# Patient Record
Sex: FEMALE | Race: BLACK OR AFRICAN AMERICAN | NOT HISPANIC OR LATINO | URBAN - METROPOLITAN AREA
[De-identification: names, ages, dates, MRNs, and addresses within clinical notes are randomized per-mention and may not be internally consistent; named-entity substitution may affect disease eponyms.]

---

## 2017-03-31 ENCOUNTER — HOSPITAL ENCOUNTER (OUTPATIENT)
Dept: GENERAL RADIOLOGY | Facility: HOSPITAL | Age: 72
Discharge: HOME | End: 2017-03-31
Attending: INTERNAL MEDICINE | Admitting: INTERNAL MEDICINE

## 2017-04-01 ENCOUNTER — CONVERSION ENCOUNTER (OUTPATIENT)
Dept: VASCULAR SURGERY | Facility: CLINIC | Age: 72
End: 2017-04-01

## 2017-04-01 LAB
ALBUMIN SERPL-MCNC: 3.7 G/DL (ref 3.6–5.1)
ALBUMIN/GLOB SERPL: ABNORMAL {RATIO} (ref 1–2.5)
ALP SERPL-CCNC: 88 UNITS/L (ref 33–130)
ALT SERPL-CCNC: 17 UNITS/L (ref 6–29)
ANA SER QL IA: NEGATIVE
AST SERPL-CCNC: 19 UNITS/L (ref 10–35)
BILIRUB SERPL-MCNC: 0.9 MG/DL (ref 0.2–1.2)
BUN SERPL-MCNC: 28 MG/DL (ref 7–25)
BUN/CREAT SERPL: ABNORMAL (ref 6–22)
CALCIUM SERPL-MCNC: 9.3 MG/DL (ref 8.6–10.4)
CHLORIDE SERPL-SCNC: 100 MMOL/L (ref 98–110)
CO2 CONTENT VENOUS: 33 MMOL/L (ref 20–31)
CONV TOTAL PROTEIN: 6.4 G/DL (ref 6.1–8.1)
CREAT UR-MCNC: 1.02 MG/DL (ref 0.6–0.93)
CRP SERPL-MCNC: 0.29 MG/DL
ERYTHROCYTE [DISTWIDTH] IN BLOOD BY AUTOMATED COUNT: 14.3 % (ref 11–15)
GLOBULIN UR ELPH-MCNC: ABNORMAL G/DL (ref 1.9–3.7)
GLUCOSE SERPL-MCNC: 158 MG/DL (ref 65–99)
HCT VFR BLD AUTO: 37.7 % (ref 35–45)
HGB BLD-MCNC: 12.3 G/DL (ref 11.7–15.5)
MCH RBC QN AUTO: 27.6 PG (ref 27–33)
MCHC RBC AUTO-ENTMCNC: ABNORMAL % (ref 32–36)
MCV RBC AUTO: 84.5 FL (ref 80–100)
PLATELET # BLD AUTO: ABNORMAL 10*3/MM3 (ref 140–400)
PMV BLD AUTO: 9.6 FL (ref 7.5–12.5)
POTASSIUM SERPL-SCNC: 4.8 MMOL/L (ref 3.5–5.3)
RBC # BLD AUTO: ABNORMAL 10*6/MM3 (ref 3.8–5.1)
SODIUM SERPL-SCNC: 139 MMOL/L (ref 135–146)
WBC # BLD AUTO: ABNORMAL K/UL (ref 3.8–10.8)

## 2017-04-05 ENCOUNTER — HOSPITAL ENCOUNTER (OUTPATIENT)
Dept: MAMMOGRAPHY | Facility: HOSPITAL | Age: 72
Discharge: HOME | End: 2017-04-05
Attending: INTERNAL MEDICINE | Admitting: INTERNAL MEDICINE

## 2018-02-05 ENCOUNTER — HOSPITAL ENCOUNTER (OUTPATIENT)
Dept: PREADMISSION TESTING | Facility: HOSPITAL | Age: 73
Discharge: HOME | End: 2018-02-05
Attending: SURGERY | Admitting: SURGERY

## 2018-02-05 LAB
ANION GAP SERPL CALC-SCNC: 14.8 MMOL/L (ref 10–20)
BASOPHILS # BLD AUTO: 0.1 10*3/UL (ref 0–0.2)
BASOPHILS NFR BLD AUTO: 1 % (ref 0–2)
BUN SERPL-MCNC: 29 MG/DL (ref 8–20)
BUN/CREAT SERPL: 24.2 (ref 5.4–26.2)
CALCIUM SERPL-MCNC: 9.4 MG/DL (ref 8.9–10.3)
CHLORIDE SERPL-SCNC: 95 MMOL/L (ref 101–111)
CONV CO2: 30 MMOL/L (ref 22–32)
CREAT UR-MCNC: 1.2 MG/DL (ref 0.4–1)
DIFFERENTIAL METHOD BLD: (no result)
EOSINOPHIL # BLD AUTO: 0.4 10*3/UL (ref 0–0.3)
EOSINOPHIL # BLD AUTO: 5 % (ref 0–3)
ERYTHROCYTE [DISTWIDTH] IN BLOOD BY AUTOMATED COUNT: 13.7 % (ref 11.5–14.5)
GLUCOSE SERPL-MCNC: 271 MG/DL (ref 65–99)
HCT VFR BLD AUTO: 39.5 % (ref 35–49)
HGB BLD-MCNC: 13.1 G/DL (ref 12–15)
LYMPHOCYTES # BLD AUTO: 2.5 10*3/UL (ref 0.8–4.8)
LYMPHOCYTES NFR BLD AUTO: 33 % (ref 18–42)
MCH RBC QN AUTO: 28.2 PG (ref 26–32)
MCHC RBC AUTO-ENTMCNC: 33.1 G/DL (ref 32–36)
MCV RBC AUTO: 85.2 FL (ref 80–94)
MONOCYTES # BLD AUTO: 0.4 10*3/UL (ref 0.1–1.3)
MONOCYTES NFR BLD AUTO: 6 % (ref 2–11)
NEUTROPHILS # BLD AUTO: 4.1 10*3/UL (ref 2.3–8.6)
NEUTROPHILS NFR BLD AUTO: 55 % (ref 50–75)
NRBC BLD AUTO-RTO: 0 /100{WBCS}
NRBC/RBC NFR BLD MANUAL: 0 10*3/UL
PLATELET # BLD AUTO: 249 10*3/UL (ref 150–450)
PMV BLD AUTO: 8.8 FL (ref 7.4–10.4)
POTASSIUM SERPL-SCNC: 3.8 MMOL/L (ref 3.6–5.1)
RBC # BLD AUTO: 4.63 10*6/UL (ref 4–5.4)
SODIUM SERPL-SCNC: 136 MMOL/L (ref 136–144)
WBC # BLD AUTO: 7.5 10*3/UL (ref 4.5–11.5)

## 2018-02-08 ENCOUNTER — HOSPITAL ENCOUNTER (OUTPATIENT)
Dept: PREOP | Facility: HOSPITAL | Age: 73
Setting detail: HOSPITAL OUTPATIENT SURGERY
Discharge: HOME | End: 2018-02-08
Attending: SURGERY | Admitting: SURGERY

## 2018-02-08 LAB
GLUCOSE BLD-MCNC: 106 MG/DL (ref 70–105)
GLUCOSE BLD-MCNC: 73 MG/DL (ref 70–105)

## 2023-10-18 ENCOUNTER — APPOINTMENT (OUTPATIENT)
Dept: CT IMAGING | Facility: HOSPITAL | Age: 78
End: 2023-10-18
Payer: MEDICARE

## 2023-10-18 ENCOUNTER — APPOINTMENT (OUTPATIENT)
Dept: CARDIOLOGY | Facility: HOSPITAL | Age: 78
End: 2023-10-18
Payer: MEDICARE

## 2023-10-18 ENCOUNTER — APPOINTMENT (OUTPATIENT)
Dept: GENERAL RADIOLOGY | Facility: HOSPITAL | Age: 78
End: 2023-10-18
Payer: MEDICARE

## 2023-10-18 ENCOUNTER — HOSPITAL ENCOUNTER (INPATIENT)
Facility: HOSPITAL | Age: 78
LOS: 4 days | Discharge: NURSING, REHAB OR LONG-TERM CARE | End: 2023-10-23
Attending: EMERGENCY MEDICINE | Admitting: HOSPITALIST
Payer: MEDICARE

## 2023-10-18 DIAGNOSIS — N17.9 AKI (ACUTE KIDNEY INJURY): ICD-10-CM

## 2023-10-18 DIAGNOSIS — M25.551 ACUTE RIGHT HIP PAIN: Primary | ICD-10-CM

## 2023-10-18 PROBLEM — I10 ESSENTIAL HYPERTENSION: Chronic | Status: ACTIVE | Noted: 2023-10-18

## 2023-10-18 PROBLEM — E11.9 TYPE 2 DIABETES MELLITUS: Chronic | Status: ACTIVE | Noted: 2023-10-18

## 2023-10-18 LAB
ANION GAP SERPL CALCULATED.3IONS-SCNC: 12 MMOL/L (ref 5–15)
BACTERIA UR QL AUTO: ABNORMAL /HPF
BASOPHILS # BLD AUTO: 0.1 10*3/MM3 (ref 0–0.2)
BASOPHILS NFR BLD AUTO: 0.7 % (ref 0–1.5)
BILIRUB UR QL STRIP: NEGATIVE
BUN SERPL-MCNC: 62 MG/DL (ref 8–23)
BUN/CREAT SERPL: 27.9 (ref 7–25)
CALCIUM SPEC-SCNC: 9.4 MG/DL (ref 8.6–10.5)
CHLORIDE SERPL-SCNC: 101 MMOL/L (ref 98–107)
CLARITY UR: CLEAR
CO2 SERPL-SCNC: 26 MMOL/L (ref 22–29)
COLOR UR: YELLOW
CREAT SERPL-MCNC: 2.22 MG/DL (ref 0.57–1)
CRP SERPL-MCNC: 0.59 MG/DL (ref 0–0.5)
DEPRECATED RDW RBC AUTO: 46.4 FL (ref 37–54)
EGFRCR SERPLBLD CKD-EPI 2021: 22.3 ML/MIN/1.73
EOSINOPHIL # BLD AUTO: 0.1 10*3/MM3 (ref 0–0.4)
EOSINOPHIL NFR BLD AUTO: 1.6 % (ref 0.3–6.2)
ERYTHROCYTE [DISTWIDTH] IN BLOOD BY AUTOMATED COUNT: 15 % (ref 12.3–15.4)
ERYTHROCYTE [SEDIMENTATION RATE] IN BLOOD: 30 MM/HR (ref 0–30)
FERRITIN SERPL-MCNC: 86.13 NG/ML (ref 13–150)
FOLATE SERPL-MCNC: 12.1 NG/ML (ref 4.78–24.2)
GLUCOSE BLDC GLUCOMTR-MCNC: 301 MG/DL (ref 70–105)
GLUCOSE BLDC GLUCOMTR-MCNC: 342 MG/DL (ref 70–105)
GLUCOSE BLDC GLUCOMTR-MCNC: 345 MG/DL (ref 70–105)
GLUCOSE SERPL-MCNC: 298 MG/DL (ref 65–99)
GLUCOSE UR STRIP-MCNC: ABNORMAL MG/DL
HBA1C MFR BLD: 9.3 % (ref 4.8–5.6)
HCT VFR BLD AUTO: 27.5 % (ref 34–46.6)
HGB BLD-MCNC: 9.3 G/DL (ref 12–15.9)
HGB UR QL STRIP.AUTO: NEGATIVE
HYALINE CASTS UR QL AUTO: ABNORMAL /LPF
IRON 24H UR-MRATE: 47 MCG/DL (ref 37–145)
KETONES UR QL STRIP: NEGATIVE
LEUKOCYTE ESTERASE UR QL STRIP.AUTO: NEGATIVE
LYMPHOCYTES # BLD AUTO: 1 10*3/MM3 (ref 0.7–3.1)
LYMPHOCYTES NFR BLD AUTO: 12.2 % (ref 19.6–45.3)
MCH RBC QN AUTO: 28 PG (ref 26.6–33)
MCHC RBC AUTO-ENTMCNC: 33.8 G/DL (ref 31.5–35.7)
MCV RBC AUTO: 82.8 FL (ref 79–97)
MONOCYTES # BLD AUTO: 0.7 10*3/MM3 (ref 0.1–0.9)
MONOCYTES NFR BLD AUTO: 8.5 % (ref 5–12)
NEUTROPHILS NFR BLD AUTO: 6 10*3/MM3 (ref 1.7–7)
NEUTROPHILS NFR BLD AUTO: 77 % (ref 42.7–76)
NITRITE UR QL STRIP: NEGATIVE
NRBC BLD AUTO-RTO: 0 /100 WBC (ref 0–0.2)
PH UR STRIP.AUTO: <=5 [PH] (ref 5–8)
PLATELET # BLD AUTO: 198 10*3/MM3 (ref 140–450)
PMV BLD AUTO: 9 FL (ref 6–12)
POTASSIUM SERPL-SCNC: 4.3 MMOL/L (ref 3.5–5.2)
PROT UR QL STRIP: ABNORMAL
RBC # BLD AUTO: 3.32 10*6/MM3 (ref 3.77–5.28)
RBC # UR STRIP: ABNORMAL /HPF
REF LAB TEST METHOD: ABNORMAL
SODIUM SERPL-SCNC: 139 MMOL/L (ref 136–145)
SP GR UR STRIP: 1.02 (ref 1–1.03)
SQUAMOUS #/AREA URNS HPF: ABNORMAL /HPF
URATE SERPL-MCNC: 9.7 MG/DL (ref 2.4–5.7)
UROBILINOGEN UR QL STRIP: ABNORMAL
VIT B12 BLD-MCNC: 694 PG/ML (ref 211–946)
WBC # UR STRIP: ABNORMAL /HPF
WBC NRBC COR # BLD: 7.8 10*3/MM3 (ref 3.4–10.8)
WHOLE BLOOD HOLD COAG: NORMAL

## 2023-10-18 PROCEDURE — G0378 HOSPITAL OBSERVATION PER HR: HCPCS

## 2023-10-18 PROCEDURE — 97162 PT EVAL MOD COMPLEX 30 MIN: CPT

## 2023-10-18 PROCEDURE — 86140 C-REACTIVE PROTEIN: CPT | Performed by: EMERGENCY MEDICINE

## 2023-10-18 PROCEDURE — 99285 EMERGENCY DEPT VISIT HI MDM: CPT

## 2023-10-18 PROCEDURE — 25010000002 MORPHINE PER 10 MG: Performed by: EMERGENCY MEDICINE

## 2023-10-18 PROCEDURE — 25010000002 HYDRALAZINE PER 20 MG: Performed by: EMERGENCY MEDICINE

## 2023-10-18 PROCEDURE — 83540 ASSAY OF IRON: CPT

## 2023-10-18 PROCEDURE — 82746 ASSAY OF FOLIC ACID SERUM: CPT

## 2023-10-18 PROCEDURE — 73502 X-RAY EXAM HIP UNI 2-3 VIEWS: CPT

## 2023-10-18 PROCEDURE — 25810000003 SODIUM CHLORIDE 0.9 % SOLUTION: Performed by: EMERGENCY MEDICINE

## 2023-10-18 PROCEDURE — 81001 URINALYSIS AUTO W/SCOPE: CPT | Performed by: EMERGENCY MEDICINE

## 2023-10-18 PROCEDURE — 36415 COLL VENOUS BLD VENIPUNCTURE: CPT

## 2023-10-18 PROCEDURE — P9612 CATHETERIZE FOR URINE SPEC: HCPCS

## 2023-10-18 PROCEDURE — 82607 VITAMIN B-12: CPT

## 2023-10-18 PROCEDURE — 83036 HEMOGLOBIN GLYCOSYLATED A1C: CPT

## 2023-10-18 PROCEDURE — 85652 RBC SED RATE AUTOMATED: CPT | Performed by: EMERGENCY MEDICINE

## 2023-10-18 PROCEDURE — 73700 CT LOWER EXTREMITY W/O DYE: CPT

## 2023-10-18 PROCEDURE — 84550 ASSAY OF BLOOD/URIC ACID: CPT | Performed by: EMERGENCY MEDICINE

## 2023-10-18 PROCEDURE — 85025 COMPLETE CBC W/AUTO DIFF WBC: CPT | Performed by: EMERGENCY MEDICINE

## 2023-10-18 PROCEDURE — 25010000002 HYDROMORPHONE 1 MG/ML SOLUTION: Performed by: EMERGENCY MEDICINE

## 2023-10-18 PROCEDURE — 25010000002 ONDANSETRON PER 1 MG: Performed by: EMERGENCY MEDICINE

## 2023-10-18 PROCEDURE — 25010000002 HYDRALAZINE PER 20 MG: Performed by: HOSPITALIST

## 2023-10-18 PROCEDURE — 82948 REAGENT STRIP/BLOOD GLUCOSE: CPT

## 2023-10-18 PROCEDURE — 63710000001 INSULIN LISPRO (HUMAN) PER 5 UNITS

## 2023-10-18 PROCEDURE — 80048 BASIC METABOLIC PNL TOTAL CA: CPT | Performed by: EMERGENCY MEDICINE

## 2023-10-18 PROCEDURE — 82728 ASSAY OF FERRITIN: CPT

## 2023-10-18 RX ORDER — SODIUM CHLORIDE 9 MG/ML
40 INJECTION, SOLUTION INTRAVENOUS AS NEEDED
Status: DISCONTINUED | OUTPATIENT
Start: 2023-10-18 | End: 2023-10-23 | Stop reason: HOSPADM

## 2023-10-18 RX ORDER — ALUMINA, MAGNESIA, AND SIMETHICONE 2400; 2400; 240 MG/30ML; MG/30ML; MG/30ML
15 SUSPENSION ORAL EVERY 6 HOURS PRN
Status: DISCONTINUED | OUTPATIENT
Start: 2023-10-18 | End: 2023-10-23 | Stop reason: HOSPADM

## 2023-10-18 RX ORDER — ACETAMINOPHEN 325 MG/1
650 TABLET ORAL EVERY 4 HOURS PRN
Status: DISCONTINUED | OUTPATIENT
Start: 2023-10-18 | End: 2023-10-23 | Stop reason: HOSPADM

## 2023-10-18 RX ORDER — ACETAMINOPHEN 650 MG/1
650 SUPPOSITORY RECTAL EVERY 4 HOURS PRN
Status: DISCONTINUED | OUTPATIENT
Start: 2023-10-18 | End: 2023-10-23 | Stop reason: HOSPADM

## 2023-10-18 RX ORDER — EZETIMIBE 10 MG/1
10 TABLET ORAL DAILY
COMMUNITY

## 2023-10-18 RX ORDER — ONDANSETRON 2 MG/ML
4 INJECTION INTRAMUSCULAR; INTRAVENOUS EVERY 6 HOURS PRN
Status: DISCONTINUED | OUTPATIENT
Start: 2023-10-18 | End: 2023-10-23 | Stop reason: HOSPADM

## 2023-10-18 RX ORDER — FLUTICASONE PROPIONATE 50 MCG
2 SPRAY, SUSPENSION (ML) NASAL DAILY
COMMUNITY

## 2023-10-18 RX ORDER — HYDRALAZINE HYDROCHLORIDE 100 MG/1
100 TABLET, FILM COATED ORAL DAILY
COMMUNITY

## 2023-10-18 RX ORDER — NICOTINE POLACRILEX 4 MG
15 LOZENGE BUCCAL
Status: DISCONTINUED | OUTPATIENT
Start: 2023-10-18 | End: 2023-10-23 | Stop reason: HOSPADM

## 2023-10-18 RX ORDER — DILTIAZEM HYDROCHLORIDE 240 MG/1
240 CAPSULE, COATED, EXTENDED RELEASE ORAL DAILY
Status: DISCONTINUED | OUTPATIENT
Start: 2023-10-18 | End: 2023-10-23 | Stop reason: HOSPADM

## 2023-10-18 RX ORDER — POLYETHYLENE GLYCOL 3350 17 G/17G
17 POWDER, FOR SOLUTION ORAL DAILY PRN
Status: DISCONTINUED | OUTPATIENT
Start: 2023-10-18 | End: 2023-10-23 | Stop reason: HOSPADM

## 2023-10-18 RX ORDER — SODIUM CHLORIDE 0.9 % (FLUSH) 0.9 %
10 SYRINGE (ML) INJECTION EVERY 12 HOURS SCHEDULED
Status: DISCONTINUED | OUTPATIENT
Start: 2023-10-18 | End: 2023-10-23 | Stop reason: HOSPADM

## 2023-10-18 RX ORDER — INSULIN LISPRO 100 [IU]/ML
2-7 INJECTION, SOLUTION INTRAVENOUS; SUBCUTANEOUS
Status: DISCONTINUED | OUTPATIENT
Start: 2023-10-18 | End: 2023-10-23 | Stop reason: HOSPADM

## 2023-10-18 RX ORDER — LEVOTHYROXINE SODIUM 0.05 MG/1
50 TABLET ORAL DAILY
COMMUNITY

## 2023-10-18 RX ORDER — ONDANSETRON 4 MG/1
4 TABLET, FILM COATED ORAL EVERY 6 HOURS PRN
Status: DISCONTINUED | OUTPATIENT
Start: 2023-10-18 | End: 2023-10-23 | Stop reason: HOSPADM

## 2023-10-18 RX ORDER — ACETAMINOPHEN 160 MG/5ML
650 SOLUTION ORAL EVERY 4 HOURS PRN
Status: DISCONTINUED | OUTPATIENT
Start: 2023-10-18 | End: 2023-10-23 | Stop reason: HOSPADM

## 2023-10-18 RX ORDER — DILTIAZEM HYDROCHLORIDE 240 MG/1
240 CAPSULE, COATED, EXTENDED RELEASE ORAL DAILY
COMMUNITY

## 2023-10-18 RX ORDER — LEVOTHYROXINE SODIUM 0.05 MG/1
50 TABLET ORAL
Status: DISCONTINUED | OUTPATIENT
Start: 2023-10-18 | End: 2023-10-23 | Stop reason: HOSPADM

## 2023-10-18 RX ORDER — DEXTROSE MONOHYDRATE 25 G/50ML
25 INJECTION, SOLUTION INTRAVENOUS
Status: DISCONTINUED | OUTPATIENT
Start: 2023-10-18 | End: 2023-10-23 | Stop reason: HOSPADM

## 2023-10-18 RX ORDER — IBUPROFEN 600 MG/1
1 TABLET ORAL
Status: DISCONTINUED | OUTPATIENT
Start: 2023-10-18 | End: 2023-10-23 | Stop reason: HOSPADM

## 2023-10-18 RX ORDER — HYDRALAZINE HYDROCHLORIDE 20 MG/ML
10 INJECTION INTRAMUSCULAR; INTRAVENOUS EVERY 6 HOURS PRN
Status: DISCONTINUED | OUTPATIENT
Start: 2023-10-18 | End: 2023-10-23 | Stop reason: HOSPADM

## 2023-10-18 RX ORDER — HYDRALAZINE HYDROCHLORIDE 25 MG/1
100 TABLET, FILM COATED ORAL DAILY
Status: DISCONTINUED | OUTPATIENT
Start: 2023-10-18 | End: 2023-10-23 | Stop reason: HOSPADM

## 2023-10-18 RX ORDER — FUROSEMIDE 40 MG/1
40 TABLET ORAL DAILY
COMMUNITY

## 2023-10-18 RX ORDER — MELOXICAM 15 MG/1
15 TABLET ORAL DAILY
COMMUNITY

## 2023-10-18 RX ORDER — ONDANSETRON 2 MG/ML
4 INJECTION INTRAMUSCULAR; INTRAVENOUS ONCE
Status: COMPLETED | OUTPATIENT
Start: 2023-10-18 | End: 2023-10-18

## 2023-10-18 RX ORDER — POTASSIUM CHLORIDE 750 MG/1
10 TABLET, FILM COATED, EXTENDED RELEASE ORAL DAILY
COMMUNITY

## 2023-10-18 RX ORDER — BISACODYL 10 MG
10 SUPPOSITORY, RECTAL RECTAL DAILY PRN
Status: DISCONTINUED | OUTPATIENT
Start: 2023-10-18 | End: 2023-10-23 | Stop reason: HOSPADM

## 2023-10-18 RX ORDER — LOSARTAN POTASSIUM 50 MG/1
100 TABLET ORAL DAILY
Status: DISCONTINUED | OUTPATIENT
Start: 2023-10-18 | End: 2023-10-18

## 2023-10-18 RX ORDER — AMOXICILLIN 250 MG
2 CAPSULE ORAL 2 TIMES DAILY
Status: DISCONTINUED | OUTPATIENT
Start: 2023-10-18 | End: 2023-10-23 | Stop reason: HOSPADM

## 2023-10-18 RX ORDER — SODIUM CHLORIDE 0.9 % (FLUSH) 0.9 %
10 SYRINGE (ML) INJECTION AS NEEDED
Status: DISCONTINUED | OUTPATIENT
Start: 2023-10-18 | End: 2023-10-23 | Stop reason: HOSPADM

## 2023-10-18 RX ORDER — HYDROCHLOROTHIAZIDE 25 MG/1
25 TABLET ORAL DAILY
COMMUNITY

## 2023-10-18 RX ORDER — SODIUM CHLORIDE 9 MG/ML
125 INJECTION, SOLUTION INTRAVENOUS CONTINUOUS
Status: DISCONTINUED | OUTPATIENT
Start: 2023-10-18 | End: 2023-10-19

## 2023-10-18 RX ORDER — CHOLECALCIFEROL (VITAMIN D3) 125 MCG
5 CAPSULE ORAL NIGHTLY PRN
Status: DISCONTINUED | OUTPATIENT
Start: 2023-10-18 | End: 2023-10-23 | Stop reason: HOSPADM

## 2023-10-18 RX ORDER — BISACODYL 5 MG/1
5 TABLET, DELAYED RELEASE ORAL DAILY PRN
Status: DISCONTINUED | OUTPATIENT
Start: 2023-10-18 | End: 2023-10-23 | Stop reason: HOSPADM

## 2023-10-18 RX ORDER — LOSARTAN POTASSIUM 100 MG/1
100 TABLET ORAL DAILY
COMMUNITY

## 2023-10-18 RX ORDER — MORPHINE SULFATE 2 MG/ML
2 INJECTION, SOLUTION INTRAMUSCULAR; INTRAVENOUS ONCE
Status: COMPLETED | OUTPATIENT
Start: 2023-10-18 | End: 2023-10-18

## 2023-10-18 RX ORDER — HYDRALAZINE HYDROCHLORIDE 20 MG/ML
10 INJECTION INTRAMUSCULAR; INTRAVENOUS ONCE
Status: COMPLETED | OUTPATIENT
Start: 2023-10-18 | End: 2023-10-18

## 2023-10-18 RX ADMIN — HYDROMORPHONE HYDROCHLORIDE 0.25 MG: 1 INJECTION, SOLUTION INTRAMUSCULAR; INTRAVENOUS; SUBCUTANEOUS at 08:53

## 2023-10-18 RX ADMIN — INSULIN LISPRO 5 UNITS: 100 INJECTION, SOLUTION INTRAVENOUS; SUBCUTANEOUS at 17:57

## 2023-10-18 RX ADMIN — SODIUM CHLORIDE 125 ML/HR: 9 INJECTION, SOLUTION INTRAVENOUS at 06:34

## 2023-10-18 RX ADMIN — Medication 5 MG: at 22:21

## 2023-10-18 RX ADMIN — HYDRALAZINE HYDROCHLORIDE 10 MG: 20 INJECTION INTRAMUSCULAR; INTRAVENOUS at 08:08

## 2023-10-18 RX ADMIN — Medication 10 ML: at 21:59

## 2023-10-18 RX ADMIN — HYDRALAZINE HYDROCHLORIDE 100 MG: 25 TABLET, FILM COATED ORAL at 13:24

## 2023-10-18 RX ADMIN — ONDANSETRON 4 MG: 2 INJECTION INTRAMUSCULAR; INTRAVENOUS at 04:38

## 2023-10-18 RX ADMIN — MORPHINE SULFATE 2 MG: 2 INJECTION, SOLUTION INTRAMUSCULAR; INTRAVENOUS at 04:38

## 2023-10-18 RX ADMIN — SENNOSIDES AND DOCUSATE SODIUM 2 TABLET: 50; 8.6 TABLET ORAL at 22:15

## 2023-10-18 RX ADMIN — DILTIAZEM HYDROCHLORIDE 240 MG: 240 CAPSULE, EXTENDED RELEASE ORAL at 13:24

## 2023-10-18 RX ADMIN — LEVOTHYROXINE SODIUM 50 MCG: 0.05 TABLET ORAL at 13:24

## 2023-10-18 RX ADMIN — HYDRALAZINE HYDROCHLORIDE 10 MG: 20 INJECTION INTRAMUSCULAR; INTRAVENOUS at 22:16

## 2023-10-18 RX ADMIN — HYDROMORPHONE HYDROCHLORIDE 0.25 MG: 1 INJECTION, SOLUTION INTRAMUSCULAR; INTRAVENOUS; SUBCUTANEOUS at 22:14

## 2023-10-18 RX ADMIN — HYDROMORPHONE HYDROCHLORIDE 0.25 MG: 1 INJECTION, SOLUTION INTRAMUSCULAR; INTRAVENOUS; SUBCUTANEOUS at 05:45

## 2023-10-18 RX ADMIN — INSULIN LISPRO 5 UNITS: 100 INJECTION, SOLUTION INTRAVENOUS; SUBCUTANEOUS at 21:59

## 2023-10-18 NOTE — THERAPY EVALUATION
Patient Name: Leona Garcia  : 1945    MRN: [id number]                              Today's Date: 10/18/2023       Admit Date: 10/18/2023    Visit Dx:     ICD-10-CM ICD-9-CM   1. Acute right hip pain  M25.551 719.45   2. JOSELITO (acute kidney injury)  N17.9 584.9     Patient Active Problem List   Diagnosis    Right hip pain    Essential hypertension    Type 2 diabetes mellitus     No past medical history on file.  No past surgical history on file.   General Information       Row Name 10/18/23 1640          Physical Therapy Time and Intention    Document Type evaluation  -MB     Mode of Treatment physical therapy  -MB       Row Name 10/18/23 1640          General Information    Patient Profile Reviewed yes  -MB     Prior Level of Function independent:;all household mobility;community mobility;gait;transfer;ADL's;dressing;bathing;home management  -MB     Existing Precautions/Restrictions no known precautions/restrictions  -MB     Barriers to Rehab none identified  -MB       Row Name 10/18/23 1640          Living Environment    People in Home sibling(s)  -MB       Row Name 10/18/23 1640          Home Main Entrance    Number of Stairs, Main Entrance none  -MB       Row Name 10/18/23 1640          Stairs Within Home, Primary    Number of Stairs, Within Home, Primary none  -MB       Row Name 10/18/23 1640          Cognition    Orientation Status (Cognition) oriented x 4  -MB       Row Name 10/18/23 1640          Safety Issues, Functional Mobility    Impairments Affecting Function (Mobility) balance;endurance/activity tolerance;pain;strength  -MB               User Key  (r) = Recorded By, (t) = Taken By, (c) = Cosigned By      Initials Name Provider Type    Anant Fowler, PT Physical Therapist                   Mobility       Row Name 10/18/23 1641          Bed Mobility    Bed Mobility bed mobility (all) activities  -MB     All Activities, [place] (Bed Mobility) maximum assist (25% patient effort);1 person  assist  -MB     Comment, (Bed Mobility) max A supine>sit EOB  -MB       Row Name 10/18/23 1641          Sit-Stand Transfer    Sit-Stand [place] (Transfers) maximum assist (25% patient effort)  -MB     Assistive Device (Sit-Stand Transfers) walker, 4-wheeled  -MB       Row Name 10/18/23 1641          Gait/Stairs (Locomotion)    [place] Level (Gait) not tested  -MB               User Key  (r) = Recorded By, (t) = Taken By, (c) = Cosigned By      Initials Name Provider Type    Anant Fowler, PT Physical Therapist                   Obj/Interventions       Row Name 10/18/23 1641          Range of Motion Comprehensive    General Range of Motion no range of motion deficits identified  -MB       Row Name 10/18/23 1641          Strength Comprehensive (MMT)    Comment, General Manual Muscle Testing (MMT) Assessment BLE Strength limited by body habitus, functionally 3/5  -MB       Row Name 10/18/23 1641          Balance    Balance Assessment sitting static balance;sitting dynamic balance;sit to stand dynamic balance;standing static balance  -MB     Static Sitting Balance standby assist  -MB     Dynamic Sitting Balance contact guard  -MB     Sit to Stand Dynamic Balance minimal assist  -MB     Static Standing Balance contact guard;minimal assist  -MB       Row Name 10/18/23 1641          Sensory Assessment (Somatosensory)    Sensory Assessment (Somatosensory) sensation intact  -MB               User Key  (r) = Recorded By, (t) = Taken By, (c) = Cosigned By      Initials Name Provider Type    Anant Fowler, PT Physical Therapist                   Goals/Plan       Row Name 10/18/23 1643          Bed Mobility Goal 1 (PT)    Activity/Assistive Device (Bed Mobility Goal 1, PT) bed mobility activities, all  -MB     [place] Level/Cues Needed (Bed Mobility Goal 1, PT) minimum assist (75% or more patient effort)  -MB     Time Frame (Bed Mobility Goal 1, PT) long term goal (LTG);2 weeks  -MB       Row Name 10/18/23  1643          Transfer Goal 1 (PT)    Activity/Assistive Device (Transfer Goal 1, PT) transfers, all;walker, rolling  -MB     [place] Level/Cues Needed (Transfer Goal 1, PT) minimum assist (75% or more patient effort)  -MB     Time Frame (Transfer Goal 1, PT) long term goal (LTG);2 weeks  -MB       Row Name 10/18/23 1643          Gait Training Goal 1 (PT)    Activity/Assistive Device (Gait Training Goal 1, PT) gait (walking locomotion);walker, rolling  -MB     [place] Level (Gait Training Goal 1, PT) contact guard required  -MB     Distance (Gait Training Goal 1, PT) 10  -MB     Time Frame (Gait Training Goal 1, PT) long term goal (LTG);2 weeks  -MB       Row Name 10/18/23 1643          Therapy Assessment/Plan (PT)    Planned Therapy Interventions (PT) balance training;bed mobility training;gait training;home exercise program;neuromuscular re-education;patient/family education;strengthening;transfer training  -MB               User Key  (r) = Recorded By, (t) = Taken By, (c) = Cosigned By      Initials Name Provider Type    Anant Fowler, PT Physical Therapist                   Clinical Impression       Row Name 10/18/23 1642          Pain    Pretreatment Pain Rating 3/10  -MB     Posttreatment Pain Rating 10/10  -MB     Pain Location - Side/Orientation Right  -MB     Pain Location - hip  -MB     Pain Intervention(s) Repositioned;Emotional support;Nursing Notified  -MB       Row Name 10/18/23 1648 10/18/23 1642       Plan of Care Review    Plan of Care Reviewed With -- patient  -MB    Progress -- no change  -MB    Outcome Evaluation Pt is a 77 y/o F admitted to [facility] on [facility]/18/23 with complaints of R hip pain beginning when she stood up at [facility] last evening. She reports that she heard a pop when standing up and pain is worse with movements, but she has been able to ambulate since the injury. XR Hip (-) for acute osseous abnormality and CT Lower Extremity (+) for potential cellulitis and mild hip OA. Pt is  currently with sister, half the week at her house and the other half the week at sister's house, no steps to complete at either place. At baseline, she reports being IND with household mobility, community ambulation, and ADLs with rollator. Pt is currently AAOx4, complaining of 3/10 pain in the R hip at rest. She completes bed mobility max A and STS transfer max A, mainly due to severe pain in the R hip. When sitting back onto the bed, pt screams in 10/10 pain. Ambulation was not able to be attempted this session as WB increases pain levels. PT is far below functional IND baseline and is no safe to d/c back home in this condition. She will req SNF at d/c until pain levels improve to where she can tolerate ambulation and standing. PT will follow during stay and will req additional assist to help with transfers and bed mobility.  -MB --      Row Name 10/18/23 1642          Therapy Assessment/Plan (PT)    Rehab Potential (PT) fair, will monitor progress closely  -MB     Criteria for Skilled Interventions Met (PT) yes;meets criteria  -MB     Therapy Frequency (PT) 3 times/wk  -MB     Predicted Duration of Therapy Intervention (PT) until d/c  -MB       Row Name 10/18/23 1642          Vital Signs    Pre Systolic BP Rehab 167  -MB     Pre Treatment Diastolic BP 66  -MB     Intra Systolic BP Rehab 176  -MB     Intra Treatment Diastolic BP 66  -MB     Pre Patient Position Supine  -MB     Intra Patient Position Standing  -MB     Post Patient Position Supine  -MB       Row Name 10/18/23 1647          Positioning and Restraints    Pre-Treatment Position in bed  -MB     Post Treatment Position bed  -MB     In Bed notified nsg;supine;call light within reach;encouraged to call for assist;exit alarm on  -MB               User Key  (r) = Recorded By, (t) = Taken By, (c) = Cosigned By      Initials Name Provider Type    Anant Fowler, PT Physical Therapist                   Outcome Measures       Row Name 10/18/23 7112 10/18/23  1100       How much help from another person do you currently need...    Turning from your back to your side while in flat bed without using bedrails? 2  -MB 2  -KB    Moving from lying on back to sitting on the side of a flat bed without bedrails? 2  -MB 2  -KB    Moving to and from a bed to a chair (including a wheelchair)? 2  -MB 2  -KB    Standing up from a chair using your arms (e.g., wheelchair, bedside chair)? 2  -MB 2  -KB    Climbing 3-5 steps with a railing? 1  -MB 2  -KB    To walk in hospital room? 1  -MB 2  -KB    AM-PAC 6 Clicks Score (PT) 10  -MB 12  -KB    Highest level of mobility 4 --> Transferred to chair/commode  -MB 4 --> Transferred to chair/commode  -KB              User Key  (r) = Recorded By, (t) = Taken By, (c) = Cosigned By      Initials Name Provider Type     Theodora Nolasco, RN Registered Nurse    Anant Fowler, PT Physical Therapist                                 Physical Therapy Education       Title: PT OT SLP Therapies (Done)       Topic: Physical Therapy (Done)       Point: Mobility training (Done)       Learning Progress Summary             Patient Acceptance, E,TB, VU by MB at 10/18/2023 1644                         Point: Body mechanics (Done)       Learning Progress Summary             Patient Acceptance, E,TB, VU by MB at 10/18/2023 1644                         Point: Precautions (Done)       Learning Progress Summary             Patient Acceptance, E,TB, VU by MB at 10/18/2023 1644                                         User Key       Initials Effective Dates Name Provider Type Discipline    MB 06/06/23 -  Anant Del Toro, ARTHUR Physical Therapist PT                  PT Recommendation and Plan  Planned Therapy Interventions (PT): balance training, bed mobility training, gait training, home exercise program, neuromuscular re-education, patient/family education, strengthening, transfer training  Plan of Care Reviewed With: patient  Progress: no change  Outcome Evaluation: Pt is a  77 y/o F admitted to [facility] on 10/18/23 with complaints of R hip pain beginning when she stood up at [facility] last evening. She reports that she heard a pop when standing up and pain is worse with movements, but she has been able to ambulate since the injury. XR Hip (-) for acute osseous abnormality and CT Lower Extremity (+) for potential cellulitis and mild hip OA. Pt is currently with sister, half the week at her house and the other half the week at sister's house, no steps to complete at either place. At baseline, she reports being IND with household mobility, community ambulation, and ADLs with rollator. Pt is currently AAOx4, complaining of 3/10 pain in the R hip at rest. She completes bed mobility max A and STS transfer max A, mainly due to severe pain in the R hip. When sitting back onto the bed, pt screams in 10/10 pain. Ambulation was not able to be attempted this session as WB increases pain levels. PT is far below functional IND baseline and is no safe to d/c back home in this condition. She will req SNF at d/c until pain levels improve to where she can tolerate ambulation and standing. PT will follow during stay and will req additional assist to help with transfers and bed mobility.     Time Calculation:   PT Evaluation Complexity  History, PT Evaluation Complexity: 1-2 personal factors and/or comorbidities  Examination of Body Systems (PT Eval Complexity): total of 3 or more elements  Clinical Presentation (PT Evaluation Complexity): evolving  Clinical Decision Making (PT Evaluation Complexity): moderate complexity  Overall Complexity (PT Evaluation Complexity): moderate complexity     PT Charges       Row Name 10/18/23 7484             Time Calculation    Start Time 1500  -MB      Stop Time 1533  -MB      Time Calculation (min) 33 min  -MB      PT Received On 10/18/23  -MB      PT - Next Appointment 10/20/23  -MB      PT Goal Re-Cert Due Date 11/01/23  -MB         Time Calculation- PT    Total Timed Code  Minutes- PT 0 minute(s)  -MB                User Key  (r) = Recorded By, (t) = Taken By, (c) = Cosigned By      Initials Name Provider Type    Anant Fowler, PT Physical Therapist                  Therapy Charges for Today       Code Description Service Date Service Provider Modifiers Qty    82100361144 HC PT EVAL MOD COMPLEXITY 4 10/18/2023 Anant Del Toro, PT GP 1            PT G-Codes  AM-PAC 6 Clicks Score (PT): 10  PT Discharge Summary  Anticipated Discharge Disposition (PT): skilled nursing facility    Anant Del Toro PT  10/18/2023

## 2023-10-18 NOTE — CASE MANAGEMENT/SOCIAL WORK
Continued Stay Note   [facility]     Patient Name: Leona Garcia  MRN: [id number]  Today's Date: 10/18/2023    Admit Date: 10/18/2023    Plan: [facility] Accepted, No precert or PASRR required   Discharge Plan       Row Name 10/18/23 1716       Plan    Plan [facility] Accepted, No precert or PASRR required    Plan Comments Per Liaison Michelle [facility] has accepted patient and Liaison will come see patient tomorrow in hospital d/c barriers: Slmi Nielsen RN

## 2023-10-18 NOTE — H&P
"    [facility] Medicine Services  History & Physical    Patient Name: Leona Garcia  : 1945  MRN: [id number]  Primary Care Physician:  Alfred Liriano MD  Date of admission: 10/18/2023  Date and Time of Service: 10/18/2023     Subjective      Chief Complaint: hip pain     History of Present Illness: Leona Garcia is a 78 y.o. female with past medical history of hypertension, type 2 diabetes mellitus, hypothyroidism who [facility] complaining of right hip pain. She reports pain started when she attempted to stand at [employer] last night. She states she heard a \"pop\" when the pain started. She denies any falls, recent known injury to hip or prior injury/surgeries. She states the pain is constant and worse with movement.      In the ED, hip/pelvis x-ray interpreted by radiologist showed no acute osseous abnormality.  Mild to moderate osteoarthritic changes are present.  Trochanteric enthesopathy is present consistent with gluteal tendinopathy.  All vitals stable on admission except /74. All labs unremarkable except BUN 62, creatinine 2.22, EGFR 22.3, glucose 298, uric acid 9.7, hemoglobin 9.3, hematocrit 27.5.  UA unremarkable.  Patient received 2 mg IV morphine, 4 mg IV Zofran in ED. Hospitalist was contacted to admit patient for further care and management.          12 point ROS reviewed and negative except as mentioned above.      Personal History     No past medical history on file.    No past surgical history on file.    Family History: family history is not on file. Otherwise pertinent FHx was reviewed and not pertinent to current issue.    Social History:      Home Medications:  Prior to Admission Medications       None              Allergies:  No Known Allergies    Objective      Vitals:   Temp:  [96.8 °F (36 °C)] 96.8 °F (36 °C)  Heart Rate:  [61-75] 74  Resp:  [20] 20  BP: (155-209)/() 181/69  Flow (L/min):  [2] 2    Physical " Exam  Vitals reviewed.   Constitutional:       General: She is awake.   HENT:      Head: Normocephalic and atraumatic.      Mouth/Throat:      Mouth: Mucous membranes are moist.      Pharynx: Oropharynx is clear.   Eyes:      Extraocular Movements: Extraocular movements intact.      Pupils: Pupils are equal, round, and reactive to light.   Cardiovascular:      Rate and Rhythm: Normal rate and regular rhythm.      Pulses: Normal pulses.      Heart sounds: Normal heart sounds.   Pulmonary:      Effort: Pulmonary effort is normal.      Breath sounds: Wheezing present.   Abdominal:      General: Bowel sounds are normal.      Palpations: Abdomen is soft.   Musculoskeletal:      Cervical back: Normal range of motion and neck supple.      Right lower leg: Edema present.      Left lower leg: Edema present.      Comments: Decreased ROM to right lower extremity due to pain   Skin:     Capillary Refill: Capillary refill takes 2 to 3 seconds.   Neurological:      General: No focal deficit present.      Mental Status: She is alert and oriented to person, place, and time.   Psychiatric:         Mood and Affect: Mood normal.         Behavior: Behavior normal. Behavior is cooperative.          Result Review    Result Review:  I have personally reviewed the results from the time of this admission to 10/18/2023 10:56 EDT and agree with these findings:  []  Laboratory  []  Microbiology  []  Radiology  []  EKG/Telemetry   []  Cardiology/Vascular   []  Pathology  []  Old records  []  Other:  Most notable findings include:     In the ED, hip/pelvis x-ray interpreted by radiologist showed no acute osseous abnormality.  Mild to moderate osteoarthritic changes are present.  Trochanteric enthesopathy is present consistent with gluteal tendinopathy.  All vitals stable on admission except /74. All labs unremarkable except BUN 62, creatinine 2.22, EGFR 22.3, glucose 298, uric acid 9.7, hemoglobin 9.3, hematocrit 27.5.  UA unremarkable.   Patient received 2 mg IV morphine, 4 mg IV Zofran in ED. Hospitalist was contacted to admit patient for further care and management.   Assessment & Plan        Active Hospital Problems:  Active Hospital Problems    Diagnosis     **Right hip pain     Essential hypertension     Type 2 diabetes mellitus      Plan:     Right hip pain   - XR hip/pelvis interpreted by radiologist showed no acute osseous abnormality.  Mild to moderate osteoarthritic changes present. Trochanteric enthesopathy is present consistent with gluteal tendinopathy  - Uric acid 9.7  - Fall precautions  - Pain and antiemetics as needed  - Ortho consulted     JOSELITO on CKD  - BUN 62, Creatinine 2.22 (per available records last creatinine 1.2 02/05/2018)  - eGFR 22.3  - UA reviewed  - Avoid nephrotoxic agents   - Monitor BMP and I's and O's   - hold lasix for now  - Nephrology consulted >>> Followed by Dr. Lee outpatient    HTN   - /74 on admission  - monitor BP while admitted  - resume cardizem, hydralazine  - losartan held due to JOSELITO, resume pending further recommendations by Nephrology    Type 2 diabetes mellitus  - Glucose on admission 298  - Check Hemoglobin A1c  - Start SSI  - resume home NPH  - Glucose checks ACHS    Anemia  - likely of chronic disease  - Hgb 9.3, Hct 27.5  - no overt or active signs of bleeding  - Check anemia profile     Hypothyroidism  - resume synthroid    DVT prophylaxis:  Mechanical DVT prophylaxis orders are present.    I have seen and examined the patient myself. I have provided a substantial portion of the care of this patient. I personally provided more than half of the total time dedicated to the treatment of this patient. I discussed the case with the patient and or family. Please note the following additions and my physical exam findings.   GENERAL: The patient is well developed and nontoxic.  HEENT: Nonicteric sclerae, PERRLA, EOMI. Oropharynx clear. Moist mucous membranes. Conjunctivae appear well  perfused.  CHEST: Chest wall is nontender.  HEART: Regular rate and rhythm without murmurs. No MRG  LUNGS: Clear to auscultation bilaterally. No WRR  ABDOMEN: Soft, positive bowel sounds, nontender, no organomegaly.  RECTAL: Deferred.  SKIN: No rash, no excessive bruising, petechiae, or purpura.  NEUROLOGIC: Cranial nerves II-XII intact without motor/sensory deficit     CODE STATUS:    Code Status (Patient has no pulse and is not breathing): CPR (Attempt to Resuscitate)  Medical Interventions (Patient has pulse or is breathing): Full Support    Admission Status:  I believe this patient meets observation status.    I discussed the patient's findings and my recommendations with patient and family.    Signature: Electronically signed by ORVILLE Chao, 10/18/23, 10:56 EDT.  [facility] Hospitalist Team

## 2023-10-18 NOTE — PLAN OF CARE
Problem: Pain Acute  Goal: Acceptable Pain Control and Functional Ability  Outcome: Ongoing, Progressing             Problem: Fall Injury Risk  Goal: Absence of Fall and Fall-Related Injury  Outcome: Ongoing, Progressing  Intervention: Promote Injury-Free Environment  Recent Flowsheet Documentation  Taken 10/18/2023 1100 by Theodora Nolasco, RN  Safety Promotion/Fall Prevention: safety round/check completed     Problem: Dysrhythmia  Goal: Normalized Cardiac Rhythm  Outcome: Ongoing, Progressing

## 2023-10-18 NOTE — CONSULTS
INITIAL CONSULT NOTE      Patient Name: Leona Garcia  : 1945  MRN: [id number]  Primary Care Physician: Alfred Liriano MD  Date of admission: 10/18/2023    Patient Care Team:  Alfred Liriano MD as PCP - General (Family Medicine)        Reason for Consult:       Acute renal failure  Subjective   History of Present Illness:   Chief Complaint:   Chief Complaint   Patient presents with    Hip Pain     HISTORY:  Leona Garcia is a 78 y.o. female with past medical history of hypertension, diabetes mellitus, hypothyroidism, with CKD stage III A2B with last creatinine 1.5 when she saw me in my office at the 2023 her next appointment was on  next week,. who presents with mainly admitted because of pain in the hip as she heard a pop while moving from her wheelchair to get into her Chair x-ray did not show any acute fracture and CAT scan is also unremarkable for any acute fracture but patient is being admitted because of acute increasing creatinine from baseline of 1.5 now 2.2.          Review of systems:  All ROS unremarable  Constitutional: No fever, no chills, no lethargy, no weakness.  HEENT:  No headache, otalgia, itchy eyes, nasal discharge or sore throat.  Cardiac:  No chest pain, dyspnea, orthopnea or PND.  Chest:              No cough, phlegm or wheezing.  Abdomen:  No abdominal pain, nausea or vomiting.  Neuro:  Weakness recently  :   No hematuria, no pyuria, no dysuria, no flank pain.  ROS was otherwise negative except as mentioned in the [place].       Personal History:     Past Medical History: No past medical history on file.    Surgical History:    No past surgical history on file.    Family History: family history is not on file. Otherwise pertinent FHx was reviewed and unremarkable.     Social History:      Medications:  Prior to Admission medications    Medication Sig Start Date End Date Taking? Authorizing Provider   dilTIAZem CD (CARDIZEM CD) 240 MG 24 hr capsule  Take 1 capsule by mouth Daily.   Yes Miriam Baldwin MD   ezetimibe (ZETIA) 10 MG tablet Take 1 tablet by mouth Daily.   Yes Miriam Baldwin MD   fluticasone (FLONASE) 50 MCG/ACT nasal spray 2 sprays into the nostril(s) as directed by provider Daily.   Yes Miriam Baldwin MD   furosemide (LASIX) 40 MG tablet Take 1 tablet by mouth Daily.   Yes Miriam Baldwin MD   hydrALAZINE (APRESOLINE) 100 MG tablet Take 1 tablet by mouth Daily.   Yes Miriam Baldwin MD   hydroCHLOROthiazide (HYDRODIURIL) 25 MG tablet Take 1 tablet by mouth Daily.   Yes Miriam Baldwin MD   insulin NPH-insulin regular (humuLIN 70/30,novoLIN 70/30) (70-30) 100 UNIT/ML injection Inject 20 Units under the skin into the appropriate area as directed Every Morning. And inject 10 units under the skin at bedtime   Yes Miriam Baldwin MD   levothyroxine (SYNTHROID, LEVOTHROID) 50 MCG tablet Take 1 tablet by mouth Daily.   Yes Miriam Baldwin MD   losartan (COZAAR) 100 MG tablet Take 1 tablet by mouth Daily.   Yes Miriam Baldwin MD   meloxicam (MOBIC) 15 MG tablet Take 1 tablet by mouth Daily.   Yes Miriam Baldwin MD   potassium chloride 10 MEQ CR tablet Take 1 tablet by mouth Daily.   Yes Miriam Baldwin MD     Scheduled Meds:dilTIAZem CD, 240 mg, Oral, Daily  hydrALAZINE, 100 mg, Oral, Daily  insulin lispro, 2-7 Units, Subcutaneous, 4x Daily AC & at Bedtime  insulin NPH-insulin regular, 20 Units, Subcutaneous, QAM  levothyroxine, 50 mcg, Oral, Q AM  senna-docusate sodium, 2 tablet, Oral, BID  sodium chloride, 10 mL, Intravenous, Q12H      Continuous Infusions:sodium chloride, 125 mL/hr, Last Rate: 125 mL/hr (10/18/23 0843)      PRN Meds:  acetaminophen **OR** acetaminophen **OR** acetaminophen    aluminum-magnesium hydroxide-simethicone    senna-docusate sodium **AND** polyethylene glycol **AND** bisacodyl **AND** bisacodyl    dextrose    dextrose    glucagon (human recombinant)     hydrALAZINE    HYDROmorphone    melatonin    ondansetron **OR** ondansetron    sodium chloride    sodium chloride  Allergies:  No Known Allergies    Objective   Exam:     Vital Signs  Temp:  [96.8 °F (36 °C)-97.4 °F (36.3 °C)] 97.4 °F (36.3 °C)  Heart Rate:  [61-75] 69  Resp:  [18-20] 18  BP: (120-209)/() 177/75  SpO2:  [90 %-100 %] 95 %  on  Flow (L/min):  [2] 2;   Device (Oxygen Therapy): nasal cannula  Body mass index is 43.42 kg/m².  EXAM  General:  elderly obese  female in no acute distress.    Head:      Normocephalic and atraumatic.    Eyes:      PERRL/EOM intact, conjunctivae and sclerae clear without nystagmus.    Neck:      No masses, thyromegaly,  trachea central   Lungs:    Clear bilaterally to auscultation.    Heart:      Regular rate and rhythm, no murmur no gallop  Abd:        Soft, nontender, not distended, bowel sounds positive, no shifting dullness.  Msk:        No deformity or scoliosis noted of thoracic or lumbar spine.    Pulses:   Pulses normal in all 4 extremities.    Extremities:        No cyanosis or clubbing--+ edema.    Neuro:    No focal deficits.   alert oriented x3  Skin:       Intact without lesions or rashes.    Psych:    Alert and cooperative; normal mood and affect; normal attention span       Results Review:  I have personally reviewed most recent Data :  BMP @LABRCNT(creatinine:10)  CBC    Results from last 7 days   Lab Units 10/18/23  0413   WBC 10*3/mm3 7.80   HEMOGLOBIN g/dL 9.3*   PLATELETS 10*3/mm3 198     CMP   Results from last 7 days   Lab Units 10/18/23  0413   SODIUM mmol/L 139   POTASSIUM mmol/L 4.3   CHLORIDE mmol/L 101   CO2 mmol/L 26.0   BUN mg/dL 62*   CREATININE mg/dL 2.22*   GLUCOSE mg/dL 298*     ABG      CT Lower Extremity Right Without Contrast    Result Date: 10/18/2023  Impression: 1.Subcutaneous edema along the lateral right pelvis and anterior-lateral proximal right thigh which could be related to contusion or cellulitis. 2.No definite acute osseous  abnormality is identified on this limited exam. If there is persistent clinical concern for an occult fracture or soft tissue injury, MRI could be performed for further evaluation. 3.Mild hip osteoarthritis. 4.Degenerative changes at the sacroiliac joints. Electronically Signed: Vlad Lopez  10/18/2023 10:52 AM EDT  Workstation ID: [id number]    XR Hip With or Without Pelvis 2 - 3 View Right    Result Date: 10/18/2023  Impression: No acute osseous abnormality. Mild to moderate osteoarthritic changes are present. Trochanteric enthesopathy is present consistent with gluteal tendinopathy. Electronically Signed: Karen Sweet MD  10/18/2023 4:52 AM EDT  Workstation ID: [id number]           Assessment & Plan   Assessment and Plan:         Right hip pain    Essential hypertension    Type 2 diabetes mellitus    ASSESSMENT:  Acute kidney injury in a patient with chronic kidney disease stage III  Hypertension  Diabetes millitus type 2  Hip pain         PLAN :     JOSELITO: etiology likley due to diuretics ,ARB , NSAIDS  Hold ARB  F/u with renal US if needed   Dc meloxicam at thsi time might be causing some increase in creatinine   Repeat labs AM      Chu Lee MD  [facility]  10/18/2023  14:07 EDT

## 2023-10-18 NOTE — ED PROVIDER NOTES
Subjective   History of Present Illness  78-year-old female complains of severe right lateral hip pain, worse with movement onset around 11:30 PM at [facility].  Patient states she went to stand up and felt a pop in her right lateral hip.  Patient denies any back pain, no abdominal pain, no weakness or numbness or fever.      Review of Systems   Constitutional: Negative.    Gastrointestinal: Negative.    Musculoskeletal:         As per HPI   Neurological: Negative.        No past medical history on file.    No Known Allergies    No past surgical history on file.    No family history on file.    Social History     Socioeconomic History    Marital status:            Objective   Physical Exam  Constitutional:       Comments: Morbidly obese 78-year-old female in moderate pain distress, otherwise alert and appropriate   HENT:      Head: Normocephalic and atraumatic.      Mouth/Throat:      Mouth: Mucous membranes are moist.      Pharynx: Oropharynx is clear.   Cardiovascular:      Rate and Rhythm: Normal rate and regular rhythm.      Pulses: Normal pulses.   Pulmonary:      Effort: Pulmonary effort is normal.      Breath sounds: Normal breath sounds.   Abdominal:      General: Bowel sounds are normal. There is no distension.      Palpations: Abdomen is soft.      Tenderness: There is no abdominal tenderness.   Musculoskeletal:      Comments: Bilateral lower extremity lymphedema with venous stasis, range of motion with hip without any reproduced pain in the groin but there is pain in the right lateral hip with movement, no overlying warmth or erythema appreciated, no tenderness over the lateral hip/greater trochanter   Skin:     General: Skin is warm and dry.      Capillary Refill: Capillary refill takes less than 2 seconds.   Neurological:      General: No focal deficit present.      Mental Status: She is oriented to person, place, and time.   Psychiatric:         Mood and Affect: Mood normal.         Behavior:  Behavior normal.         Procedures           ED Course                                           Medical Decision Making  Patient's pain has been difficult to control, patient had no pain relief with IV morphine and little relief with IV Dilaudid.  Unfortunately, patient has become hypoxic with this though is doing well with supplemental oxygen.  Records reviewed from [facility] where patient was admitted for pneumonia in May 2023.  5/5/2023, BUN 41, creatinine 1.9, GFR 33.  5/6/2023, hemoglobin 10.8.  Patient does appear to have a degree of acute kidney injury.  Given intractable pain, will observe in the hospital, gently hydrate, consult her nephrologist, consult orthopedist for further input.    Problems Addressed:  Acute right hip pain: complicated acute illness or injury  JOSELITO (acute kidney injury): complicated acute illness or injury    Amount and/or Complexity of Data Reviewed  Labs: ordered.  Radiology: ordered.    Risk  Prescription drug management.  Decision regarding hospitalization.        Final diagnoses:   Acute right hip pain   JOSELITO (acute kidney injury)       ED Disposition  ED Disposition       ED Disposition   Decision to Admit    Condition   --    Comment   Level of Care: Telemetry [5]   Admitting Physician: LUIS ANGEL ANGEL [[id number]]                 No follow-up provider specified.       Medication List      No changes were made to your prescriptions during this visit.            Jasiel Hoyos MD  10/19/23 0357

## 2023-10-18 NOTE — PLAN OF CARE
Goal Outcome Evaluation:  Plan of Care Reviewed With: patient        Progress: no change   Pt is a 79 y/o F admitted to [facility] on 10/18/23 with complaints of R hip pain beginning when she stood up at bingo last evening. She reports that she heard a pop when standing up and pain is worse with movements, but she has been able to ambulate since the injury. XR Hip (-) for acute osseous abnormality and CT Lower Extremity (+) for potential cellulitis and mild hip OA. Pt is currently with sister, half the week at her house and the other half the week at sister's house, no steps to complete at either place. At baseline, she reports being IND with household mobility, community ambulation, and ADLs with rollator. Pt is currently AAOx4, complaining of 3/10 pain in the R hip at rest. She completes bed mobility max A and STS transfer max A, mainly due to severe pain in the R hip. When sitting back onto the bed, pt screams in 10/10 pain. Ambulation was not able to be attempted this session as WB increases pain levels. PT is far below functional IND baseline and is no safe to d/c back home in this condition. She will req SNF at d/c until pain levels improve to where she can tolerate ambulation and standing. PT will follow during stay and will req additional assist to help with transfers and bed mobility.    Anticipated Discharge Disposition (PT): skilled nursing facility

## 2023-10-18 NOTE — DISCHARGE PLACEMENT REQUEST
"Chari Garcia (78 y.o. Female)       Date of Birth   1945    Social Security Number       Address   [address]  [place] [facility]    Home Phone   [phone]    MRN   [id number]       [facility]    Marital Status                               Admission Date   10/18/23    Admission Type   Emergency    Admitting Provider   Enrike Quijano MD    Attending Provider   Enrike Quijano MD    Department, Room/Bed   [facility] EMERGENCY DEPARTMENT, 23/23       Discharge Date       Discharge Disposition       Discharge Destination                                 Attending Provider: Enrike Quijano MD    Allergies: No Known Allergies    Isolation: None   Infection: None   Code Status: CPR    Ht: 167.6 cm (66\")   Wt: 122 kg (269 lb)    Admission Cmt: None   Principal Problem: Right hip pain [M25.551]                   Active Insurance as of 10/18/2023       Primary Coverage       Payor Plan Insurance Group Employer/Plan Group    MEDICARE MEDICARE A & B        Payor Plan Address Payor Plan Phone Number Payor Plan Fax Number Effective Dates    [address] [phone]  7/1/2011 - None Entered    [facility] [id number]         Subscriber Name Subscriber Birth Date Member ID       CHARI GARCIA 1945 [id number]               Secondary Coverage       Payor Plan Insurance Group Employer/Plan Group    AETNA COMMERCIAL AETNA [id number]       Payor Plan Address Payor Plan Phone Number Payor Plan Fax Number Effective Dates    [address] [phone]  12/1/2012 - None Entered    [facility] [address]         Subscriber Name Subscriber Birth Date Member ID       CHARI GARCIA 1945 [id number]                     Emergency Contacts        (Rel.) Home Phone Work Phone Mobile Phone    MOSES HUMPHREY (Sister) [phone] -- [phone]                "

## 2023-10-18 NOTE — CASE MANAGEMENT/SOCIAL WORK
Discharge Planning Assessment  [facility]     Patient Name: Leona Garcia  MRN: [id number]  Today's Date: 10/18/2023    Admit Date: 10/18/2023    Plan: Referral to [facility]hab pending acceptance, No precert or PASRR required   Discharge Needs Assessment       Row Name 10/18/23 1620       Living Environment    People in Home sibling(s)    Current Living Arrangements home    Potentially Unsafe Housing Conditions none    In the past 12 months has the electric, gas, oil, or water company threatened to shut off services in your home? No    Primary Care Provided by self    Provides Primary Care For no one    Able to Return to Prior Arrangements yes       Resource/Environmental Concerns    Resource/Environmental Concerns none    Transportation Concerns none       Food Insecurity    Within the past 12 months, you worried that your food would run out before you got the money to buy more. Never true    Within the past 12 months, the food you bought just didn't last and you didn't have money to get more. Never true       Transition Planning    Patient/Family Anticipates Transition to home with family    Patient/Family Anticipated Services at Transition none    Transportation Anticipated family or friend will provide       Discharge Needs Assessment    Equipment Currently Used at Home walker, rolling    Concerns to be Addressed denies needs/concerns at this time    Anticipated Changes Related to Illness none    Equipment Needed After Discharge none    Discharge Facility/Level of Care Needs nursing facility, skilled    Provided Post Acute Provider List? Yes    Post Acute Provider List Nursing Home    Provided Post Acute Provider Quality & Resource List? Yes    Post Acute Provider Quality and Resource List Nursing Home    Delivered To Patient    Method of Delivery In person                   Discharge Plan       Row Name 10/18/23 1622       Plan    Plan Referral to [facility] pending acceptance, No precert or PASRR required     Plan Comments Met with Patient at bedside Lives at home with sister. Patient does not drive but sister does. IADL's. PCP and Pharmacy verified, able to afford medications. Per Patient She was getting around just fine yesterday now she cannot walk today do to Pain. Patient [facility] referral sent to [facility] Pending review and aceptance. Also explained that patient may not have criteria for acute inpatient but she only want acute rehab referral at this time. d/c barriers: Ortho COnsult, Pain control                  Continued Care and Services - Admitted Since 10/18/2023       Destination       Service Provider Request Status Selected Services Address Phone Fax Patient Preferred    [facility] Pending - Request Sent N/A [id number] [facility] [place] [id number] [phone] --                  Expected Discharge Date and Time       Expected Discharge Date Expected Discharge Time    Oct 19, 2023            Demographic Summary       Row Name 10/18/23 1620       General Information    Admission Type observation    Arrived From emergency department    Required Notices Provided Observation Status Notice    Referral Source admission list    Reason for Consult discharge planning    Preferred Language English                   Functional Status       Row Name 10/18/23 1620       Functional Status    Usual Activity Tolerance good    Current Activity Tolerance good       Functional Status, IADL    Medications independent    Meal Preparation independent    Housekeeping independent    Laundry independent    Shopping assistive person       Mental Status    General Appearance WDL WDL       Mental Status Summary    Recent Changes in Mental Status/Cognitive Functioning no changes                            Patient Forms       Row Name 10/18/23 1633       Patient Forms    Important Message from Medicare (IMM) --  [place] 10/18 per michael Nielsen,  RN

## 2023-10-19 ENCOUNTER — APPOINTMENT (OUTPATIENT)
Dept: CARDIOLOGY | Facility: HOSPITAL | Age: 78
End: 2023-10-19
Payer: MEDICARE

## 2023-10-19 LAB
ALBUMIN SERPL-MCNC: 3.5 G/DL (ref 3.5–5.2)
ALBUMIN/GLOB SERPL: 1.3 G/DL
ALP SERPL-CCNC: 129 U/L (ref 39–117)
ALT SERPL W P-5'-P-CCNC: 57 U/L (ref 1–33)
ANION GAP SERPL CALCULATED.3IONS-SCNC: 10 MMOL/L (ref 5–15)
AST SERPL-CCNC: 34 U/L (ref 1–32)
BASOPHILS # BLD AUTO: 0 10*3/MM3 (ref 0–0.2)
BASOPHILS NFR BLD AUTO: 0.5 % (ref 0–1.5)
BH CV ECHO MEAS - ACS: 2.3 CM
BH CV ECHO MEAS - AI P1/2T: 1183 MSEC
BH CV ECHO MEAS - AO MAX PG: 15.4 MMHG
BH CV ECHO MEAS - AO MEAN PG: 7 MMHG
BH CV ECHO MEAS - AO ROOT DIAM: 3.2 CM
BH CV ECHO MEAS - AO V2 MAX: 196 CM/SEC
BH CV ECHO MEAS - AO V2 VTI: 46.8 CM
BH CV ECHO MEAS - AVA(I,D): 2.22 CM2
BH CV ECHO MEAS - EDV(CUBED): 103.8 ML
BH CV ECHO MEAS - EDV(MOD-SP4): 78.1 ML
BH CV ECHO MEAS - EF(MOD-BP): 55 %
BH CV ECHO MEAS - EF(MOD-SP4): 54.5 %
BH CV ECHO MEAS - ESV(CUBED): 24.4 ML
BH CV ECHO MEAS - ESV(MOD-SP4): 35.5 ML
BH CV ECHO MEAS - FS: 38.3 %
BH CV ECHO MEAS - IVS/LVPW: 1 CM
BH CV ECHO MEAS - IVSD: 1.1 CM
BH CV ECHO MEAS - LA DIMENSION: 4.3 CM
BH CV ECHO MEAS - LAT PEAK E' VEL: 7.8 CM/SEC
BH CV ECHO MEAS - LV DIASTOLIC VOL/BSA (35-75): 32.7 CM2
BH CV ECHO MEAS - LV MASS(C)D: 187.5 GRAMS
BH CV ECHO MEAS - LV MAX PG: 7 MMHG
BH CV ECHO MEAS - LV MEAN PG: 3 MMHG
BH CV ECHO MEAS - LV SYSTOLIC VOL/BSA (12-30): 14.9 CM2
BH CV ECHO MEAS - LV V1 MAX: 132 CM/SEC
BH CV ECHO MEAS - LV V1 VTI: 33 CM
BH CV ECHO MEAS - LVIDD: 4.7 CM
BH CV ECHO MEAS - LVIDS: 2.9 CM
BH CV ECHO MEAS - LVOT AREA: 3.1 CM2
BH CV ECHO MEAS - LVOT DIAM: 2 CM
BH CV ECHO MEAS - LVPWD: 1.1 CM
BH CV ECHO MEAS - MED PEAK E' VEL: 8.6 CM/SEC
BH CV ECHO MEAS - MR MAX PG: 81.7 MMHG
BH CV ECHO MEAS - MR MAX VEL: 452 CM/SEC
BH CV ECHO MEAS - MV A DUR: 0.11 SEC
BH CV ECHO MEAS - MV A MAX VEL: 94 CM/SEC
BH CV ECHO MEAS - MV DEC SLOPE: 984 CM/SEC2
BH CV ECHO MEAS - MV DEC TIME: 0.17 SEC
BH CV ECHO MEAS - MV E MAX VEL: 159 CM/SEC
BH CV ECHO MEAS - MV E/A: 1.69
BH CV ECHO MEAS - MV MAX PG: 13.7 MMHG
BH CV ECHO MEAS - MV MEAN PG: 5 MMHG
BH CV ECHO MEAS - MV P1/2T: 56.6 MSEC
BH CV ECHO MEAS - MV V2 VTI: 42.5 CM
BH CV ECHO MEAS - MVA(P1/2T): 3.9 CM2
BH CV ECHO MEAS - MVA(VTI): 2.44 CM2
BH CV ECHO MEAS - PA V2 MAX: 95.3 CM/SEC
BH CV ECHO MEAS - PULM A REVS DUR: 0.12 SEC
BH CV ECHO MEAS - PULM A REVS VEL: 30.5 CM/SEC
BH CV ECHO MEAS - PULM DIAS VEL: 47.2 CM/SEC
BH CV ECHO MEAS - PULM S/D: 0.83
BH CV ECHO MEAS - PULM SYS VEL: 39.2 CM/SEC
BH CV ECHO MEAS - RAP SYSTOLE: 8 MMHG
BH CV ECHO MEAS - RVOT DIAM: 2.2 CM
BH CV ECHO MEAS - RVSP: 56.4 MMHG
BH CV ECHO MEAS - SI(MOD-SP4): 17.8 ML/M2
BH CV ECHO MEAS - SV(LVOT): 103.7 ML
BH CV ECHO MEAS - SV(MOD-SP4): 42.6 ML
BH CV ECHO MEAS - TAPSE (>1.6): 2.7 CM
BH CV ECHO MEAS - TR MAX PG: 48.4 MMHG
BH CV ECHO MEAS - TR MAX VEL: 348 CM/SEC
BH CV ECHO MEASUREMENTS AVERAGE E/E' RATIO: 19.39
BH CV XLRA - RV BASE: 3.9 CM
BH CV XLRA - RV LENGTH: 7.7 CM
BH CV XLRA - RV MID: 3.5 CM
BH CV XLRA - TDI S': 9.9 CM/SEC
BILIRUB SERPL-MCNC: 0.3 MG/DL (ref 0–1.2)
BUN SERPL-MCNC: 62 MG/DL (ref 8–23)
BUN/CREAT SERPL: 29.8 (ref 7–25)
CALCIUM SPEC-SCNC: 9.1 MG/DL (ref 8.6–10.5)
CHLORIDE SERPL-SCNC: 102 MMOL/L (ref 98–107)
CO2 SERPL-SCNC: 27 MMOL/L (ref 22–29)
CREAT SERPL-MCNC: 2.08 MG/DL (ref 0.57–1)
DEPRECATED RDW RBC AUTO: 45.1 FL (ref 37–54)
EGFRCR SERPLBLD CKD-EPI 2021: 24.1 ML/MIN/1.73
EOSINOPHIL # BLD AUTO: 0.1 10*3/MM3 (ref 0–0.4)
EOSINOPHIL NFR BLD AUTO: 2.5 % (ref 0.3–6.2)
ERYTHROCYTE [DISTWIDTH] IN BLOOD BY AUTOMATED COUNT: 15.2 % (ref 12.3–15.4)
GLOBULIN UR ELPH-MCNC: 2.8 GM/DL
GLUCOSE BLDC GLUCOMTR-MCNC: 223 MG/DL (ref 70–105)
GLUCOSE BLDC GLUCOMTR-MCNC: 229 MG/DL (ref 70–105)
GLUCOSE BLDC GLUCOMTR-MCNC: 246 MG/DL (ref 70–105)
GLUCOSE BLDC GLUCOMTR-MCNC: 250 MG/DL (ref 70–105)
GLUCOSE SERPL-MCNC: 304 MG/DL (ref 65–99)
HCT VFR BLD AUTO: 29.2 % (ref 34–46.6)
HGB BLD-MCNC: 9.3 G/DL (ref 12–15.9)
LEFT ATRIUM VOLUME INDEX: 45.2 ML/M2
LYMPHOCYTES # BLD AUTO: 1 10*3/MM3 (ref 0.7–3.1)
LYMPHOCYTES NFR BLD AUTO: 17.8 % (ref 19.6–45.3)
MCH RBC QN AUTO: 27.1 PG (ref 26.6–33)
MCHC RBC AUTO-ENTMCNC: 31.8 G/DL (ref 31.5–35.7)
MCV RBC AUTO: 85.1 FL (ref 79–97)
MONOCYTES # BLD AUTO: 0.7 10*3/MM3 (ref 0.1–0.9)
MONOCYTES NFR BLD AUTO: 12.4 % (ref 5–12)
NEUTROPHILS NFR BLD AUTO: 3.8 10*3/MM3 (ref 1.7–7)
NEUTROPHILS NFR BLD AUTO: 66.8 % (ref 42.7–76)
NRBC BLD AUTO-RTO: 0 /100 WBC (ref 0–0.2)
PLATELET # BLD AUTO: 201 10*3/MM3 (ref 140–450)
PMV BLD AUTO: 9 FL (ref 6–12)
POTASSIUM SERPL-SCNC: 4.6 MMOL/L (ref 3.5–5.2)
PROT SERPL-MCNC: 6.3 G/DL (ref 6–8.5)
RBC # BLD AUTO: 3.43 10*6/MM3 (ref 3.77–5.28)
SINUS: 3.1 CM
SODIUM SERPL-SCNC: 139 MMOL/L (ref 136–145)
STJ: 2.1 CM
WBC NRBC COR # BLD: 5.8 10*3/MM3 (ref 3.4–10.8)

## 2023-10-19 PROCEDURE — 85025 COMPLETE CBC W/AUTO DIFF WBC: CPT

## 2023-10-19 PROCEDURE — 80053 COMPREHEN METABOLIC PANEL: CPT | Performed by: INTERNAL MEDICINE

## 2023-10-19 PROCEDURE — 63710000001 INSULIN ISOPHANE & REGULAR PER 5 UNITS

## 2023-10-19 PROCEDURE — 25010000002 ONDANSETRON PER 1 MG

## 2023-10-19 PROCEDURE — 25010000002 HYDRALAZINE PER 20 MG: Performed by: HOSPITALIST

## 2023-10-19 PROCEDURE — 93306 TTE W/DOPPLER COMPLETE: CPT

## 2023-10-19 PROCEDURE — 82948 REAGENT STRIP/BLOOD GLUCOSE: CPT

## 2023-10-19 PROCEDURE — 25010000002 DIPHENHYDRAMINE PER 50 MG: Performed by: HOSPITALIST

## 2023-10-19 PROCEDURE — 25010000002 HYDROMORPHONE 1 MG/ML SOLUTION: Performed by: EMERGENCY MEDICINE

## 2023-10-19 PROCEDURE — 25810000003 SODIUM CHLORIDE 0.9 % SOLUTION: Performed by: EMERGENCY MEDICINE

## 2023-10-19 PROCEDURE — 93306 TTE W/DOPPLER COMPLETE: CPT | Performed by: INTERNAL MEDICINE

## 2023-10-19 PROCEDURE — 63710000001 INSULIN LISPRO (HUMAN) PER 5 UNITS

## 2023-10-19 RX ORDER — DIPHENHYDRAMINE HYDROCHLORIDE 50 MG/ML
25 INJECTION INTRAMUSCULAR; INTRAVENOUS ONCE
Status: COMPLETED | OUTPATIENT
Start: 2023-10-19 | End: 2023-10-19

## 2023-10-19 RX ADMIN — HYDRALAZINE HYDROCHLORIDE 10 MG: 20 INJECTION INTRAMUSCULAR; INTRAVENOUS at 21:51

## 2023-10-19 RX ADMIN — SENNOSIDES AND DOCUSATE SODIUM 2 TABLET: 50; 8.6 TABLET ORAL at 08:13

## 2023-10-19 RX ADMIN — SENNOSIDES AND DOCUSATE SODIUM 2 TABLET: 50; 8.6 TABLET ORAL at 21:50

## 2023-10-19 RX ADMIN — INSULIN LISPRO 3 UNITS: 100 INJECTION, SOLUTION INTRAVENOUS; SUBCUTANEOUS at 21:50

## 2023-10-19 RX ADMIN — INSULIN LISPRO 4 UNITS: 100 INJECTION, SOLUTION INTRAVENOUS; SUBCUTANEOUS at 08:18

## 2023-10-19 RX ADMIN — INSULIN LISPRO 3 UNITS: 100 INJECTION, SOLUTION INTRAVENOUS; SUBCUTANEOUS at 12:30

## 2023-10-19 RX ADMIN — HYDROMORPHONE HYDROCHLORIDE 0.25 MG: 1 INJECTION, SOLUTION INTRAMUSCULAR; INTRAVENOUS; SUBCUTANEOUS at 16:51

## 2023-10-19 RX ADMIN — HYDROMORPHONE HYDROCHLORIDE 0.25 MG: 1 INJECTION, SOLUTION INTRAMUSCULAR; INTRAVENOUS; SUBCUTANEOUS at 23:22

## 2023-10-19 RX ADMIN — ONDANSETRON 4 MG: 2 INJECTION INTRAMUSCULAR; INTRAVENOUS at 09:54

## 2023-10-19 RX ADMIN — HYDRALAZINE HYDROCHLORIDE 100 MG: 25 TABLET, FILM COATED ORAL at 08:13

## 2023-10-19 RX ADMIN — LEVOTHYROXINE SODIUM 50 MCG: 0.05 TABLET ORAL at 05:10

## 2023-10-19 RX ADMIN — Medication 10 ML: at 08:14

## 2023-10-19 RX ADMIN — ACETAMINOPHEN 650 MG: 325 TABLET, FILM COATED ORAL at 18:16

## 2023-10-19 RX ADMIN — DILTIAZEM HYDROCHLORIDE 240 MG: 240 CAPSULE, EXTENDED RELEASE ORAL at 08:13

## 2023-10-19 RX ADMIN — INSULIN LISPRO 3 UNITS: 100 INJECTION, SOLUTION INTRAVENOUS; SUBCUTANEOUS at 18:11

## 2023-10-19 RX ADMIN — INSULIN HUMAN 20 UNITS: 100 INJECTION, SUSPENSION SUBCUTANEOUS at 08:18

## 2023-10-19 RX ADMIN — HYDROMORPHONE HYDROCHLORIDE 0.25 MG: 1 INJECTION, SOLUTION INTRAMUSCULAR; INTRAVENOUS; SUBCUTANEOUS at 05:10

## 2023-10-19 RX ADMIN — DIPHENHYDRAMINE HYDROCHLORIDE 25 MG: 50 INJECTION, SOLUTION INTRAMUSCULAR; INTRAVENOUS at 09:54

## 2023-10-19 RX ADMIN — SODIUM CHLORIDE 125 ML/HR: 9 INJECTION, SOLUTION INTRAVENOUS at 11:14

## 2023-10-19 RX ADMIN — Medication 10 ML: at 21:58

## 2023-10-19 NOTE — CONSULTS
Pt sad/ frustrated about hospitalization and injury. Anxious and hopeful for transition to rehab and then home. Not as comfortable due to having a room mate, but coping. Chp offered active listening and prayer as requested. Chp will continue to provide routine visits at least once weekly through admission. Additional support available as needed/ requested.

## 2023-10-19 NOTE — PROGRESS NOTES
PROGRESS NOTE      Patient Name: Leona Garcia  : 1945  MRN: [id number]  Primary Care Physician: Alfred Liriano MD  Date of admission: 10/18/2023    Patient Care Team:  Alfred Liriano MD as PCP - General (Family Medicine)        Subjective   Subjective:     Patient is feeling better no new complaints still complaining of arthritis hip pain  Review of systems:  All other review of system unremarkable      Allergies:  No Known Allergies    Objective   Exam:     Vital Signs  Temp:  [97.4 °F (36.3 °C)-97.9 °F (36.6 °C)] 97.8 °F (36.6 °C)  Heart Rate:  [67-69] 67  Resp:  [18-20] 20  BP: (120-196)/(49-75) 158/70  SpO2:  [95 %-96 %] 96 %  on  Flow (L/min):  [1.5-2] 1.5;   Device (Oxygen Therapy): nasal cannula  Body mass index is 49.07 kg/m².    General: Elderly Afro-American female in no acute distress.    Head:      Normocephalic and atraumatic.    Eyes:      PERRL/EOM intact, conjunctiva and sclera clear with out nystagmus.    Neck:      No masses, thyromegaly,  trachea central with normal respiratory effort   Lungs:    Clear bilaterally to auscultation.    Heart:      Regular rate and rhythm, no murmur no gallop  Abd:        Soft, nontender, not distended, bowel sounds positive, no shifting dullness   Pulses:   Pulses palpable  Extr:        No cyanosis or clubbing--+1 edema.    Neuro:    No focal deficits.   alert oriented x3  Skin:       Intact without lesions or rashes.    Psych:    Alert and cooperative; normal mood and affect; .      Results Review:  I have personally reviewed most recent Data :  CBC    Results from last 7 days   Lab Units 10/19/23  0155 10/18/23  0413   WBC 10*3/mm3 5.80 7.80   HEMOGLOBIN g/dL 9.3* 9.3*   PLATELETS 10*3/mm3 201 198     CMP   Results from last 7 days   Lab Units 10/19/23  0155 10/18/23  0413   SODIUM mmol/L 139 139   POTASSIUM mmol/L 4.6 4.3   CHLORIDE mmol/L 102 101   CO2 mmol/L 27.0 26.0   BUN mg/dL 62* 62*   CREATININE mg/dL 2.08* 2.22*   GLUCOSE  mg/dL 304* 298*   ALBUMIN g/dL 3.5  --    BILIRUBIN mg/dL 0.3  --    ALK PHOS U/L 129*  --    AST (SGOT) U/L 34*  --    ALT (SGPT) U/L 57*  --      ABG      CT Lower Extremity Right Without Contrast    Result Date: 10/18/2023  Impression: 1.Subcutaneous edema along the lateral right pelvis and anterior-lateral proximal right thigh which could be related to contusion or cellulitis. 2.No definite acute osseous abnormality is identified on this limited exam. If there is persistent clinical concern for an occult fracture or soft tissue injury, MRI could be performed for further evaluation. 3.Mild hip osteoarthritis. 4.Degenerative changes at the sacroiliac joints. Electronically Signed: Vlad Lopez  10/18/2023 10:52 AM EDT  Workstation ID: [id number]    XR Hip With or Without Pelvis 2 - 3 View Right    Result Date: 10/18/2023  Impression: No acute osseous abnormality. Mild to moderate osteoarthritic changes are present. Trochanteric enthesopathy is present consistent with gluteal tendinopathy. Electronically Signed: Karen Sweet MD  10/18/2023 4:52 AM EDT  Workstation ID: [id number]     Results for orders placed during the hospital encounter of 10/18/23    Adult Transthoracic Echo Complete w/ Color, Spectral and Contrast if Necessary Per Protocol    Interpretation Summary    Left ventricular systolic function is normal. Left ventricular ejection fraction appears to be 56 - 60%.    Left ventricular wall thickness is consistent with borderline concentric hypertrophy.    Estimated right ventricular systolic pressure from tricuspid regurgitation is markedly elevated (>55 mmHg).    Scheduled Meds:dilTIAZem CD, 240 mg, Oral, Daily  hydrALAZINE, 100 mg, Oral, Daily  insulin lispro, 2-7 Units, Subcutaneous, 4x Daily AC & at Bedtime  insulin NPH-insulin regular, 20 Units, Subcutaneous, QAM  levothyroxine, 50 mcg, Oral, Q AM  senna-docusate sodium, 2 tablet, Oral, BID  sodium chloride, 10 mL, Intravenous, Q12H      Continuous  Infusions:sodium chloride, 125 mL/hr, Last Rate: 125 mL/hr (10/19/23 1114)      PRN Meds:  acetaminophen **OR** acetaminophen **OR** acetaminophen    aluminum-magnesium hydroxide-simethicone    senna-docusate sodium **AND** polyethylene glycol **AND** bisacodyl **AND** bisacodyl    dextrose    dextrose    glucagon (human recombinant)    hydrALAZINE    HYDROmorphone    melatonin    ondansetron **OR** ondansetron    sodium chloride    sodium chloride    Assessment & Plan   Assessment and Plan:         Right hip pain    Essential hypertension    Type 2 diabetes mellitus    ASSESSMENT:  Acute kidney injury in a patient with chronic kidney disease stage III  Hypertension  Diabetes millitus type 2  Hip pain            PLAN :      JOSELITO: etiology likley due to diuretics ,ARB , NSAIDS  Will restart angiotensin receptor blocker in 1 to 2 days  As hemodynamics are better discontinue IV fluid  Hold ARB  May need low-dose diuretics by tomorrow morning  F/u with renal US if needed   Dc meloxicam at thsi time might be causing some increase in creatinine   Repeat labs AM             Electronically signed by Chu Lee MD,   [facility]  [phone]  10/19/2023  12:26 EDT

## 2023-10-19 NOTE — PLAN OF CARE
Problem: Pain Acute  Goal: Acceptable Pain Control and Functional Ability  Outcome: Ongoing, Progressing     Problem: Fall Injury Risk  Goal: Absence of Fall and Fall-Related Injury  Outcome: Ongoing, Progressing  Intervention: Promote Injury-Free Environment  Recent Flowsheet Documentation  Taken 10/19/2023 0200 by Aden Johnson LPN  Safety Promotion/Fall Prevention:   assistive device/personal items within reach   clutter free environment maintained   fall prevention program maintained   room organization consistent   safety round/check completed     Problem: Dysrhythmia  Goal: Normalized Cardiac Rhythm  Outcome: Ongoing, Progressing     Problem: Skin Injury Risk Increased  Goal: Skin Health and Integrity  Outcome: Ongoing, Progressing     Problem: Malnutrition  Goal: Improved Nutritional Intake  Outcome: Ongoing, Progressing     Problem: Diabetes Comorbidity  Goal: Blood Glucose Level Within Targeted Range  Outcome: Ongoing, Progressing   Goal Outcome Evaluation:            Pt admitted to unit and admission completed.  Vitals taken, No issues.  Will continue to monitor.

## 2023-10-19 NOTE — NURSING NOTE
Pt bp 198/71, patient administered scheduled 100mg of hydralazine. After administration , patient c/o dizziness and nausea and hard time swallowing. /70. MD at bedside. Benadryl 25mg IV once ordered and administered with zofran. Will continue to monitor.

## 2023-10-19 NOTE — SIGNIFICANT NOTE
10/19/23 1421   OTHER   Discipline occupational therapist   Rehab Time/Intention   Session Not Performed other (see comments)  (awaiting ortho consult)   Recommendation   OT - Next Appointment 10/20/23

## 2023-10-19 NOTE — CONSULTS
Diabetes Education    Patient Name:  Leona Garcia  YOB: 1945  MRN: [id number]  Admit Date:  10/18/2023    Consult received to teach bs monitoring. Pt off floor for testing. Will attempt follow up at later time.       Electronically signed by:  Cori May RN  10/19/23 13:56 EDT

## 2023-10-19 NOTE — CASE MANAGEMENT/SOCIAL WORK
Continued Stay Note  DENISE Gallagher     Patient Name: Leona Garcia  MRN: [id number]  Today's Date: 10/19/2023    Admit Date: 10/18/2023    Plan: [facility] accepted. No precert or PASRR required.   Discharge Plan       Row Name 10/19/23 1622       Plan    Plan [facility] accepted. No precert or PASRR required.    Patient/Family in Agreement with Plan yes    Plan Comments DC barriers: Renal following for acute renal failure. Ortho consulted but no notes in. Echo completed.             Megan Naegele, RN     Office Phone: [phone]  Office Cell: [phone]

## 2023-10-19 NOTE — PLAN OF CARE
Goal Outcome Evaluation:               Patient resting comfortably in bed. BP high this am, 100mg of hydralazine administered and patient then c/o of dizziness. MD aware. BP stable. No nursing concerns at this time

## 2023-10-19 NOTE — PROGRESS NOTES
[facility] Medicine Services   Daily Progress Note    Patient Name: Leona Garcia  : 1945  MRN: [id number]  Primary Care Physician:  Alfred Liriano MD  Date of admission: 10/18/2023  Date and Time of Service: 10/19/23 at 1:11pm      Subjective    Pt seen and examined  Today feeling extremely dizzy, her BP was  elevated and she was given hydralazin 100mg this morning;    Vitals:   Temp:  [97.1 °F (36.2 °C)-97.9 °F (36.6 °C)] 97.1 °F (36.2 °C)  Heart Rate:  [67-69] 67  Resp:  [18-20] 18  BP: (157-196)/(66-75) 157/66  Flow (L/min):  [1.5-2] 1.5    Physical Exam   GENERAL: The patient is well developed and nontoxic.  HEENT: Nonicteric sclerae, PERRLA, EOMI. Oropharynx clear. Moist mucous membranes. Conjunctivae appear well perfused.  CHEST: Chest wall is nontender.  HEART: Regular rate and rhythm without murmurs. No MRG  LUNGS: Clear to auscultation bilaterally. No WRR  ABDOMEN: Soft, positive bowel sounds, nontender, no organomegaly.  RECTAL: Deferred.  SKIN: No rash, no excessive bruising, petechiae, or purpura.  NEUROLOGIC: Cranial nerves II-XII intact without motor/sensory deficit     Result Review    Result Review:  I have personally reviewed the results from the time of this admission to 10/19/2023 13:11 EDT and agree with these findings:  [x]  Laboratory  []  Microbiology  [x]  Radiology  [x]  EKG/Telemetry   []  Cardiology/Vascular   []  Pathology  []  Old records  []  Other:  Most notable findings include          Assessment & Plan      Brief Patient Summary:  Leona Garcia is a 78 y.o. female who       dilTIAZem CD, 240 mg, Oral, Daily  hydrALAZINE, 100 mg, Oral, Daily  insulin lispro, 2-7 Units, Subcutaneous, 4x Daily AC & at Bedtime  insulin NPH-insulin regular, 20 Units, Subcutaneous, QAM  levothyroxine, 50 mcg, Oral, Q AM  senna-docusate sodium, 2 tablet, Oral, BID  sodium chloride, 10 mL, Intravenous, Q12H             Active Hospital Problems:  Active Hospital Problems     Diagnosis     **Right hip pain     Essential hypertension     Type 2 diabetes mellitus      Plan:   Right hip pain   - XR hip/pelvis interpreted by radiologist showed no acute osseous abnormality.  Mild to moderate osteoarthritic changes present. Trochanteric enthesopathy is present consistent with gluteal tendinopathy  - Uric acid 9.7  - Fall precautions  - Pain and antiemetics as needed  - Ortho consulted      JOSELITO on CKD  - BUN 62, Creatinine 2.22 (per available records last creatinine 1.2 02/05/2018)  - eGFR 22.3  - UA reviewed  - Avoid nephrotoxic agents   - Monitor BMP and I's and O's   - hold lasix for now  - Nephrology consulted >>> Followed by Dr. Lee outpatient     HTN   - /74 on admission  - monitor BP while admitted  -Will restart angiotensin receptor blocker in 1 to 2 days   -Hold ARB       Type 2 diabetes mellitus  - Glucose on admission 298  - Check Hemoglobin A1c  - Start SSI  - resume home NPH  - Glucose checks ACHS     Anemia  - likely of chronic disease  - Hgb 9.3, Hct 27.5  - no overt or active signs of bleeding  - Check anemia profile      Hypothyroidism  - resume synthroid    DVT prophylaxis:  Mechanical DVT prophylaxis orders are present.    CODE STATUS:    Code Status (Patient has no pulse and is not breathing): CPR (Attempt to Resuscitate)  Medical Interventions (Patient has pulse or is breathing): Full Support      Disposition:  I expect patient to be discharged home        Electronically signed by Enrike Quijano MD, 10/19/23, 13:11 EDT.  [facility] Hospitalist Team

## 2023-10-20 LAB
ANION GAP SERPL CALCULATED.3IONS-SCNC: 8 MMOL/L (ref 5–15)
BASOPHILS # BLD AUTO: 0 10*3/MM3 (ref 0–0.2)
BASOPHILS NFR BLD AUTO: 0.4 % (ref 0–1.5)
BUN SERPL-MCNC: 56 MG/DL (ref 8–23)
BUN/CREAT SERPL: 29 (ref 7–25)
CALCIUM SPEC-SCNC: 9.4 MG/DL (ref 8.6–10.5)
CHLORIDE SERPL-SCNC: 104 MMOL/L (ref 98–107)
CO2 SERPL-SCNC: 26 MMOL/L (ref 22–29)
CREAT SERPL-MCNC: 1.93 MG/DL (ref 0.57–1)
DEPRECATED RDW RBC AUTO: 48.6 FL (ref 37–54)
EGFRCR SERPLBLD CKD-EPI 2021: 26.4 ML/MIN/1.73
EOSINOPHIL # BLD AUTO: 0.1 10*3/MM3 (ref 0–0.4)
EOSINOPHIL NFR BLD AUTO: 1.2 % (ref 0.3–6.2)
ERYTHROCYTE [DISTWIDTH] IN BLOOD BY AUTOMATED COUNT: 16 % (ref 12.3–15.4)
GLUCOSE BLDC GLUCOMTR-MCNC: 214 MG/DL (ref 70–105)
GLUCOSE BLDC GLUCOMTR-MCNC: 245 MG/DL (ref 70–105)
GLUCOSE BLDC GLUCOMTR-MCNC: 249 MG/DL (ref 70–105)
GLUCOSE BLDC GLUCOMTR-MCNC: 257 MG/DL (ref 70–105)
GLUCOSE SERPL-MCNC: 229 MG/DL (ref 65–99)
HCT VFR BLD AUTO: 26.8 % (ref 34–46.6)
HGB BLD-MCNC: 8.8 G/DL (ref 12–15.9)
LYMPHOCYTES # BLD AUTO: 1.1 10*3/MM3 (ref 0.7–3.1)
LYMPHOCYTES NFR BLD AUTO: 21.2 % (ref 19.6–45.3)
MCH RBC QN AUTO: 28.6 PG (ref 26.6–33)
MCHC RBC AUTO-ENTMCNC: 32.9 G/DL (ref 31.5–35.7)
MCV RBC AUTO: 87.1 FL (ref 79–97)
MONOCYTES # BLD AUTO: 0.5 10*3/MM3 (ref 0.1–0.9)
MONOCYTES NFR BLD AUTO: 10.1 % (ref 5–12)
NEUTROPHILS NFR BLD AUTO: 3.3 10*3/MM3 (ref 1.7–7)
NEUTROPHILS NFR BLD AUTO: 67.1 % (ref 42.7–76)
NRBC BLD AUTO-RTO: 0 /100 WBC (ref 0–0.2)
PLATELET # BLD AUTO: 190 10*3/MM3 (ref 140–450)
PMV BLD AUTO: 8.8 FL (ref 6–12)
POTASSIUM SERPL-SCNC: 4.5 MMOL/L (ref 3.5–5.2)
RBC # BLD AUTO: 3.08 10*6/MM3 (ref 3.77–5.28)
SODIUM SERPL-SCNC: 138 MMOL/L (ref 136–145)
WBC NRBC COR # BLD: 5 10*3/MM3 (ref 3.4–10.8)

## 2023-10-20 PROCEDURE — 25010000002 HYDRALAZINE PER 20 MG: Performed by: HOSPITALIST

## 2023-10-20 PROCEDURE — 97530 THERAPEUTIC ACTIVITIES: CPT

## 2023-10-20 PROCEDURE — 97116 GAIT TRAINING THERAPY: CPT

## 2023-10-20 PROCEDURE — 25010000002 LABETALOL 5 MG/ML SOLUTION: Performed by: STUDENT IN AN ORGANIZED HEALTH CARE EDUCATION/TRAINING PROGRAM

## 2023-10-20 PROCEDURE — 99221 1ST HOSP IP/OBS SF/LOW 40: CPT | Performed by: ORTHOPAEDIC SURGERY

## 2023-10-20 PROCEDURE — 25010000002 HYDROMORPHONE 1 MG/ML SOLUTION: Performed by: EMERGENCY MEDICINE

## 2023-10-20 PROCEDURE — 85025 COMPLETE CBC W/AUTO DIFF WBC: CPT | Performed by: HOSPITALIST

## 2023-10-20 PROCEDURE — 63710000001 INSULIN LISPRO (HUMAN) PER 5 UNITS

## 2023-10-20 PROCEDURE — 63710000001 INSULIN ISOPHANE & REGULAR PER 5 UNITS

## 2023-10-20 PROCEDURE — 80048 BASIC METABOLIC PNL TOTAL CA: CPT | Performed by: HOSPITALIST

## 2023-10-20 PROCEDURE — 82948 REAGENT STRIP/BLOOD GLUCOSE: CPT

## 2023-10-20 PROCEDURE — 97166 OT EVAL MOD COMPLEX 45 MIN: CPT

## 2023-10-20 RX ORDER — LABETALOL HYDROCHLORIDE 5 MG/ML
10 INJECTION, SOLUTION INTRAVENOUS
Status: DISCONTINUED | OUTPATIENT
Start: 2023-10-20 | End: 2023-10-23 | Stop reason: HOSPADM

## 2023-10-20 RX ADMIN — DILTIAZEM HYDROCHLORIDE 240 MG: 240 CAPSULE, EXTENDED RELEASE ORAL at 08:48

## 2023-10-20 RX ADMIN — ACETAMINOPHEN 650 MG: 325 TABLET, FILM COATED ORAL at 17:57

## 2023-10-20 RX ADMIN — Medication 10 ML: at 08:51

## 2023-10-20 RX ADMIN — HYDRALAZINE HYDROCHLORIDE 10 MG: 20 INJECTION INTRAMUSCULAR; INTRAVENOUS at 21:02

## 2023-10-20 RX ADMIN — INSULIN LISPRO 3 UNITS: 100 INJECTION, SOLUTION INTRAVENOUS; SUBCUTANEOUS at 13:14

## 2023-10-20 RX ADMIN — INSULIN LISPRO 3 UNITS: 100 INJECTION, SOLUTION INTRAVENOUS; SUBCUTANEOUS at 17:58

## 2023-10-20 RX ADMIN — SENNOSIDES AND DOCUSATE SODIUM 2 TABLET: 50; 8.6 TABLET ORAL at 21:00

## 2023-10-20 RX ADMIN — HYDROMORPHONE HYDROCHLORIDE 0.25 MG: 1 INJECTION, SOLUTION INTRAMUSCULAR; INTRAVENOUS; SUBCUTANEOUS at 14:53

## 2023-10-20 RX ADMIN — SENNOSIDES AND DOCUSATE SODIUM 2 TABLET: 50; 8.6 TABLET ORAL at 08:48

## 2023-10-20 RX ADMIN — HYDROMORPHONE HYDROCHLORIDE 0.25 MG: 1 INJECTION, SOLUTION INTRAMUSCULAR; INTRAVENOUS; SUBCUTANEOUS at 21:02

## 2023-10-20 RX ADMIN — INSULIN HUMAN 20 UNITS: 100 INJECTION, SUSPENSION SUBCUTANEOUS at 06:19

## 2023-10-20 RX ADMIN — HYDRALAZINE HYDROCHLORIDE 25 MG: 25 TABLET, FILM COATED ORAL at 08:49

## 2023-10-20 RX ADMIN — INSULIN LISPRO 4 UNITS: 100 INJECTION, SOLUTION INTRAVENOUS; SUBCUTANEOUS at 21:01

## 2023-10-20 RX ADMIN — Medication 10 ML: at 21:03

## 2023-10-20 RX ADMIN — LABETALOL HYDROCHLORIDE 10 MG: 5 INJECTION, SOLUTION INTRAVENOUS at 03:11

## 2023-10-20 RX ADMIN — LEVOTHYROXINE SODIUM 50 MCG: 0.05 TABLET ORAL at 06:19

## 2023-10-20 RX ADMIN — INSULIN LISPRO 3 UNITS: 100 INJECTION, SOLUTION INTRAVENOUS; SUBCUTANEOUS at 08:49

## 2023-10-20 RX ADMIN — LABETALOL HYDROCHLORIDE 10 MG: 5 INJECTION, SOLUTION INTRAVENOUS at 22:38

## 2023-10-20 NOTE — CONSULTS
"     Patient ID: Leona Garcia is a 78 y.o. female.    Chief Complaint:    Chief Complaint   Patient presents with    Hip Pain       HPI:  This is a 78-year-old female who developed right hip pain after feeling a pop while walking in the last couple days.  She denies a fall.  She has diffuse pain over the anterior aspect of the hip with difficulty bearing weight  History reviewed. No pertinent past medical history.    History reviewed. No pertinent surgical history.    History reviewed. No pertinent family history.       Social History     Occupational History    Not on file   Tobacco Use    Smoking status: Never    Smokeless tobacco: Never   Vaping Use    Vaping Use: Never used   Substance and Sexual Activity    Alcohol use: Never    Drug use: Never    Sexual activity: Not Currently      Review of Systems   Cardiovascular:  Negative for chest pain.   Musculoskeletal:  Positive for arthralgias.       Objective:    /65 (BP Location: Left arm, Patient Position: Lying)   Pulse 82   Temp 98 °F (36.7 °C) (Oral)   Resp 16   Ht 167.6 cm (66\")   Wt (!) 145 kg (318 lb 9 oz)   SpO2 98%   BMI 51.42 kg/m²     Physical Examination:  Right hip demonstrates intact skin no redness or warmth mild tenderness over the anterior aspect of the hip flexor hip flexion 70 external rotation is about 50 degrees with mild pain in the groin  Sensory and motor exam are intact in all distributions. Dorsalis pedis and posterior tibialis pulses are palpable and capillary refill is less than two seconds to all digits.    Imaging:  CT and x-ray of the right hip demonstrate no fracture    Assessment:  Right hip strain    Plan:  Recommend continued supportive modalities physical therapy weightbearing as tolerated pain control per the primary service he has as needed    Disclaimer: Part of this note may be an electronic transcription/translation of spoken language to printed text using the Dragon Dictation System   "

## 2023-10-20 NOTE — PLAN OF CARE
Goal Outcome Evaluation:      Patient resting this shift. Patients BP elevated this shift, see MAR. Patient has no new complaints at this time. Patient is stable at this time.

## 2023-10-20 NOTE — THERAPY EVALUATION
"Patient Name: Leona Garcia  : 1945    MRN: [id number]                              Today's Date: 10/20/2023       Admit Date: 10/18/2023    Visit Dx:     ICD-10-CM ICD-9-CM   1. Acute right hip pain  M25.551 719.45   2. JOSELITO (acute kidney injury)  N17.9 584.9     Patient Active Problem List   Diagnosis    Right hip pain    Essential hypertension    Type 2 diabetes mellitus     History reviewed. No pertinent past medical history.  History reviewed. No pertinent surgical history.   General Information       Row Name 10/20/23 1513          OT Time and Intention    Document Type evaluation  -MS     Mode of Treatment occupational therapy  -MS       Row Name 10/20/23 1513          General Information    Patient Profile Reviewed yes  -MS     Prior Level of Function independent:;ADL's  sister provides transportation  -MS     Existing Precautions/Restrictions fall  -MS     Barriers to Rehab previous functional deficit  -MS       Row Name 10/20/23 1513          Occupational Profile    Reason for Services/Referral (Occupational Profile) Pt is a 77 y/o F admitted to [facility] 10/18/23 with c/o R hip pain, after standing \"heard a pop.\" XR R hip (-) acute abnormality, (+) mild to moderate arthritic changes, (+) trochanteric enthesopathy consistent with gluteal tendinopathy. CT LE (+) cellulitis related to lateral R pelvis and anterior-lateral proximal R thigh, (+) mild hip OA. Orthopedic recommends supportive modalities and WBAT. PMHx significant for HTN and DMII. At baseline pt resides with sister, 1/2 week at pt house, 1/2 week at sister's home. Pt does not drive but sister provides transportation. Pt typically independent with ADLs, uses rollator for mobility, and reports 0 falls. Pt reports her and pt assist each other as needed.  -MS     Environmental Supports and Barriers (Occupational Profile) supportive family  -MS       Row Name 10/20/23 1513          Living Environment    People in Home sibling(s)  -MS       Row " Name 10/20/23 1513          Home Main Entrance    Number of Stairs, Main Entrance other (see comments)  ramp entry for pt home, 0 OTONIEL for sister home  -MS       Row Name 10/20/23 1513          Stairs Within Home, Primary    Number of Stairs, Within Home, Primary none  -MS       Row Name 10/20/23 1513          Cognition    Orientation Status (Cognition) oriented x 4  -MS       Row Name 10/20/23 1513          Safety Issues, Functional Mobility    Impairments Affecting Function (Mobility) balance;endurance/activity tolerance;pain  -MS               User Key  (r) = Recorded By, (t) = Taken By, (c) = Cosigned By      Initials Name Provider Type    Maral Escobar, MARY Occupational Therapist                     Mobility/ADL's       Row Name 10/20/23 1518          Bed Mobility    Bed Mobility bed mobility (all) activities  -MS     All Activities, [place] (Bed Mobility) unable to assess  -MS     Comment, (Bed Mobility) pt up in chair upon arrival  -MS       Row Name 10/20/23 1518          Transfers    Transfers sit-stand transfer  -MS       Row Name 10/20/23 1518          Sit-Stand Transfer    Sit-Stand [place] (Transfers) minimum assist (75% patient effort)  -MS     Comment, (Sit-Stand Transfer) personal rollator  -MS       Row Name 10/20/23 1518          Activities of Daily Living    BADL Assessment/Intervention feeding  -MS       Row Name 10/20/23 1518          Mobility    Extremity Weight-bearing Status right lower extremity  -MS     Right Lower Extremity (Weight-bearing Status) weight-bearing as tolerated (WBAT)  -MS       Row Name 10/20/23 1518          Self-Feeding Assessment/Training    [place] Level (Feeding) feeding skills;independent  -MS     Position (Self-Feeding) supported sitting  -MS               User Key  (r) = Recorded By, (t) = Taken By, (c) = Cosigned By      Initials Name Provider Type    Maral Escobar, MARY Occupational Therapist                   Obj/Interventions       Row Name  10/20/23 1520          Sensory Assessment (Somatosensory)    Sensory Assessment (Somatosensory) UE sensation intact  -MS       Row Name 10/20/23 1520          Vision Assessment/Intervention    Visual Impairment/Limitations WFL  -MS       Row Name 10/20/23 1520          Range of Motion Comprehensive    General Range of Motion no range of motion deficits identified  -MS       Row Name 10/20/23 1520          Strength Comprehensive (MMT)    Comment, General Manual Muscle Testing (MMT) Assessment BUE grossly 4-/5  -MS       Row Name 10/20/23 1520          Balance    Balance Assessment sitting static balance;sitting dynamic balance;standing static balance;standing dynamic balance  -MS     Static Sitting Balance modified independence  -MS     Dynamic Sitting Balance modified independence  -MS     Position, Sitting Balance sitting in chair;supported  -MS     Static Standing Balance contact guard  -MS     Dynamic Standing Balance contact guard;minimal assist  -MS     Position/Device Used, Standing Balance supported  rollator  -MS               User Key  (r) = Recorded By, (t) = Taken By, (c) = Cosigned By      Initials Name Provider Type    MS Maral Narayan, OT Occupational Therapist                   Goals/Plan       Row Name 10/20/23 1524          Bed Mobility Goal 1 (OT)    Activity/Assistive Device (Bed Mobility Goal 1, OT) bed mobility activities, all  -MS     [place] Level/Cues Needed (Bed Mobility Goal 1, OT) modified independence  -MS     Time Frame (Bed Mobility Goal 1, OT) long term goal (LTG);2 weeks  -MS     Progress/Outcomes (Bed Mobility Goal 1, OT) new goal  -MS       Row Name 10/20/23 1524          Transfer Goal 1 (OT)    Activity/Assistive Device (Transfer Goal 1, OT) transfers, all  -MS     [place] Level/Cues Needed (Transfer Goal 1, OT) modified independence  -MS     Time Frame (Transfer Goal 1, OT) long term goal (LTG);2 weeks  -MS     Progress/Outcome (Transfer Goal 1, OT) new goal  -MS        "Row Name 10/20/23 1524          Dressing Goal 1 (OT)    Activity/Device (Dressing Goal 1, OT) dressing skills, all  -MS     [place]/Cues Needed (Dressing Goal 1, OT) minimum assist (75% or more patient effort)  -MS     Time Frame (Dressing Goal 1, OT) long term goal (LTG);2 weeks  -MS     Progress/Outcome (Dressing Goal 1, OT) new goal  -MS       Row Name 10/20/23 1524          Toileting Goal 1 (OT)    Activity/Device (Toileting Goal 1, OT) toileting skills, all  -MS     [place] Level/Cues Needed (Toileting Goal 1, OT) modified independence  -MS     Time Frame (Toileting Goal 1, OT) long term goal (LTG);2 weeks  -MS     Progress/Outcome (Toileting Goal 1, OT) new goal  -MS       Row Name 10/20/23 1524          Therapy Assessment/Plan (OT)    Planned Therapy Interventions (OT) activity tolerance training;adaptive equipment training;BADL retraining;functional balance retraining;IADL retraining;occupation/activity based interventions;passive ROM/stretching;patient/caregiver education/training;transfer/mobility retraining;strengthening exercise;ROM/therapeutic exercise  -MS               User Key  (r) = Recorded By, (t) = Taken By, (c) = Cosigned By      Initials Name Provider Type    Maral Escobar, MARY Occupational Therapist                   Clinical Impression       Row Name 10/20/23 1521          Pain Assessment    Additional Documentation Pain Scale: FACES Pre/Post-Treatment (Group)  -MS       Row Name 10/20/23 1521          Pain Scale: FACES Pre/Post-Treatment    Pain: FACES Scale, Pretreatment 2-->hurts little bit  -MS     Posttreatment Pain Rating 6-->hurts even more  -MS     Pain Location - Side/Orientation Right  -MS     Pain Location - hip  -MS       Row Name 10/20/23 1521          Plan of Care Review    Plan of Care Reviewed With patient  -MS     Progress no change  -MS     Outcome Evaluation Pt is a 79 y/o F admitted to [facility] 10/18/23 with c/o R hip pain, after standing \"heard a pop.\" XR R hip " (-) acute abnormality, (+) mild to moderate arthritic changes, (+) trochanteric enthesopathy consistent with gluteal tendinopathy. CT LE (+) cellulitis related to lateral R pelvis and anterior-lateral proximal R thigh, (+) mild hip OA. Orthopedic recommends supportive modalities and WBAT. PMHx significant for HTN and DMII. At baseline pt resides with sister, 1/2 week at pt house, 1/2 week at sister's home. Pt does not drive but sister provides transportation. Pt typically independent with ADLs, uses rollator for mobility, and reports 0 falls. Pt reports her and pt assist each other as needed. This date, pt A&Ox4, sititng up in chair and eating lunch upon arrival. Pt requires min A to come to standing from chair with rollator. Pt demos difficulty weight shifting and difficulty taking steps,d/t R hip pain, therefore further activity was deferred. Pt is far below baseline and is at high risk for falls. OT recommend SNF when medically approrpiate for d/c. OT will follow.  -MS       Row Name 10/20/23 1521          Therapy Assessment/Plan (OT)    Rehab Potential (OT) good, to achieve stated therapy goals  -MS     Criteria for Skilled Therapeutic Interventions Met (OT) yes;meets criteria;skilled treatment is necessary  -MS     Therapy Frequency (OT) 3 times/wk  -MS     Predicted Duration of Therapy Intervention (OT) until d/c  -MS       Row Name 10/20/23 1521          Therapy Plan Review/Discharge Plan (OT)    Anticipated Discharge Disposition (OT) skilled nursing facility  -MS       Row Name 10/20/23 1521          Vital Signs    O2 Delivery Pre Treatment supplemental O2  -MS     O2 Delivery Intra Treatment supplemental O2  -MS     O2 Delivery Post Treatment supplemental O2  -MS     Pre Patient Position Sitting  -MS     Intra Patient Position Standing  -MS     Post Patient Position Sitting  -MS     Recovery Time VSS  -MS       Row Name 10/20/23 1521          Positioning and Restraints    Pre-Treatment Position sitting in  chair/recliner  -MS     Post Treatment Position chair  -MS     In Chair notified nsg;sitting;call light within reach;encouraged to call for assist  -MS               User Key  (r) = Recorded By, (t) = Taken By, (c) = Cosigned By      Initials Name Provider Type    Maral Escobar OT Occupational Therapist                   Outcome Measures       Row Name 10/20/23 1526          How much help from another is currently needed...    Putting on and taking off regular lower body clothing? 2  -MS     Bathing (including washing, rinsing, and drying) 2  -MS     Toileting (which includes using toilet bed pan or urinal) 2  -MS     Putting on and taking off regular upper body clothing 4  -MS     Taking care of personal grooming (such as brushing teeth) 4  -MS     Eating meals 4  -MS     AM-PAC 6 Clicks Score (OT) 18  -MS       Row Name 10/20/23 0839          How much help from another person do you currently need...    Turning from your back to your side while in flat bed without using bedrails? 2  -ET     Moving from lying on back to sitting on the side of a flat bed without bedrails? 2  -ET     Moving to and from a bed to a chair (including a wheelchair)? 2  -ET     Standing up from a chair using your arms (e.g., wheelchair, bedside chair)? 2  -ET     Climbing 3-5 steps with a railing? 1  -ET     To walk in hospital room? 1  -ET     AM-PAC 6 Clicks Score (PT) 10  -ET     Highest level of mobility 4 --> Transferred to chair/commode  -ET       Row Name 10/20/23 1526          Functional Assessment    Outcome Measure Options AM-PAC 6 Clicks Daily Activity (OT)  -MS               User Key  (r) = Recorded By, (t) = Taken By, (c) = Cosigned By      Initials Name Provider Type    Maral Escobar OT Occupational Therapist    ET Meera Sahu RN Registered Nurse                    Occupational Therapy Education       Title: PT OT SLP Therapies (Done)       Topic: Occupational Therapy (Done)       Point: ADL training (Done)   "     Description:   Instruct learner(s) on proper safety adaptation and remediation techniques during self care or transfers.   Instruct in proper use of assistive devices.                  Learning Progress Summary             Patient Acceptance, E,TB, VU by MS at 10/20/2023 1527                         Point: Precautions (Done)       Description:   Instruct learner(s) on prescribed precautions during self-care and functional transfers.                  Learning Progress Summary             Patient Acceptance, E,TB, VU by MS at 10/20/2023 1527                         Point: Body mechanics (Done)       Description:   Instruct learner(s) on proper positioning and spine alignment during self-care, functional mobility activities and/or exercises.                  Learning Progress Summary             Patient Acceptance, E,TB, VU by MS at 10/20/2023 1527                                         User Key       Initials Effective Dates Name Provider Type Discipline    MS 07/13/22 -  Maral Narayan, MARY Occupational Therapist OT                  OT Recommendation and Plan  Planned Therapy Interventions (OT): activity tolerance training, adaptive equipment training, BADL retraining, functional balance retraining, IADL retraining, occupation/activity based interventions, passive ROM/stretching, patient/caregiver education/training, transfer/mobility retraining, strengthening exercise, ROM/therapeutic exercise  Therapy Frequency (OT): 3 times/wk  Plan of Care Review  Plan of Care Reviewed With: patient  Progress: no change  Outcome Evaluation: Pt is a 79 y/o F admitted [facility]/18/23 with c/o R hip pain, after standing \"heard a pop.\" XR R hip (-) acute abnormality, (+) mild to moderate arthritic changes, (+) trochanteric enthesopathy consistent with gluteal tendinopathy. CT LE (+) cellulitis related to lateral R pelvis and anterior-lateral proximal R thigh, (+) mild hip OA. Orthopedic recommends supportive modalities and WBAT. " PMHx significant for HTN and DMII. At baseline pt resides with sister, 1/2 week at pt house, 1/2 week at sister's home. Pt does not drive but sister provides transportation. Pt typically independent with ADLs, uses rollator for mobility, and reports 0 falls. Pt reports her and pt assist each other as needed. This date, pt A&Ox4, sititng up in chair and eating lunch upon arrival. Pt requires min A to come to standing from chair with rollator. Pt demos difficulty weight shifting and difficulty taking steps,d/t R hip pain, therefore further activity was deferred. Pt is far below baseline and is at high risk for falls. OT recommend SNF when medically approrpiate for d/c. OT will follow.     Time Calculation:                   Maral Narayan OT  10/20/2023

## 2023-10-20 NOTE — PLAN OF CARE
"Goal Outcome Evaluation:      Bed mobility - Mod-A supine to sit  Transfers - Mod-A and with rolling walker sit to stand to bedside chair.  Pt then transferred to bedside commode.   Ambulation - 12 feet Min-A and with rolling walker  wide lashell, pt slightly flexed forward at the hips, slow pace, lateral sway    Moderate Intensity Therapy recommended post-acute care. This is recommended as therapy feels the patient would require 3-4 days per week and wouldn't tolerate \"3 hour daily\" rehab intensity. SNF would be the preferred choice.  Pt requires no DME at discharge.     Pt desires Skilled Rehab placement at discharge. Pt cooperative; agreeable to therapeutic recommendations and plan of care.                   "

## 2023-10-20 NOTE — PLAN OF CARE
"Goal Outcome Evaluation:  Plan of Care Reviewed With: patient        Progress: no change  Outcome Evaluation: Pt is a 77 y/o F admitted to [facility] 10/18/23 with c/o R hip pain, after standing \"heard a pop.\" XR R hip (-) acute abnormality, (+) mild to moderate arthritic changes, (+) trochanteric enthesopathy consistent with gluteal tendinopathy. CT LE (+) cellulitis related to lateral R pelvis and anterior-lateral proximal R thigh, (+) mild hip OA. Orthopedic recommends supportive modalities and WBAT. PMHx significant for HTN and DMII. At baseline pt resides with sister, 1/2 week at pt house, 1/2 week at sister's home. Pt does not drive but sister provides transportation. Pt typically independent with ADLs, uses rollator for mobility, and reports 0 falls. Pt reports her and pt assist each other as needed. This date, pt A&Ox4, sititng up in chair and eating lunch upon arrival. Pt requires min A to come to standing from chair with rollator. Pt demos difficulty weight shifting and difficulty taking steps,d/t R hip pain, therefore further activity was deferred. Pt is far below baseline and is at high risk for falls. OT recommend SNF when medically approrpiate for d/c. OT will follow.      Anticipated Discharge Disposition (OT): skilled nursing facility  "

## 2023-10-20 NOTE — PROGRESS NOTES
PROGRESS NOTE      Patient Name: Leona Garcia  : 1945  MRN: [id number]  Primary Care Physician: Alfred Liriano MD  Date of admission: 10/18/2023    Patient Care Team:  Alfred Liriano MD as PCP - General (Family Medicine)        Subjective   Subjective:     Patient is feeling better no new complaints still complaining of arthritis hip pain  Review of systems:  All other review of system unremarkable      Allergies:  No Known Allergies    Objective   Exam:     Vital Signs  Temp:  [97.4 °F (36.3 °C)-98 °F (36.7 °C)] 97.4 °F (36.3 °C)  Heart Rate:  [66-86] 70  Resp:  [12-18] 16  BP: (131-205)/(54-80) 131/54  SpO2:  [92 %-100 %] 92 %  on  Flow (L/min):  [1.5] 1.5;   Device (Oxygen Therapy): nasal cannula  Body mass index is 51.42 kg/m².    General: Elderly Afro-American female in no acute distress.    Head:      Normocephalic and atraumatic.    Eyes:      PERRL/EOM intact, conjunctiva and sclera clear with out nystagmus.    Neck:      No masses, thyromegaly,  trachea central with normal respiratory effort   Lungs:    Clear bilaterally to auscultation.    Heart:      Regular rate and rhythm, no murmur no gallop  Abd:        Soft, nontender, not distended, bowel sounds positive, no shifting dullness   Pulses:   Pulses palpable  Extr:        No cyanosis or clubbing--+1 edema.    Neuro:    No focal deficits.   alert oriented x3  Skin:       Intact without lesions or rashes.    Psych:    Alert and cooperative; normal mood and affect; .      Results Review:  I have personally reviewed most recent Data :  CBC    Results from last 7 days   Lab Units 10/20/23  1148 10/19/23  0155 10/18/23  0413   WBC 10*3/mm3 5.00 5.80 7.80   HEMOGLOBIN g/dL 8.8* 9.3* 9.3*   PLATELETS 10*3/mm3 190 201 198     CMP   Results from last 7 days   Lab Units 10/20/23  1148 10/19/23  0155 10/18/23  0413   SODIUM mmol/L 138 139 139   POTASSIUM mmol/L 4.5 4.6 4.3   CHLORIDE mmol/L 104 102 101   CO2 mmol/L 26.0 27.0 26.0   BUN  mg/dL 56* 62* 62*   CREATININE mg/dL 1.93* 2.08* 2.22*   GLUCOSE mg/dL 229* 304* 298*   ALBUMIN g/dL  --  3.5  --    BILIRUBIN mg/dL  --  0.3  --    ALK PHOS U/L  --  129*  --    AST (SGOT) U/L  --  34*  --    ALT (SGPT) U/L  --  57*  --      ABG      No radiology results for the last day    Results for orders placed during the hospital encounter of 10/18/23    Adult Transthoracic Echo Complete w/ Color, Spectral and Contrast if Necessary Per Protocol    Interpretation Summary    Left ventricular systolic function is normal. Left ventricular ejection fraction appears to be 56 - 60%.    Left ventricular wall thickness is consistent with borderline concentric hypertrophy.    Estimated right ventricular systolic pressure from tricuspid regurgitation is markedly elevated (>55 mmHg).    Scheduled Meds:dilTIAZem CD, 240 mg, Oral, Daily  hydrALAZINE, 100 mg, Oral, Daily  insulin lispro, 2-7 Units, Subcutaneous, 4x Daily AC & at Bedtime  insulin NPH-insulin regular, 20 Units, Subcutaneous, QAM  levothyroxine, 50 mcg, Oral, Q AM  senna-docusate sodium, 2 tablet, Oral, BID  sodium chloride, 10 mL, Intravenous, Q12H      Continuous Infusions:     PRN Meds:  acetaminophen **OR** acetaminophen **OR** acetaminophen    aluminum-magnesium hydroxide-simethicone    senna-docusate sodium **AND** polyethylene glycol **AND** bisacodyl **AND** bisacodyl    dextrose    dextrose    glucagon (human recombinant)    hydrALAZINE    HYDROmorphone    labetalol    melatonin    ondansetron **OR** ondansetron    sodium chloride    sodium chloride    Assessment & Plan   Assessment and Plan:         Right hip pain    Essential hypertension    Type 2 diabetes mellitus    ASSESSMENT:  Acute kidney injury in a patient with chronic kidney disease stage III  Hypertension  Diabetes millitus type 2  Hip pain            PLAN :      JOSELITO: etiology likley due to diuretics ,ARB , NSAIDS  Creatinine slightly better than yesterday  Will restart angiotensin  receptor blocker in 1 to 2 days  As hemodynamics are better discontinue IV fluid  Hold ARB  Blood pressure is improving at this time  May need low-dose diuretics by tomorrow morning  F/u with renal US if needed   Dc meloxicam at thsi time might be causing some increase in creatinine   Repeat labs AM             Electronically signed by Chu Lee MD,   [facility] kidney consultant  [phone]  10/20/2023  14:00 EDT  ,

## 2023-10-20 NOTE — CONSULTS
"Diabetes Education  Assessment/Teaching    Patient Name:  Leona Garcia  YOB: 1945  MRN: [id number]  Admit Date:  10/18/2023      Assessment Date:  10/20/2023  Flowsheet Row Most Recent Value   General Information     Referral From: MD order  [Consult received to teach bs monitoring. A1c this adm 9.3%. Adm bs 298.]   Height 167.6 cm (66\")   Height Method Stated   Weight 145 kg (318 lb 9 oz)   Weight Method Bed scale   Pregnancy Assessment    Diabetes History    What type of diabetes do you have? Type 2   Do you test your blood sugar at home? yes   Frequency of checks 2-3 times/day   Meter type ReliOn meter (talking meter), a couple months old   Who performs the test? self   Have you had low blood sugar? (<70mg/dl) yes   How often do you have low blood sugar? occasionally   Education Preferences    Nutrition Information    Assessment Topics    DM Goals             Flowsheet Row Most Recent Value   DM Education Needs    Meter Has own   Frequency of Testing 3 times a day  [Discussed importance of checking bs 3 times/day. Pt states she does record bs. Discussed importance of taking readings to PCP visits.]   Blood Glucose Target Range Reviewed A1c result of 9.3%. Reviewed healthy bs range and healthy A1c target. Discussed importance of bs control.   Medication Insulin, Pen  [Pt taking Humulin 70/30 20 units am and 10 units pm. Pt giving own injections.]   Problem Solving Hypoglycemia, Hyperglycemia, Signs, Symptoms, Treatment  [Discussed importance of keeping low bs tx with her at all times.]   Reducing Risks A1C testing  [Gave A1c info sheet.]   Healthy Eating --  [Pt usually eating 2-3 meals/day.]   Motivation Engaged   Teaching Method Explanation, Discussion, Handouts   Patient Response Verbalized understanding              Other Comments:  Met with pt at bedside. Pt taking insulin injections prebreakfast and presupper. She states has problems with low bs when she doesn't eat enough. Discussed " importance of eating 3 meals/day when taking this type of insulin. Pt has PCP and sees routinely. Pt states she has insulin, insulin supplies, bs meter and monitoring supplies. Pt states not needing additional info at this time.       Electronically signed by:  Cori May RN  10/20/23 11:04 EDT

## 2023-10-20 NOTE — PROGRESS NOTES
[facility]   Daily Progress Note    Patient Name: Loena Garcia  : 1945  MRN: [id number]  Primary Care Physician:  Alfred Liriano MD  Date of admission: 10/18/2023  Date and Time of Service: 10/19/23 at 1:11pm      Subjective    Pt seen and examined  She feels better today no dizziness, her bp is better controlled  Hoever she still c.o hip pain, renal fxn unchanged since yesterday.    Vitals:   Temp:  [97.4 °F (36.3 °C)-98 °F (36.7 °C)] 97.4 °F (36.3 °C)  Heart Rate:  [66-86] 70  Resp:  [12-18] 16  BP: (131-205)/(54-80) 131/54  Flow (L/min):  [1.5] 1.5    Physical Exam   GENERAL: The patient is well developed and nontoxic.  HEENT: Nonicteric sclerae, PERRLA, EOMI. Oropharynx clear. Moist mucous membranes. Conjunctivae appear well perfused.  CHEST: Chest wall is nontender.  HEART: Regular rate and rhythm without murmurs. No MRG  LUNGS: Clear to auscultation bilaterally. No WRR  ABDOMEN: Soft, positive bowel sounds, nontender, no organomegaly.  RECTAL: Deferred.  SKIN: No rash, no excessive bruising, petechiae, or purpura.  NEUROLOGIC: Cranial nerves II-XII intact without motor/sensory deficit     Result Review    Result Review:  I have personally reviewed the results from the time of this admission to 10/20/2023 12:35 EDT and agree with these findings:  [x]  Laboratory  []  Microbiology  [x]  Radiology  [x]  EKG/Telemetry   []  Cardiology/Vascular   []  Pathology  []  Old records  []  Other:  Most notable findings include          Assessment & Plan      Brief Patient Summary:  Leona Garcia is a 78 y.o. female who       dilTIAZem CD, 240 mg, Oral, Daily  hydrALAZINE, 100 mg, Oral, Daily  insulin lispro, 2-7 Units, Subcutaneous, 4x Daily AC & at Bedtime  insulin NPH-insulin regular, 20 Units, Subcutaneous, QAM  levothyroxine, 50 mcg, Oral, Q AM  senna-docusate sodium, 2 tablet, Oral, BID  sodium chloride, 10 mL, Intravenous, Q12H             Active Hospital  Problems:  Active Hospital Problems    Diagnosis     **Right hip pain     Essential hypertension     Type 2 diabetes mellitus      Plan:   Right hip pain   - XR hip/pelvis interpreted by radiologist showed no acute osseous abnormality.  Mild to moderate osteoarthritic changes present. Trochanteric enthesopathy is present consistent with gluteal tendinopathy  - Uric acid 9.7  - Fall precautions  - Pain and antiemetics as needed  - Ortho consulted ; conservative mgt     JOSELITO on CKD  - BUN 62, Creatinine 2.22 (per available records last creatinine 1.2 02/05/2018)  - eGFR 22.3  - UA reviewed  - Avoid nephrotoxic agents   - Monitor BMP and I's and O's   - hold lasix for now  - Nephrology consulted >>> Followed by Dr. Lee outpatient     HTN   - /74 on admission  - monitor BP while admitted  -Will restart angiotensin receptor blocker in 1 to 2 days   -Hold ARB       Type 2 diabetes mellitus  - Glucose on admission 298  - Check Hemoglobin A1c  - Start SSI  - resume home NPH  - Glucose checks ACHS     Anemia  - likely of chronic disease  - Hgb 9.3, Hct 27.5  - no overt or active signs of bleeding  - Check anemia profile      Hypothyroidism  - resume synthroid    DVT prophylaxis:  Mechanical DVT prophylaxis orders are present.    CODE STATUS:    Code Status (Patient has no pulse and is not breathing): CPR (Attempt to Resuscitate)  Medical Interventions (Patient has pulse or is breathing): Full Support      Disposition:  I expect patient to be discharged home        Electronically signed by Enrike Quijano MD, 10/20/23, 12:35 EDT.  [facility] Hospitalist Team

## 2023-10-20 NOTE — PLAN OF CARE
Goal Outcome Evaluation:               Patient sitting up in chair. VS stable. Patient has no c/o pain or s/s of distress. No nursing concerns

## 2023-10-20 NOTE — THERAPY TREATMENT NOTE
"Subjective: Pt agreeable to therapeutic plan of care.  Dr. Ponce consulted today.   Pt with right hip strain:   WBAT.      Objective:     Bed mobility - Mod-A supine to sit  Transfers - Mod-A and with rolling walker sit to stand to bedside chair.  Pt then transferred to bedside commode.   Ambulation - 12 feet Min-A and with rolling walker  wide lashell, pt slightly flexed forward at the hips, slow pace, lateral sway    Vitals: WNL    Pain: 6 VAS   Location: right hip  Intervention for pain: Repositioned, Increased Activity, and Therapeutic Presence    Education: Provided education on the importance of mobility in the acute care setting, Verbal/Tactile Cues, Transfer Training, and Gait Training    Assessment: Leona Garcia presents with functional mobility impairments which indicate the need for skilled intervention. Tolerating session today without incident. Pt with less right hip pain today.  Pt was able to transfers out of the bed and progress to gait training. Pt making very good effort to improve her mobility.  Pt will need rehab at d/c to recover her mobility.  Will continue to follow and progress as tolerated.     Plan/Recommendations:   Moderate Intensity Therapy recommended post-acute care. This is recommended as therapy feels the patient would require 3-4 days per week and wouldn't tolerate \"3 hour daily\" rehab intensity. SNF would be the preferred choice.  Pt requires no DME at discharge.     Pt desires Skilled Rehab placement at discharge. Pt cooperative; agreeable to therapeutic recommendations and plan of care.     Basic Mobility 6-click:  Rollin = Total, A lot = 2, A little = 3; 4 = None  Supine>Sit:   1 = Total, A lot = 2, A little = 3; 4 = None   Sit>Stand with arms:  1 = Total, A lot = 2, A little = 3; 4 = None  Bed>Chair:   1 = Total, A lot = 2, A little = 3; 4 = None  Ambulate in room:  1 = Total, A lot = 2, A little = 3; 4 = None  3-5 Steps with railin = Total, A lot = 2, A " little = 3; 4 = None  Score: 13    Modified [place]: 4 = Moderately severe disability (Unable to attend to own bodily needs without assistance, and unable to walk unassisted)     Post-Tx Position: Up in Chair and Call light and personal items within reach  PPE: gloves

## 2023-10-20 NOTE — CASE MANAGEMENT/SOCIAL WORK
Continued Stay Note   [facility]     Patient Name: Leona Garcia  MRN: [id number]  Today's Date: 10/20/2023    Admit Date: 10/18/2023    Plan: [facility] accepted. No precert or PASRR required.   Discharge Plan       Row Name 10/20/23 161       Plan    Plan Comments DC barriers: Ortho consult completed. Renal following, improvement in Cr.             Megan Naegele, RN     Office Phone: [phone]  Office Cell: [phone]

## 2023-10-21 LAB
ANION GAP SERPL CALCULATED.3IONS-SCNC: 8 MMOL/L (ref 5–15)
BUN SERPL-MCNC: 54 MG/DL (ref 8–23)
BUN/CREAT SERPL: 30.3 (ref 7–25)
CALCIUM SPEC-SCNC: 9 MG/DL (ref 8.6–10.5)
CHLORIDE SERPL-SCNC: 102 MMOL/L (ref 98–107)
CO2 SERPL-SCNC: 27 MMOL/L (ref 22–29)
CREAT SERPL-MCNC: 1.78 MG/DL (ref 0.57–1)
EGFRCR SERPLBLD CKD-EPI 2021: 29.1 ML/MIN/1.73
GLUCOSE BLDC GLUCOMTR-MCNC: 222 MG/DL (ref 70–105)
GLUCOSE BLDC GLUCOMTR-MCNC: 252 MG/DL (ref 70–105)
GLUCOSE BLDC GLUCOMTR-MCNC: 256 MG/DL (ref 70–105)
GLUCOSE BLDC GLUCOMTR-MCNC: 270 MG/DL (ref 70–105)
GLUCOSE SERPL-MCNC: 235 MG/DL (ref 65–99)
POTASSIUM SERPL-SCNC: 5.2 MMOL/L (ref 3.5–5.2)
SODIUM SERPL-SCNC: 137 MMOL/L (ref 136–145)

## 2023-10-21 PROCEDURE — 25010000002 ONDANSETRON PER 1 MG

## 2023-10-21 PROCEDURE — 25010000002 HYDRALAZINE PER 20 MG: Performed by: HOSPITALIST

## 2023-10-21 PROCEDURE — 80048 BASIC METABOLIC PNL TOTAL CA: CPT | Performed by: NURSE PRACTITIONER

## 2023-10-21 PROCEDURE — 25010000002 HYDROMORPHONE 1 MG/ML SOLUTION: Performed by: EMERGENCY MEDICINE

## 2023-10-21 PROCEDURE — 63710000001 INSULIN ISOPHANE & REGULAR PER 5 UNITS

## 2023-10-21 PROCEDURE — 63710000001 INSULIN LISPRO (HUMAN) PER 5 UNITS

## 2023-10-21 PROCEDURE — 25010000002 LABETALOL 5 MG/ML SOLUTION: Performed by: STUDENT IN AN ORGANIZED HEALTH CARE EDUCATION/TRAINING PROGRAM

## 2023-10-21 PROCEDURE — 82948 REAGENT STRIP/BLOOD GLUCOSE: CPT

## 2023-10-21 RX ORDER — LOSARTAN POTASSIUM 50 MG/1
50 TABLET ORAL
Status: DISCONTINUED | OUTPATIENT
Start: 2023-10-21 | End: 2023-10-21

## 2023-10-21 RX ORDER — BUMETANIDE 1 MG/1
1 TABLET ORAL DAILY
Status: DISCONTINUED | OUTPATIENT
Start: 2023-10-21 | End: 2023-10-22

## 2023-10-21 RX ADMIN — HYDRALAZINE HYDROCHLORIDE 10 MG: 20 INJECTION INTRAMUSCULAR; INTRAVENOUS at 17:40

## 2023-10-21 RX ADMIN — DILTIAZEM HYDROCHLORIDE 240 MG: 240 CAPSULE, EXTENDED RELEASE ORAL at 08:16

## 2023-10-21 RX ADMIN — Medication 10 ML: at 08:17

## 2023-10-21 RX ADMIN — LABETALOL HYDROCHLORIDE 10 MG: 5 INJECTION, SOLUTION INTRAVENOUS at 00:56

## 2023-10-21 RX ADMIN — HYDRALAZINE HYDROCHLORIDE 100 MG: 25 TABLET, FILM COATED ORAL at 08:17

## 2023-10-21 RX ADMIN — SENNOSIDES AND DOCUSATE SODIUM 2 TABLET: 50; 8.6 TABLET ORAL at 20:33

## 2023-10-21 RX ADMIN — INSULIN LISPRO 4 UNITS: 100 INJECTION, SOLUTION INTRAVENOUS; SUBCUTANEOUS at 13:21

## 2023-10-21 RX ADMIN — SENNOSIDES AND DOCUSATE SODIUM 2 TABLET: 50; 8.6 TABLET ORAL at 08:16

## 2023-10-21 RX ADMIN — Medication 5 MG: at 20:34

## 2023-10-21 RX ADMIN — INSULIN LISPRO 4 UNITS: 100 INJECTION, SOLUTION INTRAVENOUS; SUBCUTANEOUS at 17:04

## 2023-10-21 RX ADMIN — HYDRALAZINE HYDROCHLORIDE 10 MG: 20 INJECTION INTRAMUSCULAR; INTRAVENOUS at 03:42

## 2023-10-21 RX ADMIN — Medication 10 ML: at 20:44

## 2023-10-21 RX ADMIN — BUMETANIDE 1 MG: 1 TABLET ORAL at 16:15

## 2023-10-21 RX ADMIN — HYDROMORPHONE HYDROCHLORIDE 0.25 MG: 1 INJECTION, SOLUTION INTRAMUSCULAR; INTRAVENOUS; SUBCUTANEOUS at 03:42

## 2023-10-21 RX ADMIN — INSULIN HUMAN 20 UNITS: 100 INJECTION, SUSPENSION SUBCUTANEOUS at 08:17

## 2023-10-21 RX ADMIN — LABETALOL HYDROCHLORIDE 10 MG: 5 INJECTION, SOLUTION INTRAVENOUS at 20:42

## 2023-10-21 RX ADMIN — INSULIN LISPRO 3 UNITS: 100 INJECTION, SOLUTION INTRAVENOUS; SUBCUTANEOUS at 08:17

## 2023-10-21 RX ADMIN — ONDANSETRON 4 MG: 2 INJECTION INTRAMUSCULAR; INTRAVENOUS at 11:30

## 2023-10-21 RX ADMIN — INSULIN LISPRO 4 UNITS: 100 INJECTION, SOLUTION INTRAVENOUS; SUBCUTANEOUS at 20:46

## 2023-10-21 RX ADMIN — LEVOTHYROXINE SODIUM 50 MCG: 0.05 TABLET ORAL at 05:04

## 2023-10-21 NOTE — PLAN OF CARE
Goal Outcome Evaluation:  Plan of Care Reviewed With: patient        Progress: no change  Outcome Evaluation: Patient denies pain so far this shift. PO bumex started by nephrology. Pt remains on 1.5L oxygen. Has sat up in chair several hours this shift. Ax1 to chair/BSC. Will go to [facility] on discharge. Will monitor.

## 2023-10-21 NOTE — PLAN OF CARE
Problem: Pain Acute  Goal: Acceptable Pain Control and Functional Ability  Outcome: Ongoing, Not Progressing  Intervention: Prevent or Manage Pain  Recent Flowsheet Documentation  Taken 10/21/2023 0005 by Amalia Rojo RN  Medication Review/Management:   medications reviewed   high-risk medications identified  Taken 10/20/2023 2005 by Amalia Rojo RN  Sensory Stimulation Regulation: television on  Bowel Elimination Promotion:   adequate fluid intake promoted   ambulation promoted  Sleep/Rest Enhancement: awakenings minimized  Medication Review/Management:   medications reviewed   high-risk medications identified  Intervention: Develop Pain Management Plan  Recent Flowsheet Documentation  Taken 10/21/2023 0005 by Amalia Rojo RN  Pain Management Interventions: see MAR  Taken 10/20/2023 2005 by Amalia Rojo RN  Pain Management Interventions: see MAR  Intervention: Optimize Psychosocial Wellbeing  Recent Flowsheet Documentation  Taken 10/20/2023 2005 by Amalia Rojo RN  Supportive Measures: active listening utilized  Diversional Activities:   television   smartphone     Problem: Fall Injury Risk  Goal: Absence of Fall and Fall-Related Injury  Outcome: Ongoing, Not Progressing  Intervention: Identify and Manage Contributors  Recent Flowsheet Documentation  Taken 10/21/2023 0005 by Amalia Rojo RN  Medication Review/Management:   medications reviewed   high-risk medications identified  Taken 10/20/2023 2005 by Amalia Rojo RN  Medication Review/Management:   medications reviewed   high-risk medications identified  Self-Care Promotion: independence encouraged  Intervention: Promote Injury-Free Environment  Recent Flowsheet Documentation  Taken 10/21/2023 0203 by Amalia Rojo RN  Safety Promotion/Fall Prevention:   assistive device/personal items within reach   clutter free environment maintained   nonskid shoes/slippers when out of bed   room organization consistent   safety round/check  completed  Taken 10/21/2023 0005 by Amalia Rojo RN  Safety Promotion/Fall Prevention:   assistive device/personal items within reach   clutter free environment maintained   nonskid shoes/slippers when out of bed   room organization consistent   safety round/check completed  Taken 10/20/2023 2214 by Amalia Rojo RN  Safety Promotion/Fall Prevention:   assistive device/personal items within reach   clutter free environment maintained   nonskid shoes/slippers when out of bed   room organization consistent   safety round/check completed  Taken 10/20/2023 2005 by Amalia Rojo RN  Safety Promotion/Fall Prevention:   assistive device/personal items within reach   clutter free environment maintained   fall prevention program maintained   nonskid shoes/slippers when out of bed   room organization consistent   safety round/check completed     Problem: Dysrhythmia  Goal: Normalized Cardiac Rhythm  Outcome: Ongoing, Not Progressing  Intervention: Monitor and Manage Cardiac Rhythm Effect  Recent Flowsheet Documentation  Taken 10/20/2023 2005 by Amalia Rojo RN  VTE Prevention/Management: sequential compression devices off     Problem: Skin Injury Risk Increased  Goal: Skin Health and Integrity  Outcome: Ongoing, Not Progressing  Intervention: Optimize Skin Protection  Recent Flowsheet Documentation  Taken 10/20/2023 2005 by Amalia Rojo RN  Pressure Reduction Techniques: frequent weight shift encouraged  Head of Bed (HOB) Positioning: HOB at 60-90 degrees     Problem: Malnutrition  Goal: Improved Nutritional Intake  Outcome: Ongoing, Not Progressing     Problem: Diabetes Comorbidity  Goal: Blood Glucose Level Within Targeted Range  Outcome: Ongoing, Not Progressing  Intervention: Monitor and Manage Glycemia  Recent Flowsheet Documentation  Taken 10/20/2023 2005 by Amalia Rojo RN  Glycemic Management:   blood glucose monitored   supplemental insulin given   Goal Outcome Evaluation: no change    Alert and  oriented x 4. Able to verbalize needs and wants. Takes medication whole and tolerates well. Continent of bowel and bladder. C/O pain to R hip, PRN dilaudid administered with minimal effect stated. Continues to require O2 therapy at 1.5 LPM via NC and tolerating well. Continues to be followed by nephrology and orthopedics. WBAT. Elevated BP noted, PRN hydralazine and labetalol administered, positive effect noted. Currently in bed, awake. Call bell in reach.

## 2023-10-21 NOTE — PROGRESS NOTES
PROGRESS NOTE      Patient Name: Leona Garcia  : 1945  MRN: [id number]  Primary Care Physician: Alfred Liriano MD  Date of admission: 10/18/2023    Patient Care Team:  Alferd Liriano MD as PCP - General (Family Medicine)        Subjective   Subjective:     Patient is feeling better no new complaints   Increase volume.     Review of systems:  All other review of system unremarkable      Allergies:  No Known Allergies    Objective   Exam:     Vital Signs  Temp:  [97.2 °F (36.2 °C)-97.8 °F (36.6 °C)] 97.8 °F (36.6 °C)  Heart Rate:  [70-82] 82  Resp:  [14-19] 16  BP: (131-206)/(54-85) 149/69  SpO2:  [92 %-99 %] 97 %  on  Flow (L/min):  [1.5] 1.5;   Device (Oxygen Therapy): nasal cannula  Body mass index is 51.6 kg/m².    General: Elderly Afro-American female in no acute distress.    Head:      Normocephalic and atraumatic.    Eyes:      PERRL/EOM intact, conjunctiva and sclera clear with out nystagmus.    Neck:      No masses, thyromegaly,  trachea central with normal respiratory effort   Lungs:    Clear bilaterally to auscultation.    Heart:      Regular rate and rhythm, no murmur no gallop  Abd:        Soft, nontender, not distended, bowel sounds positive, no shifting dullness   Pulses:   Pulses palpable  Extr:        No cyanosis or clubbing--+1 edema.    Neuro:    No focal deficits.   alert oriented x3  Skin:       Intact without lesions or rashes.    Psych:    Alert and cooperative; normal mood and affect; .      Results Review:  I have personally reviewed most recent Data :  CBC    Results from last 7 days   Lab Units 10/20/23  1148 10/19/23  0155 10/18/23  0413   WBC 10*3/mm3 5.00 5.80 7.80   HEMOGLOBIN g/dL 8.8* 9.3* 9.3*   PLATELETS 10*3/mm3 190 201 198     CMP   Results from last 7 days   Lab Units 10/20/23  1148 10/19/23  0155 10/18/23  0413   SODIUM mmol/L 138 139 139   POTASSIUM mmol/L 4.5 4.6 4.3   CHLORIDE mmol/L 104 102 101   CO2 mmol/L 26.0 27.0 26.0   BUN mg/dL 56* 62* 62*    CREATININE mg/dL 1.93* 2.08* 2.22*   GLUCOSE mg/dL 229* 304* 298*   ALBUMIN g/dL  --  3.5  --    BILIRUBIN mg/dL  --  0.3  --    ALK PHOS U/L  --  129*  --    AST (SGOT) U/L  --  34*  --    ALT (SGPT) U/L  --  57*  --      ABG      No radiology results for the last day    Results for orders placed during the hospital encounter of 10/18/23    Adult Transthoracic Echo Complete w/ Color, Spectral and Contrast if Necessary Per Protocol    Interpretation Summary    Left ventricular systolic function is normal. Left ventricular ejection fraction appears to be 56 - 60%.    Left ventricular wall thickness is consistent with borderline concentric hypertrophy.    Estimated right ventricular systolic pressure from tricuspid regurgitation is markedly elevated (>55 mmHg).    Scheduled Meds:dilTIAZem CD, 240 mg, Oral, Daily  hydrALAZINE, 100 mg, Oral, Daily  insulin lispro, 2-7 Units, Subcutaneous, 4x Daily AC & at Bedtime  insulin NPH-insulin regular, 20 Units, Subcutaneous, QAM  levothyroxine, 50 mcg, Oral, Q AM  senna-docusate sodium, 2 tablet, Oral, BID  sodium chloride, 10 mL, Intravenous, Q12H      Continuous Infusions:     PRN Meds:  acetaminophen **OR** acetaminophen **OR** acetaminophen    aluminum-magnesium hydroxide-simethicone    senna-docusate sodium **AND** polyethylene glycol **AND** bisacodyl **AND** bisacodyl    dextrose    dextrose    glucagon (human recombinant)    hydrALAZINE    HYDROmorphone    labetalol    melatonin    ondansetron **OR** ondansetron    sodium chloride    sodium chloride    Assessment & Plan   Assessment and Plan:         Right hip pain    Essential hypertension    Type 2 diabetes mellitus    ASSESSMENT:  Acute kidney injury in a patient with chronic kidney disease stage III  Hypertension  Diabetes millitus type 2  Hip pain   ECHO EF normal.      PLAN :     JOSELITO: etiology likley due to diuretics ,ARB , NSAIDS, acute kidney injury with a creatinine as high as 2.2,    Kidney function continues  to be stable with a creatinine 1.9, down from 2, other electrolytes are stable, acid-base stable, hemoglobin low at 8.8 but stable,  NO ARB FOR NOW. DC  Needs vol control, clinically increase vol.   Plan for bumex daily.   Strict I/o monitoring.     Blood pressure very labile, noted as high as 200 yesterday, currently around 140s, monitor,  Creatinine slightly better than yesterda  F/u with renal US if needed   Avoid NSAID.   Repeat labs AM      Electronically signed by ORVILLE Diop,      Attestation Statement:   I personally have examined the patient and interviewed the patient. I have reviewed the history, data, problems, assessment and plan with the nurse practitioner during rounds. and I concur with the history. exam, asssesment and plan as described in the Progress note, with comments additions, revisions as noted.      Tab England MD     [facility] kidney consultant  [phone]  10/21/2023  10:13 EDT  ,

## 2023-10-21 NOTE — PROGRESS NOTES
[facility] Medicine Services   Daily Progress Note    Patient Name: Leona Garcia  : 1945  MRN: [id number]  Primary Care Physician:  Alfred Liriano MD  Date of admission: 10/18/2023  Date and Time of Service: 10/19/23 at 1:11pm      Subjective    Pt seen and examined  She feels better today no dizziness, her bp is better controlled      Vitals:   Temp:  [97.2 °F (36.2 °C)-97.8 °F (36.6 °C)] 97.5 °F (36.4 °C)  Heart Rate:  [73-82] 76  Resp:  [14-19] 16  BP: (136-206)/(51-85) 156/56  Flow (L/min):  [1.5] 1.5    Physical Exam   GENERAL: The patient is well developed and nontoxic.  HEENT: Nonicteric sclerae, PERRLA, EOMI. Oropharynx clear. Moist mucous membranes. Conjunctivae appear well perfused.  CHEST: Chest wall is nontender.  HEART: Regular rate and rhythm without murmurs. No MRG  LUNGS: Clear to auscultation bilaterally. No WRR  ABDOMEN: Soft, positive bowel sounds, nontender, no organomegaly.  RECTAL: Deferred.  SKIN: No rash, no excessive bruising, petechiae, or purpura.  NEUROLOGIC: Cranial nerves II-XII intact without motor/sensory deficit     Result Review    Result Review:  I have personally reviewed the results from the time of this admission to 10/21/2023 14:07 EDT and agree with these findings:  [x]  Laboratory  []  Microbiology  [x]  Radiology  [x]  EKG/Telemetry   []  Cardiology/Vascular   []  Pathology  []  Old records  []  Other:  Most notable findings include          Assessment & Plan      Brief Patient Summary:  Leona Garcia is a 78 y.o. female who       dilTIAZem CD, 240 mg, Oral, Daily  hydrALAZINE, 100 mg, Oral, Daily  insulin lispro, 2-7 Units, Subcutaneous, 4x Daily AC & at Bedtime  insulin NPH-insulin regular, 20 Units, Subcutaneous, QAM  levothyroxine, 50 mcg, Oral, Q AM  losartan, 50 mg, Oral, Q24H  senna-docusate sodium, 2 tablet, Oral, BID  sodium chloride, 10 mL, Intravenous, Q12H             Active Hospital Problems:  Active Hospital Problems     Diagnosis     **Right hip pain     Essential hypertension     Type 2 diabetes mellitus      Plan:   Right hip pain   - XR hip/pelvis interpreted by radiologist showed no acute osseous abnormality.  Mild to moderate osteoarthritic changes present. Trochanteric enthesopathy is present consistent with gluteal tendinopathy  - Uric acid 9.7  - Fall precautions  - Pain and antiemetics as needed  - Ortho consulted ; conservative mgt     JOSELITO on CKD  - BUN 62, Creatinine 2.22 (per available records last creatinine 1.2 02/05/2018)  - eGFR 22.3  - UA reviewed  - Avoid nephrotoxic agents   - Monitor BMP and I's and O's   - hold lasix for now  - Nephrology onboard     HTN   - /74 on admission  - monitor BP while admitted  -Will restart angiotensin receptor blocker in 1 to 2 days   -Hold ARB       Type 2 diabetes mellitus  - Glucose on admission 298  - Check Hemoglobin A1c  - Start SSI  - resume home NPH  - Glucose checks ACHS     Anemia  - likely of chronic disease  - Hgb 9.3, Hct 27.5  - no overt or active signs of bleeding  - Check anemia profile      Hypothyroidism  - resume synthroid    DVT prophylaxis:  Mechanical DVT prophylaxis orders are present.    CODE STATUS:    Code Status (Patient has no pulse and is not breathing): CPR (Attempt to Resuscitate)  Medical Interventions (Patient has pulse or is breathing): Full Support      Disposition:  I expect patient to be discharged home        Electronically signed by Enrike Quijano MD, 10/21/23, 14:07 EDT.  [facility] Hospitalist Team

## 2023-10-22 LAB
ANION GAP SERPL CALCULATED.3IONS-SCNC: 8 MMOL/L (ref 5–15)
BASOPHILS # BLD AUTO: 0 10*3/MM3 (ref 0–0.2)
BASOPHILS NFR BLD AUTO: 0.4 % (ref 0–1.5)
BUN SERPL-MCNC: 53 MG/DL (ref 8–23)
BUN/CREAT SERPL: 30.1 (ref 7–25)
CALCIUM SPEC-SCNC: 9.4 MG/DL (ref 8.6–10.5)
CHLORIDE SERPL-SCNC: 103 MMOL/L (ref 98–107)
CO2 SERPL-SCNC: 28 MMOL/L (ref 22–29)
CREAT SERPL-MCNC: 1.76 MG/DL (ref 0.57–1)
DEPRECATED RDW RBC AUTO: 47.3 FL (ref 37–54)
EGFRCR SERPLBLD CKD-EPI 2021: 29.5 ML/MIN/1.73
EOSINOPHIL # BLD AUTO: 0.1 10*3/MM3 (ref 0–0.4)
EOSINOPHIL NFR BLD AUTO: 1.4 % (ref 0.3–6.2)
ERYTHROCYTE [DISTWIDTH] IN BLOOD BY AUTOMATED COUNT: 15.4 % (ref 12.3–15.4)
GLUCOSE BLDC GLUCOMTR-MCNC: 190 MG/DL (ref 70–105)
GLUCOSE BLDC GLUCOMTR-MCNC: 203 MG/DL (ref 70–105)
GLUCOSE BLDC GLUCOMTR-MCNC: 231 MG/DL (ref 70–105)
GLUCOSE BLDC GLUCOMTR-MCNC: 277 MG/DL (ref 70–105)
GLUCOSE BLDC GLUCOMTR-MCNC: 286 MG/DL (ref 70–105)
GLUCOSE SERPL-MCNC: 183 MG/DL (ref 65–99)
HCT VFR BLD AUTO: 26.1 % (ref 34–46.6)
HGB BLD-MCNC: 8.7 G/DL (ref 12–15.9)
LYMPHOCYTES # BLD AUTO: 1.1 10*3/MM3 (ref 0.7–3.1)
LYMPHOCYTES NFR BLD AUTO: 16.9 % (ref 19.6–45.3)
MCH RBC QN AUTO: 27.4 PG (ref 26.6–33)
MCHC RBC AUTO-ENTMCNC: 33.2 G/DL (ref 31.5–35.7)
MCV RBC AUTO: 82.6 FL (ref 79–97)
MONOCYTES # BLD AUTO: 0.8 10*3/MM3 (ref 0.1–0.9)
MONOCYTES NFR BLD AUTO: 11.9 % (ref 5–12)
NEUTROPHILS NFR BLD AUTO: 4.7 10*3/MM3 (ref 1.7–7)
NEUTROPHILS NFR BLD AUTO: 69.4 % (ref 42.7–76)
NRBC BLD AUTO-RTO: 0.1 /100 WBC (ref 0–0.2)
PLATELET # BLD AUTO: 190 10*3/MM3 (ref 140–450)
PMV BLD AUTO: 9.3 FL (ref 6–12)
POTASSIUM SERPL-SCNC: 4.6 MMOL/L (ref 3.5–5.2)
RBC # BLD AUTO: 3.16 10*6/MM3 (ref 3.77–5.28)
SODIUM SERPL-SCNC: 139 MMOL/L (ref 136–145)
WBC NRBC COR # BLD: 6.7 10*3/MM3 (ref 3.4–10.8)

## 2023-10-22 PROCEDURE — 63710000001 INSULIN LISPRO (HUMAN) PER 5 UNITS

## 2023-10-22 PROCEDURE — 25010000002 LABETALOL 5 MG/ML SOLUTION: Performed by: STUDENT IN AN ORGANIZED HEALTH CARE EDUCATION/TRAINING PROGRAM

## 2023-10-22 PROCEDURE — 82948 REAGENT STRIP/BLOOD GLUCOSE: CPT

## 2023-10-22 PROCEDURE — 25010000002 FUROSEMIDE PER 20 MG: Performed by: INTERNAL MEDICINE

## 2023-10-22 PROCEDURE — 85025 COMPLETE CBC W/AUTO DIFF WBC: CPT | Performed by: HOSPITALIST

## 2023-10-22 PROCEDURE — 80048 BASIC METABOLIC PNL TOTAL CA: CPT | Performed by: NURSE PRACTITIONER

## 2023-10-22 PROCEDURE — 63710000001 INSULIN ISOPHANE & REGULAR PER 5 UNITS

## 2023-10-22 PROCEDURE — 92610 EVALUATE SWALLOWING FUNCTION: CPT

## 2023-10-22 PROCEDURE — 25010000002 HYDRALAZINE PER 20 MG: Performed by: HOSPITALIST

## 2023-10-22 RX ORDER — HYDROCHLOROTHIAZIDE 25 MG/1
25 TABLET ORAL ONCE
Status: COMPLETED | OUTPATIENT
Start: 2023-10-22 | End: 2023-10-22

## 2023-10-22 RX ORDER — HYDROXYZINE HYDROCHLORIDE 50 MG/ML
25 INJECTION, SOLUTION INTRAMUSCULAR ONCE
Status: DISCONTINUED | OUTPATIENT
Start: 2023-10-22 | End: 2023-10-23 | Stop reason: HOSPADM

## 2023-10-22 RX ORDER — FUROSEMIDE 10 MG/ML
80 INJECTION INTRAMUSCULAR; INTRAVENOUS EVERY 8 HOURS
Status: DISCONTINUED | OUTPATIENT
Start: 2023-10-22 | End: 2023-10-23 | Stop reason: HOSPADM

## 2023-10-22 RX ORDER — TERAZOSIN 2 MG/1
2 CAPSULE ORAL NIGHTLY
Status: DISCONTINUED | OUTPATIENT
Start: 2023-10-22 | End: 2023-10-23 | Stop reason: HOSPADM

## 2023-10-22 RX ORDER — CHLORTHALIDONE 25 MG/1
25 TABLET ORAL DAILY
Status: DISCONTINUED | OUTPATIENT
Start: 2023-10-22 | End: 2023-10-23 | Stop reason: HOSPADM

## 2023-10-22 RX ADMIN — INSULIN LISPRO 4 UNITS: 100 INJECTION, SOLUTION INTRAVENOUS; SUBCUTANEOUS at 21:00

## 2023-10-22 RX ADMIN — FUROSEMIDE 80 MG: 10 INJECTION, SOLUTION INTRAMUSCULAR; INTRAVENOUS at 20:05

## 2023-10-22 RX ADMIN — HYDROCHLOROTHIAZIDE 25 MG: 25 TABLET ORAL at 05:10

## 2023-10-22 RX ADMIN — HYDRALAZINE HYDROCHLORIDE 10 MG: 20 INJECTION INTRAMUSCULAR; INTRAVENOUS at 00:41

## 2023-10-22 RX ADMIN — FUROSEMIDE 80 MG: 10 INJECTION, SOLUTION INTRAMUSCULAR; INTRAVENOUS at 11:12

## 2023-10-22 RX ADMIN — CHLORTHALIDONE 25 MG: 25 TABLET ORAL at 11:12

## 2023-10-22 RX ADMIN — Medication 10 ML: at 08:16

## 2023-10-22 RX ADMIN — LABETALOL HYDROCHLORIDE 10 MG: 5 INJECTION, SOLUTION INTRAVENOUS at 02:58

## 2023-10-22 RX ADMIN — INSULIN LISPRO 3 UNITS: 100 INJECTION, SOLUTION INTRAVENOUS; SUBCUTANEOUS at 08:16

## 2023-10-22 RX ADMIN — TERAZOSIN HYDROCHLORIDE 2 MG: 2 CAPSULE ORAL at 11:12

## 2023-10-22 RX ADMIN — DILTIAZEM HYDROCHLORIDE 240 MG: 240 CAPSULE, EXTENDED RELEASE ORAL at 08:15

## 2023-10-22 RX ADMIN — LEVOTHYROXINE SODIUM 50 MCG: 0.05 TABLET ORAL at 05:10

## 2023-10-22 RX ADMIN — INSULIN LISPRO 4 UNITS: 100 INJECTION, SOLUTION INTRAVENOUS; SUBCUTANEOUS at 17:02

## 2023-10-22 RX ADMIN — INSULIN HUMAN 20 UNITS: 100 INJECTION, SUSPENSION SUBCUTANEOUS at 08:15

## 2023-10-22 RX ADMIN — HYDRALAZINE HYDROCHLORIDE 100 MG: 25 TABLET, FILM COATED ORAL at 08:15

## 2023-10-22 RX ADMIN — TERAZOSIN HYDROCHLORIDE 2 MG: 2 CAPSULE ORAL at 21:01

## 2023-10-22 RX ADMIN — BUMETANIDE 1 MG: 1 TABLET ORAL at 08:15

## 2023-10-22 RX ADMIN — INSULIN LISPRO 3 UNITS: 100 INJECTION, SOLUTION INTRAVENOUS; SUBCUTANEOUS at 12:13

## 2023-10-22 RX ADMIN — Medication 10 ML: at 21:01

## 2023-10-22 NOTE — PLAN OF CARE
Goal Outcome Evaluation:      Patient was seen for dysphagia evaluation as nursing reported patient was choking on eggs this morning. Patient reports that she has had GERD in the past but does not take routine medication for it. No history of esophageal stricture. Patient has upper and lower dentures. Patient reports that lower dentures are loosely fitted d/t gum shrinkage. Oral mechanism examination revealed patient demonstrates full ROM and mobility of lingual and labial structures. Lingual strength was adequate. Minimal palatal movement was observed. Diminished gag reflex noted. Patient was sitting upright in chair at 90 degree hip flexion. Patient was in bed during breakfast when the choking episode ocurred. Patient is currently receiving a regular diet. Patient demonstrated adequate rotary chewing but was prolong  chewing. for regular textured food. Noted trace to mild oral residue between bites.  Patient has tendency to chew on her right side. Patient had tendency to take multiple bites at a time without completely clearing oral cavity. Noted patient spitting out some food at times as well. No clearing of throat, coughing or vocal changes were detected.  Provided trials of thins via straw x 5. No overt s/s of aspiration were evident. Vocal quality remained clear. Patient demonstrates moderate oral dysphagia characterized by increase chewing, oral residue between bites. Furthermore, patient does not exhibit safe swallow strategies during meal. It is recommended that patient be downgraded to mechanical ground diet at this time. ST fabián rogers to assure safety and tolerance with recommended diet as well as to assure patient utilizes safe swallow strategies to minimize risk of aspiration and/or choking.

## 2023-10-22 NOTE — NURSING NOTE
Patient called staff into room, complains of choking. Patient pulled up and sat up in bed. RN was told patient was on oxygen during report, patient on RA upon assessment, patient put back on 2L with humidification. After RN reassessed patient, patient complaining of congestion, says she gets congested at night, does not feel like any pills are stuck. RN educated patient that the oxygen could also dry patient out as well, humidification added.

## 2023-10-22 NOTE — NURSING NOTE
Nephrologist at bedside, explained new medications to patient and why he added them. Wants to get extra fluid off and try to better control BP. Patient wants to call sister prior to taking medication

## 2023-10-22 NOTE — PROGRESS NOTES
PROGRESS NOTE      Patient Name: Leona Garcia  : 1945  MRN: [id number]  Primary Care Physician: Alfred Liriano MD  Date of admission: 10/18/2023    Patient Care Team:  Alfred Liriano MD as PCP - General (Family Medicine)        Subjective   Subjective:     Patient is feeling better no new complaints   Continues to have increased volume  Blood pressure is very high, needing as needed antihypertensive,    Review of systems:  All other review of system unremarkable      Allergies:  No Known Allergies    Objective   Exam:     Vital Signs  Temp:  [97.3 °F (36.3 °C)-98 °F (36.7 °C)] 98 °F (36.7 °C)  Heart Rate:  [75-85] 81  Resp:  [13-19] 18  BP: (136-223)/(51-81) 213/80  SpO2:  [90 %-97 %] 91 %  on  Flow (L/min):  [1-2] 2;   Device (Oxygen Therapy): humidified;nasal cannula  Body mass index is 50.03 kg/m².    General: Elderly Afro-American female in no acute distress.    Head:      Normocephalic and atraumatic.    Eyes:      PERRL/EOM intact, conjunctiva and sclera clear with out nystagmus.    Neck:      No masses, thyromegaly,  trachea central with normal respiratory effort   Lungs:    Clear bilaterally to auscultation.    Heart:      Regular rate and rhythm, no murmur no gallop  Abd:        Soft, nontender, not distended, bowel sounds positive, no shifting dullness   Pulses:   Pulses palpable  Extr:        No cyanosis or clubbing--+1 edema.    Neuro:    No focal deficits.   alert oriented x3  Skin:       Intact without lesions or rashes.    Psych:    Alert and cooperative; normal mood and affect; .      Results Review:  I have personally reviewed most recent Data :  CBC    Results from last 7 days   Lab Units 10/22/23  0133 10/20/23  1148 10/19/23  0155 10/18/23  0413   WBC 10*3/mm3 6.70 5.00 5.80 7.80   HEMOGLOBIN g/dL 8.7* 8.8* 9.3* 9.3*   PLATELETS 10*3/mm3 190 190 201 198     CMP   Results from last 7 days   Lab Units 10/22/23  0133 10/21/23  1230 10/20/23  1148 10/19/23  0155  10/18/23  0413   SODIUM mmol/L 139 137 138 139 139   POTASSIUM mmol/L 4.6 5.2 4.5 4.6 4.3   CHLORIDE mmol/L 103 102 104 102 101   CO2 mmol/L 28.0 27.0 26.0 27.0 26.0   BUN mg/dL 53* 54* 56* 62* 62*   CREATININE mg/dL 1.76* 1.78* 1.93* 2.08* 2.22*   GLUCOSE mg/dL 183* 235* 229* 304* 298*   ALBUMIN g/dL  --   --   --  3.5  --    BILIRUBIN mg/dL  --   --   --  0.3  --    ALK PHOS U/L  --   --   --  129*  --    AST (SGOT) U/L  --   --   --  34*  --    ALT (SGPT) U/L  --   --   --  57*  --      ABG      No radiology results for the last day    Results for orders placed during the hospital encounter of 10/18/23    Adult Transthoracic Echo Complete w/ Color, Spectral and Contrast if Necessary Per Protocol    Interpretation Summary    Left ventricular systolic function is normal. Left ventricular ejection fraction appears to be 56 - 60%.    Left ventricular wall thickness is consistent with borderline concentric hypertrophy.    Estimated right ventricular systolic pressure from tricuspid regurgitation is markedly elevated (>55 mmHg).    Scheduled Meds:chlorthalidone, 25 mg, Oral, Daily  dilTIAZem CD, 240 mg, Oral, Daily  furosemide, 80 mg, Intravenous, Q8H  hydrALAZINE, 100 mg, Oral, Daily  insulin lispro, 2-7 Units, Subcutaneous, 4x Daily AC & at Bedtime  insulin NPH-insulin regular, 20 Units, Subcutaneous, QAM  levothyroxine, 50 mcg, Oral, Q AM  senna-docusate sodium, 2 tablet, Oral, BID  sodium chloride, 10 mL, Intravenous, Q12H  terazosin, 2 mg, Oral, Nightly      Continuous Infusions:     PRN Meds:  acetaminophen **OR** acetaminophen **OR** acetaminophen    aluminum-magnesium hydroxide-simethicone    senna-docusate sodium **AND** polyethylene glycol **AND** bisacodyl **AND** bisacodyl    dextrose    dextrose    glucagon (human recombinant)    hydrALAZINE    HYDROmorphone    labetalol    melatonin    ondansetron **OR** ondansetron    sodium chloride    sodium chloride    Assessment & Plan   Assessment and Plan:          Right hip pain    Essential hypertension    Type 2 diabetes mellitus    ASSESSMENT:  Acute kidney injury in a patient with chronic kidney disease stage III  Hypertension  Diabetes millitus type 2  Hip pain   ECHO EF normal.      PLAN :     JOSELITO: etiology likley due to diuretics ,ARB , NSAIDS, acute kidney injury with a creatinine as high as 2.2,    Kidney function continues to be stable with a creatinine 1.76, down from 2, other electrolytes are stable, acid-base stable, hemoglobin low at 8.7 but stable,  Currently holding ARB, till resolution of the kidney injury, but also has a significant increase in volume and hypertension, with a systolic more than 200, needing as needed antihypertensive, very frequently,   Had started Bumex, will need more aggressive volume control,  Start Lasix 80 IV q8h. Added chlorthalidone 25mg PO qd, as well as doxazosin,  Patient is very hesitant about taking these medications, patient nurse in the room, discussed need of volume control, and my Rationale.  Patient blood pressure is as high as 220, patient is seems to be slightly anxious too.  But highlighted, risk of such a high blood pressure including stroke MI.   I discussed as volume improves, I expect cutting down on the blood pressure medications again,  Patient was on maximal dose of hydralazine, already on diltiazem,  Eventually once volume is better, kidney function is better, needs to be back on the ARB,    Strict I/o monitoring.   Strict input output monitoring,    Creatinine slightly better than yesterday    Avoid NSAID.   Repeat labs AM      Electronically signed by ORVILLE Diop,      Attestation Statement:   I personally have examined the patient and interviewed the patient. I have reviewed the history, data, problems, assessment and plan with the nurse practitioner during rounds. and I concur with the history. exam, asssesment and plan as described in the Progress note, with comments additions, revisions as noted.       Tab England MD     [facility] kidney consultant  [phone]  10/22/2023  10:45 EDT  ,   98.1

## 2023-10-22 NOTE — PROGRESS NOTES
[facility] Medicine Services   Daily Progress Note    Patient Name: Leona Garcia  : 1945  MRN: [id number]  Primary Care Physician:  Alfred Liriano MD  Date of admission: 10/18/2023  Date and Time of Service: 10/19/23 at 1:11pm      Subjective    Pt seen and examined  She feels anxious; she states she choked on eggs this monring and has been having trouble ever since      Vitals:   Temp:  [97.3 °F (36.3 °C)-98.3 °F (36.8 °C)] 98.3 °F (36.8 °C)  Heart Rate:  [75-85] 77  Resp:  [13-19] 18  BP: (169-223)/(63-81) 169/63  Flow (L/min):  [1-2] 2    Physical Exam   GENERAL: The patient is well developed and nontoxic.  HEENT: Nonicteric sclerae, PERRLA, EOMI. Oropharynx clear. Moist mucous membranes. Conjunctivae appear well perfused.  CHEST: Chest wall is nontender.  HEART: Regular rate and rhythm without murmurs. No MRG  LUNGS: Clear to auscultation bilaterally. No WRR  ABDOMEN: Soft, positive bowel sounds, nontender, no organomegaly.  RECTAL: Deferred.  SKIN: No rash, no excessive bruising, petechiae, or purpura.  NEUROLOGIC: Cranial nerves II-XII intact without motor/sensory deficit     Result Review    Result Review:  I have personally reviewed the results from the time of this admission to 10/22/2023 12:56 EDT and agree with these findings:  [x]  Laboratory  []  Microbiology  [x]  Radiology  [x]  EKG/Telemetry   []  Cardiology/Vascular   []  Pathology  []  Old records  []  Other:  Most notable findings include          Assessment & Plan      Brief Patient Summary:  Leona Garcia is a 78 y.o. female who       chlorthalidone, 25 mg, Oral, Daily  dilTIAZem CD, 240 mg, Oral, Daily  furosemide, 80 mg, Intravenous, Q8H  hydrALAZINE, 100 mg, Oral, Daily  hydrOXYzine, 25 mg, Intramuscular, Once  insulin lispro, 2-7 Units, Subcutaneous, 4x Daily AC & at Bedtime  insulin NPH-insulin regular, 20 Units, Subcutaneous, QAM  levothyroxine, 50 mcg, Oral, Q AM  senna-docusate sodium, 2 tablet, Oral,  BID  sodium chloride, 10 mL, Intravenous, Q12H  terazosin, 2 mg, Oral, Nightly             Active Hospital Problems:  Active Hospital Problems    Diagnosis     **Right hip pain     Essential hypertension     Type 2 diabetes mellitus      Plan:       Difficulty swallowing  -Speech swallow eval consulted    Right hip pain   - XR hip/pelvis interpreted by radiologist showed no acute osseous abnormality.  Mild to moderate osteoarthritic changes present. Trochanteric enthesopathy is present consistent with gluteal tendinopathy  - Uric acid 9.7  - Fall precautions  - Pain and antiemetics as needed  - Ortho consulted ; conservative mgt     JOSELITO on CKD  - BUN 62, Creatinine 2.22 (per available records last creatinine 1.2 02/05/2018)  - eGFR 22.3  - UA reviewed  - Avoid nephrotoxic agents   - Monitor BMP and I's and O's   - hold lasix for now  - Nephrology onboard     HTN   - /74 on admission  - monitor BP while admitted  -Will restart angiotensin receptor blocker in 1 to 2 days   -Hold ARB       Type 2 diabetes mellitus  - Glucose on admission 298  - Check Hemoglobin A1c  - Start SSI  - resume home NPH  - Glucose checks ACHS     Anemia  - likely of chronic disease  - Hgb 9.3, Hct 27.5  - no overt or active signs of bleeding  - Check anemia profile      Hypothyroidism  - resume synthroid    DVT prophylaxis:  Mechanical DVT prophylaxis orders are present.    CODE STATUS:    Code Status (Patient has no pulse and is not breathing): CPR (Attempt to Resuscitate)  Medical Interventions (Patient has pulse or is breathing): Full Support      Disposition:  I expect patient to be discharged home        Electronically signed by Enrike Quijano MD, 10/22/23, 12:56 EDT.  [facility] Hospitalist Team

## 2023-10-22 NOTE — PLAN OF CARE
Problem: Pain Acute  Goal: Acceptable Pain Control and Functional Ability  Outcome: Ongoing, Progressing  Intervention: Prevent or Manage Pain  Recent Flowsheet Documentation  Taken 10/22/2023 0002 by Amalia Rojo RN  Medication Review/Management:   medications reviewed   high-risk medications identified  Taken 10/21/2023 2222 by Amalia Rojo RN  Medication Review/Management:   medications reviewed   high-risk medications identified  Taken 10/21/2023 2002 by Amalia Rojo RN  Sensory Stimulation Regulation: television on  Bowel Elimination Promotion:   adequate fluid intake promoted   ambulation promoted  Sleep/Rest Enhancement:   awakenings minimized   relaxation techniques promoted  Medication Review/Management:   medications reviewed   high-risk medications identified  Intervention: Optimize Psychosocial Wellbeing  Recent Flowsheet Documentation  Taken 10/21/2023 2002 by Amalia Rojo RN  Supportive Measures:   active listening utilized   self-care encouraged  Diversional Activities:   television   smartphone     Problem: Fall Injury Risk  Goal: Absence of Fall and Fall-Related Injury  Outcome: Ongoing, Progressing  Intervention: Identify and Manage Contributors  Recent Flowsheet Documentation  Taken 10/22/2023 0002 by Amalia Rojo RN  Medication Review/Management:   medications reviewed   high-risk medications identified  Taken 10/21/2023 2222 by Amalia Rojo RN  Medication Review/Management:   medications reviewed   high-risk medications identified  Taken 10/21/2023 2002 by Amalia Rojo RN  Medication Review/Management:   medications reviewed   high-risk medications identified  Self-Care Promotion:   independence encouraged   BADL personal objects within reach  Intervention: Promote Injury-Free Environment  Recent Flowsheet Documentation  Taken 10/22/2023 0210 by Amalia Rojo RN  Safety Promotion/Fall Prevention:   assistive device/personal items within reach   clutter free environment  maintained   nonskid shoes/slippers when out of bed   room organization consistent   safety round/check completed  Taken 10/22/2023 0002 by Amalia Rojo RN  Safety Promotion/Fall Prevention:   assistive device/personal items within reach   clutter free environment maintained   nonskid shoes/slippers when out of bed   room organization consistent   safety round/check completed  Taken 10/21/2023 2222 by Amalia Rojo RN  Safety Promotion/Fall Prevention:   assistive device/personal items within reach   clutter free environment maintained   nonskid shoes/slippers when out of bed   room organization consistent   safety round/check completed  Taken 10/21/2023 2002 by Amalia Rojo RN  Safety Promotion/Fall Prevention:   assistive device/personal items within reach   clutter free environment maintained   nonskid shoes/slippers when out of bed   room organization consistent   safety round/check completed     Problem: Dysrhythmia  Goal: Normalized Cardiac Rhythm  Outcome: Ongoing, Progressing  Intervention: Monitor and Manage Cardiac Rhythm Effect  Recent Flowsheet Documentation  Taken 10/21/2023 2002 by Amalia Rojo RN  VTE Prevention/Management: sequential compression devices off     Problem: Skin Injury Risk Increased  Goal: Skin Health and Integrity  Outcome: Ongoing, Progressing  Intervention: Optimize Skin Protection  Recent Flowsheet Documentation  Taken 10/21/2023 2002 by Amalia Rojo RN  Pressure Reduction Techniques: frequent weight shift encouraged  Head of Bed (HOB) Positioning: HOB at 30-45 degrees  Pressure Reduction Devices: specialty bed utilized     Problem: Malnutrition  Goal: Improved Nutritional Intake  Outcome: Ongoing, Progressing     Problem: Diabetes Comorbidity  Goal: Blood Glucose Level Within Targeted Range  Outcome: Ongoing, Progressing  Intervention: Monitor and Manage Glycemia  Recent Flowsheet Documentation  Taken 10/21/2023 2002 by Amalia Rojo RN  Glycemic Management:   blood  glucose monitored   supplemental insulin given     Problem: Adult Inpatient Plan of Care  Goal: Plan of Care Review  Outcome: Ongoing, Progressing  Flowsheets (Taken 10/22/2023 0212)  Progress: improving  Plan of Care Reviewed With: patient  Goal: Patient-Specific Goal (Individualized)  Outcome: Ongoing, Progressing  Goal: Absence of Hospital-Acquired Illness or Injury  Outcome: Ongoing, Progressing  Intervention: Identify and Manage Fall Risk  Recent Flowsheet Documentation  Taken 10/22/2023 0210 by Amalia Rojo RN  Safety Promotion/Fall Prevention:   assistive device/personal items within reach   clutter free environment maintained   nonskid shoes/slippers when out of bed   room organization consistent   safety round/check completed  Taken 10/22/2023 0002 by Amalia Rojo RN  Safety Promotion/Fall Prevention:   assistive device/personal items within reach   clutter free environment maintained   nonskid shoes/slippers when out of bed   room organization consistent   safety round/check completed  Taken 10/21/2023 2222 by Amalia Rojo RN  Safety Promotion/Fall Prevention:   assistive device/personal items within reach   clutter free environment maintained   nonskid shoes/slippers when out of bed   room organization consistent   safety round/check completed  Taken 10/21/2023 2002 by Amalia Rojo RN  Safety Promotion/Fall Prevention:   assistive device/personal items within reach   clutter free environment maintained   nonskid shoes/slippers when out of bed   room organization consistent   safety round/check completed  Intervention: Prevent Skin Injury  Recent Flowsheet Documentation  Taken 10/21/2023 2002 by Amalia Rojo, RN  Body Position:   supine   position changed independently  Intervention: Prevent and Manage VTE (Venous Thromboembolism) Risk  Recent Flowsheet Documentation  Taken 10/21/2023 2002 by Amalia Rojo, RN  Activity Management:   ambulated in room   up to bedside commode   activity  encouraged  VTE Prevention/Management: sequential compression devices off  Range of Motion: active ROM (range of motion) encouraged  Intervention: Prevent Infection  Recent Flowsheet Documentation  Taken 10/22/2023 0210 by Amalia Rojo RN  Infection Prevention:   environmental surveillance performed   hand hygiene promoted   rest/sleep promoted   single patient room provided  Taken 10/22/2023 0002 by Amalia Rojo RN  Infection Prevention:   environmental surveillance performed   hand hygiene promoted   single patient room provided  Taken 10/21/2023 2222 by Amalia Rojo RN  Infection Prevention:   environmental surveillance performed   hand hygiene promoted   single patient room provided  Taken 10/21/2023 2002 by Amalia Rojo RN  Infection Prevention:   environmental surveillance performed   hand hygiene promoted   single patient room provided  Goal: Optimal Comfort and Wellbeing  Outcome: Ongoing, Progressing  Intervention: Provide Person-Centered Care  Recent Flowsheet Documentation  Taken 10/21/2023 2002 by Amalia Rojo RN  Trust Relationship/Rapport:   care explained   choices provided  Goal: Readiness for Transition of Care  Outcome: Ongoing, Progressing   Goal Outcome Evaluation:  Plan of Care Reviewed With: patient        Progress: improving     Alert and oriented x 4. Able to verbalize needs and wants. Takes medication whole and tolerates well. Continent of bowel, external catheter in place for bladder elimination. Decrease pain noted this shift. Assist x 1 for transfers, uses Rolator for ambulation. Continues to require O2 therapy at 1.5 LPM via NC and tolerating well. No s/s of hyper/hypoglycemia noted. Continues to have nonsymptomatic HTN that requires PRN medication. Currently in bed, eyes closed. Rise and fall of chest observed. Call bell in reach. Discharge plan: [facility]

## 2023-10-22 NOTE — THERAPY EVALUATION
Acute Care - Speech Language Pathology   Swallow Initial Evaluation  [facility]     Patient Name: Leona Garcia  : 1945  MRN: [id number]  Today's Date: 10/22/2023               Admit Date: 10/18/2023    Visit Dx:     ICD-10-CM ICD-9-CM   1. Acute right hip pain  M25.551 719.45   2. JOSELITO (acute kidney injury)  N17.9 584.9     Patient Active Problem List   Diagnosis    Right hip pain    Essential hypertension    Type 2 diabetes mellitus     History reviewed. No pertinent past medical history.  History reviewed. No pertinent surgical history.    SLP Recommendation and Plan  SLP Swallowing Diagnosis: moderate, oral dysphagia (10/22/23 1500)  SLP Diet Recommendation: mechanical ground textures, thin liquids (10/22/23 1500)  Recommended Precautions and Strategies: upright posture during/after eating, small bites of food and sips of liquid, multiple swallows per bite of food, alternate between small bites of food and sips of liquid, check mouth frequently for oral residue/pocketing, general aspiration precautions (10/22/23 1500)  SLP Rec. for Method of Medication Administration: meds whole, meds crushed, as tolerated (10/22/23 1500)     Monitor for Signs of Aspiration: yes, notify SLP if any concerns (10/22/23 1500)     Swallow Criteria for Skilled Therapeutic Interventions Met: demonstrates skilled criteria (10/22/23 1500)     Rehab Potential/Prognosis, Swallowing: good, to achieve stated therapy goals (10/22/23 1500)  Therapy Frequency (Swallow): PRN (10/22/23 1500)  Predicted Duration Therapy Intervention (Days): until discharge (10/22/23 1500)  Oral Care Recommendations: Oral Care BID/PRN, Swab (10/22/23 1500)      Oral Care Recommendations: Oral Care BID/PRN, Swab (10/22/23 1500)           SWALLOW EVALUATION (last 72 hours)       SLP Adult Swallow Evaluation       Row Name 10/22/23 1500       Rehab Evaluation    Document Type evaluation  -CB    Subjective Information complains of  choking on eggs this morning.  " -CB    Patient Observations alert;cooperative  -CB    Patient/Family/Caregiver Comments/Observations Patient's sister was present during evaluation. Patient able to follow simple directives for oral mechanism examination.  -CB    Patient Effort good  -CB       General Information    Patient Profile Reviewed yes  -CB    Pertinent History Of Current Problem History of Present Illness: Leona Garica is a 78 y.o. female with past medical history of hypertension, type 2 diabetes mellitus, hypothyroidism who presented to [facility] on 10/18/2023 complaining of right hip pain. She reports pain started when she attempted to stand at bingo last night. She states she heard a \"pop\" when the pain started. She denies any falls, recent known injury to hip or prior injury/surgeries. She states the pain is constant and worse with movement.       In the ED, hip/pelvis x-ray interpreted by radiologist showed no acute osseous abnormality.  Mild to moderate osteoarthritic changes are present.  Trochanteric enthesopathy is present consistent with gluteal tendinopathy.  All vitals stable on admission except /74. All labs unremarkable except BUN 62, creatinine 2.22, EGFR 22.3, glucose 298, uric acid 9.7, hemoglobin 9.3, hematocrit 27.5.  UA unremarkable.  Patient received 2 mg IV morphine, 4 mg IV Zofran in ED. Hospitalist was contacted to admit patient for further care and management.  -CB    Current Method of Nutrition regular textures;thin liquids  -CB       Pain    Additional Documentation Pain Scale: FACES Pre/Post-Treatment (Group)  -CB       Pain Scale: FACES Pre/Post-Treatment    Pain: FACES Scale, Pretreatment 0-->no hurt  -CB    Posttreatment Pain Rating 0-->no hurt  -CB       Oral Motor Structure and Function    Dentition Assessment upper dentures/partial in place;lower dentures/partial in place  loose fitted lower dentures d/t gum shrinkage.  -CB    Secretion Management WNL/WFL  -CB    Mucosal Quality " moist, healthy  -CB    Gag Response absent or diminished  -CB       Oral Musculature and Cranial Nerve Assessment    Oral Motor General Assessment WFL  -CB    Oral Motor, Comment Oral mechanism examination revealed full ROM and mobilty of lingual and labial structures. Minimal palatal movement was observed. LIngual strength was adequate. DIminished gag reflex.  -CB       General Eating/Swallowing Observations    Respiratory Support Currently in Use nasal cannula  -CB    O2 Liters 2L  -CB    Eating/Swallowing Skills self-fed  -CB    Positioning During Eating upright in chair  -CB    Utensils Used straw  -CB    Consistencies Trialed regular textures;thin liquids  -CB       Clinical Swallow Eval    Clinical Swallow Evaluation Summary Patient was seen for dysphagia evaluation as nursing reported patient was choking on eggs this morning. Patient reports that she has had GERD in the past but does not take routine medication for it. No history of esophageal stricture. Patient has upper and lower dentures. Patient reports that lower dentures are loosely fitted d/t gum shrinkage. Oral mechanism examination revealed patient demonstrates full ROM and mobility of lingual and labial structures. Lingual strength was adequate. Minimal palatal movement was observed. Diminished gag reflex noted. Patient was sitting upright in chair at 90 degree hip flexion. Patient was in bed during breakfast when the choking episode ocurred. Patient is currently receiving a regular diet. Patient demonstrated adequate rotary chewing but was prolong  chewing. for regular textured food. Noted trace to mild oral residue between bites.  Patient has tendency to chew on her right side. Patient had tendency to take multiple bites at a time without completely clearing oral cavity. Noted patient spitting out some food at times as well. No clearing of throat, coughing or vocal changes were detected.  Provided trials of thins via straw x 5. No overt s/s of  aspiration were evident. Vocal quality remained clear. Patient demonstrates moderate oral dysphagia characterized by increase chewing, oral residue between bites. Furthermore, patient does not exhibit safe swallow strategies during meal. It is recommended that patient be downgraded to mechanical ground diet at this time. ST will folllow to assure safety and tolerance with recommended diet as well as to assure patient utilizes safe swallow strategies to minimize risk of aspiration and/or choking.  -CB       SLP Evaluation Clinical Impression    SLP Swallowing Diagnosis moderate;oral dysphagia  -CB    Functional Impact risk of aspiration/pneumonia  -CB    Rehab Potential/Prognosis, Swallowing good, to achieve stated therapy goals  -CB    Swallow Criteria for Skilled Therapeutic Interventions Met demonstrates skilled criteria  -CB       Recommendations    Therapy Frequency (Swallow) PRN  -CB    Predicted Duration Therapy Intervention (Days) until discharge  -CB    SLP Diet Recommendation mechanical ground textures;thin liquids  -CB    Recommended Precautions and Strategies upright posture during/after eating;small bites of food and sips of liquid;multiple swallows per bite of food;alternate between small bites of food and sips of liquid;check mouth frequently for oral residue/pocketing;general aspiration precautions  -CB    Oral Care Recommendations Oral Care BID/PRN;Swab  -CB    SLP Rec. for Method of Medication Administration meds whole;meds crushed;as tolerated  -CB    Monitor for Signs of Aspiration yes;notify SLP if any concerns  -CB       Swallow Goals (SLP)    Swallow LTGs Swallow Long Term Goal (free text)  -CB    Swallow STGs diet tolerance goal selection (SLP)  -CB    Diet Tolerance Goal Selection (SLP) Swallow Short Term Goal 1  -CB       (LTG) Swallow    (LTG) Swallow Patient will tolerate safest and least restrictive diet without complications from aspiration.  -CB    Time Frame (Swallow Long Term Goal) by  discharge  -CB    Progress/Outcomes (Swallow Long Term Goal) new goal  -CB       (STG) Swallow 1    (STG) Swallow 1 Patient will participate in a full meal assessment to determine safety and adequacy of recommended diet, independent use of safe swallow compensations to minimize risk of aspiration and/or choking.  -CB    Time Frame (Swallow Short Term Goal 1) 1 week  -CB    Progress/Outcomes (Swallow Short Term Goal 1) new goal  -CB              User Key  (r) = Recorded By, (t) = Taken By, (c) = Cosigned By      Initials Name Effective Dates    Bonnie Abad, SLP 09/21/21 -                     EDUCATION  The patient has been educated in the following areas:   Dysphagia (Swallowing Impairment) Oral Care/Hydration.        SLP GOALS       Row Name 10/22/23 1500       (LTG) Swallow    (LTG) Swallow Patient will tolerate safest and least restrictive diet without complications from aspiration.  -CB    Time Frame (Swallow Long Term Goal) by discharge  -CB    Progress/Outcomes (Swallow Long Term Goal) new goal  -CB       (STG) Swallow 1    (STG) Swallow 1 Patient will participate in a full meal assessment to determine safety and adequacy of recommended diet, independent use of safe swallow compensations to minimize risk of aspiration and/or choking.  -CB    Time Frame (Swallow Short Term Goal 1) 1 week  -CB    Progress/Outcomes (Swallow Short Term Goal 1) new goal  -CB              User Key  (r) = Recorded By, (t) = Taken By, (c) = Cosigned By      Initials Name Provider Type    Bonnie Abad, SLP Speech and Language Pathologist                       Time Calculation:                CECILE Montague  10/22/2023

## 2023-10-22 NOTE — PLAN OF CARE
Goal Outcome Evaluation:                          Patient very tearful this shift. Patient BP running 213 systolic this AM. Nephrology added 3 new medications, IV lasix added today. Patient hesitant to take new medications, nephrologist explained medications to patient. Patient had an episode of choking this AM, SLP consulted. Patient also expressed that she was congested, oxygen replaced with humidification and MD notified, will check chart, no new orders. Patient will go to [facility] rehab on discharge. Patient is A&O X4, takes medications whole, incontinent at times, up with 2 to chair and BSC with rollator.

## 2023-10-23 VITALS
HEIGHT: 66 IN | TEMPERATURE: 98.1 F | HEART RATE: 88 BPM | OXYGEN SATURATION: 93 % | BODY MASS INDEX: 47.09 KG/M2 | RESPIRATION RATE: 14 BRPM | DIASTOLIC BLOOD PRESSURE: 67 MMHG | WEIGHT: 293 LBS | SYSTOLIC BLOOD PRESSURE: 152 MMHG

## 2023-10-23 LAB
ANION GAP SERPL CALCULATED.3IONS-SCNC: 9 MMOL/L (ref 5–15)
BASOPHILS # BLD AUTO: 0 10*3/MM3 (ref 0–0.2)
BASOPHILS NFR BLD AUTO: 0.4 % (ref 0–1.5)
BUN SERPL-MCNC: 47 MG/DL (ref 8–23)
BUN/CREAT SERPL: 27.5 (ref 7–25)
CALCIUM SPEC-SCNC: 9.4 MG/DL (ref 8.6–10.5)
CHLORIDE SERPL-SCNC: 99 MMOL/L (ref 98–107)
CO2 SERPL-SCNC: 30 MMOL/L (ref 22–29)
CREAT SERPL-MCNC: 1.71 MG/DL (ref 0.57–1)
DEPRECATED RDW RBC AUTO: 45.9 FL (ref 37–54)
EGFRCR SERPLBLD CKD-EPI 2021: 30.5 ML/MIN/1.73
EOSINOPHIL # BLD AUTO: 0.1 10*3/MM3 (ref 0–0.4)
EOSINOPHIL NFR BLD AUTO: 1.8 % (ref 0.3–6.2)
ERYTHROCYTE [DISTWIDTH] IN BLOOD BY AUTOMATED COUNT: 15.4 % (ref 12.3–15.4)
GLUCOSE BLDC GLUCOMTR-MCNC: 173 MG/DL (ref 70–105)
GLUCOSE BLDC GLUCOMTR-MCNC: 238 MG/DL (ref 70–105)
GLUCOSE BLDC GLUCOMTR-MCNC: 79 MG/DL (ref 70–105)
GLUCOSE SERPL-MCNC: 212 MG/DL (ref 65–99)
HCT VFR BLD AUTO: 26.2 % (ref 34–46.6)
HGB BLD-MCNC: 8.7 G/DL (ref 12–15.9)
LYMPHOCYTES # BLD AUTO: 1.2 10*3/MM3 (ref 0.7–3.1)
LYMPHOCYTES NFR BLD AUTO: 19.9 % (ref 19.6–45.3)
MCH RBC QN AUTO: 28.1 PG (ref 26.6–33)
MCHC RBC AUTO-ENTMCNC: 33.1 G/DL (ref 31.5–35.7)
MCV RBC AUTO: 84.8 FL (ref 79–97)
MONOCYTES # BLD AUTO: 0.8 10*3/MM3 (ref 0.1–0.9)
MONOCYTES NFR BLD AUTO: 14.2 % (ref 5–12)
NEUTROPHILS NFR BLD AUTO: 3.8 10*3/MM3 (ref 1.7–7)
NEUTROPHILS NFR BLD AUTO: 63.7 % (ref 42.7–76)
NRBC BLD AUTO-RTO: 0.1 /100 WBC (ref 0–0.2)
PLATELET # BLD AUTO: 187 10*3/MM3 (ref 140–450)
PMV BLD AUTO: 9.2 FL (ref 6–12)
POTASSIUM SERPL-SCNC: 4.1 MMOL/L (ref 3.5–5.2)
RBC # BLD AUTO: 3.09 10*6/MM3 (ref 3.77–5.28)
SODIUM SERPL-SCNC: 138 MMOL/L (ref 136–145)
WBC NRBC COR # BLD: 5.9 10*3/MM3 (ref 3.4–10.8)

## 2023-10-23 PROCEDURE — 25010000002 FUROSEMIDE PER 20 MG: Performed by: INTERNAL MEDICINE

## 2023-10-23 PROCEDURE — 80048 BASIC METABOLIC PNL TOTAL CA: CPT | Performed by: HOSPITALIST

## 2023-10-23 PROCEDURE — 63710000001 INSULIN ISOPHANE & REGULAR PER 5 UNITS

## 2023-10-23 PROCEDURE — 25010000002 HYDRALAZINE PER 20 MG: Performed by: HOSPITALIST

## 2023-10-23 PROCEDURE — 85025 COMPLETE CBC W/AUTO DIFF WBC: CPT | Performed by: HOSPITALIST

## 2023-10-23 PROCEDURE — 82948 REAGENT STRIP/BLOOD GLUCOSE: CPT

## 2023-10-23 PROCEDURE — 63710000001 INSULIN LISPRO (HUMAN) PER 5 UNITS

## 2023-10-23 RX ADMIN — Medication 10 ML: at 09:32

## 2023-10-23 RX ADMIN — INSULIN HUMAN 20 UNITS: 100 INJECTION, SUSPENSION SUBCUTANEOUS at 06:00

## 2023-10-23 RX ADMIN — FUROSEMIDE 80 MG: 10 INJECTION, SOLUTION INTRAMUSCULAR; INTRAVENOUS at 01:29

## 2023-10-23 RX ADMIN — DILTIAZEM HYDROCHLORIDE 240 MG: 240 CAPSULE, EXTENDED RELEASE ORAL at 09:32

## 2023-10-23 RX ADMIN — CHLORTHALIDONE 25 MG: 25 TABLET ORAL at 09:31

## 2023-10-23 RX ADMIN — LEVOTHYROXINE SODIUM 50 MCG: 0.05 TABLET ORAL at 06:00

## 2023-10-23 RX ADMIN — INSULIN LISPRO 3 UNITS: 100 INJECTION, SOLUTION INTRAVENOUS; SUBCUTANEOUS at 17:23

## 2023-10-23 RX ADMIN — INSULIN LISPRO 2 UNITS: 100 INJECTION, SOLUTION INTRAVENOUS; SUBCUTANEOUS at 09:31

## 2023-10-23 RX ADMIN — HYDRALAZINE HYDROCHLORIDE 10 MG: 20 INJECTION INTRAMUSCULAR; INTRAVENOUS at 01:29

## 2023-10-23 RX ADMIN — HYDRALAZINE HYDROCHLORIDE 100 MG: 25 TABLET, FILM COATED ORAL at 09:32

## 2023-10-23 RX ADMIN — FUROSEMIDE 80 MG: 10 INJECTION, SOLUTION INTRAMUSCULAR; INTRAVENOUS at 09:32

## 2023-10-23 NOTE — PLAN OF CARE
Goal Outcome Evaluation:              Outcome Evaluation: patient discharging to [facility], waiting for transport. Patient on room air with no new complaints

## 2023-10-23 NOTE — PLAN OF CARE
Goal Outcome Evaluation:      Patient resting this shift. Patient has had no complaints of pain. Patient is stable at this time.

## 2023-10-23 NOTE — CASE MANAGEMENT/SOCIAL WORK
Social Work Assessment  [facility]     Patient Name: Leona Garcia  MRN: [id number]  Today's Date: 10/23/2023    Admit Date: 10/18/2023     Discharge Plan       Row Name 10/23/23 1434       Plan    Plan Comments PIERCE consulted to meet with Pt to discuss ACP/ healthcare surrogate. SW met with Pt at bedside. Pt reports that she is not , does not have children, and her parents are no longer alive. Pt reports she does have a sister that is her NOK. Pt agreeable to PIERCE leaving the paperwork to look over but declined wanting to comlete at this time. No further needs identified at this visit.            MAHNAZ Horne, MSW    Phone: [phone]  Fax: [phone]  Email: [email or web address]

## 2023-10-23 NOTE — DISCHARGE SUMMARY
[facility] Medicine Services  Discharge Summary    Date of Service: 10/23/23  Patient Name: Leona Garcia  : 1945  MRN: [id number]    Date of Admission: 10/18/2023  Discharge Diagnosis:    Diagnosis Plan   1. Acute right hip pain        2. JOSELITO (acute kidney injury)            Date of Discharge:  10/23/23  Primary Care Physician: Alfred Liriano MD      Presenting Problem:   Right hip pain [M25.551]  JOSELITO (acute kidney injury) [N17.9]  Acute right hip pain [M25.551]    Active and Resolved Hospital Problems:  Active Hospital Problems    Diagnosis POA    **Right hip pain [M25.551] Yes    Essential hypertension [I10] Yes    Type 2 diabetes mellitus [E11.9] Yes      Resolved Hospital Problems   No resolved problems to display.         Hospital Course     Hospital Course:  Leona Garcia is a 78 y.o. female past medical history of hypertension, diabetes mellitus, hypothyroidism, with CKD stage III A2B with last creatinine 1.5 when she saw me in my office at the 2023 her next appointment was on  next week,. who presents with mainly admitted because of pain in the hip as she heard a pop while moving from her wheelchair to get into her Chair x-ray did not show any acute fracture and CAT scan is also unremarkable for any acute fracture but patient is being admitted because of acute increasing creatinine from baseline of 1.5 now 2.2. he has diffuse pain over the anterior aspect of the hip with difficulty bearing weight, ortho consultd; Recommend continued supportive modalities physical therapy weightbearing as tolerated pain control per the primary service he has as needed     Difficulty swallowing  -Speech swallow eval consulted     Right hip pain   - XR hip/pelvis interpreted by radiologist showed no acute osseous abnormality.  Mild to moderate osteoarthritic changes present. Trochanteric enthesopathy is present consistent with gluteal tendinopathy  - Uric acid 9.7  - Fall  precautions  - Pain and antiemetics as needed  - Ortho consulted ; conservative mgt     JOSELITO on CKD  - BUN 62, Creatinine 2.22 (per available records last creatinine 1.2 02/05/2018)  - eGFR 22.3  - UA reviewed  - Avoid nephrotoxic agents   - Monitor BMP and I's and O's   - hold lasix for now  - Nephrology onboard     HTN   - /74 on admission  - monitor BP while admitted  -Will restart angiotensin receptor blocker in 1 to 2 days   -Hold ARB         Type 2 diabetes mellitus  - Glucose on admission 298  - Check Hemoglobin A1c  - Start SSI  - resume home NPH  - Glucose checks ACHS     Anemia  - likely of chronic disease  - Hgb 9.3, Hct 27.5  - no overt or active signs of bleeding  - Check anemia profile      Hypothyroidism      DISCHARGE Follow Up Recommendations for labs and diagnostics:       Reasons For Change In Medications and Indications for New Medications:      Day of Discharge     Vital Signs:  Temp:  [97.6 °F (36.4 °C)-98 °F (36.7 °C)] 97.7 °F (36.5 °C)  Heart Rate:  [80-91] 88  Resp:  [13-20] 13  BP: (136-193)/(55-86) 193/70  Flow (L/min):  [2] 2    Physical Exam:  Physical Exam   GENERAL: The patient is well developed and nontoxic.  HEENT: Nonicteric sclerae, PERRLA, EOMI. Oropharynx clear. Moist mucous membranes. Conjunctivae appear well perfused.  CHEST: Chest wall is nontender.  HEART: Regular rate and rhythm without murmurs. No MRG  LUNGS: Clear to auscultation bilaterally. No WRR  ABDOMEN: Soft, positive bowel sounds, nontender, no organomegaly.  RECTAL: Deferred.  SKIN: No rash, no excessive bruising, petechiae, or purpura.  NEUROLOGIC: Cranial nerves II-XII intact without motor/sensory deficit     Pertinent  and/or Most Recent Results     LAB RESULTS:      Lab 10/23/23  0313 10/22/23  0133 10/20/23  1148 10/19/23  0155 10/18/23  0413   WBC 5.90 6.70 5.00 5.80 7.80   HEMOGLOBIN 8.7* 8.7* 8.8* 9.3* 9.3*   HEMATOCRIT 26.2* 26.1* 26.8* 29.2* 27.5*   PLATELETS 187 190 190 201 198   NEUTROS ABS 3.80  4.70 3.30 3.80 6.00   LYMPHS ABS 1.20 1.10 1.10 1.00 1.00   MONOS ABS 0.80 0.80 0.50 0.70 0.70   EOS ABS 0.10 0.10 0.10 0.10 0.10   MCV 84.8 82.6 87.1 85.1 82.8   SED RATE  --   --   --   --  30   CRP  --   --   --   --  0.59*         Lab 10/23/23  0313 10/22/23  0133 10/21/23  1230 10/20/23  1148 10/19/23  0155 10/18/23  0413   SODIUM 138 139 137 138 139 139   POTASSIUM 4.1 4.6 5.2 4.5 4.6 4.3   CHLORIDE 99 103 102 104 102 101   CO2 30.0* 28.0 27.0 26.0 27.0 26.0   ANION GAP 9.0 8.0 8.0 8.0 10.0 12.0   BUN 47* 53* 54* 56* 62* 62*   CREATININE 1.71* 1.76* 1.78* 1.93* 2.08* 2.22*   EGFR 30.5* 29.5* 29.1* 26.4* 24.1* 22.3*   GLUCOSE 212* 183* 235* 229* 304* 298*   CALCIUM 9.4 9.4 9.0 9.4 9.1 9.4   HEMOGLOBIN A1C  --   --   --   --   --  9.30*         Lab 10/19/23  0155   TOTAL PROTEIN 6.3   ALBUMIN 3.5   GLOBULIN 2.8   ALT (SGPT) 57*   AST (SGOT) 34*   BILIRUBIN 0.3   ALK PHOS 129*                 Lab 10/18/23  1501   IRON 47   FERRITIN 86.13   FOLATE 12.10   VITAMIN B 12 694         Brief Urine Lab Results  (Last result in the past 365 days)        Color   Clarity   Blood   Leuk Est   Nitrite   Protein   CREAT   Urine HCG        10/18/23 0635 Yellow   Clear   Negative   Negative   Negative   100 mg/dL (2+)                 Microbiology Results (last 10 days)       ** No results found for the last 240 hours. **            CT Lower Extremity Right Without Contrast    Result Date: 10/18/2023  Impression: Impression: 1.Subcutaneous edema along the lateral right pelvis and anterior-lateral proximal right thigh which could be related to contusion or cellulitis. 2.No definite acute osseous abnormality is identified on this limited exam. If there is persistent clinical concern for an occult fracture or soft tissue injury, MRI could be performed for further evaluation. 3.Mild hip osteoarthritis. 4.Degenerative changes at the sacroiliac joints. Electronically Signed: Vlad Lopez  10/18/2023 10:52 AM EDT  Workstation ID:  [id number]    XR Hip With or Without Pelvis 2 - 3 View Right    Result Date: 10/18/2023  Impression: Impression: No acute osseous abnormality. Mild to moderate osteoarthritic changes are present. Trochanteric enthesopathy is present consistent with gluteal tendinopathy. Electronically Signed: Karen Sweet MD  10/18/2023 4:52 AM EDT  Workstation ID: [id number]             Results for orders placed during the hospital encounter of 10/18/23    Adult Transthoracic Echo Complete w/ Color, Spectral and Contrast if Necessary Per Protocol    Interpretation Summary    Left ventricular systolic function is normal. Left ventricular ejection fraction appears to be 56 - 60%.    Left ventricular wall thickness is consistent with borderline concentric hypertrophy.    Estimated right ventricular systolic pressure from tricuspid regurgitation is markedly elevated (>55 mmHg).      Labs Pending at Discharge:      Procedures Performed           Consults:   Consults       Date and Time Order Name Status Description    10/18/2023  6:47 AM IP Consult to Nephrology      10/18/2023  6:35 AM Inpatient Hospitalist Consult      10/18/2023  6:35 AM IP Consult to Orthopedic Surgery Completed               Discharge Details        Discharge Medications        Continue These Medications        Instructions Start Date   dilTIAZem  MG 24 hr capsule  Commonly known as: CARDIZEM CD   240 mg, Oral, Daily      ezetimibe 10 MG tablet  Commonly known as: ZETIA   10 mg, Oral, Daily      fluticasone 50 MCG/ACT nasal spray  Commonly known as: FLONASE   2 sprays, Nasal, Daily      furosemide 40 MG tablet  Commonly known as: LASIX   40 mg, Oral, Daily      hydrALAZINE 100 MG tablet  Commonly known as: APRESOLINE   100 mg, Oral, Daily      hydroCHLOROthiazide 25 MG tablet  Commonly known as: HYDRODIURIL   25 mg, Oral, Daily      insulin NPH-insulin regular (70-30) 100 UNIT/ML injection  Commonly known as: humuLIN 70/30,novoLIN 70/30   20 Units, Subcutaneous,  Every Morning, And inject 10 units under the skin at bedtime      levothyroxine 50 MCG tablet  Commonly known as: SYNTHROID, LEVOTHROID   50 mcg, Oral, Daily      losartan 100 MG tablet  Commonly known as: COZAAR   100 mg, Oral, Daily      meloxicam 15 MG tablet  Commonly known as: MOBIC   15 mg, Oral, Daily      potassium chloride 10 MEQ CR tablet   10 mEq, Oral, Daily               No Known Allergies      Discharge Disposition:   Skilled Nursing Facility (DC - External)    Diet:  Hospital:  Diet Order   Procedures    Diet: Diabetic Diets, Renal Diets; Consistent Carbohydrate; Low Sodium (2-3g); Texture: Mechanical Ground (NDD 2); Fluid Consistency: Thin (IDDSI 0)         Discharge Activity:         CODE STATUS:  Code Status and Medical Interventions:   Ordered at: 10/18/23 0932     Code Status (Patient has no pulse and is not breathing):    CPR (Attempt to Resuscitate)     Medical Interventions (Patient has pulse or is breathing):    Full Support         No future appointments.        Time spent on Discharge including face to face service:  35 minutes        Signature: Electronically signed by Enrike Quijano MD, 10/23/23, 12:45 EDT.  [facility] Hospitalist Team

## 2023-10-23 NOTE — PROGRESS NOTES
PROGRESS NOTE      Patient Name: Leona Garcia  : 1945  MRN: [id number]  Primary Care Physician: Alfred Liriano MD  Date of admission: 10/18/2023    Patient Care Team:  Alfred Liriano MD as PCP - General (Family Medicine)        Subjective   Subjective:     Patient is feeling better no new complaints still complaining of arthritis hip pain  Review of systems:  All other review of system unremarkable      Allergies:  No Known Allergies    Objective   Exam:     Vital Signs  Temp:  [97.6 °F (36.4 °C)-98 °F (36.7 °C)] 97.7 °F (36.5 °C)  Heart Rate:  [80-91] 88  Resp:  [13-20] 16  BP: (136-193)/(55-86) 152/67  SpO2:  [92 %-100 %] 93 %  on  Flow (L/min):  [2] 2;   Device (Oxygen Therapy): room air  Body mass index is 48.75 kg/m².    General: Elderly Afro-American female in no acute distress.    Head:      Normocephalic and atraumatic.    Eyes:      PERRL/EOM intact, conjunctiva and sclera clear with out nystagmus.    Neck:      No masses, thyromegaly,  trachea central with normal respiratory effort   Lungs:    Clear bilaterally to auscultation.    Heart:      Regular rate and rhythm, no murmur no gallop  Abd:        Soft, nontender, not distended, bowel sounds positive, no shifting dullness   Pulses:   Pulses palpable  Extr:        No cyanosis or clubbing--+1 edema.    Neuro:    No focal deficits.   alert oriented x3  Skin:       Intact without lesions or rashes.    Psych:    Alert and cooperative; normal mood and affect; .      Results Review:  I have personally reviewed most recent Data :  CBC    Results from last 7 days   Lab Units 10/23/23  0313 10/22/23  0133 10/20/23  1148 10/19/23  0155 10/18/23  0413   WBC 10*3/mm3 5.90 6.70 5.00 5.80 7.80   HEMOGLOBIN g/dL 8.7* 8.7* 8.8* 9.3* 9.3*   PLATELETS 10*3/mm3 187 190 190 201 198     CMP   Results from last 7 days   Lab Units 10/23/23  0313 10/22/23  0133 10/21/23  1230 10/20/23  1148 10/19/23  0155 10/18/23  0413   SODIUM mmol/L 138 139 137  138 139 139   POTASSIUM mmol/L 4.1 4.6 5.2 4.5 4.6 4.3   CHLORIDE mmol/L 99 103 102 104 102 101   CO2 mmol/L 30.0* 28.0 27.0 26.0 27.0 26.0   BUN mg/dL 47* 53* 54* 56* 62* 62*   CREATININE mg/dL 1.71* 1.76* 1.78* 1.93* 2.08* 2.22*   GLUCOSE mg/dL 212* 183* 235* 229* 304* 298*   ALBUMIN g/dL  --   --   --   --  3.5  --    BILIRUBIN mg/dL  --   --   --   --  0.3  --    ALK PHOS U/L  --   --   --   --  129*  --    AST (SGOT) U/L  --   --   --   --  34*  --    ALT (SGPT) U/L  --   --   --   --  57*  --      ABG      No radiology results for the last day    Results for orders placed during the hospital encounter of 10/18/23    Adult Transthoracic Echo Complete w/ Color, Spectral and Contrast if Necessary Per Protocol    Interpretation Summary    Left ventricular systolic function is normal. Left ventricular ejection fraction appears to be 56 - 60%.    Left ventricular wall thickness is consistent with borderline concentric hypertrophy.    Estimated right ventricular systolic pressure from tricuspid regurgitation is markedly elevated (>55 mmHg).    Scheduled Meds:chlorthalidone, 25 mg, Oral, Daily  dilTIAZem CD, 240 mg, Oral, Daily  furosemide, 80 mg, Intravenous, Q8H  hydrALAZINE, 100 mg, Oral, Daily  hydrOXYzine, 25 mg, Intramuscular, Once  insulin lispro, 2-7 Units, Subcutaneous, 4x Daily AC & at Bedtime  insulin NPH-insulin regular, 20 Units, Subcutaneous, QAM  levothyroxine, 50 mcg, Oral, Q AM  senna-docusate sodium, 2 tablet, Oral, BID  sodium chloride, 10 mL, Intravenous, Q12H  terazosin, 2 mg, Oral, Nightly      Continuous Infusions:     PRN Meds:  acetaminophen **OR** acetaminophen **OR** acetaminophen    aluminum-magnesium hydroxide-simethicone    senna-docusate sodium **AND** polyethylene glycol **AND** bisacodyl **AND** bisacodyl    dextrose    dextrose    glucagon (human recombinant)    hydrALAZINE    HYDROmorphone    labetalol    melatonin    ondansetron **OR** ondansetron    sodium chloride    sodium  chloride    Assessment & Plan   Assessment and Plan:         Right hip pain    Essential hypertension    Type 2 diabetes mellitus    ASSESSMENT:  Acute kidney injury in a patient with chronic kidney disease stage III  Hypertension  Diabetes millitus type 2  Hip pain            PLAN :      JOSELITO: etiology likley due to diuretics ,ARB , NSAIDS  Creatinine is stable from yesterday  Okay to restart angiotensin receptor blocker blood pressure is on the high side  I will follow patient if patient go to [facility] rehab we will follow-up and start diuretics and angiotensin receptor blockers  Blood pressure is improving at this time  Will restart low-dose diuretics  Dc meloxicam at thsi time might be causing some increase in creatinine   Repeat labs AM             Electronically signed by Chu Lee MD,   [facility] kidney consultant  [phone]  10/23/2023  15:09 EDT  ,

## 2023-10-23 NOTE — CASE MANAGEMENT/SOCIAL WORK
Continued Stay Note   [facility]     Patient Name: Leona Garcia  MRN: [id number]  Today's Date: 10/23/2023    Admit Date: 10/18/2023    Plan: [facility] accepted. Bed avail. 10/23. No precert or PASRR required.   Discharge Plan       Row Name 10/23/23 1435       Plan    Plan [facility] accepted. Bed avail. 10/23. No precert or PASRR required.    Patient/Family in Agreement with Plan yes    Plan Comments Discussed plans to discharge today per Dr Quijano. CM reached out to [facility] elsi Martinez and was initially told bed would not be available until tomorrow 10/24 but informed a discharge is happening this evening and pt will be able to come around 18:30. Dr Quijano and nursing updated by secure chat. El[facility] Martinez confirms transportation with their w/c van this evening at 18:30. CM informed pt at bedside and provided IMM copy.        Megan Naegele, RN     Office Phone: [phone]  Office Cell: [phone]

## 2023-10-24 NOTE — CASE MANAGEMENT/SOCIAL WORK
Case Management Discharge Note      Final Note: [facility].    Selected Continued Care - Discharged on 10/23/2023 Admission date: 10/18/2023 - Discharge disposition: Skilled Nursing Facility (DC - External)      Destination Coordination complete.      Service Provider Selected Services Address Phone Fax Patient Preferred    [facility] [address] [place] [id number] [phone] --             Transportation Services  W/C Van: Skilled Nursing Facility Van ([facility] van)    Final Discharge Disposition Code: 62 - inpatient rehab facility

## 2023-12-27 ENCOUNTER — HOSPITAL ENCOUNTER (INPATIENT)
Facility: HOSPITAL | Age: 78
LOS: 7 days | Discharge: HOME HEALTH | End: 2024-01-04
Attending: EMERGENCY MEDICINE | Admitting: INTERNAL MEDICINE
Payer: MEDICARE

## 2023-12-27 ENCOUNTER — APPOINTMENT (OUTPATIENT)
Dept: GENERAL RADIOLOGY | Facility: HOSPITAL | Age: 78
End: 2023-12-27
Payer: MEDICARE

## 2023-12-27 DIAGNOSIS — J10.1 INFLUENZA A: ICD-10-CM

## 2023-12-27 DIAGNOSIS — R79.89 ELEVATED TROPONIN: ICD-10-CM

## 2023-12-27 DIAGNOSIS — I50.9 CONGESTIVE HEART FAILURE, UNSPECIFIED HF CHRONICITY, UNSPECIFIED HEART FAILURE TYPE: ICD-10-CM

## 2023-12-27 DIAGNOSIS — N18.9 CHRONIC KIDNEY DISEASE, UNSPECIFIED CKD STAGE: ICD-10-CM

## 2023-12-27 DIAGNOSIS — J96.01 ACUTE RESPIRATORY FAILURE WITH HYPOXIA: Primary | ICD-10-CM

## 2023-12-27 PROBLEM — J96.91 RESPIRATORY FAILURE WITH HYPOXIA: Status: ACTIVE | Noted: 2023-12-27

## 2023-12-27 PROBLEM — E78.5 HYPERLIPIDEMIA: Status: ACTIVE | Noted: 2023-12-27

## 2023-12-27 PROBLEM — N18.30 CHRONIC RENAL INSUFFICIENCY, STAGE III (MODERATE): Status: ACTIVE | Noted: 2017-03-17

## 2023-12-27 PROBLEM — E03.9 HYPOTHYROIDISM: Status: ACTIVE | Noted: 2017-03-17

## 2023-12-27 LAB
ALBUMIN SERPL-MCNC: 3.7 G/DL (ref 3.5–5.2)
ALBUMIN/GLOB SERPL: 1.4 G/DL
ALP SERPL-CCNC: 167 U/L (ref 39–117)
ALT SERPL W P-5'-P-CCNC: 34 U/L (ref 1–33)
ANION GAP SERPL CALCULATED.3IONS-SCNC: 7.6 MMOL/L (ref 5–15)
ANION GAP SERPL CALCULATED.3IONS-SCNC: 9 MMOL/L (ref 5–15)
AST SERPL-CCNC: 30 U/L (ref 1–32)
BASOPHILS # BLD AUTO: 0 10*3/MM3 (ref 0–0.2)
BASOPHILS # BLD AUTO: 0.02 10*3/MM3 (ref 0–0.2)
BASOPHILS NFR BLD AUTO: 0.3 % (ref 0–1.5)
BASOPHILS NFR BLD AUTO: 0.5 % (ref 0–1.5)
BILIRUB SERPL-MCNC: 0.5 MG/DL (ref 0–1.2)
BUN SERPL-MCNC: 43 MG/DL (ref 8–23)
BUN SERPL-MCNC: 47 MG/DL (ref 8–23)
BUN/CREAT SERPL: 20.8 (ref 7–25)
BUN/CREAT SERPL: 21.4 (ref 7–25)
CALCIUM SPEC-SCNC: 8.7 MG/DL (ref 8.6–10.5)
CALCIUM SPEC-SCNC: 9.2 MG/DL (ref 8.6–10.5)
CHLORIDE SERPL-SCNC: 103 MMOL/L (ref 98–107)
CHLORIDE SERPL-SCNC: 106 MMOL/L (ref 98–107)
CO2 SERPL-SCNC: 28 MMOL/L (ref 22–29)
CO2 SERPL-SCNC: 28.4 MMOL/L (ref 22–29)
CREAT SERPL-MCNC: 2.07 MG/DL (ref 0.57–1)
CREAT SERPL-MCNC: 2.2 MG/DL (ref 0.57–1)
DEPRECATED RDW RBC AUTO: 54.3 FL (ref 37–54)
DEPRECATED RDW RBC AUTO: 55.7 FL (ref 37–54)
EGFRCR SERPLBLD CKD-EPI 2021: 22.6 ML/MIN/1.73
EGFRCR SERPLBLD CKD-EPI 2021: 24.3 ML/MIN/1.73
EOSINOPHIL # BLD AUTO: 0.1 10*3/MM3 (ref 0–0.4)
EOSINOPHIL # BLD AUTO: 0.13 10*3/MM3 (ref 0–0.4)
EOSINOPHIL NFR BLD AUTO: 1.9 % (ref 0.3–6.2)
EOSINOPHIL NFR BLD AUTO: 3 % (ref 0.3–6.2)
ERYTHROCYTE [DISTWIDTH] IN BLOOD BY AUTOMATED COUNT: 16.7 % (ref 12.3–15.4)
ERYTHROCYTE [DISTWIDTH] IN BLOOD BY AUTOMATED COUNT: 17.3 % (ref 12.3–15.4)
FLUAV SUBTYP SPEC NAA+PROBE: DETECTED
FLUBV RNA ISLT QL NAA+PROBE: NOT DETECTED
GEN 5 2HR TROPONIN T REFLEX: 49 NG/L
GLOBULIN UR ELPH-MCNC: 2.7 GM/DL
GLUCOSE BLDC GLUCOMTR-MCNC: 241 MG/DL (ref 70–105)
GLUCOSE SERPL-MCNC: 218 MG/DL (ref 65–99)
GLUCOSE SERPL-MCNC: 271 MG/DL (ref 65–99)
HCT VFR BLD AUTO: 25.9 % (ref 34–46.6)
HCT VFR BLD AUTO: 31.3 % (ref 34–46.6)
HGB BLD-MCNC: 8.2 G/DL (ref 12–15.9)
HGB BLD-MCNC: 9.3 G/DL (ref 12–15.9)
IMM GRANULOCYTES # BLD AUTO: 0.02 10*3/MM3 (ref 0–0.05)
IMM GRANULOCYTES NFR BLD AUTO: 0.3 % (ref 0–0.5)
LYMPHOCYTES # BLD AUTO: 1.6 10*3/MM3 (ref 0.7–3.1)
LYMPHOCYTES # BLD AUTO: 2.06 10*3/MM3 (ref 0.7–3.1)
LYMPHOCYTES NFR BLD AUTO: 30.4 % (ref 19.6–45.3)
LYMPHOCYTES NFR BLD AUTO: 31.8 % (ref 19.6–45.3)
MCH RBC QN AUTO: 26.3 PG (ref 26.6–33)
MCH RBC QN AUTO: 27 PG (ref 26.6–33)
MCHC RBC AUTO-ENTMCNC: 29.7 G/DL (ref 31.5–35.7)
MCHC RBC AUTO-ENTMCNC: 31.5 G/DL (ref 31.5–35.7)
MCV RBC AUTO: 83.4 FL (ref 79–97)
MCV RBC AUTO: 90.7 FL (ref 79–97)
MONOCYTES # BLD AUTO: 0.6 10*3/MM3 (ref 0.1–0.9)
MONOCYTES # BLD AUTO: 0.6 10*3/MM3 (ref 0.1–0.9)
MONOCYTES NFR BLD AUTO: 12.5 % (ref 5–12)
MONOCYTES NFR BLD AUTO: 8.8 % (ref 5–12)
NEUTROPHILS NFR BLD AUTO: 2.6 10*3/MM3 (ref 1.7–7)
NEUTROPHILS NFR BLD AUTO: 3.95 10*3/MM3 (ref 1.7–7)
NEUTROPHILS NFR BLD AUTO: 52.2 % (ref 42.7–76)
NEUTROPHILS NFR BLD AUTO: 58.3 % (ref 42.7–76)
NRBC BLD AUTO-RTO: 0.2 /100 WBC (ref 0–0.2)
NT-PROBNP SERPL-MCNC: 1936 PG/ML (ref 0–1800)
PLATELET # BLD AUTO: 174 10*3/MM3 (ref 140–450)
PLATELET # BLD AUTO: 209 10*3/MM3 (ref 140–450)
PMV BLD AUTO: 11 FL (ref 6–12)
PMV BLD AUTO: 9.1 FL (ref 6–12)
POTASSIUM SERPL-SCNC: 4.5 MMOL/L (ref 3.5–5.2)
POTASSIUM SERPL-SCNC: 4.7 MMOL/L (ref 3.5–5.2)
PROT SERPL-MCNC: 6.4 G/DL (ref 6–8.5)
RBC # BLD AUTO: 3.11 10*6/MM3 (ref 3.77–5.28)
RBC # BLD AUTO: 3.45 10*6/MM3 (ref 3.77–5.28)
SARS-COV-2 RNA RESP QL NAA+PROBE: NOT DETECTED
SODIUM SERPL-SCNC: 139 MMOL/L (ref 136–145)
SODIUM SERPL-SCNC: 143 MMOL/L (ref 136–145)
TROPONIN T DELTA: -5 NG/L
TROPONIN T SERPL HS-MCNC: 54 NG/L
WBC NRBC COR # BLD AUTO: 5 10*3/MM3 (ref 3.4–10.8)
WBC NRBC COR # BLD AUTO: 6.78 10*3/MM3 (ref 3.4–10.8)

## 2023-12-27 PROCEDURE — 93010 ELECTROCARDIOGRAM REPORT: CPT | Performed by: EMERGENCY MEDICINE

## 2023-12-27 PROCEDURE — 85025 COMPLETE CBC W/AUTO DIFF WBC: CPT

## 2023-12-27 PROCEDURE — G0378 HOSPITAL OBSERVATION PER HR: HCPCS

## 2023-12-27 PROCEDURE — 84484 ASSAY OF TROPONIN QUANT: CPT

## 2023-12-27 PROCEDURE — 25010000002 FUROSEMIDE PER 20 MG

## 2023-12-27 PROCEDURE — 25010000002 HYDRALAZINE PER 20 MG: Performed by: EMERGENCY MEDICINE

## 2023-12-27 PROCEDURE — 99285 EMERGENCY DEPT VISIT HI MDM: CPT | Performed by: EMERGENCY MEDICINE

## 2023-12-27 PROCEDURE — 87636 SARSCOV2 & INF A&B AMP PRB: CPT | Performed by: EMERGENCY MEDICINE

## 2023-12-27 PROCEDURE — 99285 EMERGENCY DEPT VISIT HI MDM: CPT

## 2023-12-27 PROCEDURE — 71045 X-RAY EXAM CHEST 1 VIEW: CPT

## 2023-12-27 PROCEDURE — 83880 ASSAY OF NATRIURETIC PEPTIDE: CPT

## 2023-12-27 PROCEDURE — 36415 COLL VENOUS BLD VENIPUNCTURE: CPT

## 2023-12-27 PROCEDURE — 82948 REAGENT STRIP/BLOOD GLUCOSE: CPT

## 2023-12-27 PROCEDURE — 93005 ELECTROCARDIOGRAM TRACING: CPT

## 2023-12-27 PROCEDURE — 25010000002 METHYLPREDNISOLONE PER 125 MG

## 2023-12-27 PROCEDURE — 80053 COMPREHEN METABOLIC PANEL: CPT

## 2023-12-27 RX ORDER — HYDRALAZINE HYDROCHLORIDE 20 MG/ML
20 INJECTION INTRAMUSCULAR; INTRAVENOUS ONCE
Status: COMPLETED | OUTPATIENT
Start: 2023-12-27 | End: 2023-12-27

## 2023-12-27 RX ORDER — LOSARTAN POTASSIUM 50 MG/1
100 TABLET ORAL DAILY
Status: DISCONTINUED | OUTPATIENT
Start: 2023-12-27 | End: 2023-12-29

## 2023-12-27 RX ORDER — ACETAMINOPHEN 650 MG/1
650 SUPPOSITORY RECTAL EVERY 4 HOURS PRN
Status: DISCONTINUED | OUTPATIENT
Start: 2023-12-27 | End: 2024-01-04 | Stop reason: HOSPADM

## 2023-12-27 RX ORDER — IBUPROFEN 600 MG/1
1 TABLET ORAL
Status: DISCONTINUED | OUTPATIENT
Start: 2023-12-27 | End: 2024-01-04 | Stop reason: HOSPADM

## 2023-12-27 RX ORDER — INSULIN LISPRO 100 [IU]/ML
4-24 INJECTION, SOLUTION INTRAVENOUS; SUBCUTANEOUS
Status: DISCONTINUED | OUTPATIENT
Start: 2023-12-28 | End: 2023-12-28

## 2023-12-27 RX ORDER — NICOTINE POLACRILEX 4 MG
15 LOZENGE BUCCAL
Status: DISCONTINUED | OUTPATIENT
Start: 2023-12-27 | End: 2024-01-04 | Stop reason: HOSPADM

## 2023-12-27 RX ORDER — SODIUM CHLORIDE 0.9 % (FLUSH) 0.9 %
10 SYRINGE (ML) INJECTION AS NEEDED
Status: DISCONTINUED | OUTPATIENT
Start: 2023-12-27 | End: 2024-01-04 | Stop reason: HOSPADM

## 2023-12-27 RX ORDER — ONDANSETRON 2 MG/ML
4 INJECTION INTRAMUSCULAR; INTRAVENOUS EVERY 6 HOURS PRN
Status: DISCONTINUED | OUTPATIENT
Start: 2023-12-27 | End: 2024-01-04 | Stop reason: HOSPADM

## 2023-12-27 RX ORDER — SODIUM CHLORIDE 9 MG/ML
40 INJECTION, SOLUTION INTRAVENOUS AS NEEDED
Status: DISCONTINUED | OUTPATIENT
Start: 2023-12-27 | End: 2024-01-04 | Stop reason: HOSPADM

## 2023-12-27 RX ORDER — CHOLECALCIFEROL (VITAMIN D3) 125 MCG
5 CAPSULE ORAL NIGHTLY PRN
Status: DISCONTINUED | OUTPATIENT
Start: 2023-12-27 | End: 2024-01-04 | Stop reason: HOSPADM

## 2023-12-27 RX ORDER — METHYLPREDNISOLONE SODIUM SUCCINATE 125 MG/2ML
125 INJECTION, POWDER, LYOPHILIZED, FOR SOLUTION INTRAMUSCULAR; INTRAVENOUS ONCE
Status: COMPLETED | OUTPATIENT
Start: 2023-12-27 | End: 2023-12-27

## 2023-12-27 RX ORDER — IPRATROPIUM BROMIDE AND ALBUTEROL SULFATE 2.5; .5 MG/3ML; MG/3ML
3 SOLUTION RESPIRATORY (INHALATION) EVERY 4 HOURS PRN
Status: DISCONTINUED | OUTPATIENT
Start: 2023-12-27 | End: 2024-01-04 | Stop reason: HOSPADM

## 2023-12-27 RX ORDER — ACETAMINOPHEN 325 MG/1
650 TABLET ORAL EVERY 4 HOURS PRN
Status: DISCONTINUED | OUTPATIENT
Start: 2023-12-27 | End: 2024-01-04 | Stop reason: HOSPADM

## 2023-12-27 RX ORDER — AMOXICILLIN 250 MG
2 CAPSULE ORAL 2 TIMES DAILY PRN
Status: DISCONTINUED | OUTPATIENT
Start: 2023-12-27 | End: 2024-01-04 | Stop reason: HOSPADM

## 2023-12-27 RX ORDER — SODIUM CHLORIDE 0.9 % (FLUSH) 0.9 %
10 SYRINGE (ML) INJECTION EVERY 12 HOURS SCHEDULED
Status: DISCONTINUED | OUTPATIENT
Start: 2023-12-27 | End: 2024-01-04 | Stop reason: HOSPADM

## 2023-12-27 RX ORDER — BISACODYL 5 MG/1
5 TABLET, DELAYED RELEASE ORAL DAILY PRN
Status: DISCONTINUED | OUTPATIENT
Start: 2023-12-27 | End: 2024-01-04 | Stop reason: HOSPADM

## 2023-12-27 RX ORDER — LEVOTHYROXINE SODIUM 0.05 MG/1
50 TABLET ORAL DAILY
Status: DISCONTINUED | OUTPATIENT
Start: 2023-12-28 | End: 2024-01-04 | Stop reason: HOSPADM

## 2023-12-27 RX ORDER — POLYETHYLENE GLYCOL 3350 17 G/17G
17 POWDER, FOR SOLUTION ORAL DAILY PRN
Status: DISCONTINUED | OUTPATIENT
Start: 2023-12-27 | End: 2024-01-04 | Stop reason: HOSPADM

## 2023-12-27 RX ORDER — FUROSEMIDE 10 MG/ML
20 INJECTION INTRAMUSCULAR; INTRAVENOUS EVERY 12 HOURS
Status: DISCONTINUED | OUTPATIENT
Start: 2023-12-27 | End: 2023-12-29

## 2023-12-27 RX ORDER — NITROGLYCERIN 0.4 MG/1
0.4 TABLET SUBLINGUAL
Status: DISCONTINUED | OUTPATIENT
Start: 2023-12-27 | End: 2024-01-04 | Stop reason: HOSPADM

## 2023-12-27 RX ORDER — HYDROCHLOROTHIAZIDE 25 MG/1
25 TABLET ORAL DAILY
Status: DISCONTINUED | OUTPATIENT
Start: 2023-12-27 | End: 2023-12-29

## 2023-12-27 RX ORDER — LABETALOL HYDROCHLORIDE 5 MG/ML
10 INJECTION, SOLUTION INTRAVENOUS EVERY 6 HOURS PRN
Status: DISCONTINUED | OUTPATIENT
Start: 2023-12-27 | End: 2024-01-04 | Stop reason: HOSPADM

## 2023-12-27 RX ORDER — DILTIAZEM HYDROCHLORIDE 240 MG/1
240 CAPSULE, COATED, EXTENDED RELEASE ORAL DAILY
Status: DISCONTINUED | OUTPATIENT
Start: 2023-12-27 | End: 2023-12-31

## 2023-12-27 RX ORDER — HYDRALAZINE HYDROCHLORIDE 25 MG/1
100 TABLET, FILM COATED ORAL DAILY
Status: DISCONTINUED | OUTPATIENT
Start: 2023-12-27 | End: 2023-12-29

## 2023-12-27 RX ORDER — ACETAMINOPHEN 160 MG/5ML
650 SOLUTION ORAL EVERY 4 HOURS PRN
Status: DISCONTINUED | OUTPATIENT
Start: 2023-12-27 | End: 2024-01-04 | Stop reason: HOSPADM

## 2023-12-27 RX ORDER — DEXTROSE MONOHYDRATE 25 G/50ML
25 INJECTION, SOLUTION INTRAVENOUS
Status: DISCONTINUED | OUTPATIENT
Start: 2023-12-27 | End: 2024-01-04 | Stop reason: HOSPADM

## 2023-12-27 RX ORDER — BISACODYL 10 MG
10 SUPPOSITORY, RECTAL RECTAL DAILY PRN
Status: DISCONTINUED | OUTPATIENT
Start: 2023-12-27 | End: 2024-01-04 | Stop reason: HOSPADM

## 2023-12-27 RX ADMIN — HYDROCHLOROTHIAZIDE 25 MG: 25 TABLET ORAL at 19:52

## 2023-12-27 RX ADMIN — NITROGLYCERIN 1 INCH: 20 OINTMENT TOPICAL at 16:26

## 2023-12-27 RX ADMIN — LOSARTAN POTASSIUM 100 MG: 50 TABLET, FILM COATED ORAL at 19:52

## 2023-12-27 RX ADMIN — HYDRALAZINE HYDROCHLORIDE 20 MG: 20 INJECTION INTRAMUSCULAR; INTRAVENOUS at 18:00

## 2023-12-27 RX ADMIN — FUROSEMIDE 20 MG: 10 INJECTION, SOLUTION INTRAMUSCULAR; INTRAVENOUS at 19:52

## 2023-12-27 RX ADMIN — METHYLPREDNISOLONE SODIUM SUCCINATE 125 MG: 125 INJECTION, POWDER, FOR SOLUTION INTRAMUSCULAR; INTRAVENOUS at 21:41

## 2023-12-27 RX ADMIN — DILTIAZEM HYDROCHLORIDE 240 MG: 240 CAPSULE, EXTENDED RELEASE ORAL at 19:52

## 2023-12-27 RX ADMIN — Medication 10 ML: at 21:12

## 2023-12-27 RX ADMIN — HYDRALAZINE HYDROCHLORIDE 20 MG: 20 INJECTION, SOLUTION INTRAMUSCULAR; INTRAVENOUS at 16:52

## 2023-12-27 RX ADMIN — HYDRALAZINE HYDROCHLORIDE 100 MG: 25 TABLET, FILM COATED ORAL at 19:52

## 2023-12-27 NOTE — FSED PROVIDER NOTE
Subjective   History of Present Illness  The patient is a 78-year-old -American female with past medical history significant for chronic kidney disease, who presents emergency room with complaints of shortness of breath.  Patient states that she was admitted to the hospital back in October.  She states that she went to rehab for a while and has now been home for several months.  Patient states that she has a visiting home nurse.  She reports that over the past week she has become increasingly more short of breath.  She states that the nurse that comes visits her checked her oxygen and it has been fine.  Patient states that today, she was trying to transfer from the bed to the chair and got extremely short of breath.  She reports that this is unusual for her.  She denies any fever.  No headache.  She is here for evaluation.        Review of Systems   Constitutional:  Positive for fatigue. Negative for chills and fever.   Eyes: Negative.    Respiratory:  Positive for cough and shortness of breath. Negative for chest tightness.    Cardiovascular:  Negative for chest pain and palpitations.   Gastrointestinal:  Negative for abdominal pain, diarrhea, nausea and vomiting.   Genitourinary: Negative.    Musculoskeletal: Negative.    Skin: Negative.  Negative for rash.   Neurological: Negative.  Negative for syncope, weakness, numbness and headaches.   Psychiatric/Behavioral: Negative.     All other systems reviewed and are negative.      History reviewed. No pertinent past medical history.    No Known Allergies    History reviewed. No pertinent surgical history.    History reviewed. No pertinent family history.    Social History     Socioeconomic History    Marital status:    Tobacco Use    Smoking status: Never    Smokeless tobacco: Never   Vaping Use    Vaping Use: Never used   Substance and Sexual Activity    Alcohol use: Never    Drug use: Never    Sexual activity: Not Currently           Objective    Physical Exam  Vitals and nursing note reviewed.   Constitutional:       General: She is in acute distress.      Appearance: Normal appearance. She is normal weight. She is ill-appearing.   HENT:      Right Ear: External ear normal.      Left Ear: External ear normal.      Nose: Nose normal.   Cardiovascular:      Rate and Rhythm: Normal rate.   Pulmonary:      Effort: Pulmonary effort is normal.      Breath sounds: Examination of the right-lower field reveals decreased breath sounds. Examination of the left-lower field reveals decreased breath sounds. Rhonchi (Scattered, bilateral) present.   Musculoskeletal:         General: Normal range of motion.      Cervical back: Normal range of motion.   Skin:     Capillary Refill: Capillary refill takes less than 2 seconds.   Neurological:      General: No focal deficit present.      Mental Status: She is alert and oriented to person, place, and time.   Psychiatric:         Mood and Affect: Mood normal.         Procedures           ED Course  ED Course as of 12/27/23 1647   Wed Dec 27, 2023   1604 Patient presents with shortness of breath and hypoxia.  She is also hypertensive. [KZ]   1604 Patient chronically anemic. [KZ]   1619 Chest x-ray is read as nothing acute. [KZ]   1626 Troponin is critically elevated.  Patient's renal function is worse than her baseline.  BNP elevated. [KZ]   1635 Positive for influenza A. [KZ]      ED Course User Index  [KZ] Álvaro Ba MD                                           Medical Decision Making  The patient presents to the emergency room with an acute emergent concern-shortness of breath.  Patient has NO history of pulmonary disease.  They are not oxygen dependent at home.  Differential diagnosis includes:acute cardiac etiologies (ACS/CHF/Pericardial effusion/tamponade), acute respiratory etiologies (PE, pneumothorax , asthma, COPD exacerbation, allergic etiologies), and/or infectious etiologies such as PNA/Covid-19/Bronchitis.  Presentation is not consistent with non-cardiopulmonary causes to include toxidromes, metabolic etiologies such as acidemia or electrolyte derangements, sepsis, neurologic causes (i.e. demyelinating diseases).  A work-up has been requested.    Patient found to be in new onset CHF.  Also positive for influenza A.  Patient will be admitted to [facility].      Problems Addressed:  Acute respiratory failure with hypoxia: complicated acute illness or injury  Chronic kidney disease, unspecified CKD stage: complicated acute illness or injury  Congestive heart failure, unspecified HF chronicity, unspecified heart failure type: complicated acute illness or injury  Elevated troponin: complicated acute illness or injury  Influenza A: complicated acute illness or injury    Amount and/or Complexity of Data Reviewed  External Data Reviewed: radiology.     Details: Echocardiogram-October 23:    Left ventricular systolic function is normal. Left ventricular ejection fraction appears to be 56 - 60%.  ·  Left ventricular wall thickness is consistent with borderline concentric hypertrophy.  ·  Estimated right ventricular systolic pressure from tricuspid regurgitation is markedly elevated (>55 mmHg).    Labs: ordered. Decision-making details documented in ED Course.  Radiology: ordered. Decision-making details documented in ED Course.  ECG/medicine tests: ordered and independent interpretation performed. Decision-making details documented in ED Course.     Details: EKG shows normal sinus rhythm.  The rate is 72 bpm.  There is no ectopy.  Intervals are normal.  No acute ST segment or T wave changes.  Discussion of management or test interpretation with external provider(s): Case discussed with Dr. Meneses.  He has accepted the patient for admission.    Risk  Prescription drug management.  Decision regarding hospitalization.        Final diagnoses:   Acute respiratory failure with hypoxia   Congestive heart failure, unspecified  HF chronicity, unspecified heart failure type   Elevated troponin   Chronic kidney disease, unspecified CKD stage   Influenza A       ED Disposition  ED Disposition       ED Disposition   Decision to Admit    Condition   --    Comment   Level of Care: Progressive Care [20]   Diagnosis: Respiratory failure with hypoxia [301650]   Admitting Physician: DONIS PALMER [[id number]]   Attending Physician: DONIS PALMER [[id number]]                 No follow-up provider specified.       Medication List      No changes were made to your prescriptions during this visit.

## 2023-12-27 NOTE — ED NOTES
[facility] on site to transfer patient to [facility] PCU room 2114; status board showing room clean and ready.

## 2023-12-28 ENCOUNTER — APPOINTMENT (OUTPATIENT)
Dept: ULTRASOUND IMAGING | Facility: HOSPITAL | Age: 78
End: 2023-12-28
Payer: MEDICARE

## 2023-12-28 ENCOUNTER — APPOINTMENT (OUTPATIENT)
Dept: CARDIOLOGY | Facility: HOSPITAL | Age: 78
End: 2023-12-28
Payer: MEDICARE

## 2023-12-28 PROBLEM — J10.1 INFLUENZA A: Status: ACTIVE | Noted: 2023-12-28

## 2023-12-28 LAB
AMORPH URATE CRY URNS QL MICRO: ABNORMAL /HPF
BACTERIA UR QL AUTO: ABNORMAL /HPF
BILIRUB UR QL STRIP: NEGATIVE
CLARITY UR: ABNORMAL
COLOR UR: YELLOW
GLUCOSE BLDC GLUCOMTR-MCNC: 341 MG/DL (ref 70–105)
GLUCOSE BLDC GLUCOMTR-MCNC: 351 MG/DL (ref 70–105)
GLUCOSE BLDC GLUCOMTR-MCNC: 362 MG/DL (ref 70–105)
GLUCOSE BLDC GLUCOMTR-MCNC: 405 MG/DL (ref 70–105)
GLUCOSE UR STRIP-MCNC: ABNORMAL MG/DL
HGB UR QL STRIP.AUTO: NEGATIVE
HYALINE CASTS UR QL AUTO: ABNORMAL /LPF
KETONES UR QL STRIP: NEGATIVE
LEUKOCYTE ESTERASE UR QL STRIP.AUTO: NEGATIVE
NITRITE UR QL STRIP: NEGATIVE
PH UR STRIP.AUTO: <=5 [PH] (ref 5–8)
PROT UR QL STRIP: ABNORMAL
RBC # UR STRIP: ABNORMAL /HPF
REF LAB TEST METHOD: ABNORMAL
SP GR UR STRIP: 1.01 (ref 1–1.03)
SQUAMOUS #/AREA URNS HPF: ABNORMAL /HPF
UROBILINOGEN UR QL STRIP: ABNORMAL
WBC # UR STRIP: ABNORMAL /HPF

## 2023-12-28 PROCEDURE — 93306 TTE W/DOPPLER COMPLETE: CPT | Performed by: INTERNAL MEDICINE

## 2023-12-28 PROCEDURE — 76775 US EXAM ABDO BACK WALL LIM: CPT

## 2023-12-28 PROCEDURE — 82948 REAGENT STRIP/BLOOD GLUCOSE: CPT

## 2023-12-28 PROCEDURE — 63710000001 INSULIN LISPRO (HUMAN) PER 5 UNITS

## 2023-12-28 PROCEDURE — 99223 1ST HOSP IP/OBS HIGH 75: CPT | Performed by: INTERNAL MEDICINE

## 2023-12-28 PROCEDURE — 93306 TTE W/DOPPLER COMPLETE: CPT

## 2023-12-28 PROCEDURE — 25010000002 HYDRALAZINE PER 20 MG

## 2023-12-28 PROCEDURE — 63710000001 INSULIN LISPRO (HUMAN) PER 5 UNITS: Performed by: INTERNAL MEDICINE

## 2023-12-28 PROCEDURE — 81001 URINALYSIS AUTO W/SCOPE: CPT | Performed by: INTERNAL MEDICINE

## 2023-12-28 PROCEDURE — 63710000001 INSULIN GLARGINE PER 5 UNITS: Performed by: INTERNAL MEDICINE

## 2023-12-28 PROCEDURE — 63710000001 INSULIN ISOPHANE & REGULAR PER 5 UNITS

## 2023-12-28 PROCEDURE — 99222 1ST HOSP IP/OBS MODERATE 55: CPT | Performed by: INTERNAL MEDICINE

## 2023-12-28 PROCEDURE — 25010000002 FUROSEMIDE PER 20 MG

## 2023-12-28 RX ORDER — DEXTROSE MONOHYDRATE 25 G/50ML
25 INJECTION, SOLUTION INTRAVENOUS
Status: DISCONTINUED | OUTPATIENT
Start: 2023-12-28 | End: 2023-12-28 | Stop reason: SDUPTHER

## 2023-12-28 RX ORDER — INSULIN LISPRO 100 [IU]/ML
2-9 INJECTION, SOLUTION INTRAVENOUS; SUBCUTANEOUS
Status: DISCONTINUED | OUTPATIENT
Start: 2023-12-28 | End: 2024-01-04 | Stop reason: HOSPADM

## 2023-12-28 RX ORDER — ECHINACEA PURPUREA EXTRACT 125 MG
2 TABLET ORAL AS NEEDED
Status: DISCONTINUED | OUTPATIENT
Start: 2023-12-28 | End: 2024-01-04 | Stop reason: HOSPADM

## 2023-12-28 RX ORDER — HYDRALAZINE HYDROCHLORIDE 20 MG/ML
10 INJECTION INTRAMUSCULAR; INTRAVENOUS EVERY 4 HOURS PRN
Status: DISCONTINUED | OUTPATIENT
Start: 2023-12-28 | End: 2024-01-04 | Stop reason: HOSPADM

## 2023-12-28 RX ORDER — INSULIN LISPRO 100 [IU]/ML
7 INJECTION, SOLUTION INTRAVENOUS; SUBCUTANEOUS
Status: DISCONTINUED | OUTPATIENT
Start: 2023-12-28 | End: 2023-12-29

## 2023-12-28 RX ORDER — NICOTINE POLACRILEX 4 MG
15 LOZENGE BUCCAL
Status: DISCONTINUED | OUTPATIENT
Start: 2023-12-28 | End: 2023-12-28 | Stop reason: SDUPTHER

## 2023-12-28 RX ORDER — IBUPROFEN 600 MG/1
1 TABLET ORAL
Status: DISCONTINUED | OUTPATIENT
Start: 2023-12-28 | End: 2023-12-28 | Stop reason: SDUPTHER

## 2023-12-28 RX ADMIN — INSULIN LISPRO 16 UNITS: 100 INJECTION, SOLUTION INTRAVENOUS; SUBCUTANEOUS at 16:45

## 2023-12-28 RX ADMIN — INSULIN GLARGINE 20 UNITS: 100 INJECTION, SOLUTION SUBCUTANEOUS at 20:53

## 2023-12-28 RX ADMIN — HYDROCHLOROTHIAZIDE 25 MG: 25 TABLET ORAL at 09:08

## 2023-12-28 RX ADMIN — INSULIN LISPRO 9 UNITS: 100 INJECTION, SOLUTION INTRAVENOUS; SUBCUTANEOUS at 20:53

## 2023-12-28 RX ADMIN — Medication 10 ML: at 09:11

## 2023-12-28 RX ADMIN — INSULIN HUMAN 20 UNITS: 100 INJECTION, SUSPENSION SUBCUTANEOUS at 07:40

## 2023-12-28 RX ADMIN — HYDRALAZINE HYDROCHLORIDE 10 MG: 20 INJECTION INTRAMUSCULAR; INTRAVENOUS at 06:26

## 2023-12-28 RX ADMIN — HYDRALAZINE HYDROCHLORIDE 10 MG: 20 INJECTION INTRAMUSCULAR; INTRAVENOUS at 20:53

## 2023-12-28 RX ADMIN — HYDRALAZINE HYDROCHLORIDE 100 MG: 25 TABLET, FILM COATED ORAL at 09:10

## 2023-12-28 RX ADMIN — FUROSEMIDE 20 MG: 10 INJECTION, SOLUTION INTRAMUSCULAR; INTRAVENOUS at 07:41

## 2023-12-28 RX ADMIN — FUROSEMIDE 20 MG: 10 INJECTION, SOLUTION INTRAMUSCULAR; INTRAVENOUS at 20:53

## 2023-12-28 RX ADMIN — Medication 10 MG: at 04:23

## 2023-12-28 RX ADMIN — DILTIAZEM HYDROCHLORIDE 240 MG: 240 CAPSULE, EXTENDED RELEASE ORAL at 09:09

## 2023-12-28 RX ADMIN — INSULIN LISPRO 20 UNITS: 100 INJECTION, SOLUTION INTRAVENOUS; SUBCUTANEOUS at 07:40

## 2023-12-28 RX ADMIN — LEVOTHYROXINE SODIUM 50 MCG: 0.05 TABLET ORAL at 20:53

## 2023-12-28 RX ADMIN — Medication 10 ML: at 20:54

## 2023-12-28 RX ADMIN — INSULIN LISPRO 20 UNITS: 100 INJECTION, SOLUTION INTRAVENOUS; SUBCUTANEOUS at 11:38

## 2023-12-28 RX ADMIN — LOSARTAN POTASSIUM 100 MG: 50 TABLET, FILM COATED ORAL at 09:09

## 2023-12-28 RX ADMIN — Medication 10 MG: at 11:39

## 2023-12-28 NOTE — CONSULTS
Pt resting in bed, no visitors present. Pt lives w 69 yo sister. Pt has been feeling ill since [place]. Requested prayer for enough strength to be able to return home with support of home health. Chp provided emotional support and prayer. Chp will continue to provide routine support to offer consistent, relational supports and prayer consistent with pt giovanny culture. Additional support available as needed/ requested.

## 2023-12-28 NOTE — SIGNIFICANT NOTE
12/28/23 1427   OTHER   Discipline physical therapist   Rehab Time/Intention   Session Not Performed patient unavailable for evaluation  (Pt leaving floor for renal ultrasound)   Recommendation   PT - Next Appointment 12/29/23

## 2023-12-28 NOTE — CONSULTS
Consults  [facility] Consult Note      Patient Name: Leona Garcia  : 1945  MRN: [id number]  Primary Care Physician:  Alfred Liriano MD  Referring Physician: Alfred Liriano MD  Date of admission: 2023    Subjective     Reason for Consult:  amaris    HPI:   Leona Garcia is a 78 y.o. female admitted with soa.  She is homebound and has home health.  She developed worsening cough and soa and came to the er in [facility] and then was transferred here. She denies taking nsaids. She was found to have a creatinine of 2 whereas her baseline creatinine apparently is in the 1.7 range prompting renal consultation. She is not short of breath now. She has no orthopnea or pnd. She is very weak with any exertion at baseline and basically spends all of her time seated on her couch and walking to and from the bathroom and bedroom. She has had no chest pain. She has felt cold and wonders if she has had fevers. She has had no n/v or diarrhea.    Review of Systems:   14 point review of systems is otherwise negative except for mentioned above on HPI    Personal History     Past Medical History:   Diagnosis Date    Anemia     Chronic kidney disease (CKD)     Diabetes mellitus     Disease of thyroid gland     Hypertension        History reviewed. No pertinent surgical history.    Family History: family history is not on file.    Social History:  reports that she has never smoked. She has never used smokeless tobacco. She reports that she does not drink alcohol and does not use drugs.    Home Medications:  Prior to Admission medications    Medication Sig Start Date End Date Taking? Authorizing Provider   dilTIAZem CD (CARDIZEM CD) 240 MG 24 hr capsule Take 1 capsule by mouth Daily.   Yes Provider, MD Miriam   ezetimibe (ZETIA) 10 MG tablet Take 1 tablet by mouth Daily.   Yes Provider, MD Miriam   fluticasone (FLONASE) 50 MCG/ACT nasal spray 2 sprays into the nostril(s)  as directed by provider Daily.   Yes Miriam Baldwin MD   furosemide (LASIX) 40 MG tablet Take 1 tablet by mouth Daily.   Yes Miriam Baldwin MD   hydrALAZINE (APRESOLINE) 100 MG tablet Take 1 tablet by mouth Daily.   Yes Miriam Baldwin MD   hydroCHLOROthiazide (HYDRODIURIL) 25 MG tablet Take 1 tablet by mouth Daily.   Yes Miriam Baldwin MD   insulin NPH-insulin regular (humuLIN 70/30,novoLIN 70/30) (70-30) 100 UNIT/ML injection Inject 20 Units under the skin into the appropriate area as directed Every Morning. And inject 10 units under the skin at bedtime   Yes Miriam Baldwin MD   levothyroxine (SYNTHROID, LEVOTHROID) 50 MCG tablet Take 1 tablet by mouth Daily.   Yes Miriam Baldwin MD   losartan (COZAAR) 100 MG tablet Take 1 tablet by mouth Daily.   Yes Miriam Baldwin MD   meloxicam (MOBIC) 15 MG tablet Take 1 tablet by mouth Daily.   Yes Miriam Baldwin MD   potassium chloride 10 MEQ CR tablet Take 1 tablet by mouth Daily.   Yes Miriam Baldwin MD       Allergies:  No Known Allergies    Objective     Vitals:   Temp:  [94.7 °F (34.8 °C)-98.7 °F (37.1 °C)] 97.9 °F (36.6 °C)  Heart Rate:  [46-81] 74  Resp:  [14-26] 21  BP: (123-214)/(47-94) 167/63  Flow (L/min):  [2-4] 4    Intake/Output Summary (Last 24 hours) at 12/28/2023 1216  Last data filed at 12/28/2023 0908  Gross per 24 hour   Intake 1920 ml   Output 500 ml   Net 1420 ml       Physical Exam:    General Appearance: alert, oriented x 3, no acute distress   Skin: warm and dry  HEENT: oral mucosa normal, nonicteric sclera  Neck: supple, no JVD  Lungs: CTA  Heart: RRR, normal S1 and S2  Abdomen: soft, nontender, nondistended  : no palpable bladder  Extremities: 3+ brawny edema with severe venous stasis change, no cyanosis or clubbing  Neuro: normal speech and mental status     Scheduled Meds:     dilTIAZem CD, 240 mg, Oral, Daily  furosemide, 20 mg, Intravenous, Q12H  hydrALAZINE, 100 mg, Oral,  Daily  hydroCHLOROthiazide, 25 mg, Oral, Daily  insulin lispro, 4-24 Units, Subcutaneous, 4x Daily AC & at Bedtime  insulin NPH-insulin regular, 20 Units, Subcutaneous, QAM  levothyroxine, 50 mcg, Oral, Daily  losartan, 100 mg, Oral, Daily  sodium chloride, 10 mL, Intravenous, Q12H      IV Meds:        Results Reviewed:   I have personally reviewed the results from the time of this admission to 12/28/2023 12:16 EST     Lab Results   Component Value Date    GLUCOSE 218 (H) 12/27/2023    CALCIUM 8.7 12/27/2023     12/27/2023    K 4.5 12/27/2023    CO2 28.0 12/27/2023     12/27/2023    BUN 43 (H) 12/27/2023    CREATININE 2.07 (H) 12/27/2023    EGFRIFAFRI 56 (L) 04/01/2017    EGFRIFNONA 65 04/01/2017    BCR 20.8 12/27/2023    ANIONGAP 9.0 12/27/2023      Lab Results   Component Value Date    ALBUMIN 3.7 12/27/2023           Assessment / Plan     ASSESSMENT:  Toñito-prerenal, better with supportive care, superimposed on ckd3a from diabetes and hypertension  Benign htn with ckd3 controlled  Edema-venous stasis from staying seated with legs dependent  Influenza a infection    PLAN:  Avoid nephrotoxins  Renally dose medicines  Check lab in am    Thank you for involving us in the care of Leona HILDA Jose.  Please feel free to call with any questions.    Gray Norwood MD  12/28/23  12:16 EST    [facility]  [phone]

## 2023-12-28 NOTE — PLAN OF CARE
Goal Outcome Evaluation:  Plan of Care Reviewed With: patient           Outcome Evaluation: Patient remains on 4 liters of oxygen with saturation in the mid 90s. Patient NSR and at times bradycardia with rate in the upper 50s. Patient had a renal ultrasound today that was negative and a echo that is still waiting to be read. BP improved this afternoon with prn med given once. Blood sugars in the mid 300s, endocrine consulted and changes made to insulin regimen. Patient up out of bed once today to sit on bsc with max assist of 2. No new complaints

## 2023-12-28 NOTE — DISCHARGE PLACEMENT REQUEST
"Chari Garcia (78 y.o. Female)       Date of Birth   1945    Social Security Number       Address   [address] [place] [facility]    Home Phone   [phone]    MRN   [id number]       [facility]    Marital Status                               Admission Date   12/27/23    Admission Type   Urgent    Admitting Provider   Valery Mensees MD    Attending Provider   Valery Meneses MD    Department, Room/Bed   [facility] CARE, 2114/1       Discharge Date       Discharge Disposition       Discharge Destination                                 Attending Provider: Valery Meneses MD    Allergies: No Known Allergies    Isolation: Droplet   Infection: Influenza (12/27/23)   Code Status: CPR    Ht: 167.6 cm (66\")   Wt: 134 kg (295 lb)    Admission Cmt: None   Principal Problem: Respiratory failure with hypoxia [J96.91]                   Active Insurance as of 12/27/2023       Primary Coverage       Payor Plan Insurance Group Employer/Plan Group    MEDICARE MEDICARE A & B        Payor Plan Address Payor Plan Phone Number Payor Plan Fax Number Effective Dates    [address] [phone]  7/1/2011 - None Entered    [facility] [id number]         Subscriber Name Subscriber Birth Date Member ID       CHARI GARCIA 1945 [id number]               Secondary Coverage       Payor Plan Insurance Group Employer/Plan Group    AETNA COMMERCIAL AETNA [id number]       Payor Plan Address Payor Plan Phone Number Payor Plan Fax Number Effective Dates    [address] [phone]  12/1/2012 - None Entered    [facility] [id number]         Subscriber Name Subscriber Birth Date Member ID       CHARI GARCIA 1945 [id number]                     Emergency Contacts        (Rel.) Home Phone Work Phone Mobile Phone    MOSES HUMPHREY (Sister) [phone] -- [phone]              "

## 2023-12-28 NOTE — PROGRESS NOTES
[facility] Medicine Services  History & Physical     Patient Name: Leona Garcia  : 1945  MRN: [id number]  Primary Care Physician:  Alfred Liriano MD  Date of admission: 2023  Date and Time of Service: 2023 at 1935     Subjective       Chief Complaint: shortness of breath     History of Present Illness: Leona Garcia is a 78 y.o. female with a previous medical history of HTN, CKD III, DM on insulin, Hyperlipidemia, and Dysphagia who presented to [facility] ED on 2023 with shortness of breath which has been worsening over the past week.  She states that her home health nurse has been checking oxygen and it has been fine.  She states that this morning, she was unable to transfer from her bed to the chair due to the shortness of breath.       At [facility], initial troponin is 54, repeat 49. BNP 1936, Creatinine 2.2 (baseline 1.71). Respiratory panel is positive for Influenza A. Otherwise, labs are unremarkable. Chest xray shows stable cardiomegaly without acute process. EKG shows SR, HR 72.  She is afebrile, hypertensive at 170/62.  SPO2 94% on 2L per NC.       On chart review, patient had an ECHO on 10/19/23 which showed normal LV systolic function with an EF of 56-60%. Right ventricular systolic pressure is >55 mmHg due to tricuspid regurgitation.  LV wall thickness is consistent with borderline concentric hypertrophy.     12 point ROS reviewed and negative except as mentioned above        Personal History      Medical History        Past Medical History:   Diagnosis Date    Anemia      Chronic kidney disease (CKD)      Diabetes mellitus      Disease of thyroid gland      Hypertension              Surgical History   History reviewed. No pertinent surgical history.        Family History: family history is not on file. Otherwise pertinent FHx was reviewed and not pertinent to current issue.     Social History:  reports that she has never smoked. She has  never used smokeless tobacco. She reports that she does not drink alcohol and does not use drugs.     Home Medications:  Prior to Admission Medications         Prescriptions Last Dose Informant Patient Reported? Taking?     dilTIAZem CD (CARDIZEM CD) 240 MG 24 hr capsule 12/27/2023   Yes Yes     Take 1 capsule by mouth Daily.     ezetimibe (ZETIA) 10 MG tablet 12/27/2023   Yes Yes     Take 1 tablet by mouth Daily.     fluticasone (FLONASE) 50 MCG/ACT nasal spray 12/27/2023   Yes Yes     2 sprays into the nostril(s) as directed by provider Daily.     furosemide (LASIX) 40 MG tablet 12/27/2023   Yes Yes     Take 1 tablet by mouth Daily.     hydrALAZINE (APRESOLINE) 100 MG tablet 12/27/2023   Yes Yes     Take 1 tablet by mouth Daily.     hydroCHLOROthiazide (HYDRODIURIL) 25 MG tablet 12/26/2023   Yes Yes     Take 1 tablet by mouth Daily.     insulin NPH-insulin regular (humuLIN 70/30,novoLIN 70/30) (70-30) 100 UNIT/ML injection 12/27/2023   Yes Yes     Inject 20 Units under the skin into the appropriate area as directed Every Morning. And inject 10 units under the skin at bedtime     levothyroxine (SYNTHROID, LEVOTHROID) 50 MCG tablet 12/27/2023   Yes Yes     Take 1 tablet by mouth Daily.     losartan (COZAAR) 100 MG tablet 12/27/2023   Yes Yes     Take 1 tablet by mouth Daily.     meloxicam (MOBIC) 15 MG tablet Past Week   Yes Yes     Take 1 tablet by mouth Daily.     potassium chloride 10 MEQ CR tablet 12/27/2023   Yes Yes     Take 1 tablet by mouth Daily.                   Allergies:  No Known Allergies     Objective       Vitals:   Temp:  [94.7 °F (34.8 °C)-97.8 °F (36.6 °C)] 94.7 °F (34.8 °C)  Heart Rate:  [64-81] 64  Resp:  [14-26] 14  BP: (192-214)/(68-86) 195/68  Body mass index is 47.68 kg/m².  Physical Exam  Vitals and nursing note reviewed.   Constitutional:       General: She is sleeping.      Appearance: Normal appearance.      Interventions: Nasal cannula in place.   HENT:      Head: Normocephalic and  atraumatic.      Nose: Nose normal.      Mouth/Throat:      Mouth: Mucous membranes are moist.   Eyes:      Extraocular Movements: Extraocular movements intact.      Pupils: Pupils are equal, round, and reactive to light.   Cardiovascular:      Rate and Rhythm: Regular rhythm. Bradycardia present.      Pulses: Normal pulses.      Heart sounds: Normal heart sounds.   Pulmonary:      Effort: Pulmonary effort is normal.      Breath sounds: Normal breath sounds. Examination of the right-upper field reveals wheezing. Examination of the left-upper field reveals wheezing. Examination of the right-lower field reveals wheezing. Examination of the left-lower field reveals wheezing.   Abdominal:      General: Bowel sounds are normal.      Palpations: Abdomen is soft.   Musculoskeletal:         General: Normal range of motion.      Cervical back: Normal range of motion.  Significant bilateral lower extremity edema/lymphedema with  Skin:     General: Skin is warm and dry.   Neurological:      General: No focal deficit present.      Mental Status: She is oriented to person, place, and time and easily aroused. Mental status is at baseline. She is lethargic.   Psychiatric:         Mood and Affect: Mood normal.         Behavior: Behavior normal.               Assessment & Plan          This is a 78 y.o. female with:     Active and Resolved Problems        Active Hospital Problems     Diagnosis   POA    **Respiratory failure with hypoxia [J96.91]   Yes    Hyperlipidemia [E78.5]   Yes    Type 2 diabetes mellitus [E11.9]   Yes    Essential hypertension [I10]   Yes    Chronic renal insufficiency, stage III (moderate) [N18.30]   Yes    Hypothyroidism [E03.9]   Yes       Resolved Hospital Problems   No resolved problems to display.         Plan:     Respiratory Failure with Hypoxia  Likely due to a combination of Influenza A and possible new onset CHF  -Patient denies previous respiratory problems  -Chest xray reviewed, cardiomegaly  -BNP  1936, 2D echocardiogram results pending.  -Positive for Influenza A  -EF 56-60% 10/2023  -No Tamiflu, Symptomatic for greater than 48 hours  -Solumedrol ordered, patient with wheezes throughout all lung fields  -Duonebs prn  -Supplemental O2 to keep SPO2 greater than 90%  -Cardiology consulted  -IV Lasix ordered  Bilateral lower extremity edema/lymphedema; patient is basically bedridden just gets up and go to the bathroom and comes back at home     Essential Hypertension  -Uncontrolled  -Monitor BP  -Continue home Cardizem, Hydralazine, Hydrochlorothiazide, Losartan     Bradycardia  -likely due to the combination of multiple BP meds  -HR running in the 40's on exam  -She is lethargic but wakes easily and is A&O x4  -continuous cardiac monitoring  -She received her home BP meds, a total of 40mg of IV Hydralazine, and 1 inch of Nitrostat at [facility]  -Nitrostat was removed  JOSELITO/CKD stage III; likely prerenal continue to monitor improved with supportive care.  Nephrology consult appreciated.  Dysphagia  -chronic, stable  -Evaluated by SLP on previous admission 10/2023  -Mechanical ground textures with thin liquids and either whole or crushed medications recommended  -Diet ordered, no need for additional SLP evaluation     Diabetes Mellitus II  -Monitor glucose  -SSI ordered  -Continue home NPH     Hypothyroidism  -Continue Levothyroxine                 DVT prophylaxis:  Mechanical DVT prophylaxis orders are present.     CODE STATUS:    Code Status (Patient has no pulse and is not breathing): CPR (Attempt to Resuscitate)  Medical Interventions (Patient has pulse or is breathing): Full Support           Admission Status:  I believe this patient meets inpatient status.     I discussed the patient's findings and my recommendations with patient and nursing staff.     Signature:  Electronically signed by Valery Meneses MD, 12/28/23, 3:05 PM EST.    [facility] Hospitalist Team

## 2023-12-28 NOTE — PLAN OF CARE
Goal Outcome Evaluation:  Plan of Care Reviewed With: patient        Progress: no change       Pt admitted for increased SOA and hypertension. Pt positive for Flu A. Pt arrived to PCU from the [place] ER yesterday evening on 2L NC, in SR, with elevated BP, and a low temp. Admission complete and pt was seen by the on call NP. Bear hugger placed on pt and temp and BP closely monitored. Home medications reordered and administered. Medications effective in lowering BP but later on began creeping back up. PRN labetalol administered. Audible wheezing noted upon assessment of breath sounds. Albuterol and steroids ordered. HR running kristin while pt resting. Pt being turned Q2hrs to prevent skin breakdown. No family has been at bedside up to this point but the pt frequently speaks with them on the phone to give updates. Pt currently resting with call light within reach.

## 2023-12-28 NOTE — CONSULTS
ENDOCRINE INITIAL CONSULT NOTE  DATE OF SERVICE: 23  Patient Care Team:  Alfred Liriano MD as PCP - General (Family Medicine)        PATIENT NAME: Leona Garcia  PATIENT : 1945 AGE: 78 y.o.  MRN NUMBER: [id number]  PRIMARY CARE: Alfred Liriano MD    ==========================================================================    REASON FOR CONSULT: Type 2 diabetes management    HPI / SUBJECTIVE  78 y.o. female with multiple medical problems admitted to the hospital with shortness of breath, currently being managed for acute hypoxic respiratory failure likely secondary to combination of influenza A and possibly new onset congestive heart failure.  Endocrine team is consulted for type 2 diabetes management.  Patient longstanding history of type 2 diabetes and currently following PCP for management.  Maintained on insulin 70/30 20 units in the morning and 10 in the evening.  Reports to have a history of hypoglycemia.  Previously tried medication but was not able to tolerate it.  Patient says the admission is having significant hyperglycemia started on NPH insulin and given bolus dosing of lispro but still have persistent hypoglycemia.  Was also given 1 dose of Solu-Medrol yesterday on 2023.    ==========================================================================                                                PAST MEDICAL HISTORY    Past Medical History:   Diagnosis Date    Anemia     Chronic kidney disease (CKD)     Diabetes mellitus     Disease of thyroid gland     Hypertension        ==========================================================================    PAST SURGICAL HISTORY    History reviewed. No pertinent surgical history.    ==========================================================================    FAMILY HISTORY    History reviewed. No pertinent family history.    ==========================================================================    SOCIAL  HISTORY    Social History     Socioeconomic History    Marital status:    Tobacco Use    Smoking status: Never    Smokeless tobacco: Never   Vaping Use    Vaping Use: Never used   Substance and Sexual Activity    Alcohol use: Never    Drug use: Never    Sexual activity: Not Currently       ==========================================================================    HOME MEDICATIONS REPORTED ON ADMISSION      Current Facility-Administered Medications:     acetaminophen (TYLENOL) tablet 650 mg, 650 mg, Oral, Q4H PRN **OR** acetaminophen (TYLENOL) 160 MG/5ML oral solution 650 mg, 650 mg, Oral, Q4H PRN **OR** acetaminophen (TYLENOL) suppository 650 mg, 650 mg, Rectal, Q4H PRN, Blaire Hancock, [place]    sennosides-docusate (PERICOLACE) 8.6-50 MG per tablet 2 tablet, 2 tablet, Oral, BID PRN **AND** polyethylene glycol (MIRALAX) packet 17 g, 17 g, Oral, Daily PRN **AND** bisacodyl (DULCOLAX) EC tablet 5 mg, 5 mg, Oral, Daily PRN **AND** bisacodyl (DULCOLAX) suppository 10 mg, 10 mg, Rectal, Daily PRN, Blaire Hancock, APRN    dextrose (D50W) (25 g/50 mL) IV injection 25 g, 25 g, Intravenous, Q15 Min PRN, Blaire Hancock APRAGNES    dextrose (GLUTOSE) oral gel 15 g, 15 g, Oral, Q15 Min PRN, Blaire Hancock, APRN    dilTIAZem CD (CARDIZEM CD) 24 hr capsule 240 mg, 240 mg, Oral, Daily, Blaire Hancock, APRN, 240 mg at 12/28/23 0909    furosemide (LASIX) injection 20 mg, 20 mg, Intravenous, Q12H, Blaire Hancock, APRN, 20 mg at 12/28/23 0741    glucagon (GLUCAGEN) injection 1 mg, 1 mg, Intramuscular, Q15 Min PRN, Blaire Hancock, APRN    hydrALAZINE (APRESOLINE) injection 10 mg, 10 mg, Intravenous, Q4H PRN, Blaire Hancock APRN, 10 mg at 12/28/23 0626    hydrALAZINE (APRESOLINE) tablet 100 mg, 100 mg, Oral, Daily, Blaire Hancock APRN, 100 mg at 12/28/23 0910    hydroCHLOROthiazide (HYDRODIURIL) tablet 25 mg, 25 mg, Oral, Daily, Blaire Hancock APRN, 25 mg at 12/28/23 0908    Influenza Vac High-Dose Quad  (FLUZONE HIGH DOSE) injection 0.7 mL, 0.7 mL, Intramuscular, During Hospitalization, Valery Meneses MD    insulin lispro (HUMALOG/ADMELOG) injection 4-24 Units, 4-24 Units, Subcutaneous, 4x Daily AC & at Bedtime, Blaire Hancock APRN, 16 Units at 12/28/23 1645    insulin NPH-insulin regular (humuLIN 70/30,novoLIN 70/30) injection 20 Units, 20 Units, Subcutaneous, QAM, Blaire Hancock APRN, 20 Units at 12/28/23 0740    ipratropium-albuterol (DUO-NEB) nebulizer solution 3 mL, 3 mL, Nebulization, Q4H PRN, Blaire Hancock APRN    labetalol (NORMODYNE,TRANDATE) injection 10 mg, 10 mg, Intravenous, Q6H PRN, Blaire Hancock APRN, 10 mg at 12/28/23 1139    levothyroxine (SYNTHROID, LEVOTHROID) tablet 50 mcg, 50 mcg, Oral, Daily, Blaire Hancock APRN    losartan (COZAAR) tablet 100 mg, 100 mg, Oral, Daily, Blaire Hancock APRN, 100 mg at 12/28/23 0909    melatonin tablet 5 mg, 5 mg, Oral, Nightly PRN, Blaire Hancock APRN    nitroglycerin (NITROSTAT) SL tablet 0.4 mg, 0.4 mg, Sublingual, Q5 Min PRN, Blaire Hancock APRN    ondansetron (ZOFRAN) injection 4 mg, 4 mg, Intravenous, Q6H PRN, Blaire Hancock APRN    sodium chloride 0.9 % flush 10 mL, 10 mL, Intravenous, PRN, Emergency, Triage Protocol, MD    sodium chloride 0.9 % flush 10 mL, 10 mL, Intravenous, Q12H, Blaire Hancock APRN, 10 mL at 12/28/23 0911    sodium chloride 0.9 % flush 10 mL, 10 mL, Intravenous, PRN, Blaire Hancock APRN    sodium chloride 0.9 % infusion 40 mL, 40 mL, Intravenous, PRN, Karissa, Blaire L, APRN    ==========================================================================    ALLERGIES    No Known Allergies    ==========================================================================    ACTIVE HOSPITAL MEDICATIONS    Scheduled Medications:  dilTIAZem CD, 240 mg, Oral, Daily  furosemide, 20 mg, Intravenous, Q12H  hydrALAZINE, 100 mg, Oral, Daily  hydroCHLOROthiazide, 25 mg, Oral, Daily  insulin lispro, 4-24 Units,  Subcutaneous, 4x Daily AC & at Bedtime  insulin NPH-insulin regular, 20 Units, Subcutaneous, QAM  levothyroxine, 50 mcg, Oral, Daily  losartan, 100 mg, Oral, Daily  sodium chloride, 10 mL, Intravenous, Q12H         PRN Medications:    acetaminophen **OR** acetaminophen **OR** acetaminophen    senna-docusate sodium **AND** polyethylene glycol **AND** bisacodyl **AND** bisacodyl    dextrose    dextrose    glucagon (human recombinant)    hydrALAZINE    influenza vaccine    ipratropium-albuterol    labetalol    melatonin    nitroglycerin    ondansetron    sodium chloride    sodium chloride    sodium chloride     ==========================================================================    OBJECTIVE    Vitals:    12/28/23 1617   BP: 153/54   Pulse: 59   Resp: 16   Temp: 97.3 °F (36.3 °C)   SpO2: 93%      Body mass index is 47.61 kg/m².     General: Alert, cooperative, on O2 therapy with nasal cannula, obese  Head: Normocephalic, without obvious abnormality, atraumatic  Lungs: Harsh vesicular breathing, on O2 therapy with nasal cannula  Heart: Regular rate and rhythm, S1 and S2  Abdomen: Bowel sounds Positive    ==========================================================================    LABORATORY DATA    Lab Results   Component Value Date    GLUCOSE 218 (H) 12/27/2023    BUN 43 (H) 12/27/2023    CREATININE 2.07 (H) 12/27/2023    EGFRIFNONA 65 04/01/2017    EGFRIFAFRI 56 (L) 04/01/2017    BCR 20.8 12/27/2023    K 4.5 12/27/2023    CO2 28.0 12/27/2023    CALCIUM 8.7 12/27/2023    ALBUMIN 3.7 12/27/2023    LABIL2 1.4 (calc) 04/01/2017    AST 30 12/27/2023    ALT 34 (H) 12/27/2023       Lab Results   Component Value Date    HGBA1C 9.30 (H) 10/18/2023     Lab Results   Component Value Date    CREATININE 2.07 (H) 12/27/2023     Results from last 7 days   Lab Units 12/28/23  1618 12/28/23  1111 12/28/23  0709 12/27/23  1919   GLUCOSE mg/dL 341* 351* 362* 241*  "    ==========================================================================    ASSESSMENT AND PLAN    # Type 2 diabetes with hyperglycemia  - Blood sugar significantly referred for last 24 hours  - Patient have A1c of 9.3% on 10/13/2023  - Patient reports to have a history of hypoglycemia at home  - So far patient have received 60 units of insulin and there is no evidence of hypoglycemia  - Will start patient on weight-based basal bolus therapy rather than using premixed insulin  - Given previous history of hypoglycemia and patient recent exposure to significant high amount of insulin will start conservatively as follows:  Insulin Lantus 20 units nightly  Insulin lispro 7 units with each meal  Moderate dose correction scale  - Monitor blood sugar for next 24 hours and adjust therapy accordingly    # Acute hypoxic respiratory failure, care as per primary team    Thank you for courtesy of consultation.  Will follow with you.  Rest as per primary care.    This note was created using voice recognition software and is inherently subject to errors including those of syntax and \"sound-alike\" substitutions which may escape proofreading.  In such instances, original meaning may be extrapolated by contextual derivation.    =======================================  Ron Arreguin MD  Department of Endocrine, Diabetes and Metabolism  [facility], [place]  =======================================    "

## 2023-12-28 NOTE — H&P
[facility] Medicine Services  History & Physical    Patient Name: Leona Garcia  : 1945  MRN: [id number]  Primary Care Physician:  Alfred Liriano MD  Date of admission: 2023  Date and Time of Service: 2023 at 1935    Subjective      Chief Complaint: shortness of breath    History of Present Illness: Leona Garcia is a 78 y.o. female with a previous medical history of HTN, CKD III, DM on insulin, Hyperlipidemia, and Dysphagia who presented to [facility] ED on 2023 with shortness of breath which has been worsening over the past week.  She states that her home health nurse has been checking oxygen and it has been fine.  She states that this morning, she was unable to transfer from her bed to the chair due to the shortness of breath.      At [facility], initial troponin is 54, repeat 49. BNP 1936, Creatinine 2.2 (baseline 1.71). Respiratory panel is positive for Influenza A. Otherwise, labs are unremarkable. Chest xray shows stable cardiomegaly without acute process. EKG shows SR, HR 72.  She is afebrile, hypertensive at 170/62.  SPO2 94% on 2L per NC.      On chart review, patient had an ECHO on 10/19/23 which showed normal LV systolic function with an EF of 56-60%. Right ventricular systolic pressure is >55 mmHg due to tricuspid regurgitation.  LV wall thickness is consistent with borderline concentric hypertrophy.    12 point ROS reviewed and negative except as mentioned above      Personal History     Past Medical History:   Diagnosis Date    Anemia     Chronic kidney disease (CKD)     Diabetes mellitus     Disease of thyroid gland     Hypertension        History reviewed. No pertinent surgical history.    Family History: family history is not on file. Otherwise pertinent FHx was reviewed and not pertinent to current issue.    Social History:  reports that she has never smoked. She has never used smokeless tobacco. She reports that she does not drink  alcohol and does not use drugs.    Home Medications:  Prior to Admission Medications       Prescriptions Last Dose Informant Patient Reported? Taking?    dilTIAZem CD (CARDIZEM CD) 240 MG 24 hr capsule 12/27/2023  Yes Yes    Take 1 capsule by mouth Daily.    ezetimibe (ZETIA) 10 MG tablet 12/27/2023  Yes Yes    Take 1 tablet by mouth Daily.    fluticasone (FLONASE) 50 MCG/ACT nasal spray 12/27/2023  Yes Yes    2 sprays into the nostril(s) as directed by provider Daily.    furosemide (LASIX) 40 MG tablet 12/27/2023  Yes Yes    Take 1 tablet by mouth Daily.    hydrALAZINE (APRESOLINE) 100 MG tablet 12/27/2023  Yes Yes    Take 1 tablet by mouth Daily.    hydroCHLOROthiazide (HYDRODIURIL) 25 MG tablet 12/26/2023  Yes Yes    Take 1 tablet by mouth Daily.    insulin NPH-insulin regular (humuLIN 70/30,novoLIN 70/30) (70-30) 100 UNIT/ML injection 12/27/2023  Yes Yes    Inject 20 Units under the skin into the appropriate area as directed Every Morning. And inject 10 units under the skin at bedtime    levothyroxine (SYNTHROID, LEVOTHROID) 50 MCG tablet 12/27/2023  Yes Yes    Take 1 tablet by mouth Daily.    losartan (COZAAR) 100 MG tablet 12/27/2023  Yes Yes    Take 1 tablet by mouth Daily.    meloxicam (MOBIC) 15 MG tablet Past Week  Yes Yes    Take 1 tablet by mouth Daily.    potassium chloride 10 MEQ CR tablet 12/27/2023  Yes Yes    Take 1 tablet by mouth Daily.              Allergies:  No Known Allergies    Objective      Vitals:   Temp:  [94.7 °F (34.8 °C)-97.8 °F (36.6 °C)] 94.7 °F (34.8 °C)  Heart Rate:  [64-81] 64  Resp:  [14-26] 14  BP: (192-214)/(68-86) 195/68  Body mass index is 47.68 kg/m².  Physical Exam  Vitals and nursing note reviewed.   Constitutional:       General: She is sleeping.      Appearance: Normal appearance.      Interventions: Nasal cannula in place.   HENT:      Head: Normocephalic and atraumatic.      Nose: Nose normal.      Mouth/Throat:      Mouth: Mucous membranes are moist.   Eyes:       Extraocular Movements: Extraocular movements intact.      Pupils: Pupils are equal, round, and reactive to light.   Cardiovascular:      Rate and Rhythm: Regular rhythm. Bradycardia present.      Pulses: Normal pulses.      Heart sounds: Normal heart sounds.   Pulmonary:      Effort: Pulmonary effort is normal.      Breath sounds: Normal breath sounds. Examination of the right-upper field reveals wheezing. Examination of the left-upper field reveals wheezing. Examination of the right-lower field reveals wheezing. Examination of the left-lower field reveals wheezing.   Abdominal:      General: Bowel sounds are normal.      Palpations: Abdomen is soft.   Musculoskeletal:         General: Normal range of motion.      Cervical back: Normal range of motion.   Skin:     General: Skin is warm and dry.   Neurological:      General: No focal deficit present.      Mental Status: She is oriented to person, place, and time and easily aroused. Mental status is at baseline. She is lethargic.   Psychiatric:         Mood and Affect: Mood normal.         Behavior: Behavior normal.           Assessment & Plan        This is a 78 y.o. female with:    Active and Resolved Problems  Active Hospital Problems    Diagnosis  POA    **Respiratory failure with hypoxia [J96.91]  Yes    Hyperlipidemia [E78.5]  Yes    Type 2 diabetes mellitus [E11.9]  Yes    Essential hypertension [I10]  Yes    Chronic renal insufficiency, stage III (moderate) [N18.30]  Yes    Hypothyroidism [E03.9]  Yes      Resolved Hospital Problems   No resolved problems to display.       Plan:    Respiratory Failure with Hypoxia  Likely due to a combination of Influenza A and new onset CHF  -Patient denies previous respiratory problems  -Chest xray reviewed, cardiomegaly  -BNP 1936  -Positive for Influenza A  -EF 56-60% 10/2023  -No Tamiflu, Symptomatic for greater than 48 hours  -ECHO ordered  -Solumedrol ordered, patient with wheezes throughout all lung fields  -David  prn  -Supplemental O2 to keep SPO2 greater than 90%  -Cardiology consulted  -IV Lasix ordered  -Consider Pulmonology consult if unable to wean off Oxygen    Essential Hypertension  -Uncontrolled  -Monitor BP  -Continue home Cardizem, Hydralazine, Hydrochlorothiazide, Losartan    Bradycardia  -likely due to the combination of multiple BP meds  -HR running in the 40's on exam  -She is lethargic but wakes easily and is A&O x4  -continuous cardiac monitoring  -She received her home BP meds, a total of 40mg of IV Hydralazine, and 1 inch of Nitrostat at [facility]  -Nitrostat was removed  -Atropine if HR does not improve    Dysphagia  -chronic, stable  -Evaluated by SLP on previous admission 10/2023  -Mechanical ground textures with thin liquids and either whole or crushed medications recommended  -Diet ordered, no need for additional SLP evaluation    Diabetes Mellitus II  -Monitor glucose  -SSI ordered  -Continue home NPH    Hypothyroidism  -Continue Levothyroxine            DVT prophylaxis:  Mechanical DVT prophylaxis orders are present.    CODE STATUS:    Code Status (Patient has no pulse and is not breathing): CPR (Attempt to Resuscitate)  Medical Interventions (Patient has pulse or is breathing): Full Support        Admission Status:  I believe this patient meets inpatient status.    I discussed the patient's findings and my recommendations with patient and nursing staff.    Signature:     This document has been electronically signed by Blaire Hancock DNP, APRN on December 27, 2023 19:42 [facility] Hospitalist Team

## 2023-12-28 NOTE — CONSULTS
"Diabetes Education  Assessment/Teaching    Patient Name:  Leona Garcia  YOB: 1945  MRN: [id number]  Admit Date:  12/27/2023      Assessment Date:  12/28/2023  Flowsheet Row Most Recent Value   General Information     Referral From: MD order   Height 167.6 cm (66\")   Height Method Stated   Weight 134 kg (295 lb)   Weight Method Bed scale   Pregnancy Assessment    Diabetes History    What type of diabetes do you have? Type 2   Education Preferences    Nutrition Information    Assessment Topics    DM Goals             Flowsheet Row Most Recent Value   DM Education Needs    Meter Has own   Frequency of Testing 4 times a day  [SHechecks 3-4 times a day.]   Blood Glucose Target Range   [Patient states they have been starting to come down lately.]   Medication Insulin   Problem Solving Hyperglycemia, Symptoms, Treatment, Hypoglycemia   Reducing Risks A1C testing  [Discussed A1C of 9.3 from Oct 2023]   Physical Activity --  [Encouraged pt to increase physical activity.]   Teaching Method Handouts, Discussion   Patient Response Verbalized understanding              Other Comments:  Patient seen per MD order. Patient states she takes 70/30 20 units before breakfast and 10 units before supper. Patient states she had some issues getting her glasses last time for [employer].  Patient states she dials up her insulin by listening to the clicks and then her sister always checks the dose before injecting.  Discussed A1c of 9.3% from Oct 2023. Patient states she has a meter and checks her blood sugars 3-4 times day. She states they have been coming down lately.  Discussed hyperglycemia.  Encouraged pt to increase physical activity once she is feeling well. She states she has been getting PT at home and a nurse visits her at home as well. Discussed the one plate method with patient. Patient states no further questions at this time.          Electronically signed by:  Mitzi Burgess RN  12/28/23 12:04 " EST

## 2023-12-28 NOTE — PLAN OF CARE
Goal Outcome Evaluation:  ST orders received for swallowing. Pt currently receiving renal ultrasound. Will f/u w/ pt tomorrow and attempt evaluation if deemed appropriate.

## 2023-12-28 NOTE — CASE MANAGEMENT/SOCIAL WORK
Discharge Planning Assessment  [facility]     Patient Name: Leona Garcia  MRN: [id number]  Today's Date: 12/28/2023    Admit Date: 12/27/2023    Plan: Anticipate routine home with [facility] and [facility] (current). Watch for O2 needs.   Discharge Needs Assessment       Row Name 12/28/23 1311       Living Environment    People in Home sibling(s)    Current Living Arrangements home    Potentially Unsafe Housing Conditions none    Primary Care Provided by self    Provides Primary Care For no one    Family Caregiver if Needed sibling(s)    Quality of Family Relationships helpful    Able to Return to Prior Arrangements yes       Resource/Environmental Concerns    Resource/Environmental Concerns none    Transportation Concerns none       Transition Planning    Patient/Family Anticipates Transition to home with family;home with help/services    Patient/Family Anticipated Services at Transition none    Transportation Anticipated family or friend will provide       Discharge Needs Assessment    Readmission Within the Last 30 Days no previous admission in last 30 days    Equipment Currently Used at Home rollator    Concerns to be Addressed denies needs/concerns at this time    Anticipated Changes Related to Illness none    Equipment Needed After Discharge none                   Discharge Plan       Row Name 12/28/23 3660       Plan    Plan Comments CM met with patient at bedside. IMM letter reviewed with patient, consent obtained and copy left at bedside.      Row Name 12/28/23 0642       Plan    Plan Anticipate routine home with [facility] and [facility] (current). Watch for O2 needs.    Patient/Family in Agreement with Plan yes    Plan Comments CM met with patient at bedside. Patient lives with sister who assists with ADLs and transportation. PCP and pharmacy verified-denies any difficulty affording meds. Patient denies any d/c needs at this time and sister will transport at d/c. Patient current  with [facility], liaison notfiied. Barrier to D/C: 4L O2, IV lasix, PT pending.                      Expected Discharge Date and Time       Expected Discharge Date Expected Discharge Time    Dec 30, 2023            Demographic Summary       Row Name 12/28/23 1310       General Information    Admission Type observation    Arrived From emergency department    Referral Source admission list    Reason for Consult discharge planning    Preferred Language English       Contact Information    Permission Granted to Share Info With                    Functional Status       Row Name 12/28/23 1311       Functional Status    Usual Activity Tolerance moderate    Current Activity Tolerance moderate       Functional Status, IADL    Medications assistive person    Meal Preparation assistive person    Housekeeping assistive person    Laundry assistive person    Shopping assistive person       Mental Status    General Appearance WDL WDL       Mental Status Summary    Recent Changes in Mental Status/Cognitive Functioning no changes                       Patient Forms       Row Name 12/28/23 1440       Patient Forms    Important Message from Medicare (IMM) Delivered  12/28/23    Delivered to Patient    Method of delivery In person      Row Name 12/28/23 1317       Patient Forms    Important Message from Medicare (IMM) --  LAND 12/27/23 by registration                             Met with patient in room.  Maintained distance greater than six feet and spent less than 15 minutes in the room.     OPAL ArguetaN, RN    [address] [address]    Office: [phone]  Fax: [phone]

## 2023-12-29 LAB
ANION GAP SERPL CALCULATED.3IONS-SCNC: 16 MMOL/L (ref 5–15)
BASOPHILS # BLD AUTO: 0.1 10*3/MM3 (ref 0–0.2)
BASOPHILS NFR BLD AUTO: 1 % (ref 0–1.5)
BH CV ECHO MEAS - ACS: 1.9 CM
BH CV ECHO MEAS - AO MAX PG: 17.5 MMHG
BH CV ECHO MEAS - AO MEAN PG: 9 MMHG
BH CV ECHO MEAS - AO V2 MAX: 209 CM/SEC
BH CV ECHO MEAS - AO V2 VTI: 41.2 CM
BH CV ECHO MEAS - AVA(I,D): 2.5 CM2
BH CV ECHO MEAS - EDV(CUBED): 117.6 ML
BH CV ECHO MEAS - EDV(MOD-SP4): 66.6 ML
BH CV ECHO MEAS - EF(MOD-BP): 63 %
BH CV ECHO MEAS - EF(MOD-SP4): 63.4 %
BH CV ECHO MEAS - ESV(CUBED): 22 ML
BH CV ECHO MEAS - ESV(MOD-SP4): 24.4 ML
BH CV ECHO MEAS - FS: 42.9 %
BH CV ECHO MEAS - IVS/LVPW: 1.08 CM
BH CV ECHO MEAS - IVSD: 1.4 CM
BH CV ECHO MEAS - LA DIMENSION: 4.1 CM
BH CV ECHO MEAS - LAT PEAK E' VEL: 7.1 CM/SEC
BH CV ECHO MEAS - LV DIASTOLIC VOL/BSA (35-75): 28.2 CM2
BH CV ECHO MEAS - LV MASS(C)D: 267.9 GRAMS
BH CV ECHO MEAS - LV MAX PG: 8.9 MMHG
BH CV ECHO MEAS - LV MEAN PG: 5 MMHG
BH CV ECHO MEAS - LV SYSTOLIC VOL/BSA (12-30): 10.3 CM2
BH CV ECHO MEAS - LV V1 MAX: 149 CM/SEC
BH CV ECHO MEAS - LV V1 VTI: 33.3 CM
BH CV ECHO MEAS - LVIDD: 4.9 CM
BH CV ECHO MEAS - LVIDS: 2.8 CM
BH CV ECHO MEAS - LVOT AREA: 3.1 CM2
BH CV ECHO MEAS - LVOT DIAM: 2 CM
BH CV ECHO MEAS - LVPWD: 1.3 CM
BH CV ECHO MEAS - MED PEAK E' VEL: 6.7 CM/SEC
BH CV ECHO MEAS - MR MAX PG: 55.4 MMHG
BH CV ECHO MEAS - MR MAX VEL: 372 CM/SEC
BH CV ECHO MEAS - MV A MAX VEL: 133 CM/SEC
BH CV ECHO MEAS - MV DEC SLOPE: 552 CM/SEC2
BH CV ECHO MEAS - MV DEC TIME: 0.26 SEC
BH CV ECHO MEAS - MV E MAX VEL: 121 CM/SEC
BH CV ECHO MEAS - MV E/A: 0.91
BH CV ECHO MEAS - MV MAX PG: 8.8 MMHG
BH CV ECHO MEAS - MV MEAN PG: 4 MMHG
BH CV ECHO MEAS - MV P1/2T: 81.2 MSEC
BH CV ECHO MEAS - MV V2 VTI: 41.4 CM
BH CV ECHO MEAS - MVA(P1/2T): 2.7 CM2
BH CV ECHO MEAS - MVA(VTI): 2.5 CM2
BH CV ECHO MEAS - PA ACC TIME: 0.1 SEC
BH CV ECHO MEAS - PA V2 MAX: 140 CM/SEC
BH CV ECHO MEAS - PI END-D VEL: 136 CM/SEC
BH CV ECHO MEAS - PULM A REVS DUR: 0.1 SEC
BH CV ECHO MEAS - PULM A REVS VEL: 30 CM/SEC
BH CV ECHO MEAS - PULM DIAS VEL: 51.9 CM/SEC
BH CV ECHO MEAS - PULM S/D: 1.55
BH CV ECHO MEAS - PULM SYS VEL: 80.6 CM/SEC
BH CV ECHO MEAS - RAP SYSTOLE: 15 MMHG
BH CV ECHO MEAS - RV MAX PG: 5.2 MMHG
BH CV ECHO MEAS - RV V1 MAX: 114 CM/SEC
BH CV ECHO MEAS - RV V1 VTI: 25.1 CM
BH CV ECHO MEAS - RVDD: 3.1 CM
BH CV ECHO MEAS - RVSP: 57.5 MMHG
BH CV ECHO MEAS - SI(MOD-SP4): 17.9 ML/M2
BH CV ECHO MEAS - SV(LVOT): 104.6 ML
BH CV ECHO MEAS - SV(MOD-SP4): 42.2 ML
BH CV ECHO MEAS - TAPSE (>1.6): 2.01 CM
BH CV ECHO MEAS - TR MAX PG: 42.5 MMHG
BH CV ECHO MEAS - TR MAX VEL: 326 CM/SEC
BH CV ECHO MEASUREMENTS AVERAGE E/E' RATIO: 17.54
BH CV XLRA - TDI S': 14.5 CM/SEC
BUN SERPL-MCNC: 60 MG/DL (ref 8–23)
BUN/CREAT SERPL: 22.5 (ref 7–25)
CALCIUM SPEC-SCNC: 9 MG/DL (ref 8.6–10.5)
CHLORIDE SERPL-SCNC: 97 MMOL/L (ref 98–107)
CO2 SERPL-SCNC: 24 MMOL/L (ref 22–29)
CREAT SERPL-MCNC: 2.67 MG/DL (ref 0.57–1)
DEPRECATED RDW RBC AUTO: 53.4 FL (ref 37–54)
EGFRCR SERPLBLD CKD-EPI 2021: 17.9 ML/MIN/1.73
EOSINOPHIL # BLD AUTO: 0 10*3/MM3 (ref 0–0.4)
EOSINOPHIL NFR BLD AUTO: 0 % (ref 0.3–6.2)
ERYTHROCYTE [DISTWIDTH] IN BLOOD BY AUTOMATED COUNT: 17.5 % (ref 12.3–15.4)
GLUCOSE BLDC GLUCOMTR-MCNC: 205 MG/DL (ref 70–105)
GLUCOSE BLDC GLUCOMTR-MCNC: 240 MG/DL (ref 70–105)
GLUCOSE BLDC GLUCOMTR-MCNC: 256 MG/DL (ref 70–105)
GLUCOSE BLDC GLUCOMTR-MCNC: 347 MG/DL (ref 70–105)
GLUCOSE BLDC GLUCOMTR-MCNC: 392 MG/DL (ref 70–105)
GLUCOSE BLDC GLUCOMTR-MCNC: 414 MG/DL (ref 70–105)
GLUCOSE SERPL-MCNC: 407 MG/DL (ref 65–99)
HCT VFR BLD AUTO: 28.4 % (ref 34–46.6)
HGB BLD-MCNC: 8.9 G/DL (ref 12–15.9)
LEFT ATRIUM VOLUME INDEX: 35.8 ML/M2
LYMPHOCYTES # BLD AUTO: 1.2 10*3/MM3 (ref 0.7–3.1)
LYMPHOCYTES NFR BLD AUTO: 13.8 % (ref 19.6–45.3)
MCH RBC QN AUTO: 26.9 PG (ref 26.6–33)
MCHC RBC AUTO-ENTMCNC: 31.4 G/DL (ref 31.5–35.7)
MCV RBC AUTO: 85.7 FL (ref 79–97)
MONOCYTES # BLD AUTO: 0.5 10*3/MM3 (ref 0.1–0.9)
MONOCYTES NFR BLD AUTO: 5.4 % (ref 5–12)
NEUTROPHILS NFR BLD AUTO: 7 10*3/MM3 (ref 1.7–7)
NEUTROPHILS NFR BLD AUTO: 79.8 % (ref 42.7–76)
NRBC BLD AUTO-RTO: 0.2 /100 WBC (ref 0–0.2)
PLATELET # BLD AUTO: 202 10*3/MM3 (ref 140–450)
PMV BLD AUTO: 9.2 FL (ref 6–12)
POTASSIUM SERPL-SCNC: 5 MMOL/L (ref 3.5–5.2)
QT INTERVAL: 406 MS
QTC INTERVAL: 445 MS
RBC # BLD AUTO: 3.32 10*6/MM3 (ref 3.77–5.28)
SINUS: 2.8 CM
SODIUM SERPL-SCNC: 137 MMOL/L (ref 136–145)
WBC NRBC COR # BLD AUTO: 8.8 10*3/MM3 (ref 3.4–10.8)

## 2023-12-29 PROCEDURE — 25010000002 HYDRALAZINE PER 20 MG

## 2023-12-29 PROCEDURE — 80048 BASIC METABOLIC PNL TOTAL CA: CPT

## 2023-12-29 PROCEDURE — 92610 EVALUATE SWALLOWING FUNCTION: CPT

## 2023-12-29 PROCEDURE — 97161 PT EVAL LOW COMPLEX 20 MIN: CPT

## 2023-12-29 PROCEDURE — 85025 COMPLETE CBC W/AUTO DIFF WBC: CPT

## 2023-12-29 PROCEDURE — 99232 SBSQ HOSP IP/OBS MODERATE 35: CPT | Performed by: INTERNAL MEDICINE

## 2023-12-29 PROCEDURE — 63710000001 INSULIN LISPRO (HUMAN) PER 5 UNITS: Performed by: INTERNAL MEDICINE

## 2023-12-29 PROCEDURE — 63710000001 INSULIN GLARGINE PER 5 UNITS: Performed by: INTERNAL MEDICINE

## 2023-12-29 PROCEDURE — 82948 REAGENT STRIP/BLOOD GLUCOSE: CPT

## 2023-12-29 RX ORDER — INSULIN LISPRO 100 [IU]/ML
10 INJECTION, SOLUTION INTRAVENOUS; SUBCUTANEOUS
Status: DISCONTINUED | OUTPATIENT
Start: 2023-12-29 | End: 2023-12-30

## 2023-12-29 RX ORDER — HYDRALAZINE HYDROCHLORIDE 25 MG/1
100 TABLET, FILM COATED ORAL EVERY 8 HOURS SCHEDULED
Status: DISCONTINUED | OUTPATIENT
Start: 2023-12-29 | End: 2024-01-04 | Stop reason: HOSPADM

## 2023-12-29 RX ADMIN — INSULIN LISPRO 7 UNITS: 100 INJECTION, SOLUTION INTRAVENOUS; SUBCUTANEOUS at 11:30

## 2023-12-29 RX ADMIN — SALINE NASAL SPRAY 2 SPRAY: 1.5 SOLUTION NASAL at 00:50

## 2023-12-29 RX ADMIN — INSULIN LISPRO 4 UNITS: 100 INJECTION, SOLUTION INTRAVENOUS; SUBCUTANEOUS at 17:22

## 2023-12-29 RX ADMIN — Medication 10 ML: at 20:11

## 2023-12-29 RX ADMIN — INSULIN LISPRO 10 UNITS: 100 INJECTION, SOLUTION INTRAVENOUS; SUBCUTANEOUS at 11:31

## 2023-12-29 RX ADMIN — INSULIN LISPRO 6 UNITS: 100 INJECTION, SOLUTION INTRAVENOUS; SUBCUTANEOUS at 20:10

## 2023-12-29 RX ADMIN — HYDRALAZINE HYDROCHLORIDE 100 MG: 25 TABLET ORAL at 14:05

## 2023-12-29 RX ADMIN — INSULIN LISPRO 10 UNITS: 100 INJECTION, SOLUTION INTRAVENOUS; SUBCUTANEOUS at 17:21

## 2023-12-29 RX ADMIN — INSULIN GLARGINE 30 UNITS: 100 INJECTION, SOLUTION SUBCUTANEOUS at 20:10

## 2023-12-29 RX ADMIN — LOSARTAN POTASSIUM 100 MG: 50 TABLET, FILM COATED ORAL at 05:00

## 2023-12-29 RX ADMIN — Medication 10 MG: at 01:19

## 2023-12-29 RX ADMIN — HYDRALAZINE HYDROCHLORIDE 100 MG: 25 TABLET, FILM COATED ORAL at 05:01

## 2023-12-29 RX ADMIN — HYDRALAZINE HYDROCHLORIDE 100 MG: 25 TABLET ORAL at 21:12

## 2023-12-29 RX ADMIN — Medication 10 ML: at 08:23

## 2023-12-29 RX ADMIN — DILTIAZEM HYDROCHLORIDE 240 MG: 240 CAPSULE, EXTENDED RELEASE ORAL at 08:18

## 2023-12-29 RX ADMIN — INSULIN LISPRO 9 UNITS: 100 INJECTION, SOLUTION INTRAVENOUS; SUBCUTANEOUS at 08:18

## 2023-12-29 RX ADMIN — INSULIN LISPRO 7 UNITS: 100 INJECTION, SOLUTION INTRAVENOUS; SUBCUTANEOUS at 08:18

## 2023-12-29 RX ADMIN — HYDRALAZINE HYDROCHLORIDE 10 MG: 20 INJECTION INTRAMUSCULAR; INTRAVENOUS at 08:22

## 2023-12-29 RX ADMIN — HYDRALAZINE HYDROCHLORIDE 10 MG: 20 INJECTION INTRAMUSCULAR; INTRAVENOUS at 02:18

## 2023-12-29 NOTE — PROGRESS NOTES
[facility] Progress Note      Patient Name: Leona Garcia  : 1945  MRN: [id number]  Primary Care Physician:  Alfred Liriano MD  Date of admission: 2023    Subjective     Interval History:   No soa at rest, has dry cough, no chest pain    Review of Systems:   14 point review of systems is otherwise negative except for mentioned above on HPI    Objective     Vitals:   Temp:  [97.2 °F (36.2 °C)-97.8 °F (36.6 °C)] 97.3 °F (36.3 °C)  Heart Rate:  [58-77] 58  Resp:  [13-21] 17  BP: (147-194)/(51-99) 150/58  Flow (L/min):  [2-4] 2    Intake/Output Summary (Last 24 hours) at 2023 1327  Last data filed at 2023 1000  Gross per 24 hour   Intake 960 ml   Output 1000 ml   Net -40 ml       Physical Exam:    General Appearance: alert, oriented x 3, no acute distress   Skin: warm and dry  HEENT: oral mucosa normal, nonicteric sclera  Neck: supple, no JVD  Lungs: CTA  Heart: RRR, normal S1 and S2  Abdomen: soft, nontender, nondistended  : no palpable bladder  Extremities: 2+ edema, no cyanosis or clubbing  Neuro: normal speech and mental status     Scheduled Meds:     dilTIAZem CD, 240 mg, Oral, Daily  hydrALAZINE, 100 mg, Oral, Q8H  insulin glargine, 30 Units, Subcutaneous, Nightly  insulin lispro, 10 Units, Subcutaneous, TID With Meals  insulin lispro, 2-9 Units, Subcutaneous, 4x Daily AC & at Bedtime  levothyroxine, 50 mcg, Oral, Daily  sodium chloride, 10 mL, Intravenous, Q12H      IV Meds:        Results Reviewed:   I have personally reviewed the results from the time of this admission to 2023 13:27 EST     Results from last 7 days   Lab Units 23  0041 23  2251 23  1546   SODIUM mmol/L 137 143 139   POTASSIUM mmol/L 5.0 4.5 4.7   CHLORIDE mmol/L 97* 106 103   CO2 mmol/L 24.0 28.0 28.4   BUN mg/dL 60* 43* 47*   CREATININE mg/dL 2.67* 2.07* 2.20*   CALCIUM mg/dL 9.0 8.7 9.2   BILIRUBIN mg/dL  --   --  0.5   ALK PHOS U/L  --   --  167*    ALT (SGPT) U/L  --   --  34*   AST (SGOT) U/L  --   --  30   GLUCOSE mg/dL 407* 218* 271*     Estimated Creatinine Clearance: 25.2 mL/min (A) (by C-G formula based on SCr of 2.67 mg/dL (H)).          Results from last 7 days   Lab Units 12/29/23  0041 12/27/23  2251 12/27/23  1546   WBC 10*3/mm3 8.80 5.00 6.78   HEMOGLOBIN g/dL 8.9* 8.2* 9.3*   PLATELETS 10*3/mm3 202 174 209             ASSESSMENT:    Toñito-prerenal, initially better with supportive care, superimposed on ckd3a from diabetes and hypertension, a little worse overnight with resumption of diuretics  Benign htn with ckd3 controlled  Edema-venous stasis from staying seated with legs dependent  Influenza a infection     PLAN:  Avoid nephrotoxins  Hold diuretics today  Renally dose medicines  Check lab in am   Assessment / Plan       Thank you for involving us in the care of Leona Garcia.  Please feel free to call with any questions.    Gray Norwood MD  12/29/23  13:27 [facility]    [facility]  [phone]

## 2023-12-29 NOTE — PLAN OF CARE
Goal Outcome Evaluation:           Progress: no change  Outcome Evaluation: Pt remains in droplet precautions r/t being + for Flu A. Pt currently on 4L per NC. HR NSR, BP elevated. PRN BP medications administered with little to no + effect noted. Blood glucose remains elevated. Dr. Arreguin notified of pt's blood gluose of 405 with N.N.O. provided. ECHO result still pending at this time. Pt C/O nasal congestion with N.O. obtained for PRN saline spray. External cath in place for accurate I&O's. Clear yellow urine noted. No C/O voiced per pt at this time. Pt resting abed with call light within reach. Pt turned & repositioned throughout the noc. Plan of care ongoing.         Problem: Adult Inpatient Plan of Care  Goal: Plan of Care Review  Outcome: Ongoing, Progressing  Flowsheets (Taken 12/29/2023 0358)  Progress: no change  Outcome Evaluation: Pt remains in droplet precautions r/t being + for Flu A. Pt currently on 4L per NC. HR NSR, BP elevated. PRN BP medications administered with little to no + effect noted. Blood glucose remains elevated. Dr. Arreguin notified of pt's blood gluose of 405 with N.N.O. provided. ECHO result still pending at this time. Pt C/O nasal congestion with N.O. obtained for PRN saline spray. External cath in place for accurate I&O's. Clear yellow urine noted. No C/O voiced per pt at this time. Pt resting abed with call light within reach. Pt turned & repositioned throughout the noc. Plan of care ongoing.  Goal: Patient-Specific Goal (Individualized)  Outcome: Ongoing, Progressing  Goal: Absence of Hospital-Acquired Illness or Injury  Outcome: Ongoing, Progressing  Intervention: Identify and Manage Fall Risk  Recent Flowsheet Documentation  Taken 12/29/2023 0200 by Gulshan Wyman, RN  Safety Promotion/Fall Prevention:   safety round/check completed   fall prevention program maintained  Taken 12/29/2023 0000 by Gulshan Wyman, RN  Safety Promotion/Fall Prevention:   safety round/check completed    fall prevention program maintained  Taken 12/28/2023 2200 by Gulshan Wyman RN  Safety Promotion/Fall Prevention:   safety round/check completed   fall prevention program maintained  Taken 12/28/2023 2000 by Gulshan Wyman RN  Safety Promotion/Fall Prevention:   safety round/check completed   nonskid shoes/slippers when out of bed   fall prevention program maintained   clutter free environment maintained   assistive device/personal items within reach   activity supervised  Intervention: Prevent Skin Injury  Recent Flowsheet Documentation  Taken 12/29/2023 0000 by Gulshan Wyman RN  Skin Protection:   adhesive use limited   incontinence pads utilized   tubing/devices free from skin contact  Taken 12/28/2023 2000 by Gulshan Wyman RN  Body Position:   side-lying   right  Skin Protection:   adhesive use limited   incontinence pads utilized   tubing/devices free from skin contact  Intervention: Prevent and Manage VTE (Venous Thromboembolism) Risk  Recent Flowsheet Documentation  Taken 12/28/2023 2000 by Gulshan Wyman RN  Activity Management: activity encouraged  Intervention: Prevent Infection  Recent Flowsheet Documentation  Taken 12/29/2023 0200 by Gulshan Wyman RN  Infection Prevention:   hand hygiene promoted   personal protective equipment utilized   rest/sleep promoted   single patient room provided  Taken 12/29/2023 0000 by Gulshan Wyman RN  Infection Prevention:   hand hygiene promoted   personal protective equipment utilized   rest/sleep promoted   single patient room provided  Taken 12/28/2023 2200 by Gulshan Wyman RN  Infection Prevention:   hand hygiene promoted   personal protective equipment utilized   rest/sleep promoted   single patient room provided  Taken 12/28/2023 2000 by Gulshan Wyman RN  Infection Prevention:   hand hygiene promoted   personal protective equipment utilized   rest/sleep promoted   single patient room provided  Goal: Optimal Comfort and Wellbeing  Outcome:  Ongoing, Progressing  Intervention: Monitor Pain and Promote Comfort  Recent Flowsheet Documentation  Taken 12/28/2023 2000 by Gulshan Wyman RN  Pain Management Interventions:   care clustered   diversional activity provided   pillow support provided   relaxation techniques promoted   unnecessary movement minimized  Intervention: Provide Person-Centered Care  Recent Flowsheet Documentation  Taken 12/28/2023 2000 by Gulshan Wyman RN  Trust Relationship/Rapport:   care explained   choices provided   questions answered   questions encouraged   reassurance provided   thoughts/feelings acknowledged  Goal: Readiness for Transition of Care  Outcome: Ongoing, Progressing     Problem: Diabetes Comorbidity  Goal: Blood Glucose Level Within Targeted Range  Outcome: Ongoing, Progressing  Intervention: Monitor and Manage Glycemia  Recent Flowsheet Documentation  Taken 12/29/2023 0000 by Gulshan Wyman RN  Glycemic Management:   blood glucose monitored   oral hydration promoted  Taken 12/28/2023 2000 by Gulshan Wyman RN  Glycemic Management:   blood glucose monitored   oral hydration promoted     Problem: Hypertension Comorbidity  Goal: Blood Pressure in Desired Range  Outcome: Ongoing, Progressing  Intervention: Maintain Blood Pressure Management  Recent Flowsheet Documentation  Taken 12/29/2023 0200 by Gulshan Wyman RN  Medication Review/Management: medications reviewed  Taken 12/29/2023 0000 by Gulshan Wyman RN  Medication Review/Management: medications reviewed  Taken 12/28/2023 2200 by Gulshan Wyman RN  Medication Review/Management: medications reviewed  Taken 12/28/2023 2000 by Gulshan Wyman RN  Medication Review/Management:   medications reviewed   high-risk medications identified     Problem: Osteoarthritis Comorbidity  Goal: Maintenance of Osteoarthritis Symptom Control  Outcome: Ongoing, Progressing  Intervention: Maintain Osteoarthritis Symptom Control  Recent Flowsheet Documentation  Taken  12/29/2023 0200 by Gulshan Wyman RN  Medication Review/Management: medications reviewed  Taken 12/29/2023 0000 by Gulshan Wyman RN  Medication Review/Management: medications reviewed  Taken 12/28/2023 2200 by Gulshan Wyman RN  Medication Review/Management: medications reviewed  Taken 12/28/2023 2000 by Gulshan Wyman RN  Activity Management: activity encouraged  Medication Review/Management:   medications reviewed   high-risk medications identified     Problem: Pain Chronic (Persistent) (Comorbidity Management)  Goal: Acceptable Pain Control and Functional Ability  Outcome: Ongoing, Progressing  Intervention: Manage Persistent Pain  Recent Flowsheet Documentation  Taken 12/29/2023 0200 by Gulshan Wyman RN  Medication Review/Management: medications reviewed  Taken 12/29/2023 0000 by Gulshan Wyman RN  Medication Review/Management: medications reviewed  Taken 12/28/2023 2200 by Gulshan Wymna RN  Medication Review/Management: medications reviewed  Taken 12/28/2023 2000 by Gulshan Wyman RN  Sleep/Rest Enhancement:   awakenings minimized   consistent schedule promoted   regular sleep/rest pattern promoted   relaxation techniques promoted  Medication Review/Management:   medications reviewed   high-risk medications identified  Intervention: Develop Pain Management Plan  Recent Flowsheet Documentation  Taken 12/28/2023 2000 by Gulshan Wyman RN  Pain Management Interventions:   care clustered   diversional activity provided   pillow support provided   relaxation techniques promoted   unnecessary movement minimized  Intervention: Optimize Psychosocial Wellbeing  Recent Flowsheet Documentation  Taken 12/29/2023 0000 by Gulshan Wyman RN  Diversional Activities: television  Taken 12/28/2023 2000 by Gulshan Wyman RN  Supportive Measures:   active listening utilized   verbalization of feelings encouraged  Diversional Activities:   television   smartphone  Family/Support System Care: support  provided     Problem: Skin Injury Risk Increased  Goal: Skin Health and Integrity  Outcome: Ongoing, Progressing  Intervention: Optimize Skin Protection  Recent Flowsheet Documentation  Taken 12/29/2023 0000 by Gulshan Wyman RN  Pressure Reduction Techniques:   frequent weight shift encouraged   weight shift assistance provided   heels elevated off bed  Pressure Reduction Devices:   pressure-redistributing mattress utilized   positioning supports utilized  Skin Protection:   adhesive use limited   incontinence pads utilized   tubing/devices free from skin contact  Taken 12/28/2023 2000 by Gulshan Wyman RN  Pressure Reduction Techniques: frequent weight shift encouraged  Head of Bed (HOB) Positioning: HOB elevated  Pressure Reduction Devices:   pressure-redistributing mattress utilized   positioning supports utilized  Skin Protection:   adhesive use limited   incontinence pads utilized   tubing/devices free from skin contact     Problem: Fall Injury Risk  Goal: Absence of Fall and Fall-Related Injury  Outcome: Ongoing, Progressing  Intervention: Identify and Manage Contributors  Recent Flowsheet Documentation  Taken 12/29/2023 0200 by Gulshan Wyman RN  Medication Review/Management: medications reviewed  Taken 12/29/2023 0000 by Gulshan Wyman RN  Medication Review/Management: medications reviewed  Taken 12/28/2023 2200 by Gulshan Wyman RN  Medication Review/Management: medications reviewed  Taken 12/28/2023 2000 by Gulshan Wyman RN  Medication Review/Management:   medications reviewed   high-risk medications identified  Self-Care Promotion: BADL personal objects within reach  Intervention: Promote Injury-Free Environment  Recent Flowsheet Documentation  Taken 12/29/2023 0200 by Gulshan Wyman RN  Safety Promotion/Fall Prevention:   safety round/check completed   fall prevention program maintained  Taken 12/29/2023 0000 by Gulshan Wyman RN  Safety Promotion/Fall Prevention:   safety round/check  completed   fall prevention program maintained  Taken 12/28/2023 2200 by Gulshan Wyman, RN  Safety Promotion/Fall Prevention:   safety round/check completed   fall prevention program maintained  Taken 12/28/2023 2000 by Gulshan Wyman, RN  Safety Promotion/Fall Prevention:   safety round/check completed   nonskid shoes/slippers when out of bed   fall prevention program maintained   clutter free environment maintained   assistive device/personal items within reach   activity supervised

## 2023-12-29 NOTE — PLAN OF CARE
Goal Outcome Evaluation:  Plan of Care Reviewed With: patient           Outcome Evaluation: Leona Garcia is a 78 y.o. female who presents positive for Influenza A and has new onset CHF as well as Bradycardia.   PMH: HTN, CKDIII, DM, HLD, Dysphagia.  Pt lives with her sister and reports that they switch back and forth between their homes. She states that there are no steps in either home. She is typically independent with rollator and has hx of falls in the last 6 months.  She is independent with ADLs; she and her sister both prepare meals and they obtain their own groceries with the sister driving due to pt's R knee pain. She does not use home O2 and is currently on 2L.   She is current with HH Nursing and PT. At time of PT evaluation, she is A&O x 4 with occasional prompts for month and situation.  She c/o marked fatigue after being OOB earlier today.  She sits up with supervision to min and requires mod A to return to supine. Pt declines OOB at this time due to fatigue.  Nursing reports that she transferred to chair with rollator and SBA.  She appears close to baseline and would benefit from continued HH PT.      Anticipated Discharge Disposition (PT): home with home health

## 2023-12-29 NOTE — PROGRESS NOTES
ENDOCRINE CONSULT PROGRESS NOTE  DATE OF SERVICE: 23        PATIENT NAME: Leona Garcia  PATIENT : 1945 AGE: 78 y.o.  MRN NUMBER: [id number]    ==========================================================================    CHIEF COMPLAINT: Type 2 Diabetes Mellitus    CARE TEAM:   Patient Care Team:  Alfred Liriano MD as PCP - General (Family Medicine)    SUBJECTIVE  Pt seen and examined.  Patient reports to be tired.  Blood sugar still on the higher side.  Blood sugar also reviewed while I was in the room.    ==========================================================================    CURRENT ACTIVE HOSPITAL MEDICATIONS    Scheduled Medications:  dilTIAZem CD, 240 mg, Oral, Daily  hydrALAZINE, 100 mg, Oral, Q8H  insulin glargine, 30 Units, Subcutaneous, Nightly  insulin lispro, 10 Units, Subcutaneous, TID With Meals  insulin lispro, 2-9 Units, Subcutaneous, 4x Daily AC & at Bedtime  levothyroxine, 50 mcg, Oral, Daily  sodium chloride, 10 mL, Intravenous, Q12H         PRN Medications:    acetaminophen **OR** acetaminophen **OR** acetaminophen    senna-docusate sodium **AND** polyethylene glycol **AND** bisacodyl **AND** bisacodyl    dextrose    dextrose    glucagon (human recombinant)    hydrALAZINE    influenza vaccine    ipratropium-albuterol    labetalol    melatonin    nitroglycerin    ondansetron    sodium chloride    sodium chloride    sodium chloride    sodium chloride     ==========================================================================    OBJECTIVE    Vitals:    23 1735   BP: 152/63   Pulse: 60   Resp: 13   Temp: 97.6 °F (36.4 °C)   SpO2: 98%      Body mass index is 48.57 kg/m².     General - Awake and alert    ==========================================================================    LAB EVALUATION    Lab Results   Component Value Date    GLUCOSE 407 (C) 2023    BUN 60 (H) 2023    CREATININE 2.67 (H) 2023    EGFRIFNONA 65 2017     "EGFRIFAFRI 56 (L) 04/01/2017    BCR 22.5 12/29/2023    K 5.0 12/29/2023    CO2 24.0 12/29/2023    CALCIUM 9.0 12/29/2023    ALBUMIN 3.7 12/27/2023    LABIL2 1.4 (calc) 04/01/2017    AST 30 12/27/2023    ALT 34 (H) 12/27/2023       Lab Results   Component Value Date    HGBA1C 9.30 (H) 10/18/2023     Lab Results   Component Value Date    CREATININE 2.67 (H) 12/29/2023     Results from last 7 days   Lab Units 12/29/23  1614 12/29/23  1110 12/29/23  0717 12/29/23  0132 12/28/23 2029 12/28/23  1618   GLUCOSE mg/dL 240* 347* 414* 392* 405* 341*     ==========================================================================    ASSESSMENT AND PLAN    # Type 2 diabetes with hyperglycemia  - Blood sugar reviewed for last 24 hours did have significant hyperglycemia  - Adjusted insulin therapy as:  Insulin Lantus 30 units nightly  Insulin lispro 10 units with each meal  Moderate dose correction scale  - Will review blood sugar for next 24 hours and further therapy adjustment accordingly  - Slowly uptitrating insulin as patient have a history of hypoglycemia    # Hypothyroidism, levothyroxine replacement therapy      Will follow with you.  Rest as per primary team.    This note was created using voice recognition software and is inherently subject to errors including those of syntax and \"sound-alike\" substitutions which may escape proofreading.  In such instances, original meaning may be extrapolated by contextual derivation.    Note: Portions of this note may have been copied from previous notes but documentation have been reviewed and edited as necessary to support clinical decision making for today's visit.    ==========================================================================  Ron Arreguin MD  Department of Endocrine, Diabetes and Metabolism  [facility] [place]  ==========================================================================    "

## 2023-12-29 NOTE — PLAN OF CARE
Goal Outcome Evaluation:   Pt seen for clinical swallow eval. Pt was awake but sleepy. She was able to follow all commands. Pt is well known to this dept and had VFSS in October 2023. A [facility] soft diet with thin liquids was rec. Pt had extended but functional mastication of soft solids during that assessment. Pt was seen at lunch meal consuming meat loaf, green beans and drinking water by straw. Pt had good labial closure over the fork and straw with no labial spillage.  Pt has upper and lower dentures and had extended mastication of the solids. Pt reports that chewing fatigues her but that she is not interested in puree food. She had no pocketing or oral residual on each bite of solid. Visualization of swallow suggests timely initiation after liquids. After the swallow, pt had clear vocal quality and no overt s/s of aspiration.     Pt appears to tolerate soft solids and thin liquids with no overt s/s of aspiration, however puree diet would be more appropriate for pt. Education provided to pt on the rationale for puree diet. pt verbalized understanding but rejected that diet rec. It is rec that pt remain on current [facility] soft diet with thin liquids. Pt should be up at 90 degrees for all PO and take small bites and sips.  ST will follow to assure tolerance of diet and make further recs as indicated.

## 2023-12-29 NOTE — THERAPY EVALUATION
Patient Name: Leona Garcia  : 1945    MRN: [id number]                              Today's Date: 2023       Admit Date: 2023    Visit Dx:     ICD-10-CM ICD-9-CM   1. Acute respiratory failure with hypoxia  J96.01 518.81   2. Congestive heart failure, unspecified HF chronicity, unspecified heart failure type  I50.9 428.0   3. Elevated troponin  R79.89 790.6   4. Chronic kidney disease, unspecified CKD stage  N18.9 585.9   5. Influenza A  J10.1 487.1     Patient Active Problem List   Diagnosis    Right hip pain    Essential hypertension    Type 2 diabetes mellitus    Respiratory failure with hypoxia    Chronic renal insufficiency, stage III (moderate)    Hyperlipidemia    Hypothyroidism    Influenza A     Past Medical History:   Diagnosis Date    Anemia     Chronic kidney disease (CKD)     Diabetes mellitus     Disease of thyroid gland     Hypertension      History reviewed. No pertinent surgical history.   General Information       Row Name 23 1115          Physical Therapy Time and Intention    Document Type evaluation  -CL     Mode of Treatment individual therapy;physical therapy  -CL       Row Name 23 111          General Information    Patient Profile Reviewed yes  -CL     Prior Level of Function independent:;all household mobility;ADL's;shopping  -CL     Existing Precautions/Restrictions fall;oxygen therapy device and L/min  -CL       Row Name 23 111          Living Environment    People in Home sibling(s)  -CL       Row Name 23 1115          Home Main Entrance    Number of Stairs, Main Entrance none  -CL       Row Name 23 1115          Stairs Within Home, Primary    Number of Stairs, Within Home, Primary none  -CL       Row Name 23 111          Cognition    Orientation Status (Cognition) oriented to;person;place;verbal cues/prompts needed for orientation;time;situation  -CL       Row Name 23 111          Safety Issues, Functional Mobility     Impairments Affecting Function (Mobility) other (see comments)  -CL     Comment, Safety Issues/Impairments (Mobility) Pt c/o fatigue; sat up and refused to get OOB due to fatigue  -CL               User Key  (r) = Recorded By, (t) = Taken By, (c) = Cosigned By      Initials Name Provider Type    Cierra Salazar, PT Physical Therapist                   Mobility       Row Name 12/29/23 1117          Bed Mobility    Bed Mobility supine-sit;sit-supine  -CL     Supine-Sit [place] (Bed Mobility) minimum assist (75% patient effort)  -CL     Sit-Supine [place] (Bed Mobility) moderate assist (50% patient effort)  -CL       Row Name 12/29/23 1117          Transfers    Comment, (Transfers) refused; Per nursing she was able to transfer bed to chair with rollator and SBA from nurse and aid.  They did elevate bed ht but she was also able to get out of chair with SBA and rocking  -CL               User Key  (r) = Recorded By, (t) = Taken By, (c) = Cosigned By      Initials Name Provider Type    Cierra Salazar PT Physical Therapist                   Obj/Interventions       Row Name 12/29/23 1118          Range of Motion Comprehensive    Comment, General Range of Motion Decreased active knee flexion bilaterally; appears to have at least 80-90 degrees available.  Wears tendon strap below R knee  -CL       Row Name 12/29/23 1118          Strength Comprehensive (MMT)    Comment, General Manual Muscle Testing (MMT) Assessment Unable to assess  -CL       Row Name 12/29/23 1118          Balance    Balance Assessment sitting static balance  -CL     Static Sitting Balance independent  -CL     Position, Sitting Balance sitting edge of bed  -CL               User Key  (r) = Recorded By, (t) = Taken By, (c) = Cosigned By      Initials Name Provider Type    Cierra Salazar PT Physical Therapist                   Goals/Plan       Row Name 12/29/23 1129          Bed Mobility Goal 1 (PT)    Activity/Assistive Device  (Bed Mobility Goal 1, PT) bed mobility activities, all  -CL     [place] Level/Cues Needed (Bed Mobility Goal 1, PT) independent  -CL     Time Frame (Bed Mobility Goal 1, PT) long term goal (LTG);2 weeks  -CL       Row Name 12/29/23 1129          Transfer Goal 1 (PT)    Activity/Assistive Device (Transfer Goal 1, PT) sit-to-stand/stand-to-sit;bed-to-chair/chair-to-bed  -CL     [place] Level/Cues Needed (Transfer Goal 1, PT) modified independence  -CL     Time Frame (Transfer Goal 1, PT) long term goal (LTG);2 weeks  -CL       Row Name 12/29/23 1129          Gait Training Goal 1 (PT)    Activity/Assistive Device (Gait Training Goal 1, PT) gait (walking locomotion);assistive device use  -CL     [place] Level (Gait Training Goal 1, PT) modified independence  -CL     Distance (Gait Training Goal 1, PT) 50'  -CL     Time Frame (Gait Training Goal 1, PT) long term goal (LTG);2 weeks  -CL       Row Name 12/29/23 1129          Therapy Assessment/Plan (PT)    Planned Therapy Interventions (PT) balance training;bed mobility training;gait training;patient/family education;transfer training;strengthening;ROM (range of motion)  -CL               User Key  (r) = Recorded By, (t) = Taken By, (c) = Cosigned By      Initials Name Provider Type    Cierra Salazar, PT Physical Therapist                   Clinical Impression       Row Name 12/29/23 1119          Pain    Pretreatment Pain Rating 0/10 - no pain  -CL     Posttreatment Pain Rating 0/10 - no pain  -CL       Row Name 12/29/23 1119          Plan of Care Review    Plan of Care Reviewed With patient  -CL     Outcome Evaluation Leona Garcia is a 78 y.o. female who presents positive for Influenza A and has new onset CHF as well as Bradycardia.   PMH: HTN, CKDIII, DM, HLD, Dysphagia.  Pt lives with her sister and reports that they switch back and forth between their homes. She states that there are no steps in either home. She is typically independent with  rollator and has hx of falls in the last 6 months.  She is independent with ADLs; she and her sister both prepare meals and they obtain their own groceries with the sister driving due to pt's R knee pain. She does not use home O2 and is currently on 2L.   She is current with HH Nursing and PT. At time of PT evaluation, she is A&O x 4 with occasional prompts for month and situation.  She c/o marked fatigue after being OOB earlier today.  She sits up with supervision to min and requires mod A to return to supine. Pt declines OOB at this time due to fatigue.  Nursing reports that she transferred to chair with rollator and SBA.  She appears close to baseline and would benefit from continued HH PT.  -CL       Row Name 12/29/23 1119          Therapy Assessment/Plan (PT)    Rehab Potential (PT) good, to achieve stated therapy goals  -CL     Criteria for Skilled Interventions Met (PT) yes;skilled treatment is necessary  -CL     Therapy Frequency (PT) 3 times/wk  -CL     Predicted Duration of Therapy Intervention (PT) Until discharge  -CL       Row Name 12/29/23 1119          Vital Signs    Pre Systolic BP Rehab 161  -CL     Pre Treatment Diastolic BP 80  -CL     Pre SpO2 (%) 93  -CL     O2 Delivery Pre Treatment supplemental O2  -CL     Post SpO2 (%) 95  -CL     O2 Delivery Post Treatment supplemental O2  -CL     Pre Patient Position Supine  -CL     Intra Patient Position Sitting  -CL     Post Patient Position Supine  -CL       Row Name 12/29/23 1119          Positioning and Restraints    Pre-Treatment Position in bed  -CL     Post Treatment Position bed  -CL     In Bed notified nsg;supine;call light within reach;exit alarm on  -CL               User Key  (r) = Recorded By, (t) = Taken By, (c) = Cosigned By      Initials Name Provider Type    CL Gaetano, Cierra STEVENS, PT Physical Therapist                   Outcome Measures       Row Name 12/29/23 1130 12/29/23 0800       How much help from another person do you currently  need...    Turning from your back to your side while in flat bed without using bedrails? 4  -CL 3  -GENOVEVA    Moving from lying on back to sitting on the side of a flat bed without bedrails? 3  -CL 3  -GENOVEVA    Moving to and from a bed to a chair (including a wheelchair)? 3  -CL 2  -GENOVEVA    Standing up from a chair using your arms (e.g., wheelchair, bedside chair)? 3  -CL 3  -GENOVEVA    Climbing 3-5 steps with a railing? 1  -CL 1  -GENOVEVA    To walk in hospital room? 3  -CL 3  -GENOVEVA    AM-PAC 6 Clicks Score (PT) 17  -CL 15  -GENOVEVA    Highest Level of Mobility Goal 5 --> Static standing  -CL 4 --> Transfer to chair/commode  -GENOVEVA              User Key  (r) = Recorded By, (t) = Taken By, (c) = Cosigned By      Initials Name Provider Type    GENOVEVA Claire Servin, RN Registered Nurse    CL Cierra Salter, PT Physical Therapist                                 Physical Therapy Education       Title: PT OT SLP Therapies (Done)       Topic: Physical Therapy (Done)       Point: Mobility training (Done)       Learning Progress Summary             Patient Acceptance, E,TB, VU by  at 12/29/2023 1131                                         User Key       Initials Effective Dates Name Provider Type Discipline     03/02/22 -  Cierra Salter, PT Physical Therapist PT                  PT Recommendation and Plan  Planned Therapy Interventions (PT): balance training, bed mobility training, gait training, patient/family education, transfer training, strengthening, ROM (range of motion)  Plan of Care Reviewed With: patient  Outcome Evaluation: Leona Garcia is a 78 y.o. female who presents positive for Influenza A and has new onset CHF as well as Bradycardia.   PMH: HTN, CKDIII, DM, HLD, Dysphagia.  Pt lives with her sister and reports that they switch back and forth between their homes. She states that there are no steps in either home. She is typically independent with rollator and has hx of falls in the last 6 months.  She is independent  with ADLs; she and her sister both prepare meals and they obtain their own groceries with the sister driving due to pt's R knee pain. She does not use home O2 and is currently on 2L.   She is current with HH Nursing and PT. At time of PT evaluation, she is A&O x 4 with occasional prompts for month and situation.  She c/o marked fatigue after being OOB earlier today.  She sits up with supervision to min and requires mod A to return to supine. Pt declines OOB at this time due to fatigue.  Nursing reports that she transferred to chair with rollator and SBA.  She appears close to baseline and would benefit from continued HH PT.     Time Calculation:   PT Evaluation Complexity  History, PT Evaluation Complexity: 3 or more personal factors and/or comorbidities  Examination of Body Systems (PT Eval Complexity): total of 3 or more elements  Clinical Presentation (PT Evaluation Complexity): evolving  Clinical Decision Making (PT Evaluation Complexity): low complexity  Overall Complexity (PT Evaluation Complexity): low complexity     PT Charges       Row Name 12/29/23 1131             Time Calculation    Start Time 1041  -CL      Stop Time 1054  -CL      Time Calculation (min) 13 min  -CL      PT Received On 12/29/23  -CL      PT - Next Appointment 12/31/23  -CL      PT Goal Re-Cert Due Date 01/12/24  -CL         Time Calculation- PT    Total Timed Code Minutes- PT 0 minute(s)  -CL                User Key  (r) = Recorded By, (t) = Taken By, (c) = Cosigned By      Initials Name Provider Type    CL Cierra Salter, PT Physical Therapist                  Therapy Charges for Today       Code Description Service Date Service Provider Modifiers Qty    [id number]  PT EVAL LOW COMPLEXITY 3 12/29/2023 Cierra Salter, PT GP 1            PT G-Codes  AM-PAC 6 Clicks Score (PT): 17  PT Discharge Summary  Anticipated Discharge Disposition (PT): home with home health    Cierra Salter PT  12/29/2023

## 2023-12-29 NOTE — PLAN OF CARE
Goal Outcome Evaluation:         Patient's bun/cr increased today. Nephrology following and stopped lasix. Nephrology also adjusted blood pressure medications. Patient's BP has improved today with SBP in the 150s.  Cardiology following. Echo completed.     Endocrine following and adjusting insulins daily. Pt states she has a history of dropping low so endocrine making adjustments slowly.     Patient assisted to the chair this am. Pt used her home walker and was a stand by assist to the chair. Pt stated she didn't sleep well last night and has slept most of the day after breakfast.     Problem: Adult Inpatient Plan of Care  Goal: Plan of Care Review  Outcome: Ongoing, Progressing  Goal: Patient-Specific Goal (Individualized)  Outcome: Ongoing, Progressing  Goal: Absence of Hospital-Acquired Illness or Injury  Outcome: Ongoing, Progressing  Intervention: Identify and Manage Fall Risk  Recent Flowsheet Documentation  Taken 12/29/2023 1200 by Claire Servin RN  Safety Promotion/Fall Prevention:   activity supervised   clutter free environment maintained   nonskid shoes/slippers when out of bed   assistive device/personal items within reach   room organization consistent   safety round/check completed  Taken 12/29/2023 1000 by Claire Servin RN  Safety Promotion/Fall Prevention:   activity supervised   clutter free environment maintained   nonskid shoes/slippers when out of bed   safety round/check completed  Taken 12/29/2023 0800 by Claire Servin RN  Safety Promotion/Fall Prevention:   activity supervised   clutter free environment maintained   nonskid shoes/slippers when out of bed   safety round/check completed  Intervention: Prevent Skin Injury  Recent Flowsheet Documentation  Taken 12/29/2023 1200 by Claire Servin RN  Skin Protection: incontinence pads utilized  Taken 12/29/2023 0800 by Claire Servin RN  Skin Protection: incontinence pads utilized  Intervention: Prevent and Manage VTE  (Venous Thromboembolism) Risk  Recent Flowsheet Documentation  Taken 12/29/2023 0800 by Claire Servin RN  Activity Management: up in chair  Intervention: Prevent Infection  Recent Flowsheet Documentation  Taken 12/29/2023 1200 by Claire Servin RN  Infection Prevention:   hand hygiene promoted   personal protective equipment utilized  Taken 12/29/2023 1000 by Claire Servin RN  Infection Prevention:   hand hygiene promoted   personal protective equipment utilized  Taken 12/29/2023 0800 by Claire Servin RN  Infection Prevention:   hand hygiene promoted   personal protective equipment utilized  Goal: Optimal Comfort and Wellbeing  Outcome: Ongoing, Progressing  Goal: Readiness for Transition of Care  Outcome: Ongoing, Progressing

## 2023-12-29 NOTE — CASE MANAGEMENT/SOCIAL WORK
Continued Stay Note  [facility]     Patient Name: Leona Garcia  MRN: [id number]  Today's Date: 12/29/2023    Admit Date: 12/27/2023    Plan: Anticipate routine home with sister and [facility] (current). Watch O2 needs.   Discharge Plan       Row Name 12/29/23 1222       Plan    Plan Anticipate routine home with sister and [facility] (current). Watch O2 needs.    Plan Comments Barrier to D/C: 2L O2, IV BP meds, monitoring renal function.                      Expected Discharge Date and Time       Expected Discharge Date Expected Discharge Time    Dec 31, 2023           Phone communication or documentation only - no physical contact with patient or family.     OP[place] [address]    Office: [phone]  Fax: [phone]

## 2023-12-29 NOTE — CONSULTS
CARDIOLOGY CONSULT      Requesting Provider    Valery Meneses MD      PATIENT IDENTIFICATION    Name: Leona Garcia  Age: 78 y.o. Sex: female : 1945  MRN: [id number]    REASON FOR CONSULTATION:  Acute respiratory failure  Elevated biomarkers    CHIEF COMPLAINT:  Shortness of breath    HISTORY OF PRESENT ILLNESS:   This is a 78-year-old female with no established history of ischemic heart disease who presents to [facility] emergency department with a complaint of shortness of breath.  She reports that her shortness of breath been worsening over the past week.  Her home health nurse had been checking her oxygen and had been doing okay however on the morning of the admission she was having difficulty transferring from her bed to the chair due to significant shortness of breath.    Her emergency department workup demonstrated a BNP of 1936 and significant lower extremity edema.  Troponin was 49 with no significant trend.  In reviewing her records it appears this edema may be more chronic.  She had an echocardiogram performed in 2023 with normal LV systolic function.  Preliminary review of her echocardiogram performed today demonstrates a similar left ventricular systolic function.  It is estimated at about 60%.  The patient does have a dilated IVC.  No pericardial effusion is noted.  Also of note she was diagnosed with influenza a.    IMPRESSIONS  Acute respiratory failure-multifactorial with contributing factors being volume overload, obesity hypoventilation syndrome, and influenza.  Abnormal BNP in the setting of acute on chronic kidney injury  Acute on chronic kidney disease  Volume overload state possibly related to diastolic heart failure  Chronic bilateral lower extremity edema with venous stasis changes noted to bilateral lower extremities    RECOMMENDATIONS:  Agree with gentle diuresis as renal function allows  Monitor renal function and electrolytes for toxicities with IV  "diuresis  Consider wound care evaluation to wrap lower extremities to help with venous return  Monitor rate and rhythm closely  Further recommendations as patient's course progresses.    Medical History    Past Medical History:   Diagnosis Date    Anemia     Chronic kidney disease (CKD)     Diabetes mellitus     Disease of thyroid gland     Hypertension         Surgical History    History reviewed. No pertinent surgical history.     Family History    History reviewed. No pertinent family history.    Social History    Social History     Tobacco Use    Smoking status: Never    Smokeless tobacco: Never   Substance Use Topics    Alcohol use: Never        Allergies    No Known Allergies    REVIEW OF SYSTEMS:  Pertinent items are noted in HPI, all other systems reviewed and negative    OBJECTIVE     VITAL SIGNS:  Visit Vitals  /54 (BP Location: Left arm, Patient Position: Lying)   Pulse 59   Temp 97.3 °F (36.3 °C) (Oral)   Resp 16   Ht 167.6 cm (66\")   Wt 134 kg (295 lb)   SpO2 93%   BMI 47.61 kg/m²     Oxygen Therapy  SpO2: 93 %  Pulse Oximetry Type: Continuous  Device (Oxygen Therapy): nasal cannula  Flow (L/min): 4  Flowsheet Rows      Flowsheet Row First Filed Value   Admission Height 167.6 cm (66\") Documented at 12/27/2023 1547   Admission Weight 138 kg (304 lb) Documented at 12/27/2023 1547          Intake & Output (last 3 days)         12/26 0701  12/27 0700 12/27 0701  12/28 0700 12/28 0701  12/29 0700    P.O.  1680 720    Total Intake(mL/kg)  1680 (12.5) 720 (5.4)    Urine (mL/kg/hr)  300 550 (0.3)    Stool  0     Total Output  300 550    Net  +1380 +170           Urine Unmeasured Occurrence  1 x 1 x          Lines, Drains & Airways       Active LDAs       Name Placement date Placement time Site Days    Peripheral IV 12/27/23 1543 Right Antecubital 12/27/23  1543  Antecubital  1    Peripheral IV 12/27/23 1636 Left;Posterior Hand 12/27/23  1636  Hand  1    External Urinary Catheter 12/27/23 1927  --  less " "than 1                  /54 (BP Location: Left arm, Patient Position: Lying)   Pulse 59   Temp 97.3 °F (36.3 °C) (Oral)   Resp 16   Ht 167.6 cm (66\")   Wt 134 kg (295 lb)   SpO2 93%   BMI 47.61 kg/m²     INTAKE/OUTPUT:    Intake/Output this shift:  No intake/output data recorded.  Intake/Output last 3 shifts:  I/O last 3 completed shifts:  In: 2400 [P.O.:2400]  Out: 850 [Urine:850]      PHYSICAL EXAM:    General: Alert, cooperative, no distress, appears stated age  Head:  Normocephalic, atraumatic, mucous membranes moist  Eyes:  Conjunctivae/corneas clear, EOM's intact     Neck:  Supple,  no bruit  Lungs: Coarse and diminished with occasional wheezing noted  Chest wall: No tenderness  Heart::  Regular rate and rhythm, S1 and S2 normal, 1/6 holosystolic murmur.  No rub or gallop  Abdomen: Soft, nontender, nondistended, bowel sounds active.  Obese  Extremities: No cyanosis, clubbing 3+ pitting edema  Pulses: Diminished pedal pulses  Skin:  Scaling and lichenification of lower extremities  Neuro/psych: A&O x3. CN II through XII are grossly intact with appropriate affect    Scheduled Meds:      dilTIAZem CD, 240 mg, Oral, Daily  furosemide, 20 mg, Intravenous, Q12H  hydrALAZINE, 100 mg, Oral, Daily  hydroCHLOROthiazide, 25 mg, Oral, Daily  insulin glargine, 20 Units, Subcutaneous, Nightly  insulin lispro, 2-9 Units, Subcutaneous, 4x Daily AC & at Bedtime  insulin lispro, 7 Units, Subcutaneous, TID With Meals  levothyroxine, 50 mcg, Oral, Daily  losartan, 100 mg, Oral, Daily  sodium chloride, 10 mL, Intravenous, Q12H        Continuous Infusions:         PRN Meds:      acetaminophen **OR** acetaminophen **OR** acetaminophen    senna-docusate sodium **AND** polyethylene glycol **AND** bisacodyl **AND** bisacodyl    dextrose    dextrose    glucagon (human recombinant)    hydrALAZINE    influenza vaccine    ipratropium-albuterol    labetalol    melatonin    nitroglycerin    ondansetron    sodium chloride    " "sodium chloride    sodium chloride     Data Review:     X-rays, labs reviewed personally by provider.    CBC    Results from last 7 days   Lab Units 12/27/23 2251 12/27/23  1546   WBC 10*3/mm3 5.00 6.78   HEMOGLOBIN g/dL 8.2* 9.3*   PLATELETS 10*3/mm3 174 209     Cr Clearance Estimated Creatinine Clearance: 32 mL/min (A) (by C-G formula based on SCr of 2.07 mg/dL (H)).  Coag     HbA1C   Lab Results   Component Value Date    HGBA1C 9.30 (H) 10/18/2023     Blood Glucose   Glucose   Date/Time Value Ref Range Status   12/28/2023 1618 341 (H) 70 - 105 mg/dL Final     Comment:     Serial Number: [id number]:  [id number]   12/28/2023 1111 351 (H) 70 - 105 mg/dL Final     Comment:     Serial Number: [id number]:  [id number]   12/28/2023 0709 362 (H) 70 - 105 mg/dL Final     Comment:     Serial Number: [id number]:  [id number]   12/27/2023 1919 241 (H) 70 - 105 mg/dL Final     Comment:     Serial Number: [id number]:  [id number]     Infection     CMP   Results from last 7 days   Lab Units 12/27/23 2251 12/27/23  1546   SODIUM mmol/L 143 139   POTASSIUM mmol/L 4.5 4.7   CHLORIDE mmol/L 106 103   CO2 mmol/L 28.0 28.4   BUN mg/dL 43* 47*   CREATININE mg/dL 2.07* 2.20*   GLUCOSE mg/dL 218* 271*   ALBUMIN g/dL  --  3.7   BILIRUBIN mg/dL  --  0.5   ALK PHOS U/L  --  167*   AST (SGOT) U/L  --  30   ALT (SGPT) U/L  --  34*     ABG      UA    Results from last 7 days   Lab Units 12/28/23  1325   NITRITE UA  Negative   WBC UA /HPF 0-2   BACTERIA UA /HPF 1+*   SQUAM EPITHEL UA /HPF 0-2     XIMENA  No results found for: \"POCMETH\", \"POCAMPHET\", \"POCBARBITUR\", \"POCBENZO\", \"POCCOCAINE\", \"POCOPIATES\", \"POCOXYCODO\", \"POCPHENCYC\", \"POCPROPOXY\", \"POCTHC\", \"POCTRICYC\"  Lysis Labs   Results from last 7 days   Lab Units 12/27/23  2251 12/27/23  1546   HEMOGLOBIN g/dL 8.2* 9.3*   PLATELETS 10*3/mm3 174 209   CREATININE mg/dL 2.07* 2.20*     Radiology(recent) US Renal Bilateral    Result Date: 12/28/2023  Impression: " 1.Unremarkable appearance of the kidneys. 2.Urinary bladder inadequately visualized. 3.Incidental right pleural effusion. Electronically Signed: Dharmesh Cortés MD  12/28/2023 2:03 PM CST  Workstation ID: [id number]    XR Chest 1 View    Result Date: 12/27/2023  Impression: Stable cardiomegaly without acute process. Electronically Signed: Dexter Cartwright MD  12/27/2023 4:15 PM EST  Workstation ID: [id number]       Results from last 7 days   Lab Units 12/27/23  1740   HSTROP T ng/L 49*       ECG/EMG Results (most recent)       Procedure Component Value Units Date/Time    ECG 12 Lead ED Triage Standing Order; SOA [[id number]] Collected: 12/27/23 1545     Updated: 12/27/23 1546     QT Interval 406 ms      QTC Interval 445 ms     Narrative:      HEART RATE= 72  bpm  RR Interval= 832  ms  OK Interval= 177  ms  P Horizontal Axis= -2  deg  P Front Axis= 41  deg  QRSD Interval= 92  ms  QT Interval= 406  ms  QTcB= 445  ms  QRS Axis= -2  deg  T Wave Axis= 78  deg  - BORDERLINE ECG -  Sinus rhythm  Consider anterior infarct  When compared with ECG of 05-Feb-2018 16:14:45,  No significant change  Electronically Signed By:   Date and Time of Study: 2023-12-27 15:45:06    SCANNED - TELEMETRY   [[id number]] Resulted: 12/27/23     Updated: 12/28/23 0029    SCANNED - TELEMETRY   [[id number]] Resulted: 12/27/23     Updated: 12/28/23 0048    SCANNED - TELEMETRY   [[id number]] Resulted: 12/27/23     Updated: 12/28/23 0629    SCANNED - TELEMETRY   [[id number]] Resulted: 12/27/23     Updated: 12/28/23 0818    SCANNED - TELEMETRY   [[id number]] Resulted: 12/27/23     Updated: 12/28/23 1244    Adult Transthoracic Echo Complete W/ Cont if Necessary Per Protocol [[id number]] Resulted: 12/28/23 1350     Updated: 12/28/23 1353     LVIDd 4.9 cm      LVIDs 2.8 cm      IVSd 1.40 cm      LVPWd 1.30 cm      FS 42.9 %      IVS/LVPW 1.08 cm      ESV(cubed) 22.0 ml      LV Sys Vol (BSA corrected) 10.3 cm2      EDV(cubed) 117.6 ml      LV Corral Vol (BSA corrected) 28.2  cm2      LV mass(C)d 267.9 grams      LVOT area 3.1 cm2      LVOT diam 2.00 cm      EDV(MOD-sp4) 66.6 ml      ESV(MOD-sp4) 24.4 ml      SV(MOD-sp4) 42.2 ml      SI(MOD-sp4) 17.9 ml/m2      EF(MOD-sp4) 63.4 %      MV E max tristin 121.0 cm/sec      MV A max tristin 133.0 cm/sec      MV dec time 0.26 sec      MV E/A 0.91     Pulm A Revs Dur 0.10 sec      LA ESV Index (BP) 35.8 ml/m2      Med Peak E' Tristin 6.7 cm/sec      Lat Peak E' Tristin 7.1 cm/sec      TR max tristin 326.0 cm/sec      Avg E/e' ratio 17.54     SV(LVOT) 104.6 ml      RVIDd 3.1 cm      TAPSE (>1.6) 2.01 cm      RV S' 14.5 cm/sec      LA dimension (2D)  4.1 cm      Pulm Sys Tristin 80.6 cm/sec      Pulm Corral Tristin 51.9 cm/sec      Pulm S/D 1.55     Pulm A Revs Tristin 30.0 cm/sec      LV V1 max 149.0 cm/sec      LV V1 max PG 8.9 mmHg      LV V1 mean PG 5.0 mmHg      LV V1 VTI 33.3 cm      Ao pk tristin 209.0 cm/sec      Ao max PG 17.5 mmHg      Ao mean PG 9.0 mmHg      Ao V2 VTI 41.2 cm      MADAN(I,D) 2.5 cm2      MV max PG 8.8 mmHg      MV mean PG 4.0 mmHg      MV V2 VTI 41.4 cm      MV P1/2t 81.2 msec      MVA(P1/2t) 2.7 cm2      MVA(VTI) 2.5 cm2      MV dec slope 552.0 cm/sec2      MR max tristin 372.0 cm/sec      MR max PG 55.4 mmHg      TR max PG 42.5 mmHg      RVSP(TR) 57.5 mmHg      RAP systole 15.0 mmHg      RV V1 max PG 5.2 mmHg      RV V1 max 114.0 cm/sec      RV V1 VTI 25.1 cm      PA V2 max 140.0 cm/sec      PA acc time 0.10 sec      PI end-d tristin 136.0 cm/sec      ACS 1.90 cm      Sinus 2.8 cm      EF(MOD-bp) 63.0 %     Narrative:        Estimated right ventricular systolic pressure from tricuspid   regurgitation is markedly elevated (>55 mmHg).      Impression:          SCANNED - TELEMETRY   [[id number]] Resulted: 12/27/23     Updated: 12/28/23 1628            Imaging:  Imaging Results (Last 72 Hours)       Procedure Component Value Units Date/Time    US Renal Bilateral [[id number]] Collected: 12/28/23 1502     Updated: 12/28/23 1505    Narrative:      US RENAL  BILATERAL    Date of Exam: 12/28/2023 1:39 PM CST    Indication: amaris.    Comparison: No comparisons available.    Technique: Grayscale and color Doppler ultrasound evaluation of the kidneys and urinary bladder was performed.      Findings:  RIGHT kidney measures 10.2 x 4.9 x 5.1 cm.  Kidney echogenicity, size, and vascularity appear within normal limits. There is no solid kidney mass.  No echogenic shadowing stone.  No hydronephrosis.    LEFT kidney measures 10.9 x 4.5 x 4.4 cm. Kidney echogenicity, size, and vascularity appear within normal limits. There is no solid kidney mass.  No echogenic shadowing stone.  No hydronephrosis.    Urinary bladder not adequately identified.    Incidental note is made of a right pleural effusion.        Impression:      Impression:  1.Unremarkable appearance of the kidneys.  2.Urinary bladder inadequately visualized.  3.Incidental right pleural effusion.        Electronically Signed: Dharmesh Cortés MD    12/28/2023 2:03 PM CST    Workstation ID: [id number]    XR Chest 1 View [[id number]] Collected: 12/27/23 1614     Updated: 12/27/23 1617    Narrative:      XR CHEST 1 VW    Date of Exam: 12/27/2023 3:50 PM EST    Indication: SOA Triage Protocol    Comparison: 5/7/2023    Findings:  Stable cardiomegaly. No pneumothorax. No pleural effusion. No focal consolidation. Osseous structures grossly intact.      Impression:      Impression:  Stable cardiomegaly without acute process.      Electronically Signed: Dexter Cartwright MD    12/27/2023 4:15 PM EST    Workstation ID: [id number]              Jenaro Braun DO  12/28/23  19:18 EST

## 2023-12-29 NOTE — PROGRESS NOTES
[facility] Medicine Services  History & Physical     Patient Name: Leona Garcia  : 1945  MRN: [id number]  Primary Care Physician:  Alfred Liriano MD  Date of admission: 2023  Date and Time of Service: 2023 at 1935     Subjective       Chief Complaint: shortness of breath     History of Present Illness: Leona Garcia is a 78 y.o. female with a previous medical history of HTN, CKD III, DM on insulin, Hyperlipidemia, and Dysphagia who presented to [facility] ED on 2023 with shortness of breath which has been worsening over the past week.  She states that her home health nurse has been checking oxygen and it has been fine.  She states that this morning, she was unable to transfer from her bed to the chair due to the shortness of breath.       At [facility], initial troponin is 54, repeat 49. BNP 1936, Creatinine 2.2 (baseline 1.71). Respiratory panel is positive for Influenza A. Otherwise, labs are unremarkable. Chest xray shows stable cardiomegaly without acute process. EKG shows SR, HR 72.  She is afebrile, hypertensive at 170/62.  SPO2 94% on 2L per NC.       On chart review, patient had an ECHO on 10/19/23 which showed normal LV systolic function with an EF of 56-60%. Right ventricular systolic pressure is >55 mmHg due to tricuspid regurgitation.  LV wall thickness is consistent with borderline concentric hypertrophy.     12 point ROS reviewed and negative except as mentioned above        Personal History      Medical History        Past Medical History:   Diagnosis Date    Anemia      Chronic kidney disease (CKD)      Diabetes mellitus      Disease of thyroid gland      Hypertension              Surgical History   History reviewed. No pertinent surgical history.        Family History: family history is not on file. Otherwise pertinent FHx was reviewed and not pertinent to current issue.     Social History:  reports that she has never smoked. She has  never used smokeless tobacco. She reports that she does not drink alcohol and does not use drugs.     Home Medications:  Prior to Admission Medications         Prescriptions Last Dose Informant Patient Reported? Taking?     dilTIAZem CD (CARDIZEM CD) 240 MG 24 hr capsule 12/27/2023   Yes Yes     Take 1 capsule by mouth Daily.     ezetimibe (ZETIA) 10 MG tablet 12/27/2023   Yes Yes     Take 1 tablet by mouth Daily.     fluticasone (FLONASE) 50 MCG/ACT nasal spray 12/27/2023   Yes Yes     2 sprays into the nostril(s) as directed by provider Daily.     furosemide (LASIX) 40 MG tablet 12/27/2023   Yes Yes     Take 1 tablet by mouth Daily.     hydrALAZINE (APRESOLINE) 100 MG tablet 12/27/2023   Yes Yes     Take 1 tablet by mouth Daily.     hydroCHLOROthiazide (HYDRODIURIL) 25 MG tablet 12/26/2023   Yes Yes     Take 1 tablet by mouth Daily.     insulin NPH-insulin regular (humuLIN 70/30,novoLIN 70/30) (70-30) 100 UNIT/ML injection 12/27/2023   Yes Yes     Inject 20 Units under the skin into the appropriate area as directed Every Morning. And inject 10 units under the skin at bedtime     levothyroxine (SYNTHROID, LEVOTHROID) 50 MCG tablet 12/27/2023   Yes Yes     Take 1 tablet by mouth Daily.     losartan (COZAAR) 100 MG tablet 12/27/2023   Yes Yes     Take 1 tablet by mouth Daily.     meloxicam (MOBIC) 15 MG tablet Past Week   Yes Yes     Take 1 tablet by mouth Daily.     potassium chloride 10 MEQ CR tablet 12/27/2023   Yes Yes     Take 1 tablet by mouth Daily.                   Allergies:  No Known Allergies     Objective       Vitals:   Temp:  [97.2 °F (36.2 °C)-97.6 °F (36.4 °C)] 97.3 °F (36.3 °C)  Heart Rate:  [58-77] 58  Resp:  [13-21] 17  BP: (150-194)/(51-99) 150/58  Flow (L/min):  [2-4] 2   Physical Exam  Vitals and nursing note reviewed.   Constitutional:       General: She is sleeping.      Appearance: Normal appearance.      Interventions: Nasal cannula in place.   HENT:      Head: Normocephalic and atraumatic.       Nose: Nose normal.      Mouth/Throat:      Mouth: Mucous membranes are moist.   Eyes:      Extraocular Movements: Extraocular movements intact.      Pupils: Pupils are equal, round, and reactive to light.   Cardiovascular:      Rate and Rhythm: Regular rhythm. Bradycardia present.      Pulses: Normal pulses.      Heart sounds: Normal heart sounds.   Pulmonary:      Effort: Pulmonary effort is normal.      Breath sounds: Normal breath sounds. Examination of the right-upper field reveals wheezing. Examination of the left-upper field reveals wheezing. Examination of the right-lower field reveals wheezing. Examination of the left-lower field reveals wheezing.   Abdominal:      General: Bowel sounds are normal.      Palpations: Abdomen is soft.   Musculoskeletal:         General: Normal range of motion.      Cervical back: Normal range of motion.  Significant bilateral lower extremity edema/lymphedema with  Skin:     General: Skin is warm and dry.   Neurological:      General: No focal deficit present.      Mental Status: She is oriented to person, place, and time and easily aroused. Mental status is at baseline. She is lethargic.   Psychiatric:         Mood and Affect: Mood normal.         Behavior: Behavior normal.               Assessment & Plan          This is a 78 y.o. female with:     Active and Resolved Problems        Active Hospital Problems     Diagnosis   POA    **Respiratory failure with hypoxia [J96.91]   Yes    Hyperlipidemia [E78.5]   Yes    Type 2 diabetes mellitus [E11.9]   Yes    Essential hypertension [I10]   Yes    Chronic renal insufficiency, stage III (moderate) [N18.30]   Yes    Hypothyroidism [E03.9]   Yes       Resolved Hospital Problems   No resolved problems to display.         Plan:     Respiratory Failure with Hypoxia  Likely due to a combination of Influenza A and possible new onset CHF  -Patient denies previous respiratory problems  -Chest xray reviewed, cardiomegaly  -BNP 1936, 2D  echocardiogram results significantly ejection fraction 61 to 65%, estimated RVSP from tricuspid regurgitation more than 55 mmHg  -Positive for Influenza A  -EF 56-60% 10/2023  -No Tamiflu, Symptomatic for greater than 48 hours  -Solumedrol ordered, patient with wheezes throughout all lung fields  -Duonebs prn  -Supplemental O2 to keep SPO2 greater than 90%  -Cardiology consulted  -IV Lasix ordered  Bilateral lower extremity edema/lymphedema; patient is basically bedridden just gets up and go to the bathroom and comes back at home.     Essential Hypertension  -Uncontrolled  -Monitor BP  -Continue home Cardizem CD2 40 mg daily, hydralazine dose increased to 100 mg 3 times daily, hydrochlorothiazide discontinued.     Bradycardia  -likely due to the combination of multiple BP meds  -HR running in the 40's on exam the time of admission currently ranging 58-74  -She is lethargic but wakes easily and is A&O x4  -continuous cardiac monitoring  -She received her home BP meds, a total of 40mg of IV Hydralazine, and 1 inch of Nitrostat at [facility]  -Nitrostat was removed  JOSELITO/CKD stage III; likely prerenal continue to monitor improved with supportive care.  Nephrology consult appreciated.  Dysphagia  -chronic, stable  -Evaluated by SLP on previous admission 10/2023  -Mechanical ground textures with thin liquids and either whole or crushed medications recommended  -Diet ordered, no need for additional SLP evaluation     Diabetes Mellitus II  -Monitor glucose  -SSI ordered  -Continue home NPH     Hypothyroidism  -Continue Levothyroxine                 DVT prophylaxis:  Mechanical DVT prophylaxis orders are present.     CODE STATUS:    Code Status (Patient has no pulse and is not breathing): CPR (Attempt to Resuscitate)  Medical Interventions (Patient has pulse or is breathing): Full Support           Admission Status:  I believe this patient meets inpatient status.     I discussed the patient's findings and my  recommendations with patient and nursing staff.     Signature:    Electronically signed by Valery Meneses MD, 12/29/23, 3:40 PM EST.  .    [facility] Hospitalist Team

## 2023-12-29 NOTE — NURSING NOTE
Pt's BP has been elevated throughout this shift, despite PRN medications being administered. Pt's BP was 189/67 with 10 mg hydralazine administered IVP at 2053, 193/74 with 10 mg labetalol administered IVP at 0119, 194/81 with 10 mg hydralazine administered IVP at 0218. BP cuff changed with site rotated. Pt's BP was 182/70 at approximately 0448. ORVILLE Degroot notified. Orders given to re-time 0900 PO hydralazine 100 mg & PO losartan 100 mg to administer them now. Medications re-timed & administered. Pt continues to rest abed at this time.

## 2023-12-29 NOTE — THERAPY EVALUATION
Acute Care - Speech Language Pathology   Swallow Initial Evaluation  [place]     Patient Name: Leona Garcia  : 1945  MRN: [id number]  Today's Date: 2023               Admit Date: 2023    Visit Dx:     ICD-10-CM ICD-9-CM   1. Acute respiratory failure with hypoxia  J96.01 518.81   2. Congestive heart failure, unspecified HF chronicity, unspecified heart failure type  I50.9 428.0   3. Elevated troponin  R79.89 790.6   4. Chronic kidney disease, unspecified CKD stage  N18.9 585.9   5. Influenza A  J10.1 487.1     Patient Active Problem List   Diagnosis    Right hip pain    Essential hypertension    Type 2 diabetes mellitus    Respiratory failure with hypoxia    Chronic renal insufficiency, stage III (moderate)    Hyperlipidemia    Hypothyroidism    Influenza A     Past Medical History:   Diagnosis Date    Anemia     Chronic kidney disease (CKD)     Diabetes mellitus     Disease of thyroid gland     Hypertension      History reviewed. No pertinent surgical history.    SLP Recommendation and Plan  SLP Swallowing Diagnosis: moderate, oral dysphagia, functional pharyngeal phase (23)  SLP Diet Recommendation: mechanical ground textures, thin liquids (23)  Recommended Precautions and Strategies: upright posture during/after eating, small bites of food and sips of liquid, alternate between small bites of food and sips of liquid, general aspiration precautions, reflux precautions, fatigue precautions (23)  SLP Rec. for Method of Medication Administration: meds whole, with thin liquids, as tolerated (23)     Monitor for Signs of Aspiration: yes, notify SLP if any concerns, cough, gurgly voice, throat clearing (23)     Swallow Criteria for Skilled Therapeutic Interventions Met: demonstrates skilled criteria (23)     Rehab Potential/Prognosis, Swallowing: adequate, monitor progress closely (23)  Therapy Frequency (Swallow): PRN  (12/29/23 1700)  Predicted Duration Therapy Intervention (Days): until discharge (12/29/23 1700)  Oral Care Recommendations: Oral Care before breakfast, after meals and PRN (12/29/23 1700)                                      Oral Care Recommendations: Oral Care before breakfast, after meals and PRN (12/29/23 1700)           SWALLOW EVALUATION (last 72 hours)       SLP Adult Swallow Evaluation       Row Name 12/29/23 1700       Rehab Evaluation    Document Type evaluation  -CP    Subjective Information complains of;weakness;fatigue  -CP    Patient Observations alert;cooperative  -CP    Patient Effort adequate  -CP       General Information    Patient Profile Reviewed yes  -CP    Pertinent History Of Current Problem Leona Garcia is a 78 y.o. female with a previous medical history of HTN, CKD III, DM on insulin, Hyperlipidemia, and Dysphagia who presented to [facility] ED on 12/27/2023 with shortness of breath which has been worsening over the past week.  She states that her home health nurse has been checking oxygen and it has been fine.  She states that this morning, she was unable to transfer from her bed to the chair due to the shortness of breath. Swallow eval ordered due to hx of dysphagia  -CP    Current Method of Nutrition mechanical ground textures;thin liquids  -CP    Prior Level of Function-Swallowing mechanical ground textures;thin liquids  -CP    Plans/Goals Discussed with patient  -CP    Barriers to Rehab medically complex  -CP       Pain    Additional Documentation Pain Scale: FACES Pre/Post-Treatment (Group)  -CP       Pain Scale: FACES Pre/Post-Treatment    Pain: FACES Scale, Pretreatment 0-->no hurt  -CP    Posttreatment Pain Rating 0-->no hurt  -CP       Oral Motor Structure and Function    Dentition Assessment upper dentures/partial in place;lower dentures/partial in place  -CP    Secretion Management WNL/WFL  -CP    Mucosal Quality dry  -CP       Oral Musculature and Cranial Nerve  Assessment    Oral Motor General Assessment generalized oral motor weakness  -CP       General Eating/Swallowing Observations    Respiratory Support Currently in Use nasal cannula  -CP    O2 Liters 2L  -CP    Eating/Swallowing Skills self-fed  -CP    Positioning During Eating upright in bed  -CP    Utensils Used spoon;straw  -CP    Consistencies Trialed thin liquids;soft to chew textures  -CP       Clinical Swallow Eval    Clinical Swallow Evaluation Summary Pt seen for clinical swallow eval. Pt was awake but sleepy. She was able to follow all commands. Pt is well known to this dept and had VFSS in October 2023. A [facility] soft diet with thin liquids was rec. Pt had extended but functional mastication of soft solids during that assessment. Pt was seen at lunch meal consuming meat loaf, green beans and drinking water by straw. Pt had good labial closure over the fork and straw with no labial spillage.  Pt has upper and lower dentures and had extended mastication of the solids. Pt reports that chewing fatigues her but that she is not interested in puree food. She had no pocketing or oral residual on each bite of solid. Visualization of swallow suggests timely initiation after liquids. After the swallow, pt had clear vocal quality and no overt s/s of aspiration. Pt appears to tolerate soft solids and thin liquids with no overt s/s of aspiration, however puree diet would be more appropriate for pt. Education provided to pt on the rationale for puree diet. pt verbalized understanding but rejected that diet rec. It is rec that pt remain on current [facility] soft diet with thin liquids. Pt should be up at 90 degrees for all PO and take small bites and sips.  ST will follow to assure tolerance of diet and make further recs as indicated.  -CP       SLP Evaluation Clinical Impression    SLP Swallowing Diagnosis moderate;oral dysphagia;functional pharyngeal phase  -CP    Functional Impact risk of malnutrition  -CP    Rehab  Potential/Prognosis, Swallowing adequate, monitor progress closely  -CP    Swallow Criteria for Skilled Therapeutic Interventions Met demonstrates skilled criteria  -CP       SLP Treatment Clinical Impressions    Care Plan Review evaluation/treatment results reviewed  -CP       Recommendations    Therapy Frequency (Swallow) PRN  -CP    Predicted Duration Therapy Intervention (Days) until discharge  -CP    SLP Diet Recommendation mechanical ground textures;thin liquids  -CP    Recommended Precautions and Strategies upright posture during/after eating;small bites of food and sips of liquid;alternate between small bites of food and sips of liquid;general aspiration precautions;reflux precautions;fatigue precautions  -CP    Oral Care Recommendations Oral Care before breakfast, after meals and PRN  -CP    SLP Rec. for Method of Medication Administration meds whole;with thin liquids;as tolerated  -CP    Monitor for Signs of Aspiration yes;notify SLP if any concerns;cough;gurgly voice;throat clearing  -CP       Swallow Goals (SLP)    Swallow LTGs Swallow Long Term Goal (free text)  -CP    Swallow STGs diet tolerance goal selection (SLP)  -CP    Diet Tolerance Goal Selection (SLP) Swallow Short Term Goal 1;Swallow Short Term Goal 2  -CP       (LTG) Swallow    (LTG) Swallow Pt will maximize swallow function for least restrictive PO diet, exhibiting no complication associated with dysphagia, adequate PO intake, and demonstrating independent use of swallow compensations  -CP    Time Frame (Swallow Long Term Goal) by discharge  -CP       (STG) Swallow 1    (STG) Swallow 1 The patient will participate in a meal/follow-up assessment to determine safety and adequacy of recommended diet, independent use of safe swallow compensations, pt/family education and additional goals/recommendations to follow  -CP    Time Frame (Swallow Short Term Goal 1) 1 week  -CP       (STG) Swallow 2    (STG) Swallow 2 Pt will participate in ongoing  swallow assessment to include VFSS if indicated, and caregiver teaching.  -CP    Time Frame (Swallow Short Term Goal 2) 1 week  -CP              User Key  (r) = Recorded By, (t) = Taken By, (c) = Cosigned By      Initials Name Effective Dates    Reina Choi SLP 06/16/21 -                     EDUCATION  The patient has been educated in the following areas:   Dysphagia (Swallowing Impairment) Oral Care/Hydration Modified Diet Instruction.        SLP GOALS       Row Name 12/29/23 1700             (LTG) Swallow    (LTG) Swallow Pt will maximize swallow function for least restrictive PO diet, exhibiting no complication associated with dysphagia, adequate PO intake, and demonstrating independent use of swallow compensations  -CP      Time Frame (Swallow Long Term Goal) by discharge  -CP         (STG) Swallow 1    (STG) Swallow 1 The patient will participate in a meal/follow-up assessment to determine safety and adequacy of recommended diet, independent use of safe swallow compensations, pt/family education and additional goals/recommendations to follow  -CP      Time Frame (Swallow Short Term Goal 1) 1 week  -CP         (STG) Swallow 2    (STG) Swallow 2 Pt will participate in ongoing swallow assessment to include VFSS if indicated, and caregiver teaching.  -CP      Time Frame (Swallow Short Term Goal 2) 1 week  -CP                User Key  (r) = Recorded By, (t) = Taken By, (c) = Cosigned By      Initials Name Provider Type    Reina Choi SLP Speech and Language Pathologist                       Time Calculation:                CECILE Gao  12/29/2023

## 2023-12-30 LAB
ANION GAP SERPL CALCULATED.3IONS-SCNC: 11 MMOL/L (ref 5–15)
ANION GAP SERPL CALCULATED.3IONS-SCNC: 9 MMOL/L (ref 5–15)
BASOPHILS # BLD AUTO: 0 10*3/MM3 (ref 0–0.2)
BASOPHILS NFR BLD AUTO: 0.3 % (ref 0–1.5)
BUN SERPL-MCNC: 59 MG/DL (ref 8–23)
BUN SERPL-MCNC: 67 MG/DL (ref 8–23)
BUN/CREAT SERPL: 26.8 (ref 7–25)
BUN/CREAT SERPL: 27.4 (ref 7–25)
CALCIUM SPEC-SCNC: 8.8 MG/DL (ref 8.6–10.5)
CALCIUM SPEC-SCNC: 9 MG/DL (ref 8.6–10.5)
CHLORIDE SERPL-SCNC: 100 MMOL/L (ref 98–107)
CHLORIDE SERPL-SCNC: 102 MMOL/L (ref 98–107)
CO2 SERPL-SCNC: 28 MMOL/L (ref 22–29)
CO2 SERPL-SCNC: 29 MMOL/L (ref 22–29)
CREAT SERPL-MCNC: 2.15 MG/DL (ref 0.57–1)
CREAT SERPL-MCNC: 2.5 MG/DL (ref 0.57–1)
DEPRECATED RDW RBC AUTO: 49 FL (ref 37–54)
EGFRCR SERPLBLD CKD-EPI 2021: 19.4 ML/MIN/1.73
EGFRCR SERPLBLD CKD-EPI 2021: 23.2 ML/MIN/1.73
EOSINOPHIL # BLD AUTO: 0 10*3/MM3 (ref 0–0.4)
EOSINOPHIL NFR BLD AUTO: 0.3 % (ref 0.3–6.2)
ERYTHROCYTE [DISTWIDTH] IN BLOOD BY AUTOMATED COUNT: 16.7 % (ref 12.3–15.4)
GLUCOSE BLDC GLUCOMTR-MCNC: 129 MG/DL (ref 70–105)
GLUCOSE BLDC GLUCOMTR-MCNC: 131 MG/DL (ref 70–105)
GLUCOSE BLDC GLUCOMTR-MCNC: 141 MG/DL (ref 70–105)
GLUCOSE BLDC GLUCOMTR-MCNC: 180 MG/DL (ref 70–105)
GLUCOSE SERPL-MCNC: 148 MG/DL (ref 65–99)
GLUCOSE SERPL-MCNC: 198 MG/DL (ref 65–99)
HCT VFR BLD AUTO: 28.9 % (ref 34–46.6)
HGB BLD-MCNC: 9.3 G/DL (ref 12–15.9)
LYMPHOCYTES # BLD AUTO: 2.2 10*3/MM3 (ref 0.7–3.1)
LYMPHOCYTES NFR BLD AUTO: 21.3 % (ref 19.6–45.3)
MCH RBC QN AUTO: 27.1 PG (ref 26.6–33)
MCHC RBC AUTO-ENTMCNC: 32.2 G/DL (ref 31.5–35.7)
MCV RBC AUTO: 84.1 FL (ref 79–97)
MONOCYTES # BLD AUTO: 0.9 10*3/MM3 (ref 0.1–0.9)
MONOCYTES NFR BLD AUTO: 8.9 % (ref 5–12)
NEUTROPHILS NFR BLD AUTO: 69.2 % (ref 42.7–76)
NEUTROPHILS NFR BLD AUTO: 7.1 10*3/MM3 (ref 1.7–7)
NRBC BLD AUTO-RTO: 0.5 /100 WBC (ref 0–0.2)
PLATELET # BLD AUTO: 233 10*3/MM3 (ref 140–450)
PMV BLD AUTO: 8.8 FL (ref 6–12)
POTASSIUM SERPL-SCNC: 4.1 MMOL/L (ref 3.5–5.2)
POTASSIUM SERPL-SCNC: 4.8 MMOL/L (ref 3.5–5.2)
RBC # BLD AUTO: 3.43 10*6/MM3 (ref 3.77–5.28)
SODIUM SERPL-SCNC: 138 MMOL/L (ref 136–145)
SODIUM SERPL-SCNC: 141 MMOL/L (ref 136–145)
WBC NRBC COR # BLD AUTO: 10.3 10*3/MM3 (ref 3.4–10.8)

## 2023-12-30 PROCEDURE — 99232 SBSQ HOSP IP/OBS MODERATE 35: CPT | Performed by: INTERNAL MEDICINE

## 2023-12-30 PROCEDURE — 25810000003 SODIUM CHLORIDE 0.9 % SOLUTION 250 ML FLEX CONT: Performed by: STUDENT IN AN ORGANIZED HEALTH CARE EDUCATION/TRAINING PROGRAM

## 2023-12-30 PROCEDURE — 80048 BASIC METABOLIC PNL TOTAL CA: CPT | Performed by: INTERNAL MEDICINE

## 2023-12-30 PROCEDURE — 63710000001 INSULIN GLARGINE PER 5 UNITS: Performed by: INTERNAL MEDICINE

## 2023-12-30 PROCEDURE — 82948 REAGENT STRIP/BLOOD GLUCOSE: CPT

## 2023-12-30 PROCEDURE — 63710000001 INSULIN LISPRO (HUMAN) PER 5 UNITS: Performed by: INTERNAL MEDICINE

## 2023-12-30 PROCEDURE — 25010000002 HYDRALAZINE PER 20 MG

## 2023-12-30 RX ORDER — FUROSEMIDE 40 MG/1
40 TABLET ORAL DAILY
Status: DISCONTINUED | OUTPATIENT
Start: 2023-12-30 | End: 2024-01-01

## 2023-12-30 RX ORDER — INSULIN LISPRO 100 [IU]/ML
12 INJECTION, SOLUTION INTRAVENOUS; SUBCUTANEOUS
Status: DISCONTINUED | OUTPATIENT
Start: 2023-12-30 | End: 2024-01-01

## 2023-12-30 RX ORDER — CLONIDINE HYDROCHLORIDE 0.1 MG/1
0.1 TABLET ORAL EVERY 8 HOURS PRN
Status: DISCONTINUED | OUTPATIENT
Start: 2023-12-30 | End: 2024-01-04 | Stop reason: HOSPADM

## 2023-12-30 RX ORDER — LOSARTAN POTASSIUM 50 MG/1
50 TABLET ORAL
Status: DISCONTINUED | OUTPATIENT
Start: 2023-12-30 | End: 2023-12-31

## 2023-12-30 RX ORDER — BUTALBITAL, ACETAMINOPHEN AND CAFFEINE 50; 325; 40 MG/1; MG/1; MG/1
1 TABLET ORAL ONCE
Status: COMPLETED | OUTPATIENT
Start: 2023-12-30 | End: 2023-12-30

## 2023-12-30 RX ORDER — CLONIDINE HYDROCHLORIDE 0.1 MG/1
0.1 TABLET ORAL EVERY 12 HOURS SCHEDULED
Status: DISCONTINUED | OUTPATIENT
Start: 2023-12-30 | End: 2023-12-30

## 2023-12-30 RX ADMIN — Medication 10 ML: at 21:11

## 2023-12-30 RX ADMIN — INSULIN LISPRO 2 UNITS: 100 INJECTION, SOLUTION INTRAVENOUS; SUBCUTANEOUS at 09:00

## 2023-12-30 RX ADMIN — HYDRALAZINE HYDROCHLORIDE 10 MG: 20 INJECTION INTRAMUSCULAR; INTRAVENOUS at 22:04

## 2023-12-30 RX ADMIN — LEVOTHYROXINE SODIUM 50 MCG: 0.05 TABLET ORAL at 05:44

## 2023-12-30 RX ADMIN — BUTALBITAL, ACETAMINOPHEN, AND CAFFEINE 1 TABLET: 50; 325; 40 TABLET ORAL at 22:55

## 2023-12-30 RX ADMIN — LOSARTAN POTASSIUM 50 MG: 50 TABLET, FILM COATED ORAL at 08:59

## 2023-12-30 RX ADMIN — HYDRALAZINE HYDROCHLORIDE 100 MG: 25 TABLET ORAL at 17:21

## 2023-12-30 RX ADMIN — DILTIAZEM HYDROCHLORIDE 240 MG: 240 CAPSULE, EXTENDED RELEASE ORAL at 08:59

## 2023-12-30 RX ADMIN — Medication 10 ML: at 09:00

## 2023-12-30 RX ADMIN — HYDRALAZINE HYDROCHLORIDE 10 MG: 20 INJECTION INTRAMUSCULAR; INTRAVENOUS at 12:56

## 2023-12-30 RX ADMIN — HYDRALAZINE HYDROCHLORIDE 100 MG: 25 TABLET ORAL at 05:07

## 2023-12-30 RX ADMIN — INSULIN LISPRO 10 UNITS: 100 INJECTION, SOLUTION INTRAVENOUS; SUBCUTANEOUS at 08:59

## 2023-12-30 RX ADMIN — INSULIN GLARGINE 20 UNITS: 100 INJECTION, SOLUTION SUBCUTANEOUS at 21:10

## 2023-12-30 RX ADMIN — INSULIN LISPRO 12 UNITS: 100 INJECTION, SOLUTION INTRAVENOUS; SUBCUTANEOUS at 12:56

## 2023-12-30 RX ADMIN — HYDRALAZINE HYDROCHLORIDE 100 MG: 25 TABLET ORAL at 21:10

## 2023-12-30 RX ADMIN — HYDRALAZINE HYDROCHLORIDE 10 MG: 20 INJECTION INTRAMUSCULAR; INTRAVENOUS at 02:14

## 2023-12-30 RX ADMIN — ACETAMINOPHEN 650 MG: 325 TABLET, FILM COATED ORAL at 22:10

## 2023-12-30 RX ADMIN — Medication 10 MG: at 01:10

## 2023-12-30 RX ADMIN — NICARDIPINE HYDROCHLORIDE 5 MG/HR: 25 INJECTION, SOLUTION INTRAVENOUS at 22:27

## 2023-12-30 RX ADMIN — INSULIN LISPRO 12 UNITS: 100 INJECTION, SOLUTION INTRAVENOUS; SUBCUTANEOUS at 17:21

## 2023-12-30 RX ADMIN — Medication 10 MG: at 19:19

## 2023-12-30 RX ADMIN — FUROSEMIDE 40 MG: 40 TABLET ORAL at 08:59

## 2023-12-30 NOTE — PROGRESS NOTES
[facility] Progress Note      Patient Name: Leona Garcia  : 1945  MRN: [id number]  Primary Care Physician:  Alrfed Liriano MD  Date of admission: 2023    Subjective     Interval History:   No soa at rest, continues to have a dry cough, no chest pain    Review of Systems:   14 point review of systems is otherwise negative except for mentioned above on HPI    Objective     Vitals:   Temp:  [97.2 °F (36.2 °C)-98 °F (36.7 °C)] 98 °F (36.7 °C)  Heart Rate:  [58-77] 73  Resp:  [13-18] 14  BP: (150-199)/(58-85) 183/76  Flow (L/min):  [0-2] 0    Intake/Output Summary (Last 24 hours) at 2023 1259  Last data filed at 2023 0900  Gross per 24 hour   Intake 480 ml   Output 750 ml   Net -270 ml       Physical Exam:    General Appearance: alert, oriented x 3, no acute distress   Skin: warm and dry  HEENT: oral mucosa normal, nonicteric sclera  Neck: supple, no JVD  Lungs: CTA  Heart: RRR, normal S1 and S2  Abdomen: soft, nontender, nondistended  : no palpable bladder  Extremities: 2+ edema, no cyanosis or clubbing  Neuro: normal speech and mental status     Scheduled Meds:     dilTIAZem CD, 240 mg, Oral, Daily  furosemide, 40 mg, Oral, Daily  hydrALAZINE, 100 mg, Oral, Q8H  insulin glargine, 35 Units, Subcutaneous, Nightly  insulin lispro, 12 Units, Subcutaneous, TID With Meals  insulin lispro, 2-9 Units, Subcutaneous, 4x Daily AC & at Bedtime  levothyroxine, 50 mcg, Oral, Daily  losartan, 50 mg, Oral, Q24H  sodium chloride, 10 mL, Intravenous, Q12H      IV Meds:        Results Reviewed:   I have personally reviewed the results from the time of this admission to 2023 12:59 EST     Results from last 7 days   Lab Units 23  2353 23  0041 23  2251 23  1546   SODIUM mmol/L 141 137 143 139   POTASSIUM mmol/L 4.8 5.0 4.5 4.7   CHLORIDE mmol/L 102 97* 106 103   CO2 mmol/L 28.0 24.0 28.0 28.4   BUN mg/dL 67* 60* 43* 47*   CREATININE mg/dL  2.50* 2.67* 2.07* 2.20*   CALCIUM mg/dL 9.0 9.0 8.7 9.2   BILIRUBIN mg/dL  --   --   --  0.5   ALK PHOS U/L  --   --   --  167*   ALT (SGPT) U/L  --   --   --  34*   AST (SGOT) U/L  --   --   --  30   GLUCOSE mg/dL 198* 407* 218* 271*     Estimated Creatinine Clearance: 26.9 mL/min (A) (by C-G formula based on SCr of 2.5 mg/dL (H)).          Results from last 7 days   Lab Units 12/29/23  2353 12/29/23  0041 12/27/23  2251 12/27/23  1546   WBC 10*3/mm3 10.30 8.80 5.00 6.78   HEMOGLOBIN g/dL 9.3* 8.9* 8.2* 9.3*   PLATELETS 10*3/mm3 233 202 174 209             ASSESSMENT:    Toñito-prerenal, better with supportive care, superimposed on ckd3a from diabetes and hypertension  Benign htn with ckd3 controlled  Edema-venous stasis from staying seated with legs dependent  Influenza a infection     PLAN:  Avoid nephrotoxins  Resume diuretics today  Renally dose medicines  Discharge is ok from a renal standpoint on lasix 40mg daily, I would not resume the hctz       Thank you for involving us in the care of Leona Garcia.  Please feel free to call with any questions.    Gray Norwood MD  12/30/23  12:59 EST    [facility]  [phone]

## 2023-12-30 NOTE — PROGRESS NOTES
Cardiology Progress Note      PATIENT IDENTIFICATION    Name: Leona Garcia  Age: 78 y.o. Sex: female : 1945  MRN: [id number]    Requesting Provider    Valery Meneses MD     LOS: 1 day       Reason For Followup:  Acute respiratory failure      Subjective:    Interval History:  Seen and examined.  Chart labs reviewed.  Patient reports breathing is a little bit better today.  No chest pain.  Still with lower extremity edema.  Reviewed echocardiogram results with patient.    Review of Systems:  A complete review of systems was undertaken with the pertinent cardiovascular findings listed in history of present illness and all other systems being negative.     Assessment & Plan    Impressions:  Acute respiratory failure-multifactorial with contributing factors being volume overload, obesity hypoventilation syndrome, and influenza.  Abnormal BNP in the setting of acute on chronic kidney injury  Acute on chronic kidney disease  Volume overload state possibly related to diastolic heart failure  Chronic bilateral lower extremity edema with venous stasis changes noted to bilateral lower extremities    Recommendations:  Renal function worsened with diuresis and has thus been stopped.  Continue to monitor renal function electrolytes for toxicities  Wound care evaluation for lower extremity wraps  Echocardiogram with normal left ventricular systolic function.        Objective:    Medication Review:   Scheduled Meds:dilTIAZem CD, 240 mg, Oral, Daily  hydrALAZINE, 100 mg, Oral, Q8H  insulin glargine, 30 Units, Subcutaneous, Nightly  insulin lispro, 10 Units, Subcutaneous, TID With Meals  insulin lispro, 2-9 Units, Subcutaneous, 4x Daily AC & at Bedtime  levothyroxine, 50 mcg, Oral, Daily  sodium chloride, 10 mL, Intravenous, Q12H      Continuous Infusions:   PRN Meds:.  acetaminophen **OR** acetaminophen **OR** acetaminophen    senna-docusate sodium **AND** polyethylene glycol **AND** bisacodyl **AND** bisacodyl     dextrose    dextrose    glucagon (human recombinant)    hydrALAZINE    influenza vaccine    ipratropium-albuterol    labetalol    melatonin    nitroglycerin    ondansetron    sodium chloride    sodium chloride    sodium chloride    sodium chloride      Respiratory failure with hypoxia    Essential hypertension    Type 2 diabetes mellitus    Chronic renal insufficiency, stage III (moderate)    Hyperlipidemia    Hypothyroidism    Influenza A         Physical Exam:    General: Alert, cooperative, no distress, appears stated age  Head:  Normocephalic, atraumatic, mucous membranes moist  Eyes:  Conjunctivae/corneas clear, EOMs intact     Neck:  Supple,  no bruit  Lungs:  Coarse and diminished bilaterally.  Chest wall: No tenderness  Heart::  Regular rate and rhythm, S1 and S2 normal, 1/6 holosystolic murmur.  No rub or gallop  Abdomen: Soft, nontender, nondistended, bowel sounds active  Extremities: No cyanosis, clubbing.  3+ edema  Pulses: Diminished pedal pulses  Skin:  Scaling and lichenification of lower extremities  Neuro/psych: A&O x3. CN II through XII are grossly intact with appropriate affect    Vital Signs:  Vitals:    12/29/23 1310 12/29/23 1735 12/29/23 2000 12/29/23 2101   BP: 150/58 152/63 168/66 (!) 186/69   BP Location: Left arm Left arm  Left arm   Patient Position: Lying Lying  Lying   Pulse: 58 60 68 69   Resp: 17 13  15   Temp: 97.3 °F (36.3 °C) 97.6 °F (36.4 °C)  97.2 °F (36.2 °C)   TempSrc: Oral Oral  Oral   SpO2: 95% 98% 98% 96%   Weight:       Height:         Wt Readings from Last 1 Encounters:   12/29/23 (!) 137 kg (300 lb 14.9 oz)       Intake/Output Summary (Last 24 hours) at 12/29/2023 2155  Last data filed at 12/29/2023 2101  Gross per 24 hour   Intake 960 ml   Output 1250 ml   Net -290 ml         Results Review:     CBC    Results from last 7 days   Lab Units 12/29/23  0041 12/27/23  2251 12/27/23  1546   WBC 10*3/mm3 8.80 5.00 6.78   HEMOGLOBIN g/dL 8.9* 8.2* 9.3*   PLATELETS 10*3/mm3 202  "174 209     Cr Clearance Estimated Creatinine Clearance: 25.2 mL/min (A) (by C-G formula based on SCr of 2.67 mg/dL (H)).  Coag     HbA1C   Lab Results   Component Value Date    HGBA1C 9.30 (H) 10/18/2023     Blood Glucose   Glucose   Date/Time Value Ref Range Status   12/29/2023 2029 205 (H) 70 - 105 mg/dL Final     Comment:     Serial Number: [id number]:  [id number]   12/29/2023 2004 256 (H) 70 - 105 mg/dL Final     Comment:     Serial Number: [id number]:  [id number]   12/29/2023 1614 240 (H) 70 - 105 mg/dL Final     Comment:     Serial Number: [id number]:  [id number]   12/29/2023 1110 347 (H) 70 - 105 mg/dL Final     Comment:     Serial Number: [id number]:  [id number]   12/29/2023 0717 414 (C) 70 - 105 mg/dL Final     Comment:     Serial Number: [id number]:  [id number]   12/29/2023 0132 392 (H) 70 - 105 mg/dL Final     Comment:     Serial Number: [id number]:  [id number]   12/28/2023 2029 405 (C) 70 - 105 mg/dL Final     Comment:     Serial Number: [id number]:  [id number]   12/28/2023 1618 341 (H) 70 - 105 mg/dL Final     Comment:     Serial Number: [id number]:  [id number]     Infection     CMP   Results from last 7 days   Lab Units 12/29/23  0041 12/27/23  2251 12/27/23  1546   SODIUM mmol/L 137 143 139   POTASSIUM mmol/L 5.0 4.5 4.7   CHLORIDE mmol/L 97* 106 103   CO2 mmol/L 24.0 28.0 28.4   BUN mg/dL 60* 43* 47*   CREATININE mg/dL 2.67* 2.07* 2.20*   GLUCOSE mg/dL 407* 218* 271*   ALBUMIN g/dL  --   --  3.7   BILIRUBIN mg/dL  --   --  0.5   ALK PHOS U/L  --   --  167*   AST (SGOT) U/L  --   --  30   ALT (SGPT) U/L  --   --  34*     ABG      UA    Results from last 7 days   Lab Units 12/28/23  1325   NITRITE UA  Negative   WBC UA /HPF 0-2   BACTERIA UA /HPF 1+*   SQUAM EPITHEL UA /HPF 0-2     XIMENA  No results found for: \"POCMETH\", \"POCAMPHET\", \"POCBARBITUR\", \"POCBENZO\", \"POCCOCAINE\", \"POCOPIATES\", \"POCOXYCODO\", \"POCPHENCYC\", \"POCPROPOXY\", \"POCTHC\", " "\"POCTRICYC\"  Lysis Labs   Results from last 7 days   Lab Units 12/29/23  0041 12/27/23  2251 12/27/23  1546   HEMOGLOBIN g/dL 8.9* 8.2* 9.3*   PLATELETS 10*3/mm3 202 174 209   CREATININE mg/dL 2.67* 2.07* 2.20*     Radiology(recent) US Renal Bilateral    Result Date: 12/28/2023  Impression: 1.Unremarkable appearance of the kidneys. 2.Urinary bladder inadequately visualized. 3.Incidental right pleural effusion. Electronically Signed: Dharmesh Cortés MD  12/28/2023 2:03 PM CST  Workstation ID: [id number]       Results from last 7 days   Lab Units 12/27/23  1740   HSTROP T ng/L 49*       Imaging Results (Last 24 Hours)       ** No results found for the last 24 hours. **            Cardiac Studies:  Echo- Results for orders placed during the hospital encounter of 12/27/23    Adult Transthoracic Echo Complete W/ Cont if Necessary Per Protocol    Interpretation Summary    Left ventricular systolic function is normal. Left ventricular ejection fraction appears to be 61 - 65%.    Left ventricular diastolic function is consistent with (grade I) impaired relaxation.    The left atrial cavity is mildly dilated.    The right atrial cavity is mildly  dilated.    There is mild, bileaflet mitral valve thickening present.    Estimated right ventricular systolic pressure from tricuspid regurgitation is markedly elevated (>55 mmHg).    Stress Myoview-  Cath-        Jenaro Braun DO  12/29/23  21:55 EST  "

## 2023-12-30 NOTE — PLAN OF CARE
Goal Outcome Evaluation:  Plan of Care Reviewed With: patient        Progress: improving  Outcome Evaluation: Bp intermittently very high requiring administration of PRN hypertensive medications, otherwise no significant events this shift. Currently on room air and tolerating well. Assisted to bedside commode with 2-assist and tolerated well although did require quite a bit of assistance. Pt states she hopes she can go home soon.

## 2023-12-30 NOTE — PROGRESS NOTES
[facility] Medicine Services  Daily progress     Patient Name: Leona Garcia  : 1945  MRN: [id number]  Primary Care Physician:  Alfred Liriano MD  Date of admission: 2023       Subjective       Chief Complaint: shortness of breath     History of Present Illness: Leona Garcia is a 78 y.o. female with a previous medical history of HTN, CKD III, DM on insulin, Hyperlipidemia, and Dysphagia who presented to [facility] ED on 2023 with shortness of breath which has been worsening over the past week.  She states that her home health nurse has been checking oxygen and it has been fine.  She states that this morning, she was unable to transfer from her bed to the chair due to the shortness of breath.       At [facility], initial troponin is 54, repeat 49. BNP 1936, Creatinine 2.2 (baseline 1.71). Respiratory panel is positive for Influenza A. Otherwise, labs are unremarkable. Chest xray shows stable cardiomegaly without acute process. EKG shows SR, HR 72.  She is afebrile, hypertensive at 170/62.  SPO2 94% on 2L per NC.       On chart review, patient had an ECHO on 10/19/23 which showed normal LV systolic function with an EF of 56-60%. Right ventricular systolic pressure is >55 mmHg due to tricuspid regurgitation.  LV wall thickness is consistent with borderline concentric hypertrophy.     12 point ROS reviewed and negative except as mentioned above  Hospital course;  2023; patient is resting in the bed, hemodynamically stable, patient is too weak requiring 2 people assist to get out of the bed, will ask PT and OT to come back and check again, patient wanting to go home.     Personal History      Medical History        Past Medical History:   Diagnosis Date    Anemia      Chronic kidney disease (CKD)      Diabetes mellitus      Disease of thyroid gland      Hypertension              Surgical History   History reviewed. No pertinent surgical history.        Family  History: family history is not on file. Otherwise pertinent FHx was reviewed and not pertinent to current issue.     Social History:  reports that she has never smoked. She has never used smokeless tobacco. She reports that she does not drink alcohol and does not use drugs.     Home Medications:  Prior to Admission Medications         Prescriptions Last Dose Informant Patient Reported? Taking?     dilTIAZem CD (CARDIZEM CD) 240 MG 24 hr capsule 12/27/2023   Yes Yes     Take 1 capsule by mouth Daily.     ezetimibe (ZETIA) 10 MG tablet 12/27/2023   Yes Yes     Take 1 tablet by mouth Daily.     fluticasone (FLONASE) 50 MCG/ACT nasal spray 12/27/2023   Yes Yes     2 sprays into the nostril(s) as directed by provider Daily.     furosemide (LASIX) 40 MG tablet 12/27/2023   Yes Yes     Take 1 tablet by mouth Daily.     hydrALAZINE (APRESOLINE) 100 MG tablet 12/27/2023   Yes Yes     Take 1 tablet by mouth Daily.     hydroCHLOROthiazide (HYDRODIURIL) 25 MG tablet 12/26/2023   Yes Yes     Take 1 tablet by mouth Daily.     insulin NPH-insulin regular (humuLIN 70/30,novoLIN 70/30) (70-30) 100 UNIT/ML injection 12/27/2023   Yes Yes     Inject 20 Units under the skin into the appropriate area as directed Every Morning. And inject 10 units under the skin at bedtime     levothyroxine (SYNTHROID, LEVOTHROID) 50 MCG tablet 12/27/2023   Yes Yes     Take 1 tablet by mouth Daily.     losartan (COZAAR) 100 MG tablet 12/27/2023   Yes Yes     Take 1 tablet by mouth Daily.     meloxicam (MOBIC) 15 MG tablet Past Week   Yes Yes     Take 1 tablet by mouth Daily.     potassium chloride 10 MEQ CR tablet 12/27/2023   Yes Yes     Take 1 tablet by mouth Daily.                   Allergies:  No Known Allergies     Objective       Vitals:   Temp:  [97.2 °F (36.2 °C)-98 °F (36.7 °C)] 97.7 °F (36.5 °C)  Heart Rate:  [60-77] 75  Resp:  [13-23] 23  BP: (152-199)/(63-85) 181/68  Flow (L/min):  [0-2] 0   Physical Exam  Vitals and nursing note reviewed.    Constitutional:       General: She is sleeping.      Appearance: Normal appearance.      Interventions: Nasal cannula in place.   HENT:      Head: Normocephalic and atraumatic.      Nose: Nose normal.      Mouth/Throat:      Mouth: Mucous membranes are moist.   Eyes:      Extraocular Movements: Extraocular movements intact.      Pupils: Pupils are equal, round, and reactive to light.   Cardiovascular:      Rate and Rhythm: Regular rhythm. Bradycardia present.      Pulses: Normal pulses.      Heart sounds: Normal heart sounds.   Pulmonary:      Effort: Pulmonary effort is normal.      Breath sounds: Normal breath sounds. Examination of the right-upper field reveals wheezing. Examination of the left-upper field reveals wheezing. Examination of the right-lower field reveals wheezing. Examination of the left-lower field reveals wheezing.   Abdominal:      General: Bowel sounds are normal.      Palpations: Abdomen is soft.   Musculoskeletal:         General: Normal range of motion.      Cervical back: Normal range of motion.  Significant bilateral lower extremity edema/lymphedema with  Skin:     General: Skin is warm and dry.   Neurological:      General: No focal deficit present.      Mental Status: She is oriented to person, place, and time and easily aroused. Mental status is at baseline. She is lethargic.   Psychiatric:         Mood and Affect: Mood normal.         Behavior: Behavior normal.               Assessment & Plan          This is a 78 y.o. female with:     Active and Resolved Problems        Active Hospital Problems     Diagnosis   POA    **Respiratory failure with hypoxia [J96.91]   Yes    Hyperlipidemia [E78.5]   Yes    Type 2 diabetes mellitus [E11.9]   Yes    Essential hypertension [I10]   Yes    Chronic renal insufficiency, stage III (moderate) [N18.30]   Yes    Hypothyroidism [E03.9]   Yes       Resolved Hospital Problems   No resolved problems to display.         Plan:     Respiratory Failure with  Hypoxia  Likely due to a combination of Influenza A and possible new onset CHF  -Patient denies previous respiratory problems  -Chest xray reviewed, cardiomegaly  -BNP 1936, 2D echocardiogram results significantly ejection fraction 61 to 65%, estimated RVSP from tricuspid regurgitation more than 55 mmHg  -Positive for Influenza A  -EF 56-60% 10/2023  -No Tamiflu, Symptomatic for greater than 48 hours  -Solumedrol ordered, patient with wheezes throughout all lung fields  -Duonebs prn  -Supplemental O2 to keep SPO2 greater than 90%  -Cardiology consulted  -Was on IV Lasix , switched to p.o. Lasix 40 mg daily  Bilateral lower extremity edema/lymphedema; patient is basically bedridden just gets up and go to the bathroom and comes back at home.     Essential Hypertension  -Uncontrolled  -Monitor BP  -Continue home Cardizem CD2 40 mg daily, hydralazine dose increased to 100 mg 3 times daily, hydrochlorothiazide discontinued.     Bradycardia  -likely due to the combination of multiple BP meds  -HR running in the 40's on exam the time of admission currently ranging 58-74  -She is lethargic but wakes easily and is A&O x4  -continuous cardiac monitoring  -She received her home BP meds, a total of 40mg of IV Hydralazine, and 1 inch of Nitrostat at [facility]  -Nitrostat was removed  JOSELITO/CKD stage III; likely prerenal continue to monitor improved with supportive care.  Nephrology consult appreciated.  Dysphagia  -chronic, stable  -Evaluated by SLP on previous admission 10/2023  -Mechanical ground textures with thin liquids and either whole or crushed medications recommended  -Diet ordered, no need for additional SLP evaluation     Diabetes Mellitus II  -Monitor glucose, continue Lantus 30 units nightly and insulin lispro 12 units with each meal  -SSI ordered  -Continue home NPH     Hypothyroidism  -Continue Levothyroxine           Disposition; likely discharge in 2 to 3 days, reconsulted PT OT for possible need placement  to rehab.     DVT prophylaxis:  Mechanical DVT prophylaxis orders are present.     CODE STATUS:    Code Status (Patient has no pulse and is not breathing): CPR (Attempt to Resuscitate)  Medical Interventions (Patient has pulse or is breathing): Full Support           Admission Status:  I believe this patient meets inpatient status.     I discussed the patient's findings and my recommendations with patient and nursing staff.     Signature:  Electronically signed by Valery Meneses MD, 12/30/23, 5:04 PM EST.        .    [facility] Hospitalist Team

## 2023-12-30 NOTE — PROGRESS NOTES
Cardiology Progress Note      PATIENT IDENTIFICATION    Name: Leona Garcia  Age: 78 y.o. Sex: female : 1945  MRN: [id number]    Requesting Provider    Valery Meneses MD     LOS: 2 days       Reason For Followup:  Acute respiratory failure      Subjective:    Interval History:  Seen and examined.  Chart labs reviewed.  Patient denies any chest pain.  She reports she is frustrated and wants to go home.  Discussed the importance of improving her respiratory and physical status prior to discharge in order to keep her from readmission.    Review of Systems:  A complete review of systems was undertaken with the pertinent cardiovascular findings listed in history of present illness and all other systems being negative.     Assessment & Plan    Impressions:  Acute respiratory failure-multifactorial with contributing factors being volume overload, obesity hypoventilation syndrome, and influenza.  Abnormal BNP in the setting of acute on chronic kidney injury  Acute on chronic kidney disease  Volume overload state possibly related to diastolic heart failure  Chronic bilateral lower extremity edema with venous stasis changes noted to bilateral lower extremities    Recommendations:  Agree with oral diuretics  Consider changing calcium channel blocker to alpha-blocker if okay with nephrology as calcium channel blocker may be contributing to edema  Wound care evaluation for lower extremity wraps  Echocardiogram with normal left ventricular systolic function.  No plans for further cardiac workup at the present time.  Cardiology status appropriate for discharge to next destination of care when okay with other services.  Follow-up with primary cardiologist Dr. Mcdonough as an outpatient        Objective:    Medication Review:   Scheduled Meds:dilTIAZem CD, 240 mg, Oral, Daily  furosemide, 40 mg, Oral, Daily  hydrALAZINE, 100 mg, Oral, Q8H  insulin glargine, 35 Units, Subcutaneous, Nightly  insulin lispro, 12 Units,  Subcutaneous, TID With Meals  insulin lispro, 2-9 Units, Subcutaneous, 4x Daily AC & at Bedtime  levothyroxine, 50 mcg, Oral, Daily  losartan, 50 mg, Oral, Q24H  sodium chloride, 10 mL, Intravenous, Q12H      Continuous Infusions:   PRN Meds:.  acetaminophen **OR** acetaminophen **OR** acetaminophen    senna-docusate sodium **AND** polyethylene glycol **AND** bisacodyl **AND** bisacodyl    cloNIDine    dextrose    dextrose    glucagon (human recombinant)    hydrALAZINE    influenza vaccine    ipratropium-albuterol    labetalol    melatonin    nitroglycerin    ondansetron    sodium chloride    sodium chloride    sodium chloride    sodium chloride      Respiratory failure with hypoxia    Essential hypertension    Type 2 diabetes mellitus    Chronic renal insufficiency, stage III (moderate)    Hyperlipidemia    Hypothyroidism    Influenza A         Physical Exam:    General: Alert, cooperative, no distress, appears stated age  Head:  Normocephalic, atraumatic, mucous membranes moist  Eyes:  Conjunctivae/corneas clear, EOMs intact     Neck:  Supple,  no bruit  Lungs:  Coarse and diminished bilaterally.  Chest wall: No tenderness  Heart::  Regular rate and rhythm, S1 and S2 normal, 1/6 holosystolic murmur.  No rub or gallop  Abdomen: Soft, nontender, nondistended, bowel sounds active.  Obese  Extremities: No cyanosis, clubbing.  3+ edema  Pulses: Diminished pedal pulses  Skin:  Scaling and lichenification of lower extremities  Neuro/psych: A&O x3. CN II through XII are grossly intact with appropriate affect    Vital Signs:  Vitals:    12/30/23 0507 12/30/23 0527 12/30/23 0900 12/30/23 0939   BP: 166/71 166/71 174/70 (!) 188/72   BP Location:  Left arm  Left arm   Patient Position:  Lying  Lying   Pulse: 70 71 75 76   Resp:  17  14   Temp:  97.5 °F (36.4 °C)  98 °F (36.7 °C)   TempSrc:  Oral  Axillary   SpO2:  98% 98% 98%   Weight:       Height:         Wt Readings from Last 1 Encounters:   12/30/23 (!) 137 kg (302 lb  11.1 oz)       Intake/Output Summary (Last 24 hours) at 12/30/2023 1031  Last data filed at 12/30/2023 0900  Gross per 24 hour   Intake 480 ml   Output 750 ml   Net -270 ml         Results Review:     CBC    Results from last 7 days   Lab Units 12/29/23 2353 12/29/23  0041 12/27/23  2251 12/27/23  1546   WBC 10*3/mm3 10.30 8.80 5.00 6.78   HEMOGLOBIN g/dL 9.3* 8.9* 8.2* 9.3*   PLATELETS 10*3/mm3 233 202 174 209     Cr Clearance Estimated Creatinine Clearance: 26.9 mL/min (A) (by C-G formula based on SCr of 2.5 mg/dL (H)).  Coag     HbA1C   Lab Results   Component Value Date    HGBA1C 9.30 (H) 10/18/2023     Blood Glucose   Glucose   Date/Time Value Ref Range Status   12/30/2023 0701 180 (H) 70 - 105 mg/dL Final     Comment:     Serial Number: [id number]:  [id number]   12/29/2023 2029 205 (H) 70 - 105 mg/dL Final     Comment:     Serial Number: [id number]:  [id number]   12/29/2023 2004 256 (H) 70 - 105 mg/dL Final     Comment:     Serial Number: [id number]:  [id number]   12/29/2023 1614 240 (H) 70 - 105 mg/dL Final     Comment:     Serial Number: [id number]:  [id number]   12/29/2023 1110 347 (H) 70 - 105 mg/dL Final     Comment:     Serial Number: [id number]:  [id number]   12/29/2023 0717 414 (C) 70 - 105 mg/dL Final     Comment:     Serial Number: [id number]:  [id number]   12/29/2023 0132 392 (H) 70 - 105 mg/dL Final     Comment:     Serial Number: [id number]:  [id number]   12/28/2023 2029 405 (C) 70 - 105 mg/dL Final     Comment:     Serial Number: [id number]:  [id number]     Infection     CMP   Results from last 7 days   Lab Units 12/29/23 2353 12/29/23  0041 12/27/23  2251 12/27/23  1546   SODIUM mmol/L 141 137 143 139   POTASSIUM mmol/L 4.8 5.0 4.5 4.7   CHLORIDE mmol/L 102 97* 106 103   CO2 mmol/L 28.0 24.0 28.0 28.4   BUN mg/dL 67* 60* 43* 47*   CREATININE mg/dL 2.50* 2.67* 2.07* 2.20*   GLUCOSE mg/dL 198* 407* 218* 271*   ALBUMIN g/dL  --   --   --  3.7  "  BILIRUBIN mg/dL  --   --   --  0.5   ALK PHOS U/L  --   --   --  167*   AST (SGOT) U/L  --   --   --  30   ALT (SGPT) U/L  --   --   --  34*     ABG      UA    Results from last 7 days   Lab Units 12/28/23  1325   NITRITE UA  Negative   WBC UA /HPF 0-2   BACTERIA UA /HPF 1+*   SQUAM EPITHEL UA /HPF 0-2     XIMENA  No results found for: \"POCMETH\", \"POCAMPHET\", \"POCBARBITUR\", \"POCBENZO\", \"POCCOCAINE\", \"POCOPIATES\", \"POCOXYCODO\", \"POCPHENCYC\", \"POCPROPOXY\", \"POCTHC\", \"POCTRICYC\"  Lysis Labs   Results from last 7 days   Lab Units 12/29/23  2353 12/29/23  0041 12/27/23  2251 12/27/23  1546   HEMOGLOBIN g/dL 9.3* 8.9* 8.2* 9.3*   PLATELETS 10*3/mm3 233 202 174 209   CREATININE mg/dL 2.50* 2.67* 2.07* 2.20*     Radiology(recent) US Renal Bilateral    Result Date: 12/28/2023  Impression: 1.Unremarkable appearance of the kidneys. 2.Urinary bladder inadequately visualized. 3.Incidental right pleural effusion. Electronically Signed: Dharmesh Cortés MD  12/28/2023 2:03 PM CST  Workstation ID: [id number]       Results from last 7 days   Lab Units 12/27/23  1740   HSTROP T ng/L 49*       Imaging Results (Last 24 Hours)       ** No results found for the last 24 hours. **            Cardiac Studies:  Echo- Results for orders placed during the hospital encounter of 12/27/23    Adult Transthoracic Echo Complete W/ Cont if Necessary Per Protocol    Interpretation Summary    Left ventricular systolic function is normal. Left ventricular ejection fraction appears to be 61 - 65%.    Left ventricular diastolic function is consistent with (grade I) impaired relaxation.    The left atrial cavity is mildly dilated.    The right atrial cavity is mildly  dilated.    There is mild, bileaflet mitral valve thickening present.    Estimated right ventricular systolic pressure from tricuspid regurgitation is markedly elevated (>55 mmHg).    Stress Myoview-  Cath-        Jenaro Braun,   12/30/23  10:31 EST  "

## 2023-12-30 NOTE — PROGRESS NOTES
ENDOCRINE CONSULT PROGRESS NOTE  DATE OF SERVICE: 23        PATIENT NAME: Leona Garcia  PATIENT : 1945 AGE: 78 y.o.  MRN NUMBER: [id number]    ==========================================================================    CHIEF COMPLAINT: Type 2 Diabetes Mellitus    CARE TEAM:   Patient Care Team:  Alfred Liriano MD as PCP - General (Family Medicine)    SUBJECTIVE  Patient seen and examined.  Tolerating meals.    ==========================================================================    CURRENT ACTIVE HOSPITAL MEDICATIONS    Scheduled Medications:  dilTIAZem CD, 240 mg, Oral, Daily  furosemide, 40 mg, Oral, Daily  hydrALAZINE, 100 mg, Oral, Q8H  insulin glargine, 35 Units, Subcutaneous, Nightly  insulin lispro, 12 Units, Subcutaneous, TID With Meals  insulin lispro, 2-9 Units, Subcutaneous, 4x Daily AC & at Bedtime  levothyroxine, 50 mcg, Oral, Daily  losartan, 50 mg, Oral, Q24H  sodium chloride, 10 mL, Intravenous, Q12H         PRN Medications:    acetaminophen **OR** acetaminophen **OR** acetaminophen    senna-docusate sodium **AND** polyethylene glycol **AND** bisacodyl **AND** bisacodyl    cloNIDine    dextrose    dextrose    glucagon (human recombinant)    hydrALAZINE    influenza vaccine    ipratropium-albuterol    labetalol    melatonin    nitroglycerin    ondansetron    sodium chloride    sodium chloride    sodium chloride    sodium chloride     ==========================================================================    OBJECTIVE    Vitals:    23 0939   BP: (!) 188/72   Pulse: 76   Resp: 14   Temp: 98 °F (36.7 °C)   SpO2: 98%      Body mass index is 48.86 kg/m².     General - Awake and alert    ==========================================================================    LAB EVALUATION    Lab Results   Component Value Date    GLUCOSE 198 (H) 2023    BUN 67 (H) 2023    CREATININE 2.50 (H) 2023    EGFRIFNONA 65 2017    EGFRIFAFRI 56 (L) 2017  "   BCR 26.8 (H) 12/29/2023    K 4.8 12/29/2023    CO2 28.0 12/29/2023    CALCIUM 9.0 12/29/2023    ALBUMIN 3.7 12/27/2023    LABIL2 1.4 (calc) 04/01/2017    AST 30 12/27/2023    ALT 34 (H) 12/27/2023       Lab Results   Component Value Date    HGBA1C 9.30 (H) 10/18/2023     Lab Results   Component Value Date    CREATININE 2.50 (H) 12/29/2023     Results from last 7 days   Lab Units 12/30/23  0701 12/29/23 2029 12/29/23  2004 12/29/23  1614 12/29/23  1110 12/29/23  0717   GLUCOSE mg/dL 180* 205* 256* 240* 347* 414*     ==========================================================================    ASSESSMENT AND PLAN    # Type 2 diabetes with hyperglycemia  - Blood sugar reviewed for last 24 hours did have significant hyperglycemia  - Adjusted insulin therapy as:  Insulin Lantus 35 units nightly  Insulin lispro 12 units with each meal  Moderate dose correction scale  - Will review blood sugar for next 24 hours and further therapy adjustment accordingly    # Hypothyroidism, levothyroxine replacement therapy      Will follow with you.  Rest as per primary team.    This note was created using voice recognition software and is inherently subject to errors including those of syntax and \"sound-alike\" substitutions which may escape proofreading.  In such instances, original meaning may be extrapolated by contextual derivation.    Note: Portions of this note may have been copied from previous notes but documentation have been reviewed and edited as necessary to support clinical decision making for today's visit.    ==========================================================================  Ron Arreguin MD  Department of Endocrine, Diabetes and Metabolism  [facility] [place]  ==========================================================================    "

## 2023-12-30 NOTE — PLAN OF CARE
Problem: Adult Inpatient Plan of Care  Goal: Plan of Care Review  Outcome: Ongoing, Progressing  Goal: Patient-Specific Goal (Individualized)  Outcome: Ongoing, Progressing  Goal: Absence of Hospital-Acquired Illness or Injury  Outcome: Ongoing, Progressing  Intervention: Identify and Manage Fall Risk  Recent Flowsheet Documentation  Taken 12/30/2023 1800 by Liberty Bliss RN  Safety Promotion/Fall Prevention: safety round/check completed  Taken 12/30/2023 1600 by Liberty Bliss RN  Safety Promotion/Fall Prevention: safety round/check completed  Taken 12/30/2023 1400 by Liberty Bliss RN  Safety Promotion/Fall Prevention: safety round/check completed  Taken 12/30/2023 1200 by Liberty Bliss RN  Safety Promotion/Fall Prevention: safety round/check completed  Taken 12/30/2023 0800 by Liberty Bliss RN  Safety Promotion/Fall Prevention: safety round/check completed  Intervention: Prevent Skin Injury  Recent Flowsheet Documentation  Taken 12/30/2023 1600 by Liberty Bliss RN  Skin Protection:   adhesive use limited   incontinence pads utilized  Taken 12/30/2023 1200 by Liberty Bliss RN  Skin Protection:   adhesive use limited   incontinence pads utilized  Taken 12/30/2023 0800 by Liberty Bliss RN  Skin Protection:   adhesive use limited   incontinence pads utilized  Goal: Optimal Comfort and Wellbeing  Outcome: Ongoing, Progressing  Intervention: Provide Person-Centered Care  Recent Flowsheet Documentation  Taken 12/30/2023 1600 by Liberty Bliss RN  Trust Relationship/Rapport:   care explained   choices provided  Taken 12/30/2023 1200 by Liberty Bliss RN  Trust Relationship/Rapport:   care explained   choices provided  Taken 12/30/2023 0800 by Liberty Bliss RN  Trust Relationship/Rapport:   care explained   choices provided  Goal: Readiness for Transition of Care  Outcome: Ongoing, Progressing   Goal Outcome Evaluation:

## 2023-12-31 LAB
GLUCOSE BLDC GLUCOMTR-MCNC: 105 MG/DL (ref 70–105)
GLUCOSE BLDC GLUCOMTR-MCNC: 110 MG/DL (ref 70–105)
GLUCOSE BLDC GLUCOMTR-MCNC: 119 MG/DL (ref 70–105)
GLUCOSE BLDC GLUCOMTR-MCNC: 154 MG/DL (ref 70–105)
GLUCOSE BLDC GLUCOMTR-MCNC: 161 MG/DL (ref 70–105)
GLUCOSE BLDC GLUCOMTR-MCNC: 161 MG/DL (ref 70–105)
GLUCOSE BLDC GLUCOMTR-MCNC: 170 MG/DL (ref 70–105)
GLUCOSE BLDC GLUCOMTR-MCNC: 29 MG/DL (ref 70–105)
GLUCOSE BLDC GLUCOMTR-MCNC: 93 MG/DL (ref 70–105)
WHOLE BLOOD HOLD SPECIMEN: NORMAL

## 2023-12-31 PROCEDURE — 25810000003 SODIUM CHLORIDE 0.9 % SOLUTION 250 ML FLEX CONT: Performed by: STUDENT IN AN ORGANIZED HEALTH CARE EDUCATION/TRAINING PROGRAM

## 2023-12-31 PROCEDURE — 97166 OT EVAL MOD COMPLEX 45 MIN: CPT

## 2023-12-31 PROCEDURE — 99232 SBSQ HOSP IP/OBS MODERATE 35: CPT | Performed by: INTERNAL MEDICINE

## 2023-12-31 PROCEDURE — 85007 BL SMEAR W/DIFF WBC COUNT: CPT | Performed by: INTERNAL MEDICINE

## 2023-12-31 PROCEDURE — 82948 REAGENT STRIP/BLOOD GLUCOSE: CPT

## 2023-12-31 PROCEDURE — 97535 SELF CARE MNGMENT TRAINING: CPT

## 2023-12-31 PROCEDURE — 85025 COMPLETE CBC W/AUTO DIFF WBC: CPT | Performed by: INTERNAL MEDICINE

## 2023-12-31 PROCEDURE — 80048 BASIC METABOLIC PNL TOTAL CA: CPT | Performed by: INTERNAL MEDICINE

## 2023-12-31 PROCEDURE — 63710000001 INSULIN LISPRO (HUMAN) PER 5 UNITS: Performed by: INTERNAL MEDICINE

## 2023-12-31 RX ORDER — LOSARTAN POTASSIUM 50 MG/1
100 TABLET ORAL
Status: DISCONTINUED | OUTPATIENT
Start: 2023-12-31 | End: 2024-01-04 | Stop reason: HOSPADM

## 2023-12-31 RX ORDER — CARVEDILOL 6.25 MG/1
6.25 TABLET ORAL 2 TIMES DAILY WITH MEALS
Status: DISCONTINUED | OUTPATIENT
Start: 2023-12-31 | End: 2024-01-04 | Stop reason: HOSPADM

## 2023-12-31 RX ORDER — AMLODIPINE BESYLATE 5 MG/1
10 TABLET ORAL
Status: DISCONTINUED | OUTPATIENT
Start: 2023-12-31 | End: 2024-01-04 | Stop reason: HOSPADM

## 2023-12-31 RX ADMIN — NICARDIPINE HYDROCHLORIDE 7.5 MG/HR: 25 INJECTION, SOLUTION INTRAVENOUS at 02:09

## 2023-12-31 RX ADMIN — INSULIN LISPRO 12 UNITS: 100 INJECTION, SOLUTION INTRAVENOUS; SUBCUTANEOUS at 15:16

## 2023-12-31 RX ADMIN — HYDRALAZINE HYDROCHLORIDE 100 MG: 25 TABLET ORAL at 23:16

## 2023-12-31 RX ADMIN — NICARDIPINE HYDROCHLORIDE 7.5 MG/HR: 25 INJECTION, SOLUTION INTRAVENOUS at 05:38

## 2023-12-31 RX ADMIN — CARVEDILOL 6.25 MG: 6.25 TABLET, FILM COATED ORAL at 18:35

## 2023-12-31 RX ADMIN — HYDRALAZINE HYDROCHLORIDE 100 MG: 25 TABLET ORAL at 15:16

## 2023-12-31 RX ADMIN — Medication 10 ML: at 23:21

## 2023-12-31 RX ADMIN — LEVOTHYROXINE SODIUM 50 MCG: 0.05 TABLET ORAL at 05:38

## 2023-12-31 RX ADMIN — Medication 10 ML: at 09:32

## 2023-12-31 RX ADMIN — INSULIN LISPRO 2 UNITS: 100 INJECTION, SOLUTION INTRAVENOUS; SUBCUTANEOUS at 18:35

## 2023-12-31 RX ADMIN — NICARDIPINE HYDROCHLORIDE 5 MG/HR: 25 INJECTION, SOLUTION INTRAVENOUS at 13:14

## 2023-12-31 RX ADMIN — LOSARTAN POTASSIUM 100 MG: 50 TABLET, FILM COATED ORAL at 09:31

## 2023-12-31 RX ADMIN — INSULIN LISPRO 12 UNITS: 100 INJECTION, SOLUTION INTRAVENOUS; SUBCUTANEOUS at 18:35

## 2023-12-31 RX ADMIN — INSULIN LISPRO 12 UNITS: 100 INJECTION, SOLUTION INTRAVENOUS; SUBCUTANEOUS at 09:31

## 2023-12-31 RX ADMIN — DEXTROSE MONOHYDRATE 25 G: 25 INJECTION, SOLUTION INTRAVENOUS at 23:15

## 2023-12-31 RX ADMIN — INSULIN LISPRO 2 UNITS: 100 INJECTION, SOLUTION INTRAVENOUS; SUBCUTANEOUS at 09:31

## 2023-12-31 RX ADMIN — CARVEDILOL 6.25 MG: 6.25 TABLET, FILM COATED ORAL at 09:31

## 2023-12-31 RX ADMIN — HYDRALAZINE HYDROCHLORIDE 100 MG: 25 TABLET ORAL at 05:38

## 2023-12-31 RX ADMIN — FUROSEMIDE 40 MG: 40 TABLET ORAL at 09:31

## 2023-12-31 RX ADMIN — INSULIN LISPRO 2 UNITS: 100 INJECTION, SOLUTION INTRAVENOUS; SUBCUTANEOUS at 15:16

## 2023-12-31 RX ADMIN — AMLODIPINE BESYLATE 10 MG: 5 TABLET ORAL at 09:31

## 2023-12-31 NOTE — NURSING NOTE
Pt's evening blood sugar was 131, refused to take full ordered evening dose of lantus stating she was concerned about hypoglycemia, educated about long and short-acting insulin, pt consented to 20 units, given peanut butter crackers, and reassured will re-check BG at 0100.

## 2023-12-31 NOTE — PLAN OF CARE
Problem: Adult Inpatient Plan of Care  Goal: Plan of Care Review  Outcome: Ongoing, Progressing  Flowsheets (Taken 12/31/2023 1813)  Plan of Care Reviewed With: patient  Goal: Patient-Specific Goal (Individualized)  Outcome: Ongoing, Progressing  Goal: Absence of Hospital-Acquired Illness or Injury  Outcome: Ongoing, Progressing  Intervention: Identify and Manage Fall Risk  Recent Flowsheet Documentation  Taken 12/31/2023 1800 by Liberty Bliss RN  Safety Promotion/Fall Prevention: safety round/check completed  Taken 12/31/2023 1600 by Liberty Bliss RN  Safety Promotion/Fall Prevention: safety round/check completed  Taken 12/31/2023 1200 by Liberty Bliss RN  Safety Promotion/Fall Prevention: safety round/check completed  Intervention: Prevent Skin Injury  Recent Flowsheet Documentation  Taken 12/31/2023 1600 by Liberty Bliss RN  Skin Protection:   adhesive use limited   incontinence pads utilized  Taken 12/31/2023 1200 by Liberty Bliss RN  Skin Protection:   adhesive use limited   incontinence pads utilized  Intervention: Prevent and Manage VTE (Venous Thromboembolism) Risk  Recent Flowsheet Documentation  Taken 12/31/2023 1600 by Liberty Bliss RN  VTE Prevention/Management: sequential compression devices off  Taken 12/31/2023 1200 by Liberty Bliss RN  VTE Prevention/Management: sequential compression devices off  Goal: Optimal Comfort and Wellbeing  Outcome: Ongoing, Progressing  Intervention: Provide Person-Centered Care  Recent Flowsheet Documentation  Taken 12/31/2023 1600 by Liberty Bliss RN  Trust Relationship/Rapport:   care explained   choices provided  Taken 12/31/2023 1200 by Liberty Bliss RN  Trust Relationship/Rapport:   care explained   choices provided  Taken 12/31/2023 0800 by Liberty Bliss RN  Trust Relationship/Rapport:   choices provided   care explained  Goal: Readiness for Transition of Care  Outcome: Ongoing, Progressing   Goal Outcome Evaluation:  Plan  of Care Reviewed With: patient

## 2023-12-31 NOTE — PLAN OF CARE
Goal Outcome Evaluation:  Plan of Care Reviewed With: patient        Progress: no change  Outcome Evaluation: placed back on 2L NC after getting back to bed from the recliner last night, pt has some shortness of air with activity but gets up to bedside commode with 2 assist and tolerates moderately well. Remains on cardene gtt at 7.5 mg/hr to maintain SBP < 160. Seemed to rest well during the night.

## 2023-12-31 NOTE — PLAN OF CARE
"Goal Outcome Evaluation:  Plan of Care Reviewed With: patient        Progress: no change  Outcome Evaluation: Pt is a 77 y/o F admitted to [facility] 10/18/23 with c/o R hip pain, after standing \"heard a pop.\" XR R hip (-) acute abnormality, (+) mild to moderate arthritic changes, (+) trochanteric enthesopathy consistent with gluteal tendinopathy. CT LE (+) cellulitis related to lateral R pelvis and anterior-lateral proximal R thigh, (+) mild hip OA. Orthopedic recommends supportive modalities and WBAT. PMHx significant for HTN and DMII. At baseline pt resides with sister, 1/2 week at pt house, 1/2 week at sister's home. Pt does not drive but sister provides transportation. Pt typically independent with ADLs, uses rollator for mobility, and reports 0 falls. Pt reports her and pt assist each other as needed. Pt went to [facility] after that hospitalization and was home with HHC working on dressing, bathing, and strengthening. Pt was then admitted here with Flu A, AE CKD, and AE CHF. Pt did not want to get OOB during PT evaluation but this AM she was eager to get up to the BSC and have BM. She needed min (A) to move her legs OOB but then only required line management and arrangement of furniture. Pt was SOA with minimal activity and O2 dropped to 87% on room air but returned to 95% on 2L o2 via NC. Pt is near her functional baseline and will benefit from continued HHC at d/c. Pt has her Rolator here. OT recomends walking oximetry prior to d/c.         "

## 2023-12-31 NOTE — PROGRESS NOTES
[facility] Progress Note      Patient Name: Leona Garcia  : 1945  MRN: [id number]  Primary Care Physician:  Alfred Liriano MD  Date of admission: 2023    Subjective     Interval History:   No soa at rest, continues to have a dry cough, no chest pain    Review of Systems:   14 point review of systems is otherwise negative except for mentioned above on HPI    Objective     Vitals:   Temp:  [97.2 °F (36.2 °C)-97.8 °F (36.6 °C)] 97.3 °F (36.3 °C)  Heart Rate:  [64-79] 66  Resp:  [14-18] 18  BP: (139-210)/() 159/60  Flow (L/min):  [2] 2    Intake/Output Summary (Last 24 hours) at 2023 1310  Last data filed at 2023 0541  Gross per 24 hour   Intake 1124 ml   Output 2250 ml   Net -1126 ml       Physical Exam:    General Appearance: alert, oriented x 3, no acute distress   Skin: warm and dry  HEENT: oral mucosa normal, nonicteric sclera  Neck: supple, no JVD  Lungs: CTA  Heart: RRR, normal S1 and S2  Abdomen: soft, nontender, nondistended  : no palpable bladder  Extremities: 2+ edema, no cyanosis or clubbing  Neuro: normal speech and mental status     Scheduled Meds:     amLODIPine, 10 mg, Oral, Q24H  carvedilol, 6.25 mg, Oral, BID With Meals  furosemide, 40 mg, Oral, Daily  hydrALAZINE, 100 mg, Oral, Q8H  insulin glargine, 35 Units, Subcutaneous, Nightly  insulin lispro, 12 Units, Subcutaneous, TID With Meals  insulin lispro, 2-9 Units, Subcutaneous, 4x Daily AC & at Bedtime  levothyroxine, 50 mcg, Oral, Daily  losartan, 100 mg, Oral, Q24H  sodium chloride, 10 mL, Intravenous, Q12H      IV Meds:   niCARdipine, 5-15 mg/hr, Last Rate: 5 mg/hr (23 0963)        Results Reviewed:   I have personally reviewed the results from the time of this admission to 2023 13:10 EST     Results from last 7 days   Lab Units 23  2301 23  2353 23  0041 23  2251 23  1546   SODIUM mmol/L 138 141 137   < > 139   POTASSIUM mmol/L 4.1  4.8 5.0   < > 4.7   CHLORIDE mmol/L 100 102 97*   < > 103   CO2 mmol/L 29.0 28.0 24.0   < > 28.4   BUN mg/dL 59* 67* 60*   < > 47*   CREATININE mg/dL 2.15* 2.50* 2.67*   < > 2.20*   CALCIUM mg/dL 8.8 9.0 9.0   < > 9.2   BILIRUBIN mg/dL  --   --   --   --  0.5   ALK PHOS U/L  --   --   --   --  167*   ALT (SGPT) U/L  --   --   --   --  34*   AST (SGOT) U/L  --   --   --   --  30   GLUCOSE mg/dL 148* 198* 407*   < > 271*    < > = values in this interval not displayed.     Estimated Creatinine Clearance: 30.7 mL/min (A) (by C-G formula based on SCr of 2.15 mg/dL (H)).          Results from last 7 days   Lab Units 12/29/23  2353 12/29/23  0041 12/27/23  2251 12/27/23  1546   WBC 10*3/mm3 10.30 8.80 5.00 6.78   HEMOGLOBIN g/dL 9.3* 8.9* 8.2* 9.3*   PLATELETS 10*3/mm3 233 202 174 209             ASSESSMENT:    [place]-prerenal, slow improvement continues with supportive care, superimposed on ckd3a from diabetes and hypertension  Benign htn with ckd3 controlled  Edema-venous stasis from staying seated with legs dependent  Influenza a infection     PLAN:  Avoid nephrotoxins  Continue diuretics today  Renally dose medicines  Discharge is ok from a renal standpoint on lasix 40mg daily, I would not resume the hctz       Thank you for involving us in the care of Leona Garcia.  Please feel free to call with any questions.    Gray Norwood MD  12/31/23  13:10 [facility]    [facility]  [phone]

## 2023-12-31 NOTE — THERAPY EVALUATION
Patient Name: Leona Garcia  : 1945    MRN: [id number]                              Today's Date: 2023       Admit Date: 2023    Visit Dx:     ICD-10-CM ICD-9-CM   1. Acute respiratory failure with hypoxia  J96.01 518.81   2. Congestive heart failure, unspecified HF chronicity, unspecified heart failure type  I50.9 428.0   3. Elevated troponin  R79.89 790.6   4. Chronic kidney disease, unspecified CKD stage  N18.9 585.9   5. Influenza A  J10.1 487.1     Patient Active Problem List   Diagnosis    Right hip pain    Essential hypertension    Type 2 diabetes mellitus    Respiratory failure with hypoxia    Chronic renal insufficiency, stage III (moderate)    Hyperlipidemia    Hypothyroidism    Influenza A     Past Medical History:   Diagnosis Date    Anemia     Chronic kidney disease (CKD)     Diabetes mellitus     Disease of thyroid gland     Hypertension      History reviewed. No pertinent surgical history.   General Information       Row Name 23 1049          OT Time and Intention    Document Type evaluation  -       Row Name 23 1049          General Information    Patient Profile Reviewed yes  -     Prior Level of Function independent:;all household mobility;min assist:;ADL's;dependent:;driving  -     Existing Precautions/Restrictions fall;oxygen therapy device and L/min  -     Barriers to Rehab medically complex;previous functional deficit  just had been at [facility] with immobility due to rt leg pain in hip  -       Row Name 23 1049          Living Environment    People in Home sibling(s)  sister. They go back and forth between eachothers' homes every few days. Pt walter snot drive now. Sister drives. Pt uses Rolator & shower bench. [facility] had been coming to assist with dressing & bathing.  -       Row Name 23 1049          Home Main Entrance    Number of Stairs, Main Entrance none  -       Row Name 23 1049          Stairs Within Home, Primary    Number of  Stairs, Within Home, Primary none  -       Row Name 12/31/23 1049          Cognition    Orientation Status (Cognition) oriented to;person;disoriented to;place;time  did not know the month or year without cues, thought she was in [facility]  -       Row Name 12/31/23 1049          Safety Issues, Functional Mobility    Impairments Affecting Function (Mobility) endurance/activity tolerance;balance;strength  -               User Key  (r) = Recorded By, (t) = Taken By, (c) = Cosigned By      Initials Name Provider Type     Alyssa Menjivar, OT Occupational Therapist                     Mobility/ADL's       Row Name 12/31/23 1053          Bed Mobility    Bed Mobility supine-sit  -     Supine-Sit [place] (Bed Mobility) minimum assist (75% patient effort)  -     Comment, (Bed Mobility) had difficulty lifting legs up to move them off the edge of the bed.  -       Row Name 12/31/23 1053          Transfers    Transfers toilet transfer;sit-stand transfer;stand-sit transfer;bed-chair transfer  -       Row Name 12/31/23 1053          Bed-Chair Transfer    Bed-Chair [place] (Transfers) set up;standby assist;1 person to manage equipment  -[facility] Name 12/31/23 1053          Sit-Stand Transfer    Sit-Stand [place] (Transfers) set up;standby assist;1 person to manage equipment  -       Row Name 12/31/23 1053          Stand-Sit Transfer    Stand-Sit [place] (Transfers) standby assist;1 person to manage equipment  -[facility] Name 12/31/23 1053          Toilet Transfer    Type (Toilet Transfer) stand pivot/stand step  -     [place] Level (Toilet Transfer) set up;standby assist;1 person to manage equipment  -     Assistive Device (Toilet Transfer) commode, bedside without drop arms;bariatric equipment  -       Row Name 12/31/23 1053          Functional Mobility    Functional Mobility- Ind. Level not tested  -       Row Name 12/31/23 1053          Activities of Daily Living     BADL Assessment/Intervention toileting;lower body dressing;grooming  -[facility] Name 12/31/23 1053          Toileting Assessment/Training    [place] Level (Toileting) toileting skills;set up;standby assist  -[facility] Name 12/31/23 1053          Lower Body Dressing Assessment/Training    [place] Level (Lower Body Dressing) don;socks;dependent (less than 25% patient effort)  -[facility] Name 12/31/23 1053          Grooming Assessment/Training    [place] Level (Grooming) grooming skills;minimum assist (75% patient effort)  -     Comment, (Grooming) OT cleaned pt dentures at the sink & pt completed the rest of her grooming with setup and cues  -               User Key  (r) = Recorded By, (t) = Taken By, (c) = Cosigned By      Initials Name Provider Type     Alyssa Menjivar OT Occupational Therapist                   Obj/Interventions       [facility] Name 12/31/23 1102          Range of Motion Comprehensive    Comment, General Range of Motion body habitus limits trunk, shld, hip, knee, ankle ROM 25%  -[facility] Name 12/31/23 1102          Strength Comprehensive (MMT)    Comment, General Manual Muscle Testing (MMT) Assessment Mild gobal weakness  -               User Key  (r) = Recorded By, (t) = Taken By, (c) = Cosigned By      Initials Name Provider Type     Alyssa Menjivar OT Occupational Therapist                   Goals/Plan       [facility] Name 12/31/23 1118          Bed Mobility Goal 1 (OT)    Activity/Assistive Device (Bed Mobility Goal 1, OT) bed mobility activities, all  -     [place] Level/Cues Needed (Bed Mobility Goal 1, OT) modified independence  -     Time Frame (Bed Mobility Goal 1, OT) 1 week  -[facility] Name 12/31/23 1118          Transfer Goal 1 (OT)    Activity/Assistive Device (Transfer Goal 1, OT) transfers, all;rollator  -     [place] Level/Cues Needed (Transfer Goal 1, OT) modified independence  -     Time Frame (Transfer Goal 1, OT) 10 days  -        "Row Name 12/31/23 1118          Toileting Goal 1 (OT)    Activity/Device (Toileting Goal 1, OT) toileting skills, all  -     [place] Level/Cues Needed (Toileting Goal 1, OT) modified independence  -     Time Frame (Toileting Goal 1, OT) 10 days  -       Row Name 12/31/23 1118          Grooming Goal 1 (OT)    Activity/Device (Grooming Goal 1, OT) grooming skills, all  -     [place] (Grooming Goal 1, OT) modified independence  -     Time Frame (Grooming Goal 1, OT) 10 days  -       Row Name 12/31/23 1118          Therapy Assessment/Plan (OT)    Planned Therapy Interventions (OT) activity tolerance training;adaptive equipment training;BADL retraining;occupation/activity based interventions;patient/caregiver education/training;transfer/mobility retraining;ROM/therapeutic exercise  -               User Key  (r) = Recorded By, (t) = Taken By, (c) = Cosigned By      Initials Name Provider Type     Alyssa Menjivar, OT Occupational Therapist                   Clinical Impression       Row Name 12/31/23 1104          Pain Assessment    Pretreatment Pain Rating 0/10 - no pain  -     Posttreatment Pain Rating 0/10 - no pain  -       Row Name 12/31/23 1104          Plan of Care Review    Plan of Care Reviewed With patient  -     Progress no change  -     Outcome Evaluation Pt is a 79 y/o F admitted to [facility] 10/18/23 with c/o R hip pain, after standing \"heard a pop.\" XR R hip (-) acute abnormality, (+) mild to moderate arthritic changes, (+) trochanteric enthesopathy consistent with gluteal tendinopathy. CT LE (+) cellulitis related to lateral R pelvis and anterior-lateral proximal R thigh, (+) mild hip OA. Orthopedic recommends supportive modalities and WBAT. PMHx significant for HTN and DMII. At baseline pt resides with sister, 1/2 week at pt house, 1/2 week at sister's home. Pt does not drive but sister provides transportation. Pt typically independent with ADLs, uses rollator for mobility, and " reports 0 falls. Pt reports her and pt assist each other as needed. Pt went to hospitals after that hospitalization and was home with [facility]HC working on dressing, bathing, and strengthening. Pt was then admitted here with Flu A, AE CKD, and AE CHF. Pt did not want to get OOB during PT evaluation but this AM she was eager to get up to the BSC and have BM. She needed min (A) to move her legs OOB but then only required line management and arrangement of furniture. Pt was SOA with minimal activity and O2 dropped to 87% on room air but returned to 95% on 2L o2 via NC. Pt is near her functional baseline and will benefit from continued HHC at d/c. Pt has her Rolator here. OT recomends walking oximetry prior to d/c.  -       Row Name 12/31/23 1104          Therapy Assessment/Plan (OT)    Rehab Potential (OT) good, to achieve stated therapy goals  -     Criteria for Skilled Therapeutic Interventions Met (OT) skilled treatment is necessary  -     Therapy Frequency (OT) 3 times/wk  -     Predicted Duration of Therapy Intervention (OT) until d/c  -       Row Name 12/31/23 1104          Vital Signs    Post Systolic BP Rehab 165  -MH     Post Treatment Diastolic BP 81  -MH     O2 Delivery Pre Treatment supplemental O2  -MH     Intra SpO2 (%) 87  -MH     O2 Delivery Intra Treatment room air  -MH     Post SpO2 (%) 95  -MH     O2 Delivery Post Treatment supplemental O2  -MH     Pre Patient Position Supine  -MH     Intra Patient Position Standing  -MH     Post Patient Position Sitting  -       Row Name 12/31/23 1104          Positioning and Restraints    Pre-Treatment Position in bed  -     Post Treatment Position chair  -MH     In Chair notified nsg;sitting;call light within reach;exit alarm on;legs elevated  -               User Key  (r) = Recorded By, (t) = Taken By, (c) = Cosigned By      Initials Name Provider Type    Alyssa Stephen OT Occupational Therapist                   Outcome Measures       Row Name 12/31/23  1119          How much help from another person do you currently need...    Turning from your back to your side while in flat bed without using bedrails? 4  -MH     Moving from lying on back to sitting on the side of a flat bed without bedrails? 3  -MH     Moving to and from a bed to a chair (including a wheelchair)? 3  -MH     Standing up from a chair using your arms (e.g., wheelchair, bedside chair)? 4  -MH     Climbing 3-5 steps with a railing? 2  -MH     To walk in hospital room? 3  -MH     AM-PAC 6 Clicks Score (PT) 19  -MH     Highest Level of Mobility Goal 6 --> Walk 10 steps or more  -               User Key  (r) = Recorded By, (t) = Taken By, (c) = Cosigned By      Initials Name Provider Type    Alyssa Stephen OT Occupational Therapist                    Occupational Therapy Education       Title: PT OT SLP Therapies (In Progress)       Topic: Occupational Therapy (In Progress)       Point: ADL training (Done)       Description:   Instruct learner(s) on proper safety adaptation and remediation techniques during self care or transfers.   Instruct in proper use of assistive devices.                  Learning Progress Summary             Patient Acceptance, E,TB, VU,DU,NR by  at 12/31/2023 1121                         Point: Home exercise program (Not Started)       Description:   Instruct learner(s) on appropriate technique for monitoring, assisting and/or progressing therapeutic exercises/activities.                  Learner Progress:  Not documented in this visit.              Point: Precautions (Done)       Description:   Instruct learner(s) on prescribed precautions during self-care and functional transfers.                  Learning Progress Summary             Patient Acceptance, E,TB, VU,DU,NR by  at 12/31/2023 1121                         Point: Body mechanics (Done)       Description:   Instruct learner(s) on proper positioning and spine alignment during self-care, functional mobility  "activities and/or exercises.                  Learning Progress Summary             Patient Acceptance, E,TB, VU,DU,NR by  at 12/31/2023 1121                                         User Key       Initials Effective Dates Name Provider Type Discipline     06/16/21 -  Alyssa Menjivar OT Occupational Therapist OT                  OT Recommendation and Plan  Planned Therapy Interventions (OT): activity tolerance training, adaptive equipment training, BADL retraining, occupation/activity based interventions, patient/caregiver education/training, transfer/mobility retraining, ROM/therapeutic exercise  Therapy Frequency (OT): 3 times/wk  Plan of Care Review  Plan of Care Reviewed With: patient  Progress: no change  Outcome Evaluation: Pt is a 77 y/o F admitted to [facility] 10/18/23 with c/o R hip pain, after standing \"heard a pop.\" XR R hip (-) acute abnormality, (+) mild to moderate arthritic changes, (+) trochanteric enthesopathy consistent with gluteal tendinopathy. CT LE (+) cellulitis related to lateral R pelvis and anterior-lateral proximal R thigh, (+) mild hip OA. Orthopedic recommends supportive modalities and WBAT. PMHx significant for HTN and DMII. At baseline pt resides with sister, 1/2 week at pt house, 1/2 week at sister's home. Pt does not drive but sister provides transportation. Pt typically independent with ADLs, uses rollator for mobility, and reports 0 falls. Pt reports her and pt assist each other as needed. [facility] and was home with [facility] working on dressing, bathing, and strengthening. Pt was then admitted here with Flu A, AE CKD, and AE CHF. Pt did not want to get OOB during PT evaluation but this AM she was eager to get up to the BSC and have BM. She needed min (A) to move her legs OOB but then only required line management and arrangement of furniture. Pt was SOA with minimal activity and O2 dropped to 87% on room air but returned to 95% on 2L o2 via NC. Pt is near " her functional baseline and will benefit from continued HHC at d/c. Pt has her Rolator here. OT recomends walking oximetry prior to d/c.     Time Calculation:         Time Calculation- OT       Row Name 12/31/23 1121             Time Calculation-     OT Start Time 0943  -      OT Stop Time 1010  -      OT Time Calculation (min) 27 min  -      Total Timed Code Minutes- OT 10 minute(s)  -      OT Received On 12/31/23  -      OT - Next Appointment 01/02/24  -      OT Goal Re-Cert Due Date 01/14/24  -                User Key  (r) = Recorded By, (t) = Taken By, (c) = Cosigned By      Initials Name Provider Type     Alyssa Menjivar OT Occupational Therapist                  Therapy Charges for Today       Code Description Service Date Service Provider Modifiers Qty    [id number] HC OT SELF CARE/MGMT/TRAIN EA 15 MIN 12/31/2023 Alyssa Menjivar OT GO 1    [id number] HC OT EVAL MOD COMPLEXITY 3 12/31/2023 Alyssa Menjivar OT GO 1                 Alyssa Menjivar OT  12/31/2023

## 2023-12-31 NOTE — NURSING NOTE
SBP at beginning of shift >200 despite earlier interventions, PRN and scheduled medications given as ordered with no improvement by 2200 and pt began to complain of a headache rated 8/10. Spoke with Dr. Woodward and received orders for cardene gtt received. Currently on gtt at 7.5 mg/hr with SBP now 155. Also administered tylenol and fioricet for headache. Pt states she feels a bit better.

## 2023-12-31 NOTE — PROGRESS NOTES
[facility] Medicine Services  Daily progress note     Patient Name: Leona Garcia  : 1945  MRN: [id number]  Primary Care Physician:  Alfred Liriano MD  Date of admission: 2023       Subjective       Chief Complaint: shortness of breath     History of Present Illness: Leona Garcia is a 78 y.o. female with a previous medical history of HTN, CKD III, DM on insulin, Hyperlipidemia, and Dysphagia who presented to [facility] ED on 2023 with shortness of breath which has been worsening over the past week.  She states that her home health nurse has been checking oxygen and it has been fine.  She states that this morning, she was unable to transfer from her bed to the chair due to the shortness of breath.       At [facility], initial troponin is 54, repeat 49. BNP 1936, Creatinine 2.2 (baseline 1.71). Respiratory panel is positive for Influenza A. Otherwise, labs are unremarkable. Chest xray shows stable cardiomegaly without acute process. EKG shows SR, HR 72.  She is afebrile, hypertensive at 170/62.  SPO2 94% on 2L per NC.       On chart review, patient had an ECHO on 10/19/23 which showed normal LV systolic function with an EF of 56-60%. Right ventricular systolic pressure is >55 mmHg due to tricuspid regurgitation.  LV wall thickness is consistent with borderline concentric hypertrophy.     12 point ROS reviewed and negative except as mentioned above  Hospital course;  2023; patient is resting in the bed, hemodynamically stable, patient is too weak requiring 2 people assist to get out of the bed, will ask PT and OT to come back and check again, patient wanting to go home.  2023; patient is sitting in the chair at the bedside patient started on Cardene drip for hypertensive urgency last night, otherwise hemodynamically stable.  Spoke with the bedside RN.     Personal History      Medical History        Past Medical History:   Diagnosis Date    Anemia       Chronic kidney disease (CKD)      Diabetes mellitus      Disease of thyroid gland      Hypertension              Surgical History   History reviewed. No pertinent surgical history.        Family History: family history is not on file. Otherwise pertinent FHx was reviewed and not pertinent to current issue.     Social History:  reports that she has never smoked. She has never used smokeless tobacco. She reports that she does not drink alcohol and does not use drugs.     Home Medications:  Prior to Admission Medications         Prescriptions Last Dose Informant Patient Reported? Taking?     dilTIAZem CD (CARDIZEM CD) 240 MG 24 hr capsule 12/27/2023   Yes Yes     Take 1 capsule by mouth Daily.     ezetimibe (ZETIA) 10 MG tablet 12/27/2023   Yes Yes     Take 1 tablet by mouth Daily.     fluticasone (FLONASE) 50 MCG/ACT nasal spray 12/27/2023   Yes Yes     2 sprays into the nostril(s) as directed by provider Daily.     furosemide (LASIX) 40 MG tablet 12/27/2023   Yes Yes     Take 1 tablet by mouth Daily.     hydrALAZINE (APRESOLINE) 100 MG tablet 12/27/2023   Yes Yes     Take 1 tablet by mouth Daily.     hydroCHLOROthiazide (HYDRODIURIL) 25 MG tablet 12/26/2023   Yes Yes     Take 1 tablet by mouth Daily.     insulin NPH-insulin regular (humuLIN 70/30,novoLIN 70/30) (70-30) 100 UNIT/ML injection 12/27/2023   Yes Yes     Inject 20 Units under the skin into the appropriate area as directed Every Morning. And inject 10 units under the skin at bedtime     levothyroxine (SYNTHROID, LEVOTHROID) 50 MCG tablet 12/27/2023   Yes Yes     Take 1 tablet by mouth Daily.     losartan (COZAAR) 100 MG tablet 12/27/2023   Yes Yes     Take 1 tablet by mouth Daily.     meloxicam (MOBIC) 15 MG tablet Past Week   Yes Yes     Take 1 tablet by mouth Daily.     potassium chloride 10 MEQ CR tablet 12/27/2023   Yes Yes     Take 1 tablet by mouth Daily.                   Allergies:  No Known Allergies     Objective       Vitals:   Temp:  [97.2 °F  (36.2 °C)-97.8 °F (36.6 °C)] 97.3 °F (36.3 °C)  Heart Rate:  [64-79] 66  Resp:  [14-23] 18  BP: (139-210)/() 159/60  Flow (L/min):  [2] 2   Physical Exam  Vitals and nursing note reviewed.   Constitutional:       General: She is sleeping.      Appearance: Normal appearance.      Interventions: Nasal cannula in place.   HENT:      Head: Normocephalic and atraumatic.      Nose: Nose normal.      Mouth/Throat:      Mouth: Mucous membranes are moist.   Eyes:      Extraocular Movements: Extraocular movements intact.      Pupils: Pupils are equal, round, and reactive to light.   Cardiovascular:      Rate and Rhythm: Regular rhythm. Bradycardia present.      Pulses: Normal pulses.      Heart sounds: Normal heart sounds.   Pulmonary:      Effort: Pulmonary effort is normal.      Breath sounds: Normal breath sounds. Examination of the right-upper field reveals wheezing. Examination of the left-upper field reveals wheezing. Examination of the right-lower field reveals wheezing. Examination of the left-lower field reveals wheezing.   Abdominal:      General: Bowel sounds are normal.      Palpations: Abdomen is soft.   Musculoskeletal:         General: Normal range of motion.      Cervical back: Normal range of motion.  Significant bilateral lower extremity edema/lymphedema with  Skin:     General: Skin is warm and dry.   Neurological:      General: No focal deficit present.      Mental Status: She is oriented to person, place, and time and easily aroused. Mental status is at baseline.   Psychiatric:         Mood and Affect: Mood normal.         Behavior: Behavior normal.               Assessment & Plan          This is a 78 y.o. female with:     Active and Resolved Problems        Active Hospital Problems     Diagnosis   POA    **Respiratory failure with hypoxia [J96.91]   Yes    Hyperlipidemia [E78.5]   Yes    Type 2 diabetes mellitus [E11.9]   Yes    Essential hypertension [I10]   Yes    Chronic renal insufficiency,  stage III (moderate) [N18.30]   Yes    Hypothyroidism [E03.9]   Yes       Resolved Hospital Problems   No resolved problems to display.         Plan:     Respiratory Failure with Hypoxia  Likely due to a combination of Influenza A and possible new onset CHF  -Patient denies previous respiratory problems  -Chest xray reviewed, cardiomegaly  -BNP 1936, 2D echocardiogram results significantly ejection fraction 61 to 65%, estimated RVSP from tricuspid regurgitation more than 55 mmHg  -Positive for Influenza A  -EF 56-60% 10/2023  -No Tamiflu, Symptomatic for greater than 48 hours  -Solumedrol ordered, patient with wheezes throughout all lung fields  -Duonebs prn  -Supplemental O2 to keep SPO2 greater than 90%  -Cardiology consulted  -Was on IV Lasix , switched to p.o. Lasix 40 mg daily  Bilateral lower extremity edema/lymphedema; patient is basically bedridden just gets up and go to the bathroom and comes back at home.     Essential Hypertension/urgency  -Uncontrolled  -Monitor BP  -Continue home Cardizem CD2 40 mg daily, Norvasc 10 mg daily, Coreg 6.25 mg twice daily, hydralazine dose increased to 100 mg 3 times daily, hydrochlorothiazide discontinued.  Started on IV Cardene drip.     Bradycardia  -likely due to the combination of multiple BP meds  -HR running in the 40's on exam the time of admission currently ranging 58-74  -She is lethargic but wakes easily and is A&O x4  -continuous cardiac monitoring  -She received her home BP meds, a total of 40mg of IV Hydralazine, and 1 inch of Nitrostat at [facility]  -Nitrostat was removed  JOSELITO/CKD stage III; likely prerenal continue to monitor improved with supportive care.  Nephrology consult appreciated.  Dysphagia  -chronic, stable  -Evaluated by SLP on previous admission 10/2023  -Mechanical ground textures with thin liquids and either whole or crushed medications recommended  -Diet ordered, no need for additional SLP evaluation     Diabetes Mellitus II  -Monitor  glucose, continue Lantus 30 units nightly and insulin lispro 12 units with each meal  -SSI ordered  -Continue home NPH     Hypothyroidism  -Continue Levothyroxine           Disposition; likely discharge in 2 to 3 days, reconsulted PT OT for possible need placement to rehab.     DVT prophylaxis:  Mechanical DVT prophylaxis orders are present.     CODE STATUS:    Code Status (Patient has no pulse and is not breathing): CPR (Attempt to Resuscitate)  Medical Interventions (Patient has pulse or is breathing): Full Support           Admission Status:  I believe this patient meets inpatient status.     I discussed the patient's findings and my recommendations with patient and nursing staff.     Signature:  Electronically signed by Valery Meneses MD, 12/31/23, 12:40 PM EST.          .    [facility] Hospitalist Team

## 2023-12-31 NOTE — PROGRESS NOTES
ENDOCRINE CONSULT PROGRESS NOTE  DATE OF SERVICE: 23        PATIENT NAME: Leona Garcia  PATIENT : 1945 AGE: 78 y.o.  MRN NUMBER: [id number]    ==========================================================================    CHIEF COMPLAINT: Type 2 Diabetes Mellitus    CARE TEAM:   Patient Care Team:  Alfred Liriano MD as PCP - General (Family Medicine)    SUBJECTIVE  Patient seen and examined.  Tolerating meals.    ==========================================================================    CURRENT ACTIVE HOSPITAL MEDICATIONS    Scheduled Medications:  amLODIPine, 10 mg, Oral, Q24H  carvedilol, 6.25 mg, Oral, BID With Meals  furosemide, 40 mg, Oral, Daily  hydrALAZINE, 100 mg, Oral, Q8H  insulin glargine, 30 Units, Subcutaneous, Nightly  insulin lispro, 12 Units, Subcutaneous, TID With Meals  insulin lispro, 2-9 Units, Subcutaneous, 4x Daily AC & at Bedtime  levothyroxine, 50 mcg, Oral, Daily  losartan, 100 mg, Oral, Q24H  sodium chloride, 10 mL, Intravenous, Q12H         PRN Medications:    acetaminophen **OR** acetaminophen **OR** acetaminophen    senna-docusate sodium **AND** polyethylene glycol **AND** bisacodyl **AND** bisacodyl    cloNIDine    dextrose    dextrose    glucagon (human recombinant)    hydrALAZINE    influenza vaccine    ipratropium-albuterol    labetalol    melatonin    nitroglycerin    ondansetron    sodium chloride    sodium chloride    sodium chloride    sodium chloride     ==========================================================================    OBJECTIVE    Vitals:    23 1646   BP: 175/67   Pulse: 69   Resp: 12   Temp: 97.7 °F (36.5 °C)   SpO2: 99%      Body mass index is 47.43 kg/m².     General - Awake and alert    ==========================================================================    LAB EVALUATION    Lab Results   Component Value Date    GLUCOSE 148 (H) 2023    BUN 59 (H) 2023    CREATININE 2.15 (H) 2023    EGFRIFNONA 65  "04/01/2017    EGFRIFAFRI 56 (L) 04/01/2017    BCR 27.4 (H) 12/30/2023    K 4.1 12/30/2023    CO2 29.0 12/30/2023    CALCIUM 8.8 12/30/2023    ALBUMIN 3.7 12/27/2023    LABIL2 1.4 (calc) 04/01/2017    AST 30 12/27/2023    ALT 34 (H) 12/27/2023       Lab Results   Component Value Date    HGBA1C 9.30 (H) 10/18/2023     Lab Results   Component Value Date    CREATININE 2.15 (H) 12/30/2023     Results from last 7 days   Lab Units 12/31/23  1642 12/31/23  1208 12/31/23  0725 12/31/23  0149 12/30/23 2024 12/30/23  1625   GLUCOSE mg/dL 154* 161* 170* 161* 131* 129*     ==========================================================================    ASSESSMENT AND PLAN    # Type 2 diabetes with hyperglycemia  - Blood sugar reviewed for last 24 hours and within acceptable limit  - Patient only received 20 units of insulin last evening and still blood sugar is within target limit  - Most likely patient was in a state of glucose toxicity the time of admission which is now improving  - Adjusted insulin therapy as follows:  Insulin Lantus 30 units nightly  Insulin lispro 12 units with each meal  Moderate dose correction scale  - Will review blood sugar for next 24 hours and further therapy adjustment accordingly    # Hypothyroidism, levothyroxine replacement therapy      Will follow with you.  Rest as per primary team.    This note was created using voice recognition software and is inherently subject to errors including those of syntax and \"sound-alike\" substitutions which may escape proofreading.  In such instances, original meaning may be extrapolated by contextual derivation.    Note: Portions of this note may have been copied from previous notes but documentation have been reviewed and edited as necessary to support clinical decision making for today's visit.    ==========================================================================  Ron Arreguin MD  Department of Endocrine, Diabetes and Metabolism  [facility]" [place], [place]  ==========================================================================

## 2024-01-01 LAB
ANION GAP SERPL CALCULATED.3IONS-SCNC: 9 MMOL/L (ref 5–15)
ANISOCYTOSIS BLD QL: ABNORMAL
BUN SERPL-MCNC: 52 MG/DL (ref 8–23)
BUN/CREAT SERPL: 26.8 (ref 7–25)
BURR CELLS BLD QL SMEAR: ABNORMAL
C3 FRG RBC-MCNC: ABNORMAL
CALCIUM SPEC-SCNC: 8.9 MG/DL (ref 8.6–10.5)
CHLORIDE SERPL-SCNC: 101 MMOL/L (ref 98–107)
CO2 SERPL-SCNC: 30 MMOL/L (ref 22–29)
CREAT SERPL-MCNC: 1.94 MG/DL (ref 0.57–1)
DEPRECATED RDW RBC AUTO: 49 FL (ref 37–54)
EGFRCR SERPLBLD CKD-EPI 2021: 26.3 ML/MIN/1.73
EOSINOPHIL # BLD MANUAL: 0.19 10*3/MM3 (ref 0–0.4)
EOSINOPHIL NFR BLD MANUAL: 3 % (ref 0.3–6.2)
ERYTHROCYTE [DISTWIDTH] IN BLOOD BY AUTOMATED COUNT: 16.7 % (ref 12.3–15.4)
GLUCOSE BLDC GLUCOMTR-MCNC: 129 MG/DL (ref 70–105)
GLUCOSE BLDC GLUCOMTR-MCNC: 142 MG/DL (ref 70–105)
GLUCOSE BLDC GLUCOMTR-MCNC: 150 MG/DL (ref 70–105)
GLUCOSE BLDC GLUCOMTR-MCNC: 157 MG/DL (ref 70–105)
GLUCOSE BLDC GLUCOMTR-MCNC: 191 MG/DL (ref 70–105)
GLUCOSE SERPL-MCNC: 109 MG/DL (ref 65–99)
HCT VFR BLD AUTO: 31.7 % (ref 34–46.6)
HGB BLD-MCNC: 10.1 G/DL (ref 12–15.9)
LYMPHOCYTES # BLD MANUAL: 1.41 10*3/MM3 (ref 0.7–3.1)
LYMPHOCYTES NFR BLD MANUAL: 9 % (ref 5–12)
MCH RBC QN AUTO: 27 PG (ref 26.6–33)
MCHC RBC AUTO-ENTMCNC: 32 G/DL (ref 31.5–35.7)
MCV RBC AUTO: 84.2 FL (ref 79–97)
MONOCYTES # BLD: 0.58 10*3/MM3 (ref 0.1–0.9)
NEUTROPHILS # BLD AUTO: 4.22 10*3/MM3 (ref 1.7–7)
NEUTROPHILS NFR BLD MANUAL: 64 % (ref 42.7–76)
NEUTS BAND NFR BLD MANUAL: 2 % (ref 0–5)
NRBC SPEC MANUAL: 2 /100 WBC (ref 0–0.2)
PLAT MORPH BLD: NORMAL
PLATELET # BLD AUTO: 240 10*3/MM3 (ref 140–450)
PMV BLD AUTO: 8.8 FL (ref 6–12)
POIKILOCYTOSIS BLD QL SMEAR: ABNORMAL
POTASSIUM SERPL-SCNC: 3.7 MMOL/L (ref 3.5–5.2)
RBC # BLD AUTO: 3.76 10*6/MM3 (ref 3.77–5.28)
SCAN SLIDE: NORMAL
SODIUM SERPL-SCNC: 140 MMOL/L (ref 136–145)
VARIANT LYMPHS NFR BLD MANUAL: 2 % (ref 0–5)
VARIANT LYMPHS NFR BLD MANUAL: 20 % (ref 19.6–45.3)
WBC MORPH BLD: NORMAL
WBC NRBC COR # BLD AUTO: 6.4 10*3/MM3 (ref 3.4–10.8)
WHOLE BLOOD HOLD SPECIMEN: NORMAL

## 2024-01-01 PROCEDURE — 82803 BLOOD GASES ANY COMBINATION: CPT

## 2024-01-01 PROCEDURE — 36600 WITHDRAWAL OF ARTERIAL BLOOD: CPT

## 2024-01-01 PROCEDURE — 63710000001 INSULIN GLARGINE PER 5 UNITS: Performed by: INTERNAL MEDICINE

## 2024-01-01 PROCEDURE — 25010000002 NICARDIPINE 2.5 MG/ML SOLUTION 10 ML VIAL: Performed by: STUDENT IN AN ORGANIZED HEALTH CARE EDUCATION/TRAINING PROGRAM

## 2024-01-01 PROCEDURE — 99231 SBSQ HOSP IP/OBS SF/LOW 25: CPT | Performed by: INTERNAL MEDICINE

## 2024-01-01 PROCEDURE — 25810000003 SODIUM CHLORIDE 0.9 % SOLUTION 250 ML FLEX CONT: Performed by: STUDENT IN AN ORGANIZED HEALTH CARE EDUCATION/TRAINING PROGRAM

## 2024-01-01 PROCEDURE — 63710000001 INSULIN LISPRO (HUMAN) PER 5 UNITS: Performed by: INTERNAL MEDICINE

## 2024-01-01 PROCEDURE — 82948 REAGENT STRIP/BLOOD GLUCOSE: CPT

## 2024-01-01 RX ORDER — INSULIN LISPRO 100 [IU]/ML
10 INJECTION, SOLUTION INTRAVENOUS; SUBCUTANEOUS
Status: DISCONTINUED | OUTPATIENT
Start: 2024-01-01 | End: 2024-01-02

## 2024-01-01 RX ORDER — FUROSEMIDE 40 MG/1
40 TABLET ORAL
Status: DISCONTINUED | OUTPATIENT
Start: 2024-01-01 | End: 2024-01-01

## 2024-01-01 RX ORDER — FUROSEMIDE 40 MG/1
40 TABLET ORAL
Status: DISCONTINUED | OUTPATIENT
Start: 2024-01-01 | End: 2024-01-04 | Stop reason: HOSPADM

## 2024-01-01 RX ADMIN — NICARDIPINE HYDROCHLORIDE 5 MG/HR: 25 INJECTION, SOLUTION INTRAVENOUS at 21:10

## 2024-01-01 RX ADMIN — INSULIN LISPRO 12 UNITS: 100 INJECTION, SOLUTION INTRAVENOUS; SUBCUTANEOUS at 09:27

## 2024-01-01 RX ADMIN — LOSARTAN POTASSIUM 100 MG: 50 TABLET, FILM COATED ORAL at 09:27

## 2024-01-01 RX ADMIN — HYDRALAZINE HYDROCHLORIDE 100 MG: 25 TABLET ORAL at 05:25

## 2024-01-01 RX ADMIN — INSULIN LISPRO 2 UNITS: 100 INJECTION, SOLUTION INTRAVENOUS; SUBCUTANEOUS at 09:28

## 2024-01-01 RX ADMIN — NICARDIPINE HYDROCHLORIDE 2.5 MG/HR: 25 INJECTION, SOLUTION INTRAVENOUS at 09:48

## 2024-01-01 RX ADMIN — NICARDIPINE HYDROCHLORIDE 5 MG/HR: 25 INJECTION, SOLUTION INTRAVENOUS at 16:32

## 2024-01-01 RX ADMIN — CARVEDILOL 6.25 MG: 6.25 TABLET, FILM COATED ORAL at 18:30

## 2024-01-01 RX ADMIN — LEVOTHYROXINE SODIUM 50 MCG: 0.05 TABLET ORAL at 05:30

## 2024-01-01 RX ADMIN — CARVEDILOL 6.25 MG: 6.25 TABLET, FILM COATED ORAL at 09:27

## 2024-01-01 RX ADMIN — HYDRALAZINE HYDROCHLORIDE 100 MG: 25 TABLET ORAL at 21:11

## 2024-01-01 RX ADMIN — HYDRALAZINE HYDROCHLORIDE 100 MG: 25 TABLET ORAL at 14:04

## 2024-01-01 RX ADMIN — FUROSEMIDE 40 MG: 40 TABLET ORAL at 18:30

## 2024-01-01 RX ADMIN — FUROSEMIDE 40 MG: 40 TABLET ORAL at 14:04

## 2024-01-01 RX ADMIN — AMLODIPINE BESYLATE 10 MG: 5 TABLET ORAL at 09:27

## 2024-01-01 RX ADMIN — Medication 10 ML: at 10:17

## 2024-01-01 RX ADMIN — INSULIN GLARGINE 20 UNITS: 100 INJECTION, SOLUTION SUBCUTANEOUS at 21:10

## 2024-01-01 RX ADMIN — Medication 10 ML: at 21:11

## 2024-01-01 NOTE — PROGRESS NOTES
[facility] Medicine Services   Daily Progress Note    Patient Name: Leona Garcia  : 1945  MRN: [id number]  Primary Care Physician:  Alfred Liriano MD  Date of admission: 2023  Date of Service:        Subjective      Patient  seen examined  Dyspnic  Has cough  No chest pain  On NC SPO2 89-90% ON RA AT REST          Objective      Vitals:   Temp:  [97.1 °F (36.2 °C)-97.7 °F (36.5 °C)] 97.1 °F (36.2 °C)  Heart Rate:  [76-86] 82  Resp:  [13-20] 15  BP: (127-193)/(52-81) 147/67  Flow (L/min):  [2-2.5] 2    Physical Exam       General:  Alert and oriented , mild  distress  Eyes:  Pupils are equal, round and reactive to light. Extraocular movements are intact. Normal conjunctivae, vision unchanged    HENT:  Normocephalic, atraumatic   Respiratory: Decrease in breathing sounds anteriorly bilaterally. No wheezing   coarse  Cardiovascular: Regular rate, Regular, S1 auscultated. S2 auscultated , No edema   Gastrointestinal: Soft. Nontender, nondistended. Normal bowel sounds  Musculoskeletal: No tenderness   Neurologic: Alert, oriented. No focal defect   Psychiatric: Cooperative , appropriate mood and affect, nonsuicidal           Result Review    Result Review:  I have personally reviewed the results from the time of this admission to 2024 04:51 EST and agree with these findings:  [x]  Laboratory  [x]  Microbiology  [x]  Radiology  []  EKG/Telemetry   []  Cardiology/Vascular   []  Pathology  []  Old records  []  Other:  Most notable findings include: dyspnea          Assessment & Plan          amLODIPine, 10 mg, Oral, Q24H  carvedilol, 6.25 mg, Oral, BID With Meals  furosemide, 40 mg, Oral, BID  hydrALAZINE, 100 mg, Oral, Q8H  insulin glargine, 20 Units, Subcutaneous, Nightly  insulin lispro, 10 Units, Subcutaneous, TID With Meals  insulin lispro, 2-9 Units, Subcutaneous, 4x Daily AC & at Bedtime  levothyroxine, 50 mcg, Oral, Daily  losartan, 100 mg, Oral, Q24H  sodium chloride, 10  mL, Intravenous, Q12H       niCARdipine, 5-15 mg/hr, Last Rate: 5 mg/hr (01/02/24 0310)         Active Hospital Problems:  Active Hospital Problems    Diagnosis     **Respiratory failure with hypoxia     Influenza A     Hyperlipidemia     Type 2 diabetes mellitus     Essential hypertension     Chronic renal insufficiency, stage III (moderate)     Hypothyroidism         Respiratory Failure with Hypoxia  Not better  Not at baseline    Likely due to a combination of Influenza A and possible new onset CHF  -Patient denies previous respiratory problems  -Chest xray reviewed, cardiomegaly  -BNP 1936,   2D echocardiogram results significantly ejection fraction 61 to 65%, estimated RVSP from tricuspid regurgitation more than 55 mmHg  -Positive for Influenza A  -EF 56-60% 10/2023  -did not get  Tamiflu, Symptomatic for greater than 48 hours  -Solumedrol  as ordered  -David prn  -Supplemental O2 to keep SPO2 greater than 90%    -Cardiology f/u  C/w p.o. Lasix 40 mg daily  ISOS    Bilateral lower extremity edema/lymphedema; patient is basically bedridden just gets up and go to the bathroom and comes back at home.     Essential Hypertension/urgency  -systolic elevation  -Monitor BP  -Continue home Cardizem CD2 40 mg daily, Norvasc 10 mg daily, Coreg 6.25 mg twice daily, hydralazine dose to 100 mg 3 times daily,   ON CARDENE GTT, titrating, goal SBP <150  Hr CURRENTLY IN 60S       Bradycardia  -likely due to the combination of multiple BP meds  -HR running in the 60S TODAY    A&O x4  -continuous cardiac monitoring  -She received her home BP meds, a total of 40mg of IV Hydralazine, and 1 inch of Nitrostat at [facility]  -Nitrostat was removed      JOSELITO/CKD stage III; likely prerenal continue to monitor improved with supportive care.  Nephrology f/u appreciated.    Dysphagia  -chronic, stable  -Evaluated by SLP on previous admission 10/2023  -Mechanical ground textures with thin liquids and either whole or crushed  medications recommended  -Diet ordered, no need for additional SLP evaluation     Diabetes Mellitus II  -Monitor glucose, continue Lantus 30 units nightly and insulin lispro 12 units with each meal  -SSI ordered  -Continue home NPH     Hypothyroidism  -Continue Levothyroxine           Disposition; f/u PT OT for possible need placement to rehab.      DVT prophylaxis:  Mechanical DVT prophylaxis orders are present.    CODE STATUS:    Code Status (Patient has no pulse and is not breathing): CPR (Attempt to Resuscitate)  Medical Interventions (Patient has pulse or is breathing): Full Support      Disposition:  I expect patient to be discharged rehab once better.      Electronically signed by Roberto Connell MD, 01/02/24, 04:51 EST.  [facility] Hospitalist Team

## 2024-01-01 NOTE — PLAN OF CARE
Problem: Adult Inpatient Plan of Care  Goal: Plan of Care Review  Outcome: Ongoing, Progressing  Goal: Patient-Specific Goal (Individualized)  Outcome: Ongoing, Progressing  Goal: Absence of Hospital-Acquired Illness or Injury  Outcome: Ongoing, Progressing  Intervention: Identify and Manage Fall Risk  Recent Flowsheet Documentation  Taken 1/1/2024 1600 by Liberty Bliss RN  Safety Promotion/Fall Prevention: safety round/check completed  Taken 1/1/2024 1400 by Liberty Bliss RN  Safety Promotion/Fall Prevention: safety round/check completed  Taken 1/1/2024 1200 by Liberty Bliss RN  Safety Promotion/Fall Prevention: safety round/check completed  Taken 1/1/2024 1000 by Liberty Bliss RN  Safety Promotion/Fall Prevention: safety round/check completed  Taken 1/1/2024 0800 by Liberty Bliss RN  Safety Promotion/Fall Prevention: safety round/check completed  Intervention: Prevent Skin Injury  Recent Flowsheet Documentation  Taken 1/1/2024 1600 by Liberty Bliss RN  Skin Protection:   adhesive use limited   incontinence pads utilized  Taken 1/1/2024 1200 by Liberty Bliss RN  Skin Protection:   adhesive use limited   incontinence pads utilized  Taken 1/1/2024 0800 by Liberty Bliss RN  Skin Protection:   adhesive use limited   incontinence pads utilized  Intervention: Prevent and Manage VTE (Venous Thromboembolism) Risk  Recent Flowsheet Documentation  Taken 1/1/2024 1600 by Liberty Bliss RN  VTE Prevention/Management: sequential compression devices off  Taken 1/1/2024 1200 by Liberty Bliss RN  VTE Prevention/Management: sequential compression devices off  Taken 1/1/2024 0800 by Liberty Bliss RN  VTE Prevention/Management: sequential compression devices off  Goal: Optimal Comfort and Wellbeing  Outcome: Ongoing, Progressing  Intervention: Provide Person-Centered Care  Recent Flowsheet Documentation  Taken 1/1/2024 1600 by Liberty Bliss RN  Trust Relationship/Rapport:   care  explained   choices provided  Taken 1/1/2024 1200 by Liberty Bliss, RN  Trust Relationship/Rapport:   care explained   choices provided  Taken 1/1/2024 0800 by Liberty Bliss, RN  Trust Relationship/Rapport:   care explained   choices provided  Goal: Readiness for Transition of Care  Outcome: Ongoing, Progressing   Goal Outcome Evaluation:  Plan of Care Reviewed With: patient

## 2024-01-01 NOTE — PLAN OF CARE
Goal Outcome Evaluation:              Outcome Evaluation: A&Ox4. Remains on 2L O2 via NC. Blood sugar dropped down to 29 and dextrose given at 2315. Blood sugar recovered. No c/o pain or discomfort. VSS. Plan of care ongoing.

## 2024-01-01 NOTE — PROGRESS NOTES
"Subjective   Leona Garcia is a 78 y.o. female.   Seen for diabetes f/u.  Breathing better.  Blood sugar dropped to 29 @ MN.        Objective     /52   Pulse 76   Temp 97.7 °F (36.5 °C) (Oral)   Resp 13   Ht 167.6 cm (66\")   Wt 135 kg (296 lb 11.8 oz)   SpO2 98%   BMI 47.89 kg/m²   Blood sugar  150 this am,  157 @ lunch; cr down to 1.94    ASSESSMENT  Diabetes type 2, with hyperglycemia  Patient is Improving    PLAN    Will decrease insulin doses.         Avelino Morocho MD  1/1/2024  15:19 EST    "

## 2024-01-02 LAB
ALBUMIN SERPL-MCNC: 2.9 G/DL (ref 3.5–5.2)
ALBUMIN/GLOB SERPL: 1.3 G/DL
ALP SERPL-CCNC: 161 U/L (ref 39–117)
ALT SERPL W P-5'-P-CCNC: 16 U/L (ref 1–33)
ANION GAP SERPL CALCULATED.3IONS-SCNC: 8 MMOL/L (ref 5–15)
ARTERIAL PATENCY WRIST A: POSITIVE
AST SERPL-CCNC: 13 U/L (ref 1–32)
ATMOSPHERIC PRESS: ABNORMAL MM[HG]
BASE EXCESS BLDA CALC-SCNC: 4.1 MMOL/L (ref 0–3)
BDY SITE: ABNORMAL
BILIRUB SERPL-MCNC: 0.4 MG/DL (ref 0–1.2)
BUN SERPL-MCNC: 45 MG/DL (ref 8–23)
BUN/CREAT SERPL: 25.6 (ref 7–25)
CALCIUM SPEC-SCNC: 8.8 MG/DL (ref 8.6–10.5)
CHLORIDE SERPL-SCNC: 100 MMOL/L (ref 98–107)
CO2 BLDA-SCNC: 31.2 MMOL/L (ref 22–29)
CO2 SERPL-SCNC: 32 MMOL/L (ref 22–29)
CREAT SERPL-MCNC: 1.76 MG/DL (ref 0.57–1)
EGFRCR SERPLBLD CKD-EPI 2021: 29.5 ML/MIN/1.73
GLOBULIN UR ELPH-MCNC: 2.3 GM/DL
GLUCOSE BLDC GLUCOMTR-MCNC: 127 MG/DL (ref 70–105)
GLUCOSE BLDC GLUCOMTR-MCNC: 172 MG/DL (ref 70–105)
GLUCOSE BLDC GLUCOMTR-MCNC: 99 MG/DL (ref 70–105)
GLUCOSE SERPL-MCNC: 131 MG/DL (ref 65–99)
HCO3 BLDA-SCNC: 29.7 MMOL/L (ref 21–28)
HEMODILUTION: NO
INHALED O2 CONCENTRATION: 28 %
MAGNESIUM SERPL-MCNC: 2.1 MG/DL (ref 1.6–2.4)
MODALITY: ABNORMAL
NT-PROBNP SERPL-MCNC: 1349 PG/ML (ref 0–1800)
PCO2 BLDA: 48.4 MM HG (ref 35–48)
PH BLDA: 7.4 PH UNITS (ref 7.35–7.45)
PHOSPHATE SERPL-MCNC: 3.9 MG/DL (ref 2.5–4.5)
PO2 BLDA: 77.6 MM HG (ref 83–108)
POTASSIUM SERPL-SCNC: 3.6 MMOL/L (ref 3.5–5.2)
PROT SERPL-MCNC: 5.2 G/DL (ref 6–8.5)
SAO2 % BLDCOA: 95.1 % (ref 94–98)
SODIUM SERPL-SCNC: 140 MMOL/L (ref 136–145)

## 2024-01-02 PROCEDURE — 25810000003 SODIUM CHLORIDE 0.9 % SOLUTION 250 ML FLEX CONT: Performed by: STUDENT IN AN ORGANIZED HEALTH CARE EDUCATION/TRAINING PROGRAM

## 2024-01-02 PROCEDURE — 92526 ORAL FUNCTION THERAPY: CPT

## 2024-01-02 PROCEDURE — 82948 REAGENT STRIP/BLOOD GLUCOSE: CPT

## 2024-01-02 PROCEDURE — 63710000001 INSULIN LISPRO (HUMAN) PER 5 UNITS: Performed by: INTERNAL MEDICINE

## 2024-01-02 PROCEDURE — 83735 ASSAY OF MAGNESIUM: CPT | Performed by: INTERNAL MEDICINE

## 2024-01-02 PROCEDURE — 83880 ASSAY OF NATRIURETIC PEPTIDE: CPT | Performed by: INTERNAL MEDICINE

## 2024-01-02 PROCEDURE — 63710000001 INSULIN GLARGINE PER 5 UNITS: Performed by: INTERNAL MEDICINE

## 2024-01-02 PROCEDURE — 84100 ASSAY OF PHOSPHORUS: CPT | Performed by: INTERNAL MEDICINE

## 2024-01-02 PROCEDURE — 99232 SBSQ HOSP IP/OBS MODERATE 35: CPT | Performed by: INTERNAL MEDICINE

## 2024-01-02 PROCEDURE — 99231 SBSQ HOSP IP/OBS SF/LOW 25: CPT | Performed by: INTERNAL MEDICINE

## 2024-01-02 PROCEDURE — 80053 COMPREHEN METABOLIC PANEL: CPT | Performed by: INTERNAL MEDICINE

## 2024-01-02 PROCEDURE — 25010000002 NICARDIPINE 2.5 MG/ML SOLUTION 10 ML VIAL: Performed by: STUDENT IN AN ORGANIZED HEALTH CARE EDUCATION/TRAINING PROGRAM

## 2024-01-02 RX ORDER — INSULIN LISPRO 100 [IU]/ML
8 INJECTION, SOLUTION INTRAVENOUS; SUBCUTANEOUS
Status: DISCONTINUED | OUTPATIENT
Start: 2024-01-03 | End: 2024-01-04 | Stop reason: HOSPADM

## 2024-01-02 RX ADMIN — HYDRALAZINE HYDROCHLORIDE 100 MG: 25 TABLET ORAL at 13:00

## 2024-01-02 RX ADMIN — FUROSEMIDE 40 MG: 40 TABLET ORAL at 08:32

## 2024-01-02 RX ADMIN — AMLODIPINE BESYLATE 10 MG: 5 TABLET ORAL at 08:32

## 2024-01-02 RX ADMIN — LOSARTAN POTASSIUM 100 MG: 50 TABLET, FILM COATED ORAL at 08:32

## 2024-01-02 RX ADMIN — LEVOTHYROXINE SODIUM 50 MCG: 0.05 TABLET ORAL at 05:34

## 2024-01-02 RX ADMIN — INSULIN LISPRO 10 UNITS: 100 INJECTION, SOLUTION INTRAVENOUS; SUBCUTANEOUS at 08:32

## 2024-01-02 RX ADMIN — HYDRALAZINE HYDROCHLORIDE 100 MG: 25 TABLET ORAL at 05:34

## 2024-01-02 RX ADMIN — ACETAMINOPHEN 650 MG: 325 TABLET, FILM COATED ORAL at 20:58

## 2024-01-02 RX ADMIN — HYDRALAZINE HYDROCHLORIDE 100 MG: 25 TABLET ORAL at 21:00

## 2024-01-02 RX ADMIN — NICARDIPINE HYDROCHLORIDE 5 MG/HR: 25 INJECTION, SOLUTION INTRAVENOUS at 08:30

## 2024-01-02 RX ADMIN — Medication 10 ML: at 08:32

## 2024-01-02 RX ADMIN — CARVEDILOL 6.25 MG: 6.25 TABLET, FILM COATED ORAL at 08:32

## 2024-01-02 RX ADMIN — INSULIN LISPRO 2 UNITS: 100 INJECTION, SOLUTION INTRAVENOUS; SUBCUTANEOUS at 08:32

## 2024-01-02 RX ADMIN — INSULIN LISPRO 10 UNITS: 100 INJECTION, SOLUTION INTRAVENOUS; SUBCUTANEOUS at 13:00

## 2024-01-02 RX ADMIN — Medication 10 ML: at 20:56

## 2024-01-02 RX ADMIN — INSULIN LISPRO 2 UNITS: 100 INJECTION, SOLUTION INTRAVENOUS; SUBCUTANEOUS at 20:54

## 2024-01-02 RX ADMIN — NICARDIPINE HYDROCHLORIDE 5 MG/HR: 25 INJECTION, SOLUTION INTRAVENOUS at 03:10

## 2024-01-02 RX ADMIN — INSULIN GLARGINE 20 UNITS: 100 INJECTION, SOLUTION SUBCUTANEOUS at 20:54

## 2024-01-02 RX ADMIN — FUROSEMIDE 40 MG: 40 TABLET ORAL at 17:26

## 2024-01-02 RX ADMIN — CARVEDILOL 6.25 MG: 6.25 TABLET, FILM COATED ORAL at 17:26

## 2024-01-02 NOTE — PROGRESS NOTES
[facility] Medicine Services   Daily Progress Note    Patient Name: Leona Garcia  : 1945  MRN: [id number]  Primary Care Physician:  Alfred Liriano MD  Date of admission: 2023  Date of Service: 2024      Subjective    Patient has mild shortness of breath.  She is comfortable.  She is on IV Cardene.  Has cough  No chest pain            Objective      Vitals:   Temp:  [97.1 °F (36.2 °C)-97.6 °F (36.4 °C)] 97.5 °F (36.4 °C)  Heart Rate:  [70-84] 70  Resp:  [13-16] 16  BP: (127-170)/(61-81) 146/65  Flow (L/min):  [1.5-2.5] 1.5    Physical Exam       General:  Alert and oriented , mild  distress  Eyes:  Pupils are equal, round and reactive to light. Extraocular movements are intact. Normal conjunctivae, vision unchanged    HENT:  Normocephalic, atraumatic   Respiratory: Decrease in breathing sounds anteriorly bilaterally. No wheezing   coarse  Cardiovascular: Regular rate, Regular, S1 auscultated. S2 auscultated , No edema   Gastrointestinal: Soft. Nontender, nondistended. Normal bowel sounds  Musculoskeletal: No tenderness   Neurologic: Alert, oriented. No focal defect   Psychiatric: Cooperative , appropriate mood and affect, no focal deficits          Result Review    Result Review:  I have personally reviewed the results from the time of this admission to 2024 11:05 EST and agree with these findings:  [x]  Laboratory  [x]  Microbiology  [x]  Radiology  []  EKG/Telemetry   []  Cardiology/Vascular   []  Pathology  []  Old records  []  Other:  Most notable findings include: dyspnea          Assessment & Plan          amLODIPine, 10 mg, Oral, Q24H  carvedilol, 6.25 mg, Oral, BID With Meals  furosemide, 40 mg, Oral, BID  hydrALAZINE, 100 mg, Oral, Q8H  insulin glargine, 20 Units, Subcutaneous, Nightly  insulin lispro, 10 Units, Subcutaneous, TID With Meals  insulin lispro, 2-9 Units, Subcutaneous, 4x Daily AC & at Bedtime  levothyroxine, 50 mcg, Oral, Daily  losartan, 100 mg,  Oral, Q24H  sodium chloride, 10 mL, Intravenous, Q12H       niCARdipine, 5-15 mg/hr, Last Rate: Stopped (01/02/24 0945)         Active Hospital Problems:  Active Hospital Problems    Diagnosis     **Respiratory failure with hypoxia     Influenza A     Hyperlipidemia     Type 2 diabetes mellitus     Essential hypertension     Chronic renal insufficiency, stage III (moderate)     Hypothyroidism      Assessment and plan  Respiratory Failure with Hypoxia    Likely due to a combination of Influenza A and possible new onset CHF  -Patient denies previous respiratory problems.  Not on home oxygen.  She is currently on 1-1/2 L of oxygen here.  -Chest xray reviewed, no acute infiltrates.  -BNP 1936, cardiology and the patient.  2D echocardiogram results significantly ejection fraction 61 to 65%, estimated RVSP from tricuspid regurgitation more than 55 mmHg  -Positive for Influenza A  -EF 56-60% 10/2023  -Duonebs prn  -Supplemental O2 to keep SPO2 greater than 90%  Cardiology follow-up.-  Diuretics       Essential Hypertension/urgency  -systolic elevation  -Monitor BP  -Currently on IV Cardene.  IV Cardene being tapered off.  P.o. antihypertensives.  Follow BP.     Bradycardia  -likely due to the combination of multiple BP meds  -HR running in the 60S TODAY    A&O x4  -continuous cardiac monitoring  -Seen by cardiology.      JOSELITO/CKD stage III; likely prerenal continue to monitor improved with supportive care.  Nephrology seeing the patient.  Follow-up BMP.    Dysphagia  -chronic, stable  -Evaluated by SLP on previous admission 10/2023  -Mechanical ground textures with thin liquids and either whole or crushed medications recommended  -Diet ordered, follow-up clinically.     Diabetes Mellitus II  -Monitor glucose, continue Lantus 30 units nightly and insulin lispro 12 units with each meal  -SSI ordered  -Continue insulin.     Hypothyroidism  -Continue Levothyroxine        Patient was updated with plan of care.  All questions  answered  Disposition; f/u PT OT for possible need placement to rehab.      DVT prophylaxis:  Mechanical DVT prophylaxis orders are present.    CODE STATUS:    Code Status (Patient has no pulse and is not breathing): CPR (Attempt to Resuscitate)  Medical Interventions (Patient has pulse or is breathing): Full Support      Disposition:  I expect patient to be discharged rehab once better in 1 to 2 days      Electronically signed by Lori Hammer MD, 01/02/24, 11:05 EST.  [facility] Hospitalist Team

## 2024-01-02 NOTE — NURSING NOTE
Pt expressed concerns and frustration over delays in care over the holiday weekend and feeling upset that her blood pressure still isn't under control and that she hasn't been able to go home yet. Pt was on phone with her sister who states she is concerned her medication regimen here has been changed from what she is normally on at home and feels that is why her blood pressure is out of control and states they want the patient transferred to [facility] so that she can be seen by Dr. Liriano who knows her. CRN and primary care RN spent several minutes explaining care process and medications. Pt's sister feels the patient is confused but pt denies this.

## 2024-01-02 NOTE — PLAN OF CARE
Goal Outcome Evaluation:      Pt off of cardene gtt since this morning and blood pressure has been stable. Currently on room air with occasional 1L use while sleeping. Pt has had little intake with each meal, held dinner dose of 10 units. Possible D/C tomorrow if ready. VSS with no complaints at this time.    Problem: Adult Inpatient Plan of Care  Goal: Plan of Care Review  Outcome: Ongoing, Progressing

## 2024-01-02 NOTE — PLAN OF CARE
Goal Outcome Evaluation:  Plan of Care Reviewed With: patient        Progress: no change  Outcome Evaluation: No significant events overnight. Remains on minimal dose of cardene gtt to maintain SBP < 160. Pt maintains that she'd like to transfer to [facility] to continue her care under Dr. Liriano. Tolerated room air during the day when awake but required 1.5L NC while asleep to maintain SPO2 > 89%. Took scheduled medications as ordered but refused PRN clonidine. Getting up to bedside commode with 2 assist and using external catheter at night time. Mostly oriented but does admit to feeling forgetful at times and confused about her care. Blood sugar stable overnight.

## 2024-01-02 NOTE — CASE MANAGEMENT/SOCIAL WORK
Continued Stay Note  DENISE Gallagher     Patient Name: Leona Garcia  MRN: [id number]  Today's Date: 1/2/2024    Admit Date: 12/27/2023    Plan: Anticipate routine home with sister and [facility] (current). Watch O2 needs.   Discharge Plan       Row Name 01/02/24 1301       Plan    Plan Comments DC Barriers: BP monitoring continues, IV cardene gtt stopped today, 1/2. Nephrology and cardiology following. Currently on 1.5L O2 with no home O2. May require walking oximetry to be performed 24-48 hrs prior to dc.                  Anayeli Flores RN     Office Phone: [phone]  Office Cell: [phone]

## 2024-01-02 NOTE — THERAPY TREATMENT NOTE
Acute Care - Speech Language Pathology   Swallow Treatment Note  [place]     Patient Name: Leona Garcia  : 1945  MRN: [id number]  Today's Date: 2024               Admit Date: 2023    Visit Dx:     ICD-10-CM ICD-9-CM   1. Acute respiratory failure with hypoxia  J96.01 518.81   2. Congestive heart failure, unspecified HF chronicity, unspecified heart failure type  I50.9 428.0   3. Elevated troponin  R79.89 790.6   4. Chronic kidney disease, unspecified CKD stage  N18.9 585.9   5. Influenza A  J10.1 487.1     Patient Active Problem List   Diagnosis    Right hip pain    Essential hypertension    Type 2 diabetes mellitus    Respiratory failure with hypoxia    Chronic renal insufficiency, stage III (moderate)    Hyperlipidemia    Hypothyroidism    Influenza A     Past Medical History:   Diagnosis Date    Anemia     Chronic kidney disease (CKD)     Diabetes mellitus     Disease of thyroid gland     Hypertension      History reviewed. No pertinent surgical history.    SLP Recommendation and Plan  SLP Swallowing Diagnosis: moderate, oral dysphagia, functional pharyngeal phase (24)  SLP Diet Recommendation: mechanical ground textures, thin liquids (24)  Recommended Precautions and Strategies: upright posture during/after eating, small bites of food and sips of liquid, alternate between small bites of food and sips of liquid, general aspiration precautions, reflux precautions, fatigue precautions (24)  SLP Rec. for Method of Medication Administration: meds whole, with thin liquids, as tolerated (24)     Monitor for Signs of Aspiration: yes, notify SLP if any concerns, cough, gurgly voice, throat clearing (24)     Swallow Criteria for Skilled Therapeutic Interventions Met: demonstrates skilled criteria (24)     Rehab Potential/Prognosis, Swallowing: adequate, monitor progress closely (24)  Therapy Frequency (Swallow): PRN  (01/02/24 1300)  Predicted Duration Therapy Intervention (Days): until discharge (01/02/24 1300)  Oral Care Recommendations: Oral Care before breakfast, after meals and PRN (01/02/24 1300)                                      Oral Care Recommendations: Oral Care before breakfast, after meals and PRN (01/02/24 1300)           SWALLOW EVALUATION (last 72 hours)       SLP Adult Swallow Evaluation       Row Name 01/02/24 1300       Rehab Evaluation    Document Type therapy note (daily note)  -CP    Subjective Information no complaints  -CP    Patient Observations alert;cooperative  -CP    Patient Effort adequate  -CP    Comment Skilled ST targeting dysphagia was conducted today. Pt was seen for meal assessment. She was seen at breakfast meal. Pt was upright in bed and attempting to feed herself but reported she was ahving difficulty. Clinician fed pt a few bites then was able to position pt so she could feed herself easier. Pt was seen consuming a bite of a cheese omelet. oatmeal and drinking water by straw. Pt had extended mastication of the omelet, taking over 2 minutes to masticate one bite. pt was able to clear this spontaneously. No oral residual remained after the swallow. oral transit was slow but functional on oatmeal and liquids. Visualization of swallow suggests timely initiation. After the swallow pt had clear vocal quality and no overt s/s of aspiration. clinician again suggested puree food for pt and she rejected this suggestion. She reported that she eats slow like this as her baseline. Pt educated on safe swlalow strategies and she verbalized understanding. ST will continue to follow to assure tolerance of diet and make further recs as indicated.  -CP       SLP Evaluation Clinical Impression    SLP Swallowing Diagnosis moderate;oral dysphagia;functional pharyngeal phase  -CP    Functional Impact risk of malnutrition  -CP    Rehab Potential/Prognosis, Swallowing adequate, monitor progress closely  -CP     Swallow Criteria for Skilled Therapeutic Interventions Met demonstrates skilled criteria  -CP       SLP Treatment Clinical Impressions    Care Plan Review evaluation/treatment results reviewed  -CP       Recommendations    Therapy Frequency (Swallow) PRN  -CP    Predicted Duration Therapy Intervention (Days) until discharge  -CP    SLP Diet Recommendation mechanical ground textures;thin liquids  -CP    Recommended Precautions and Strategies upright posture during/after eating;small bites of food and sips of liquid;alternate between small bites of food and sips of liquid;general aspiration precautions;reflux precautions;fatigue precautions  -CP    Oral Care Recommendations Oral Care before breakfast, after meals and PRN  -CP    SLP Rec. for Method of Medication Administration meds whole;with thin liquids;as tolerated  -CP    Monitor for Signs of Aspiration yes;notify SLP if any concerns;cough;gurgly voice;throat clearing  -CP       Swallow Goals (SLP)    Swallow LTGs Swallow Long Term Goal (free text)  -CP    Swallow STGs diet tolerance goal selection (SLP)  -CP    Diet Tolerance Goal Selection (SLP) Swallow Short Term Goal 1;Swallow Short Term Goal 2  -CP       (LTG) Swallow    (LTG) Swallow Pt will maximize swallow function for least restrictive PO diet, exhibiting no complication associated with dysphagia, adequate PO intake, and demonstrating independent use of swallow compensations  -CP    Time Frame (Swallow Long Term Goal) by discharge  -CP    Progress/Outcomes (Swallow Long Term Goal) goal ongoing  -CP    Comment (Swallow Long Term Goal) See above note  -CP       (STG) Swallow 1    (STG) Swallow 1 The patient will participate in a meal/follow-up assessment to determine safety and adequacy of recommended diet, independent use of safe swallow compensations, pt/family education and additional goals/recommendations to follow  -CP    Time Frame (Swallow Short Term Goal 1) 1 week  -CP    Comment (Swallow Short Term  Goal 1) See above  -CP       (STG) Swallow 2    (STG) Swallow 2 Pt will participate in ongoing swallow assessment to include VFSS if indicated, and caregiver teaching.  -CP    Time Frame (Swallow Short Term Goal 2) 1 week  -CP    Progress/Outcomes (Swallow Short Term Goal 2) goal ongoing  -CP    Comment (Swallow Short Term Goal 2) See above  -CP              User Key  (r) = Recorded By, (t) = Taken By, (c) = Cosigned By      Initials Name Effective Dates    CP Reina Govea SLP 06/16/21 -                     EDUCATION  The patient has been educated in the following areas:   Dysphagia (Swallowing Impairment) Oral Care/Hydration Modified Diet Instruction.        SLP GOALS       Row Name 01/02/24 1300             (LTG) Swallow    (LTG) Swallow Pt will maximize swallow function for least restrictive PO diet, exhibiting no complication associated with dysphagia, adequate PO intake, and demonstrating independent use of swallow compensations  -CP      Time Frame (Swallow Long Term Goal) by discharge  -CP      Progress/Outcomes (Swallow Long Term Goal) goal ongoing  -CP      Comment (Swallow Long Term Goal) See above note  -CP         (STG) Swallow 1    (STG) Swallow 1 The patient will participate in a meal/follow-up assessment to determine safety and adequacy of recommended diet, independent use of safe swallow compensations, pt/family education and additional goals/recommendations to follow  -CP      Time Frame (Swallow Short Term Goal 1) 1 week  -CP      Comment (Swallow Short Term Goal 1) See above  -CP         (STG) Swallow 2    (STG) Swallow 2 Pt will participate in ongoing swallow assessment to include VFSS if indicated, and caregiver teaching.  -CP      Time Frame (Swallow Short Term Goal 2) 1 week  -CP      Progress/Outcomes (Swallow Short Term Goal 2) goal ongoing  -CP      Comment (Swallow Short Term Goal 2) See above  -CP                User Key  (r) = Recorded By, (t) = Taken By, (c) = Cosigned By       Initials Name Provider Type    CP Reina Govea, SLP Speech and Language Pathologist                       Time Calculation:                CECILE Gao  1/2/2024

## 2024-01-02 NOTE — CONSULTS
INITIAL CONSULT NOTE      Patient Name: Leona Garcia  : 1945  MRN: [id number]  Primary Care Physician: Alfred Liriano MD  Date of admission: 2023    Patient Care Team:  Alfred Liriano MD as PCP - General (Family Medicine)        Reason for Consult:       Acute renal failure  Subjective   History of Present Illness:   Chief Complaint:   Chief Complaint   Patient presents with    Shortness of Breath     Room air sats 80's.      HISTORY:  Leona Garcia is a 78 y.o. female with past medical history of hypertension, diabetes mellitus, hypothyroidism, with CKD stage III A2B with last creatinine 1.5 seen many times by me in the office.  Mainly admitted this time because of the respiratory tract infection and and some volume overload patient to have significant edema of the lower extremity that seems to be chronic.  Also have grade 2 diastolic dysfunctions.  Patient to have history of hypertension.  History of diabetes but no significant proteinuria CKD stage IIIb and creatinine anywhere between about 1.72-2.2 at the baseline no other complaint.  Slightly feeling better since admission.      Review of systems:  All ROS unremarable  Constitutional: No fever, no chills, no lethargy, no weakness.  HEENT:  No headache, otalgia, itchy eyes, nasal discharge or sore throat.  Cardiac:  No chest pain, dyspnea, orthopnea or PND.  Chest:              No cough, phlegm or wheezing.  Abdomen:  No abdominal pain, nausea or vomiting.  Neuro:  Weakness recently  :   No hematuria, no pyuria, no dysuria, no flank pain.  ROS was otherwise negative except as mentioned in the [facility].       Personal History:     Past Medical History:   Past Medical History:   Diagnosis Date    Anemia     Chronic kidney disease (CKD)     Diabetes mellitus     Disease of thyroid gland     Hypertension        Surgical History:    History reviewed. No pertinent surgical history.    Family History: family history is not on file.  Otherwise pertinent FHx was reviewed and unremarkable.     Social History:  reports that she has never smoked. She has never used smokeless tobacco. She reports that she does not drink alcohol and does not use drugs.    Medications:  Prior to Admission medications    Medication Sig Start Date End Date Taking? Authorizing Provider   dilTIAZem CD (CARDIZEM CD) 240 MG 24 hr capsule Take 1 capsule by mouth Daily.   Yes Miriam Baldwin MD   ezetimibe (ZETIA) 10 MG tablet Take 1 tablet by mouth Daily.   Yes Miriam Baldwin MD   fluticasone (FLONASE) 50 MCG/ACT nasal spray 2 sprays into the nostril(s) as directed by provider Daily.   Yes Miriam Baldwin MD   furosemide (LASIX) 40 MG tablet Take 1 tablet by mouth Daily.   Yes Miriam Baldwin MD   hydrALAZINE (APRESOLINE) 100 MG tablet Take 1 tablet by mouth Daily.   Yes Miriam Baldwin MD   hydroCHLOROthiazide (HYDRODIURIL) 25 MG tablet Take 1 tablet by mouth Daily.   Yes Miriam Baldwin MD   insulin NPH-insulin regular (humuLIN 70/30,novoLIN 70/30) (70-30) 100 UNIT/ML injection Inject 20 Units under the skin into the appropriate area as directed Every Morning. And inject 10 units under the skin at bedtime   Yes Miriam Baldwin MD   levothyroxine (SYNTHROID, LEVOTHROID) 50 MCG tablet Take 1 tablet by mouth Daily.   Yes Miriam Baldwin MD   losartan (COZAAR) 100 MG tablet Take 1 tablet by mouth Daily.   Yes Miriam Baldwin MD   meloxicam (MOBIC) 15 MG tablet Take 1 tablet by mouth Daily.   Yes Miriam Baldwin MD   potassium chloride 10 MEQ CR tablet Take 1 tablet by mouth Daily.   Yes Miriam Baldwin MD     Scheduled Meds:amLODIPine, 10 mg, Oral, Q24H  carvedilol, 6.25 mg, Oral, BID With Meals  furosemide, 40 mg, Oral, BID  hydrALAZINE, 100 mg, Oral, Q8H  insulin glargine, 20 Units, Subcutaneous, Nightly  insulin lispro, 10 Units, Subcutaneous, TID With Meals  insulin lispro, 2-9 Units, Subcutaneous, 4x Daily  AC & at Bedtime  levothyroxine, 50 mcg, Oral, Daily  losartan, 100 mg, Oral, Q24H  sodium chloride, 10 mL, Intravenous, Q12H      Continuous Infusions:niCARdipine, 5-15 mg/hr, Last Rate: Stopped (01/02/24 0945)      PRN Meds:  acetaminophen **OR** acetaminophen **OR** acetaminophen    senna-docusate sodium **AND** polyethylene glycol **AND** bisacodyl **AND** bisacodyl    cloNIDine    dextrose    dextrose    glucagon (human recombinant)    hydrALAZINE    influenza vaccine    ipratropium-albuterol    labetalol    melatonin    nitroglycerin    ondansetron    sodium chloride    sodium chloride    sodium chloride    sodium chloride  Allergies:  No Known Allergies    Objective   Exam:     Vital Signs  Temp:  [97.1 °F (36.2 °C)-97.6 °F (36.4 °C)] 97.5 °F (36.4 °C)  Heart Rate:  [70-84] 70  Resp:  [13-16] 16  BP: (127-170)/(52-81) 146/65  SpO2:  [91 %-100 %] 96 %  on  Flow (L/min):  [1.5-2.5] 1.5;   Device (Oxygen Therapy): nasal cannula  Body mass index is 47.43 kg/m².  EXAM  General:  elderly obese  female in no acute distress.    Head:      Normocephalic and atraumatic.    Eyes:      PERRL/EOM intact, conjunctivae and sclerae clear without nystagmus.    Neck:      No masses, thyromegaly,  trachea central   Lungs:    Clear bilaterally to auscultation.    Heart:      Regular rate and rhythm, no murmur no gallop  Abd:        Soft, nontender, not distended, bowel sounds positive, no shifting dullness.  Msk:        No deformity or scoliosis noted of thoracic or lumbar spine.    Pulses:   Pulses normal in all 4 extremities.    Extremities:        No cyanosis or clubbing--+ edema.    Neuro:    No focal deficits.   alert oriented x3  Skin:       Intact without lesions or rashes.    Psych:    Alert and cooperative; normal mood and affect; normal attention span       Results Review:  I have personally reviewed most recent Data :  [facility]  CBC    Results from last 7 days   Lab Units 12/31/23  6787  12/29/23  2353 12/29/23  0041 12/27/23  2251 12/27/23  1546   WBC 10*3/mm3 6.40 10.30 8.80 5.00 6.78   HEMOGLOBIN g/dL 10.1* 9.3* 8.9* 8.2* 9.3*   PLATELETS 10*3/mm3 240 233 202 174 209     CMP   Results from last 7 days   Lab Units 12/31/23  2313 12/30/23  2301 12/29/23 2353 12/29/23 0041 12/27/23 2251 12/27/23  1546   SODIUM mmol/L 140 138 141 137 143 139   POTASSIUM mmol/L 3.7 4.1 4.8 5.0 4.5 4.7   CHLORIDE mmol/L 101 100 102 97* 106 103   CO2 mmol/L 30.0* 29.0 28.0 24.0 28.0 28.4   BUN mg/dL 52* 59* 67* 60* 43* 47*   CREATININE mg/dL 1.94* 2.15* 2.50* 2.67* 2.07* 2.20*   GLUCOSE mg/dL 109* 148* 198* 407* 218* 271*   ALBUMIN g/dL  --   --   --   --   --  3.7   BILIRUBIN mg/dL  --   --   --   --   --  0.5   ALK PHOS U/L  --   --   --   --   --  167*   AST (SGOT) U/L  --   --   --   --   --  30   ALT (SGPT) U/L  --   --   --   --   --  34*     ABG      US Renal Bilateral    Result Date: 12/28/2023  Impression: 1.Unremarkable appearance of the kidneys. 2.Urinary bladder inadequately visualized. 3.Incidental right pleural effusion. Electronically Signed: Dharmesh Cortés MD  12/28/2023 2:03 PM CST  Workstation ID: [id number]    XR Chest 1 View    Result Date: 12/27/2023  Impression: Stable cardiomegaly without acute process. Electronically Signed: Dexter Cartwright MD  12/27/2023 4:15 PM EST  Workstation ID: [id number]     Results for orders placed during the hospital encounter of 12/27/23    Adult Transthoracic Echo Complete W/ Cont if Necessary Per Protocol    Interpretation Summary    Left ventricular systolic function is normal. Left ventricular ejection fraction appears to be 61 - 65%.    Left ventricular diastolic function is consistent with (grade I) impaired relaxation.    The left atrial cavity is mildly dilated.    The right atrial cavity is mildly  dilated.    There is mild, bileaflet mitral valve thickening present.    Estimated right ventricular systolic pressure from tricuspid regurgitation is markedly elevated  (>55 mmHg).        Assessment & Plan   Assessment and Plan:         Respiratory failure with hypoxia    Essential hypertension    Type 2 diabetes mellitus    Chronic renal insufficiency, stage III (moderate)    Hyperlipidemia    Hypothyroidism    Influenza A    ASSESSMENT:  Acute kidney injury in a patient with chronic kidney disease stage III  Hypertension  Diabetes millitus type 2  Hip pain   Congestive heart failure with ejection fraction around 60% and grade 1 diastolic dysfunctions  Lower extremity edema      PLAN :     Patient has CKD stage IIIb with some fluctuation in creatinine depending upon volume status and underlying cardiac condition  Significant lower extremity edema still present but close to baseline  Continue to monitor blood pressure closely continue calcium channel blocker beta-blocker angiotensin receptor blocker and l loop diuretics  Hold meloxicam because patient was taking at home that might be resulting in some progressive CKD   repeat labs today and tomorrow morning      Chu Lee MD  [facility]  1/2/2024  10:29 EST

## 2024-01-02 NOTE — PROGRESS NOTES
Cardiology Progress Note      PATIENT IDENTIFICATION    Name: Leona Garcia  Age: 78 y.o. Sex: female : 1945  MRN: [id number]    Requesting Provider    Lori Hammer MD     LOS: 5 days       Reason For Followup:  Acute respiratory failure      Subjective:    Interval History:  Seen and examined.  Chart labs reviewed.  Patient denies any chest pain.  She continues to be frustrated with his hospitalization and wants to either go home or to [facility] where her primary physician is located.  Her blood pressure has been markedly elevated and she was started on nicardipine.  Her blood pressure is currently controlled.    Review of Systems:  A complete review of systems was undertaken with the pertinent cardiovascular findings listed in history of present illness and all other systems being negative.     Assessment & Plan    Impressions:  Acute respiratory failure-multifactorial with contributing factors being volume overload, obesity hypoventilation syndrome, and influenza.  Abnormal BNP in the setting of acute on chronic kidney injury  Acute on chronic kidney disease  Volume overload state possibly related to diastolic heart failure  Chronic bilateral lower extremity edema with venous stasis changes noted to bilateral lower extremities    Recommendations:  Agree with oral diuretics  Discontinue nicardipine and monitor blood pressure  Hypertension is likely resulting from patient's angst and anxiety from being in the hospital.  It does seem to be situational  Wound care evaluation for lower extremity wraps  Echocardiogram with normal left ventricular systolic function.  No plans for further cardiac workup at the present time.  Patient will follow-up with primary cardiologist Dr. Mcdonough as an outpatient        Objective:    Medication Review:   Scheduled Meds:amLODIPine, 10 mg, Oral, Q24H  carvedilol, 6.25 mg, Oral, BID With Meals  furosemide, 40 mg, Oral, BID  hydrALAZINE, 100 mg, Oral,  Q8H  insulin glargine, 20 Units, Subcutaneous, Nightly  insulin lispro, 10 Units, Subcutaneous, TID With Meals  insulin lispro, 2-9 Units, Subcutaneous, 4x Daily AC & at Bedtime  levothyroxine, 50 mcg, Oral, Daily  losartan, 100 mg, Oral, Q24H  sodium chloride, 10 mL, Intravenous, Q12H      Continuous Infusions:niCARdipine, 5-15 mg/hr, Last Rate: Stopped (01/02/24 0945)      PRN Meds:.  acetaminophen **OR** acetaminophen **OR** acetaminophen    senna-docusate sodium **AND** polyethylene glycol **AND** bisacodyl **AND** bisacodyl    cloNIDine    dextrose    dextrose    glucagon (human recombinant)    hydrALAZINE    influenza vaccine    ipratropium-albuterol    labetalol    melatonin    nitroglycerin    ondansetron    sodium chloride    sodium chloride    sodium chloride    sodium chloride      Respiratory failure with hypoxia    Essential hypertension    Type 2 diabetes mellitus    Chronic renal insufficiency, stage III (moderate)    Hyperlipidemia    Hypothyroidism    Influenza A         Physical Exam:    General: Alert, cooperative, no distress, appears stated age  Head:  Normocephalic, atraumatic, mucous membranes moist  Eyes:  Conjunctivae/corneas clear, EOMs intact     Neck:  Supple,  no bruit  Lungs:  Coarse and diminished bilaterally.  Chest wall: No tenderness  Heart::  Regular rate and rhythm, S1 and S2 normal, 1/6 holosystolic murmur.  No rub or gallop  Abdomen: Soft, nontender, nondistended, bowel sounds active.  Obese  Extremities: No cyanosis, clubbing.  3+ edema  Pulses: Diminished pedal pulses  Skin:  Scaling and lichenification of lower extremities  Neuro/psych: A&O x3. CN II through XII are grossly intact with appropriate affect    Vital Signs:  Vitals:    01/02/24 0700 01/02/24 0800 01/02/24 0900 01/02/24 1000   BP: 168/80 151/62 149/67 146/65   BP Location:   Left arm    Patient Position:   Lying    Pulse: 81 81 83 70   Resp:   16    Temp:   97.5 °F (36.4 °C)    TempSrc:   Oral    SpO2: 96% 97% 96%  96%   Weight:       Height:         Wt Readings from Last 1 Encounters:   01/02/24 133 kg (293 lb 14 oz)       Intake/Output Summary (Last 24 hours) at 1/2/2024 1110  Last data filed at 1/2/2024 1105  Gross per 24 hour   Intake 1560 ml   Output 1650 ml   Net -90 ml         Results Review:     CBC    Results from last 7 days   Lab Units 12/31/23  2313 12/29/23  2353 12/29/23  0041 12/27/23  2251 12/27/23  1546   WBC 10*3/mm3 6.40 10.30 8.80 5.00 6.78   HEMOGLOBIN g/dL 10.1* 9.3* 8.9* 8.2* 9.3*   PLATELETS 10*3/mm3 240 233 202 174 209     Cr Clearance Estimated Creatinine Clearance: 34 mL/min (A) (by C-G formula based on SCr of 1.94 mg/dL (H)).  Coag     HbA1C   Lab Results   Component Value Date    HGBA1C 9.30 (H) 10/18/2023     Blood Glucose   Glucose   Date/Time Value Ref Range Status   01/02/2024 1100 127 (H) 70 - 105 mg/dL Final     Comment:     Serial Number: [id number]:  [id number]   01/02/2024 0710 172 (H) 70 - 105 mg/dL Final     Comment:     Serial Number: [id number]:  [id number]   01/01/2024 1959 191 (H) 70 - 105 mg/dL Final     Comment:     Serial Number: [id number]:  [id number]   01/01/2024 1637 142 (H) 70 - 105 mg/dL Final     Comment:     Serial Number: [id number]:  [id number]   01/01/2024 1119 157 (H) 70 - 105 mg/dL Final     Comment:     Serial Number: [id number]:  [id number]   01/01/2024 0742 150 (H) 70 - 105 mg/dL Final     Comment:     Serial Number: [id number]:  [id number]   01/01/2024 0131 129 (H) 70 - 105 mg/dL Final     Comment:     Serial Number: [id number]:  [id number]   12/31/2023 2344 110 (H) 70 - 105 mg/dL Final     Comment:     Serial Number: [id number]:  [id number]     Infection     CMP   Results from last 7 days   Lab Units 12/31/23  2313 12/30/23  2301 12/29/23  2353 12/29/23  0041 12/27/23  2251 12/27/23  1546   SODIUM mmol/L 140 138 141 137 143 139   POTASSIUM mmol/L 3.7 4.1 4.8 5.0 4.5 4.7   CHLORIDE mmol/L 101 100 102 97* 106 103  "  CO2 mmol/L 30.0* 29.0 28.0 24.0 28.0 28.4   BUN mg/dL 52* 59* 67* 60* 43* 47*   CREATININE mg/dL 1.94* 2.15* 2.50* 2.67* 2.07* 2.20*   GLUCOSE mg/dL 109* 148* 198* 407* 218* 271*   ALBUMIN g/dL  --   --   --   --   --  3.7   BILIRUBIN mg/dL  --   --   --   --   --  0.5   ALK PHOS U/L  --   --   --   --   --  167*   AST (SGOT) U/L  --   --   --   --   --  30   ALT (SGPT) U/L  --   --   --   --   --  34*     ABG      UA    Results from last 7 days   Lab Units 12/28/23  1325   NITRITE UA  Negative   WBC UA /HPF 0-2   BACTERIA UA /HPF 1+*   SQUAM EPITHEL UA /HPF 0-2     XIMENA  No results found for: \"POCMETH\", \"POCAMPHET\", \"POCBARBITUR\", \"POCBENZO\", \"POCCOCAINE\", \"POCOPIATES\", \"POCOXYCODO\", \"POCPHENCYC\", \"POCPROPOXY\", \"POCTHC\", \"POCTRICYC\"  Lysis Labs   Results from last 7 days   Lab Units 12/31/23  2313 12/30/23  2301 12/29/23  2353 12/29/23  0041 12/27/23  2251 12/27/23  1546   HEMOGLOBIN g/dL 10.1*  --  9.3* 8.9* 8.2* 9.3*   PLATELETS 10*3/mm3 240  --  233 202 174 209   CREATININE mg/dL 1.94* 2.15* 2.50* 2.67* 2.07* 2.20*     Radiology(recent) No radiology results for the last day      Results from last 7 days   Lab Units 12/27/23  1740   HSTROP T ng/L 49*       Imaging Results (Last 24 Hours)       ** No results found for the last 24 hours. **            Cardiac Studies:  Echo- Results for orders placed during the hospital encounter of 12/27/23    Adult Transthoracic Echo Complete W/ Cont if Necessary Per Protocol    Interpretation Summary    Left ventricular systolic function is normal. Left ventricular ejection fraction appears to be 61 - 65%.    Left ventricular diastolic function is consistent with (grade I) impaired relaxation.    The left atrial cavity is mildly dilated.    The right atrial cavity is mildly  dilated.    There is mild, bileaflet mitral valve thickening present.    Estimated right ventricular systolic pressure from tricuspid regurgitation is markedly elevated (>55 mmHg).    Stress " Lawson-  Emy-        Jenaro Braun DO  01/02/24  11:10 EST

## 2024-01-03 LAB
ANION GAP SERPL CALCULATED.3IONS-SCNC: 9 MMOL/L (ref 5–15)
BASOPHILS # BLD AUTO: 0.1 10*3/MM3 (ref 0–0.2)
BASOPHILS NFR BLD AUTO: 0.8 % (ref 0–1.5)
BUN SERPL-MCNC: 48 MG/DL (ref 8–23)
BUN/CREAT SERPL: 27.6 (ref 7–25)
CALCIUM SPEC-SCNC: 9.1 MG/DL (ref 8.6–10.5)
CHLORIDE SERPL-SCNC: 96 MMOL/L (ref 98–107)
CO2 SERPL-SCNC: 32 MMOL/L (ref 22–29)
CREAT SERPL-MCNC: 1.74 MG/DL (ref 0.57–1)
DEPRECATED RDW RBC AUTO: 48.1 FL (ref 37–54)
EGFRCR SERPLBLD CKD-EPI 2021: 29.9 ML/MIN/1.73
EOSINOPHIL # BLD AUTO: 0.2 10*3/MM3 (ref 0–0.4)
EOSINOPHIL NFR BLD AUTO: 2.5 % (ref 0.3–6.2)
ERYTHROCYTE [DISTWIDTH] IN BLOOD BY AUTOMATED COUNT: 16.6 % (ref 12.3–15.4)
GLUCOSE BLDC GLUCOMTR-MCNC: 109 MG/DL (ref 70–105)
GLUCOSE BLDC GLUCOMTR-MCNC: 132 MG/DL (ref 70–105)
GLUCOSE BLDC GLUCOMTR-MCNC: 164 MG/DL (ref 70–105)
GLUCOSE BLDC GLUCOMTR-MCNC: 173 MG/DL (ref 70–105)
GLUCOSE BLDC GLUCOMTR-MCNC: 210 MG/DL (ref 70–105)
GLUCOSE SERPL-MCNC: 141 MG/DL (ref 65–99)
HCT VFR BLD AUTO: 31.5 % (ref 34–46.6)
HGB BLD-MCNC: 10.2 G/DL (ref 12–15.9)
LYMPHOCYTES # BLD AUTO: 2.3 10*3/MM3 (ref 0.7–3.1)
LYMPHOCYTES NFR BLD AUTO: 35.7 % (ref 19.6–45.3)
MCH RBC QN AUTO: 26.8 PG (ref 26.6–33)
MCHC RBC AUTO-ENTMCNC: 32.3 G/DL (ref 31.5–35.7)
MCV RBC AUTO: 83 FL (ref 79–97)
MONOCYTES # BLD AUTO: 0.6 10*3/MM3 (ref 0.1–0.9)
MONOCYTES NFR BLD AUTO: 9.4 % (ref 5–12)
NEUTROPHILS NFR BLD AUTO: 3.3 10*3/MM3 (ref 1.7–7)
NEUTROPHILS NFR BLD AUTO: 51.6 % (ref 42.7–76)
NRBC BLD AUTO-RTO: 0.1 /100 WBC (ref 0–0.2)
PLATELET # BLD AUTO: 259 10*3/MM3 (ref 140–450)
PMV BLD AUTO: 8.3 FL (ref 6–12)
POTASSIUM SERPL-SCNC: 3.4 MMOL/L (ref 3.5–5.2)
RBC # BLD AUTO: 3.8 10*6/MM3 (ref 3.77–5.28)
SODIUM SERPL-SCNC: 137 MMOL/L (ref 136–145)
WBC NRBC COR # BLD AUTO: 6.5 10*3/MM3 (ref 3.4–10.8)

## 2024-01-03 PROCEDURE — 82948 REAGENT STRIP/BLOOD GLUCOSE: CPT

## 2024-01-03 PROCEDURE — 80048 BASIC METABOLIC PNL TOTAL CA: CPT | Performed by: INTERNAL MEDICINE

## 2024-01-03 PROCEDURE — 97530 THERAPEUTIC ACTIVITIES: CPT

## 2024-01-03 PROCEDURE — 63710000001 INSULIN LISPRO (HUMAN) PER 5 UNITS: Performed by: INTERNAL MEDICINE

## 2024-01-03 PROCEDURE — 85025 COMPLETE CBC W/AUTO DIFF WBC: CPT | Performed by: INTERNAL MEDICINE

## 2024-01-03 PROCEDURE — 97116 GAIT TRAINING THERAPY: CPT

## 2024-01-03 PROCEDURE — 99232 SBSQ HOSP IP/OBS MODERATE 35: CPT | Performed by: INTERNAL MEDICINE

## 2024-01-03 PROCEDURE — 99231 SBSQ HOSP IP/OBS SF/LOW 25: CPT | Performed by: INTERNAL MEDICINE

## 2024-01-03 PROCEDURE — 92526 ORAL FUNCTION THERAPY: CPT

## 2024-01-03 PROCEDURE — 63710000001 INSULIN GLARGINE PER 5 UNITS: Performed by: INTERNAL MEDICINE

## 2024-01-03 RX ORDER — POTASSIUM CHLORIDE 20 MEQ/1
40 TABLET, EXTENDED RELEASE ORAL ONCE
Status: COMPLETED | OUTPATIENT
Start: 2024-01-03 | End: 2024-01-03

## 2024-01-03 RX ADMIN — LOSARTAN POTASSIUM 100 MG: 50 TABLET, FILM COATED ORAL at 09:12

## 2024-01-03 RX ADMIN — HYDRALAZINE HYDROCHLORIDE 100 MG: 25 TABLET ORAL at 20:46

## 2024-01-03 RX ADMIN — HYDRALAZINE HYDROCHLORIDE 100 MG: 25 TABLET ORAL at 14:16

## 2024-01-03 RX ADMIN — INSULIN LISPRO 8 UNITS: 100 INJECTION, SOLUTION INTRAVENOUS; SUBCUTANEOUS at 09:21

## 2024-01-03 RX ADMIN — HYDRALAZINE HYDROCHLORIDE 100 MG: 25 TABLET ORAL at 04:24

## 2024-01-03 RX ADMIN — LEVOTHYROXINE SODIUM 50 MCG: 0.05 TABLET ORAL at 05:38

## 2024-01-03 RX ADMIN — INSULIN LISPRO 4 UNITS: 100 INJECTION, SOLUTION INTRAVENOUS; SUBCUTANEOUS at 11:42

## 2024-01-03 RX ADMIN — INSULIN LISPRO 8 UNITS: 100 INJECTION, SOLUTION INTRAVENOUS; SUBCUTANEOUS at 11:42

## 2024-01-03 RX ADMIN — INSULIN GLARGINE 20 UNITS: 100 INJECTION, SOLUTION SUBCUTANEOUS at 20:46

## 2024-01-03 RX ADMIN — CARVEDILOL 6.25 MG: 6.25 TABLET, FILM COATED ORAL at 18:24

## 2024-01-03 RX ADMIN — Medication 10 MG: at 00:18

## 2024-01-03 RX ADMIN — Medication 10 ML: at 09:13

## 2024-01-03 RX ADMIN — POTASSIUM CHLORIDE 40 MEQ: 1500 TABLET, EXTENDED RELEASE ORAL at 09:20

## 2024-01-03 RX ADMIN — INSULIN LISPRO 2 UNITS: 100 INJECTION, SOLUTION INTRAVENOUS; SUBCUTANEOUS at 20:45

## 2024-01-03 RX ADMIN — Medication 10 ML: at 20:46

## 2024-01-03 RX ADMIN — FUROSEMIDE 40 MG: 40 TABLET ORAL at 18:25

## 2024-01-03 RX ADMIN — FUROSEMIDE 40 MG: 40 TABLET ORAL at 09:13

## 2024-01-03 RX ADMIN — AMLODIPINE BESYLATE 10 MG: 5 TABLET ORAL at 09:13

## 2024-01-03 RX ADMIN — CARVEDILOL 6.25 MG: 6.25 TABLET, FILM COATED ORAL at 09:12

## 2024-01-03 RX ADMIN — Medication 5 MG: at 23:44

## 2024-01-03 NOTE — NURSING NOTE
78-year-old female who presents to the hospital with shortness of breath.  Consult was received to assess patient's bilateral lower extremities.  Per patient she normally wears compression stockings at home.  She states that she cannot apply them but her sister applies them for her.    Patient has no wounds.  She has no weeping.  She does have edema to bilateral lower extremities.  She has changes in her skin noted that are consistent with chronic venous disease.  Patient is sitting up in her chair and eating her breakfast.  Will recommend routine skin care and can apply Ace wrap's while in the hospital once at home she can return to wearing her compression hose

## 2024-01-03 NOTE — PLAN OF CARE
Goal Outcome Evaluation:      Pt has rested through the night, despite blood pressures reading >150s majority of the night even while pt was resting. Prn labetalol given with slight improvement; scheduled am hydralazine given early- blood pressure continues to remain greater than 150 systolic. Plan for pt to discharge home with sister and home health when appropriate. Pt has continued to remain on room air; sats drop to 89% but rebounds to 91% with no intervention. VSS aside from elevated blood pressure.

## 2024-01-03 NOTE — PROGRESS NOTES
"Subjective   Leona Garcia is a 78 y.o. female.   Seen for diabetes f/u.  Not eating much.        Objective     /59   Pulse 81   Temp 97.8 °F (36.6 °C) (Oral)   Resp 15   Ht 167.6 cm (66\")   Wt 133 kg (293 lb 14 oz)   SpO2 91%   BMI 47.43 kg/m²   Blood sugar  172 this am,  127 @ lunch, 99 @ supper; cr improving @ 1,76    ASSESSMENT  Diabetes type 2, with hyperglycemia  Patient is stable    PLAN    Will decrease pre meal lispro again.         Avelino Morocho MD  1/2/2024  20:23 EST    "

## 2024-01-03 NOTE — PROGRESS NOTES
Cardiology Progress Note      PATIENT IDENTIFICATION    Name: Leona Garcia  Age: 78 y.o. Sex: female : 1945  MRN: [id number]    Requesting Provider    Dennis Ribeiro MD     LOS: 6 days       Reason For Followup:  Acute respiratory failure      Subjective:    Interval History:  Seen and examined.  Chart labs reviewed.  Patient denies any chest pain.  She continues to be frustrated with his hospitalization and wants to either go home or to [facility] where her primary physician is located.  Her blood pressure has been markedly elevated and she was started on nicardipine.  Her blood pressure is currently controlled.  Tmax is 97.8 pulse is 68 respirations are 20 blood pressure is 156/80 sats are 90%  Sodium is 137 potassium is 3.4 creatinine is 1.7 hemoglobin is 10.2      Review of Systems:  A complete review of systems was undertaken with the pertinent cardiovascular findings listed in history of present illness and all other systems being negative.     Assessment & Plan    Impressions:  Acute respiratory failure-multifactorial with contributing factors being volume overload, obesity hypoventilation syndrome, and influenza.  Abnormal BNP in the setting of acute on chronic kidney injury  Acute on chronic kidney disease  Volume overload state possibly related to diastolic heart failure  Chronic bilateral lower extremity edema with venous stasis changes noted to bilateral lower extremities    Recommendations:  Agree with oral diuretics  Discontinue nicardipine and monitor blood pressure  Hypertension is likely resulting from patient's angst and anxiety from being in the hospital.  It does seem to be situational  Renal function is stable  Wound care evaluation for lower extremity wraps  Current medications include Norvasc 10 mg p.o. once a day Coreg 6.25 mg p.o. twice daily patient is on Lasix 40 mg p.o. twice a day patient is on hydralazine 100 mg p.o. every 8 hours patient is on losartan 100  mg p.o. once a day  Consider DVT prophylaxis  Echocardiogram with normal left ventricular systolic function.  No plans for further cardiac workup at the present time.  Patient will follow-up with primary cardiologist Dr. Mcdonough as an outpatient        Objective:    Medication Review:   Scheduled Meds:amLODIPine, 10 mg, Oral, Q24H  carvedilol, 6.25 mg, Oral, BID With Meals  furosemide, 40 mg, Oral, BID  hydrALAZINE, 100 mg, Oral, Q8H  insulin glargine, 20 Units, Subcutaneous, Nightly  insulin lispro, 2-9 Units, Subcutaneous, 4x Daily AC & at Bedtime  insulin lispro, 8 Units, Subcutaneous, TID With Meals  levothyroxine, 50 mcg, Oral, Daily  losartan, 100 mg, Oral, Q24H  sodium chloride, 10 mL, Intravenous, Q12H      Continuous Infusions:niCARdipine, 5-15 mg/hr, Last Rate: Stopped (01/02/24 0945)      PRN Meds:.  acetaminophen **OR** acetaminophen **OR** acetaminophen    senna-docusate sodium **AND** polyethylene glycol **AND** bisacodyl **AND** bisacodyl    cloNIDine    dextrose    dextrose    glucagon (human recombinant)    hydrALAZINE    influenza vaccine    ipratropium-albuterol    labetalol    melatonin    nitroglycerin    ondansetron    sodium chloride    sodium chloride    sodium chloride    sodium chloride      Respiratory failure with hypoxia    Essential hypertension    Type 2 diabetes mellitus    Chronic renal insufficiency, stage III (moderate)    Hyperlipidemia    Hypothyroidism    Influenza A         Physical Exam:    General: Alert, cooperative, no distress, appears stated age  Head:  Normocephalic, atraumatic, mucous membranes moist  Eyes:  Conjunctivae/corneas clear, EOMs intact     Neck:  Supple,  no bruit  Lungs:  Coarse and diminished bilaterally.  Chest wall: No tenderness  Heart::  Regular rate and rhythm, S1 and S2 normal, 1/6 holosystolic murmur.  No rub or gallop  Abdomen: Soft, nontender, nondistended, bowel sounds active.  Obese  Extremities: No cyanosis, clubbing.  3+  edema  Pulses: Diminished pedal pulses  Skin:  Scaling and lichenification of lower extremities  Neuro/psych: A&O x3. CN II through XII are grossly intact with appropriate affect    Vital Signs:  Vitals:    01/03/24 0400 01/03/24 0500 01/03/24 0900 01/03/24 0912   BP: 165/68  176/81 176/81   BP Location:       Patient Position:       Pulse: 81  77 75   Resp:   13    Temp:   97.7 °F (36.5 °C)    TempSrc:   Oral    SpO2: 91%  91%    Weight:  132 kg (290 lb 12.6 oz)     Height:         Wt Readings from Last 1 Encounters:   01/03/24 132 kg (290 lb 12.6 oz)       Intake/Output Summary (Last 24 hours) at 1/3/2024 1115  Last data filed at 1/3/2024 0700  Gross per 24 hour   Intake 600 ml   Output 4775 ml   Net -4175 ml         Results Review:     CBC    Results from last 7 days   Lab Units 01/03/24  0436 12/31/23  2313 12/29/23  2353 12/29/23  0041 12/27/23  2251 12/27/23  1546   WBC 10*3/mm3 6.50 6.40 10.30 8.80 5.00 6.78   HEMOGLOBIN g/dL 10.2* 10.1* 9.3* 8.9* 8.2* 9.3*   PLATELETS 10*3/mm3 259 240 233 202 174 209     Cr Clearance Estimated Creatinine Clearance: 37.8 mL/min (A) (by C-G formula based on SCr of 1.74 mg/dL (H)).  Coag     HbA1C   Lab Results   Component Value Date    HGBA1C 9.30 (H) 10/18/2023     Blood Glucose   Glucose   Date/Time Value Ref Range Status   01/03/2024 0713 132 (H) 70 - 105 mg/dL Final     Comment:     Serial Number: [id number]:  [id number]   01/02/2024 2005 164 (H) 70 - 105 mg/dL Final     Comment:     Serial Number: [id number]:  [id number]   01/02/2024 1628 99 70 - 105 mg/dL Final     Comment:     Serial Number: [id number]:  [id number]   01/02/2024 1100 127 (H) 70 - 105 mg/dL Final     Comment:     Serial Number: [id number]:  [id number]   01/02/2024 0710 172 (H) 70 - 105 mg/dL Final     Comment:     Serial Number: [id number]:  [id number]   01/01/2024 1959 191 (H) 70 - 105 mg/dL Final     Comment:     Serial Number: [id number]:  [id number]   01/01/2024  "1637 142 (H) 70 - 105 mg/dL Final     Comment:     Serial Number: [id number]:  [id number]   01/01/2024 1119 157 (H) 70 - 105 mg/dL Final     Comment:     Serial Number: [id number]:  [id number]     Infection     CMP   Results from last 7 days   Lab Units 01/03/24  0436 01/02/24  1028 12/31/23  2313 12/30/23  2301 12/29/23  2353 12/29/23  0041 12/27/23  2251 12/27/23  1546   SODIUM mmol/L 137 140 140 138 141 137 143 139   POTASSIUM mmol/L 3.4* 3.6 3.7 4.1 4.8 5.0 4.5 4.7   CHLORIDE mmol/L 96* 100 101 100 102 97* 106 103   CO2 mmol/L 32.0* 32.0* 30.0* 29.0 28.0 24.0 28.0 28.4   BUN mg/dL 48* 45* 52* 59* 67* 60* 43* 47*   CREATININE mg/dL 1.74* 1.76* 1.94* 2.15* 2.50* 2.67* 2.07* 2.20*   GLUCOSE mg/dL 141* 131* 109* 148* 198* 407* 218* 271*   ALBUMIN g/dL  --  2.9*  --   --   --   --   --  3.7   BILIRUBIN mg/dL  --  0.4  --   --   --   --   --  0.5   ALK PHOS U/L  --  161*  --   --   --   --   --  167*   AST (SGOT) U/L  --  13  --   --   --   --   --  30   ALT (SGPT) U/L  --  16  --   --   --   --   --  34*     ABG    Results from last 7 days   Lab Units 01/01/24  0517   PH, ARTERIAL pH units 7.396   PCO2, ARTERIAL mm Hg 48.4*   PO2 ART mm Hg 77.6*   O2 SATURATION ART % 95.1   BASE EXCESS ART mmol/L 4.1*     UA    Results from last 7 days   Lab Units 12/28/23  1325   NITRITE UA  Negative   WBC UA /HPF 0-2   BACTERIA UA /HPF 1+*   SQUAM EPITHEL UA /HPF 0-2     XIMENA  No results found for: \"POCMETH\", \"POCAMPHET\", \"POCBARBITUR\", \"POCBENZO\", \"POCCOCAINE\", \"POCOPIATES\", \"POCOXYCODO\", \"POCPHENCYC\", \"POCPROPOXY\", \"POCTHC\", \"POCTRICYC\"  Lysis Labs   Results from last 7 days   Lab Units 01/03/24  0436 01/02/24  1028 12/31/23  2313 12/30/23  2301 12/29/23  2353 12/29/23  0041 12/27/23  2251 12/27/23  1546   HEMOGLOBIN g/dL 10.2*  --  10.1*  --  9.3* 8.9* 8.2* 9.3*   PLATELETS 10*3/mm3 259  --  240  --  233 202 174 209   CREATININE mg/dL 1.74* 1.76* 1.94* 2.15* 2.50* 2.67* 2.07* 2.20*     Radiology(recent) No radiology " results for the last day      Results from last 7 days   Lab Units 12/27/23  1740   HSTROP T ng/L 49*       Imaging Results (Last 24 Hours)       ** No results found for the last 24 hours. **            Cardiac Studies:  Echo- Results for orders placed during the hospital encounter of 12/27/23    Adult Transthoracic Echo Complete W/ Cont if Necessary Per Protocol    Interpretation Summary    Left ventricular systolic function is normal. Left ventricular ejection fraction appears to be 61 - 65%.    Left ventricular diastolic function is consistent with (grade I) impaired relaxation.    The left atrial cavity is mildly dilated.    The right atrial cavity is mildly  dilated.    There is mild, bileaflet mitral valve thickening present.    Estimated right ventricular systolic pressure from tricuspid regurgitation is markedly elevated (>55 mmHg).    Stress Myoview-  Cath-        Eleni Bean MD  01/03/24  11:15 EST

## 2024-01-03 NOTE — PLAN OF CARE
Goal Outcome Evaluation:         Pt on room air while awake. LSCTA. Infrequent dry, non-productive cough.Up to BSC with 1 assist. Sat in chair or on side of bed for much of shift. BP still elevated at times (highest ), but responds well to medication. Asymptomatic with elevated BP.

## 2024-01-03 NOTE — PROGRESS NOTES
[facility] Medicine Services   Daily Progress Note    Patient Name: Leona Garcia  : 1945  MRN: [id number]  Primary Care Physician:  Alfred Liriano MD  Date of admission: 2023  Date of Service: 2024      Subjective    She remained stable and has no complaints.  No acute overnight event, she was seen and examined at the bedside.            Objective      Vitals:   Temp:  [97.4 °F (36.3 °C)-98 °F (36.7 °C)] 97.6 °F (36.4 °C)  Heart Rate:  [68-87] 78  Resp:  [12-21] 13  BP: (127-176)/(61-96) 163/73  Flow (L/min):  [2] 2    Physical Exam       General:  Alert and oriented ,   HENT:  Normocephalic, atraumatic   Respiratory: Diminished breath sounds bilaterally.   Cardiovascular: Regular rate, Regular, S1 auscultated. S2 auscultated , No edema   Gastrointestinal: Soft. Nontender, nondistended. Normal bowel sounds  Musculoskeletal: No tenderness   Neurologic: Alert, oriented. No focal defect   Psychiatric: Cooperative , appropriate mood and affect, no focal deficits          Result Review    Result Review:  I have personally reviewed the results from the time of this admission to 1/3/2024 18:52 EST and agree with these findings:  [x]  Laboratory  [x]  Microbiology  [x]  Radiology  []  EKG/Telemetry   []  Cardiology/Vascular   []  Pathology  []  Old records  []  Other:  Most notable findings include: dyspnea          Assessment & Plan      Respiratory Failure with Hypoxia  Likely due to a combination of Influenza A and possible new onset CHF  -Now down to 2 L, wean off as tolerated.  Echocardiogram stable,  -Continue bronchodilators, incentive spirometer and Acapella.  -Continue diuretics.         Essential Hypertension/urgency  -Nicardipine drip discontinued, blood pressure mildly elevated.  -Amlodipine, carvedilol, Lasix, hydralazine  -Continue to monitor.      Bradycardia  -Heart rates in the 70s today,  -Monitor on telemetry, cardiology following, appreciate  recommendations.        JOSELITO/CKD stage III;  -Kidney continues to improve, continue to monitor.    -Nephrology following.            Dysphagia  -Mechanical ground textures with thin liquids and either whole or crushed medications recommended           Diabetes Mellitus II  -continue Lantus 30 units nightly and insulin lispro 12 units with each meal  -SSI ordered         Hypothyroidism  -Continue Levothyroxine          DVT prophylaxis: SCDs  Continue patient level of care  Full code            Electronically signed by Dennis Ribeiro MD, 01/03/24, 18:52 EST.  [facility] Hospitalist Team

## 2024-01-03 NOTE — THERAPY TREATMENT NOTE
"Subjective: Pt agreeable to therapeutic plan of care.    Objective:     Bed mobility - Min-A  Transfers - Min-A and with rolling walker  Ambulation - 15 x 2 feet SBA and with rolling walker      Vitals: Desaturates 88-95% on RA     Pain: denies pain VAS   Location: n/a  Intervention for pain: N/A    Education: Provided education on the importance of mobility in the acute care setting and Verbal/Tactile Cues, breathing strategies    Assessment: Leona Garcia presents with functional mobility impairments which indicate the need for skilled intervention. Pt requiring min a to CTS from recliner and couch due to lower surfaces. Pt amb within room w/ rollator  w/ SBA. Pt able to manage obstacles and complete turns without incidence. Pt amb 15ft x2, requiring seated rest periods between gait bouts due to fatigue. Pt initially on 2L NC and sats 98% upon arrival. Pt completed treatment session on RA with desat w/ exertion to 88-89% but recover quickly to >90% with rest. Pt returned o supine in bed requiring min a for BLEs. Pt reports sleeping in a recliner at home. Tolerating session today without incident. Will continue to follow and progress as tolerated.     Plan/Recommendations:   If medically appropriate, Low Intensity Therapy recommended post-acute care - This is recommended as therapy feels this patient would require 2-3 visits per week. OP or HH would be the best option depending on patient's home bound status. Consider, if the patient has other  \"skilled\" needs such as wounds, IV antibiotics, etc. Combined with \"low intensity\" could also equate to a SNF. If patient is medically complex, consider LTAC. Pt requires no DME at discharge.     Pt desires Home with Home Health and Home with family assist at discharge. Pt cooperative; agreeable to therapeutic recommendations and plan of care.         Basic Mobility 6-click:  Rollin = Total, A lot = 2, A little = 3; 4 = None  Supine>Sit:   1 = Total, A lot = " 2, A little = 3; 4 = None   Sit>Stand with arms:  1 = Total, A lot = 2, A little = 3; 4 = None  Bed>Chair:   1 = Total, A lot = 2, A little = 3; 4 = None  Ambulate in room:  1 = Total, A lot = 2, A little = 3; 4 = None  3-5 Steps with railin = Total, A lot = 2, A little = 3; 4 = None  Score: 15    Modified [place]: N/A = No pre-op stroke/TIA    Post-Tx Position: Supine with HOB Elevated, Alarms activated, and Call light and personal items within reach  PPE: gloves and surgical mask

## 2024-01-03 NOTE — PLAN OF CARE
"Assessment: Leona Garcia presents with functional mobility impairments which indicate the need for skilled intervention. Pt requiring min a to CTS from recliner and couch due to lower surfaces. Pt amb within room w/ rollator  w/ SBA. Pt able to manage obstacles and complete turns without incidence. Pt amb 15ft x2, requiring seated rest periods between gait bouts due to fatigue. Pt initially on 2L NC and sats 98% upon arrival. Pt completed treatment session on RA with desat w/ exertion to 88-89% but recover quickly to >90% with rest. Pt returned o supine in bed requiring min a for BLEs. Pt reports sleeping in a recliner at home. Tolerating session today without incident. Will continue to follow and progress as tolerated.     Plan/Recommendations:   If medically appropriate, Low Intensity Therapy recommended post-acute care - This is recommended as therapy feels this patient would require 2-3 visits per week. OP or HH would be the best option depending on patient's home bound status. Consider, if the patient has other  \"skilled\" needs such as wounds, IV antibiotics, etc. Combined with \"low intensity\" could also equate to a SNF. If patient is medically complex, consider LTAC. Pt requires no DME at discharge.     Pt desires Home with Home Health and Home with family assist at discharge. Pt cooperative; agreeable to therapeutic recommendations and plan of care.   "

## 2024-01-03 NOTE — THERAPY TREATMENT NOTE
Acute Care - Speech Language Pathology   Swallow Treatment Note  [facility]     Patient Name: Leona Garcia  : 1945  MRN: [id number]  Today's Date: 1/3/2024               Admit Date: 2023    Visit Dx:     ICD-10-CM ICD-9-CM   1. Acute respiratory failure with hypoxia  J96.01 518.81   2. Congestive heart failure, unspecified HF chronicity, unspecified heart failure type  I50.9 428.0   3. Elevated troponin  R79.89 790.6   4. Chronic kidney disease, unspecified CKD stage  N18.9 585.9   5. Influenza A  J10.1 487.1     Patient Active Problem List   Diagnosis    Right hip pain    Essential hypertension    Type 2 diabetes mellitus    Respiratory failure with hypoxia    Chronic renal insufficiency, stage III (moderate)    Hyperlipidemia    Hypothyroidism    Influenza A     Past Medical History:   Diagnosis Date    Anemia     Chronic kidney disease (CKD)     Diabetes mellitus     Disease of thyroid gland     Hypertension      SLP Recommendation and Plan       EDUCATION  The patient has been educated in the following areas:   Dysphagia (Swallowing Impairment) Oral Care/Hydration Modified Diet Instruction.        SLP GOALS       Row Name 24 1000       (LTG) Swallow    (LTG) Swallow Pt will maximize swallow function for least restrictive PO diet, exhibiting no complication associated with dysphagia, adequate PO intake, and demonstrating independent use of swallow compensations  -PF    Time Frame (Swallow Long Term Goal) by discharge  -PF    Progress/Outcomes (Swallow Long Term Goal) goal ongoing  -PF    Comment (Swallow Long Term Goal) See above note  -PF       (STG) Swallow 1    (STG) Swallow 1 The patient will participate in a meal/follow-up assessment to determine safety and adequacy of recommended diet, independent use of safe swallow compensations, pt/family education and additional goals/recommendations to follow  -PF    [place] (Swallow Short Term Goal 1) with minimal cues (75-90% accuracy)  -PF     Time Frame (Swallow Short Term Goal 1) 1 week  -PF    Comment (Swallow Short Term Goal 1) Pt was seen for skilled DT/meal at breakfast to ensure tolerance and safety of rec'd diet.  Pt was found positioned upright in chair at a 90 degree angle hip flexion; she exhibited a cough prior to PO. Pt demonstrates safe and adequate swallow of soft to chew and thin liquid diet. Prolonged mastication at baseline as confirmed by pt. Pt reports she is doing well with current diet, denies difficulty, no overt s/s of aspiration on any consistency. However, pt showed difficulty w/meds (whole w/thin) and unable to get it down w/initial swallow. D/w pt regarding crushed meds in puree and she's agreeable. D/w RN regarding recs for meds. ST to sign off at this time. Re-consult if concerns arise.  -PF       (STG) Swallow 2    (STG) Swallow 2 Pt will participate in ongoing swallow assessment to include VFSS if indicated, and caregiver teaching.  -PF    Time Frame (Swallow Short Term Goal 2) 1 week  -PF    Progress/Outcomes (Swallow Short Term Goal 2) goal ongoing  -PF    Comment (Swallow Short Term Goal 2) See above  -PF              User Key  (r) = Recorded By, (t) = Taken By, (c) = Cosigned By      Initials Name Provider Type    PF Fakto, [place], SLP Speech and Language Pathologist          CECILE Piedra  1/3/2024

## 2024-01-03 NOTE — PROGRESS NOTES
PROGRESS NOTE      Patient Name: Leona Garcia  : 1945  MRN: [id number]  Primary Care Physician: Alfred Liriano MD  Date of admission: 2023    Patient Care Team:  Alfred Liriano MD as PCP - General (Family Medicine)        Subjective   Subjective:     Patient is feeling better no new complaints still complaining of arthritis hip pain  Review of systems:  All other review of system unremarkable      Allergies:  No Known Allergies    Objective   Exam:     Vital Signs  Temp:  [97.4 °F (36.3 °C)-98 °F (36.7 °C)] 97.7 °F (36.5 °C)  Heart Rate:  [70-87] 75  Resp:  [12-15] 13  BP: (122-176)/(59-98) 176/81  SpO2:  [90 %-98 %] 91 %  on  Flow (L/min):  [2] 2;   Device (Oxygen Therapy): nasal cannula  Body mass index is 46.93 kg/m².    General: Elderly Afro-American female in no acute distress.    Head:      Normocephalic and atraumatic.    Eyes:      PERRL/EOM intact, conjunctiva and sclera clear with out nystagmus.    Neck:      No masses, thyromegaly,  trachea central with normal respiratory effort   Lungs:    Clear bilaterally to auscultation.    Heart:      Regular rate and rhythm, no murmur no gallop  Abd:        Soft, nontender, not distended, bowel sounds positive, no shifting dullness   Pulses:   Pulses palpable  Extr:        No cyanosis or clubbing--+1 edema.    Neuro:    No focal deficits.   alert oriented x3  Skin:       Intact without lesions or rashes.    Psych:    Alert and cooperative; normal mood and affect; .      Results Review:  I have personally reviewed most recent Data :  CBC    Results from last 7 days   Lab Units 24  0436 23  2313 23  2353 23  0041 23  2251 23  1546   WBC 10*3/mm3 6.50 6.40 10.30 8.80 5.00 6.78   HEMOGLOBIN g/dL 10.2* 10.1* 9.3* 8.9* 8.2* 9.3*   PLATELETS 10*3/mm3 259 240 233 202 174 209     CMP   Results from last 7 days   Lab Units 24  0436 24  1028 23  2313 23  2301 23  0973  12/29/23  0041 12/27/23  2251 12/27/23  1546   SODIUM mmol/L 137 140 140 138 141 137 143 139   POTASSIUM mmol/L 3.4* 3.6 3.7 4.1 4.8 5.0 4.5 4.7   CHLORIDE mmol/L 96* 100 101 100 102 97* 106 103   CO2 mmol/L 32.0* 32.0* 30.0* 29.0 28.0 24.0 28.0 28.4   BUN mg/dL 48* 45* 52* 59* 67* 60* 43* 47*   CREATININE mg/dL 1.74* 1.76* 1.94* 2.15* 2.50* 2.67* 2.07* 2.20*   GLUCOSE mg/dL 141* 131* 109* 148* 198* 407* 218* 271*   ALBUMIN g/dL  --  2.9*  --   --   --   --   --  3.7   BILIRUBIN mg/dL  --  0.4  --   --   --   --   --  0.5   ALK PHOS U/L  --  161*  --   --   --   --   --  167*   AST (SGOT) U/L  --  13  --   --   --   --   --  30   ALT (SGPT) U/L  --  16  --   --   --   --   --  34*     ABG    Results from last 7 days   Lab Units 01/01/24  0517   PH, ARTERIAL pH units 7.396   PCO2, ARTERIAL mm Hg 48.4*   PO2 ART mm Hg 77.6*   O2 SATURATION ART % 95.1   BASE EXCESS ART mmol/L 4.1*     No radiology results for the last day    Results for orders placed during the hospital encounter of 12/27/23    Adult Transthoracic Echo Complete W/ Cont if Necessary Per Protocol    Interpretation Summary    Left ventricular systolic function is normal. Left ventricular ejection fraction appears to be 61 - 65%.    Left ventricular diastolic function is consistent with (grade I) impaired relaxation.    The left atrial cavity is mildly dilated.    The right atrial cavity is mildly  dilated.    There is mild, bileaflet mitral valve thickening present.    Estimated right ventricular systolic pressure from tricuspid regurgitation is markedly elevated (>55 mmHg).    Scheduled Meds:amLODIPine, 10 mg, Oral, Q24H  carvedilol, 6.25 mg, Oral, BID With Meals  furosemide, 40 mg, Oral, BID  hydrALAZINE, 100 mg, Oral, Q8H  insulin glargine, 20 Units, Subcutaneous, Nightly  insulin lispro, 2-9 Units, Subcutaneous, 4x Daily AC & at Bedtime  insulin lispro, 8 Units, Subcutaneous, TID With Meals  levothyroxine, 50 mcg, Oral, Daily  losartan, 100 mg, Oral,  Q24H  sodium chloride, 10 mL, Intravenous, Q12H      Continuous Infusions:niCARdipine, 5-15 mg/hr, Last Rate: Stopped (01/02/24 0945)        PRN Meds:  acetaminophen **OR** acetaminophen **OR** acetaminophen    senna-docusate sodium **AND** polyethylene glycol **AND** bisacodyl **AND** bisacodyl    cloNIDine    dextrose    dextrose    glucagon (human recombinant)    hydrALAZINE    influenza vaccine    ipratropium-albuterol    labetalol    melatonin    nitroglycerin    ondansetron    sodium chloride    sodium chloride    sodium chloride    sodium chloride    Assessment & Plan   Assessment and Plan:         Respiratory failure with hypoxia    Essential hypertension    Type 2 diabetes mellitus    Chronic renal insufficiency, stage III (moderate)    Hyperlipidemia    Hypothyroidism    Influenza A    ASSESSMENT:  Acute kidney injury in a patient with chronic kidney disease stage III  Hypertension  Diabetes millitus type 2  Hip pain   Congestive heart failure with ejection fraction around 60% and grade 1 diastolic dysfunctions  Lower extremity edema    PLAN :      Patient has CKD stage IIIb with some fluctuation in creatinine depending upon volume status and underlying cardiac condition  Significant lower extremity edema still present but close to baseline  Continue to monitor blood pressure closely continue calcium channel blocker beta-blocker angiotensin receptor blocker and l loop diuretics  Potassium need to be replaced  Hold meloxicam because patient was taking at home that might be resulting in some progressive CKD   repeat labs today and tomorrow morning  Continue same antihypertensive medications and adjust diuretics according to the volume status          Electronically signed by Chu Lee MD,   [facility] kidney consultant  [phone]  1/3/2024  10:46 EST  ,

## 2024-01-04 ENCOUNTER — READMISSION MANAGEMENT (OUTPATIENT)
Dept: CALL CENTER | Facility: HOSPITAL | Age: 79
End: 2024-01-04
Payer: MEDICARE

## 2024-01-04 VITALS
SYSTOLIC BLOOD PRESSURE: 149 MMHG | OXYGEN SATURATION: 95 % | BODY MASS INDEX: 46.2 KG/M2 | HEIGHT: 66 IN | TEMPERATURE: 97.3 F | DIASTOLIC BLOOD PRESSURE: 72 MMHG | HEART RATE: 71 BPM | WEIGHT: 287.48 LBS | RESPIRATION RATE: 16 BRPM

## 2024-01-04 LAB
ANION GAP SERPL CALCULATED.3IONS-SCNC: 8 MMOL/L (ref 5–15)
BUN SERPL-MCNC: 40 MG/DL (ref 8–23)
BUN/CREAT SERPL: 26.3 (ref 7–25)
CALCIUM SPEC-SCNC: 9.1 MG/DL (ref 8.6–10.5)
CHLORIDE SERPL-SCNC: 99 MMOL/L (ref 98–107)
CO2 SERPL-SCNC: 34 MMOL/L (ref 22–29)
CREAT SERPL-MCNC: 1.52 MG/DL (ref 0.57–1)
DEPRECATED RDW RBC AUTO: 48.6 FL (ref 37–54)
EGFRCR SERPLBLD CKD-EPI 2021: 35.2 ML/MIN/1.73
ERYTHROCYTE [DISTWIDTH] IN BLOOD BY AUTOMATED COUNT: 16.8 % (ref 12.3–15.4)
GLUCOSE BLDC GLUCOMTR-MCNC: 108 MG/DL (ref 70–105)
GLUCOSE BLDC GLUCOMTR-MCNC: 114 MG/DL (ref 70–105)
GLUCOSE SERPL-MCNC: 115 MG/DL (ref 65–99)
HCT VFR BLD AUTO: 30.4 % (ref 34–46.6)
HGB BLD-MCNC: 9.7 G/DL (ref 12–15.9)
MAGNESIUM SERPL-MCNC: 1.9 MG/DL (ref 1.6–2.4)
MCH RBC QN AUTO: 26.6 PG (ref 26.6–33)
MCHC RBC AUTO-ENTMCNC: 31.8 G/DL (ref 31.5–35.7)
MCV RBC AUTO: 83.7 FL (ref 79–97)
PHOSPHATE SERPL-MCNC: 3.7 MG/DL (ref 2.5–4.5)
PLATELET # BLD AUTO: 250 10*3/MM3 (ref 140–450)
PMV BLD AUTO: 8.3 FL (ref 6–12)
POTASSIUM SERPL-SCNC: 3.6 MMOL/L (ref 3.5–5.2)
RBC # BLD AUTO: 3.63 10*6/MM3 (ref 3.77–5.28)
SODIUM SERPL-SCNC: 141 MMOL/L (ref 136–145)
WBC NRBC COR # BLD AUTO: 6.7 10*3/MM3 (ref 3.4–10.8)

## 2024-01-04 PROCEDURE — 97530 THERAPEUTIC ACTIVITIES: CPT

## 2024-01-04 PROCEDURE — 85027 COMPLETE CBC AUTOMATED: CPT | Performed by: INTERNAL MEDICINE

## 2024-01-04 PROCEDURE — 84100 ASSAY OF PHOSPHORUS: CPT | Performed by: INTERNAL MEDICINE

## 2024-01-04 PROCEDURE — 97535 SELF CARE MNGMENT TRAINING: CPT | Performed by: OCCUPATIONAL THERAPIST

## 2024-01-04 PROCEDURE — 97116 GAIT TRAINING THERAPY: CPT

## 2024-01-04 PROCEDURE — 82948 REAGENT STRIP/BLOOD GLUCOSE: CPT

## 2024-01-04 PROCEDURE — 99232 SBSQ HOSP IP/OBS MODERATE 35: CPT | Performed by: INTERNAL MEDICINE

## 2024-01-04 PROCEDURE — 63710000001 INSULIN LISPRO (HUMAN) PER 5 UNITS: Performed by: INTERNAL MEDICINE

## 2024-01-04 PROCEDURE — 80048 BASIC METABOLIC PNL TOTAL CA: CPT | Performed by: INTERNAL MEDICINE

## 2024-01-04 PROCEDURE — 83735 ASSAY OF MAGNESIUM: CPT | Performed by: INTERNAL MEDICINE

## 2024-01-04 RX ORDER — CARVEDILOL 6.25 MG/1
6.25 TABLET ORAL 2 TIMES DAILY WITH MEALS
Qty: 60 TABLET | Refills: 0 | Status: SHIPPED | OUTPATIENT
Start: 2024-01-04 | End: 2024-02-03

## 2024-01-04 RX ORDER — AMLODIPINE BESYLATE 10 MG/1
10 TABLET ORAL
Qty: 30 TABLET | Refills: 0 | Status: SHIPPED | OUTPATIENT
Start: 2024-01-05 | End: 2024-02-04

## 2024-01-04 RX ADMIN — INSULIN LISPRO 8 UNITS: 100 INJECTION, SOLUTION INTRAVENOUS; SUBCUTANEOUS at 08:41

## 2024-01-04 RX ADMIN — LEVOTHYROXINE SODIUM 50 MCG: 0.05 TABLET ORAL at 05:09

## 2024-01-04 RX ADMIN — AMLODIPINE BESYLATE 10 MG: 5 TABLET ORAL at 08:41

## 2024-01-04 RX ADMIN — HYDRALAZINE HYDROCHLORIDE 100 MG: 25 TABLET ORAL at 05:09

## 2024-01-04 RX ADMIN — FUROSEMIDE 40 MG: 40 TABLET ORAL at 08:41

## 2024-01-04 RX ADMIN — LOSARTAN POTASSIUM 100 MG: 50 TABLET, FILM COATED ORAL at 08:41

## 2024-01-04 RX ADMIN — Medication 10 ML: at 08:43

## 2024-01-04 RX ADMIN — CARVEDILOL 6.25 MG: 6.25 TABLET, FILM COATED ORAL at 08:41

## 2024-01-04 RX ADMIN — INSULIN LISPRO 8 UNITS: 100 INJECTION, SOLUTION INTRAVENOUS; SUBCUTANEOUS at 12:47

## 2024-01-04 RX ADMIN — HYDRALAZINE HYDROCHLORIDE 100 MG: 25 TABLET ORAL at 13:56

## 2024-01-04 NOTE — THERAPY TREATMENT NOTE
Subjective: Pt agreeable to therapeutic plan of care.    Objective:     Bed mobility - Supervision supine to sit.  Pt slow at task but was able to complete without assist.  Transfers - Supervision and with rolling walker (rollator).   Ambulation - 20 + 20 feet Supervision and with rolling walker (rollator) slow pace, pt seems to bed aware of her safety and goes slow    Vitals: WNL    Pain: 0 VAS       Education: Provided education on the importance of mobility in the acute care setting, Verbal/Tactile Cues, Transfer Training, and Gait Training    Assessment: Leona Garcia presents with functional mobility impairments which indicate the need for skilled intervention. Tolerating session today without incident.  Pt performed much better today.  Pt is able to transfer out of the bed, stand up to rollator and ambulate short distance without any physical assist.  Pt is most likely close to her baseline mobility.  Will continue to follow and progress as tolerated.     Plan/Recommendations:   If medically appropriate, Low Intensity Therapy recommended post-acute care - This is recommended as therapy feels this patient would require 2-3 visits per week. HH would be the best option.  Pt requires no DME at discharge.     Pt desires Home with family assist and and Home Health at discharge. Pt cooperative; agreeable to therapeutic recommendations and plan of care.     Basic Mobility 6-click:  Rollin = Total, A lot = 2, A little = 3; 4 = None  Supine>Sit:   1 = Total, A lot = 2, A little = 3; 4 = None   Sit>Stand with arms:  1 = Total, A lot = 2, A little = 3; 4 = None  Bed>Chair:   1 = Total, A lot = 2, A little = 3; 4 = None  Ambulate in room:  1 = Total, A lot = 2, A little = 3; 4 = None  3-5 Steps with railin = Total, A lot = 2, A little = 3; 4 = None  Score: 22    Post-Tx Position: Up in Chair, Alarms activated, and Call light and personal items within reach  PPE: gloves and surgical mask

## 2024-01-04 NOTE — PLAN OF CARE
"Goal Outcome Evaluation:      Assessment: Leona Garcia presents with ADL impairments affecting function including endurance / activity tolerance and strength. Pt demo ability to complete functional mobility, grooming tasks at sink, toileting tasks using AE/DME with good . She completes tasks slow & steady, but safely. Demonstrated functioning below baseline abilities indicate the need for continued skilled intervention while inpatient. Tolerating session today without incident. Will continue to follow and progress as tolerated.     Plan/Recommendations:   Low Intensity Therapy recommended post-acute care - This is recommended as therapy feels this patient would require 2-3 visits per week. OP or HH would be the best option depending on patient's home bound status. Consider, if the patient has other  \"skilled\" needs such as wounds, IV antibiotics, etc. Combined with \"low intensity\" could also equate to a SNF. If patient is medically complex, consider LTAC.. Pt requires no DME at discharge.     Pt desires Home with family assist and and Home Health at discharge. Pt cooperative; agreeable to therapeutic recommendations and plan of care.                  " Subjective   Luda Nguyen is a 33 y.o. female who presents to the clinic with:        Sore Throat    This is a new problem. The current episode started yesterday. The problem has been unchanged. The pain is worse on the left side. There has been no fever. The fever has been present for less than 1 day. The pain is mild. Associated symptoms include abdominal pain, headaches, swollen glands and trouble swallowing. Pertinent negatives include no coughing, diarrhea, ear discharge, ear pain or vomiting. Associated symptoms comments: Body aches, feel terrible. She has had exposure to strep. Exposure to: daughters twins and infant son. She has tried NSAIDs (Benadryl last night, Aleve this AM) for the symptoms. The treatment provided no relief.          The following portions of the patient's history were reviewed and updated as appropriate: allergies, current medications, past family history, past medical history, past social history, past surgical history and problem list.        Review of Systems   Constitutional: Positive for activity change, appetite change, fatigue and fever.   HENT: Positive for sore throat and trouble swallowing. Negative for ear discharge, ear pain, sinus pain and sinus pressure.    Respiratory: Negative for cough and wheezing.    Gastrointestinal: Positive for abdominal pain and nausea. Negative for diarrhea and vomiting.   Neurological: Positive for headaches.         Objective   Physical Exam   Constitutional: She appears well-developed and well-nourished.   HENT:   Head: Normocephalic.   Right Ear: Tympanic membrane and ear canal normal.   Left Ear: Tympanic membrane and ear canal normal.   Nose: Nose normal.   Mouth/Throat: Uvula is midline. Posterior oropharyngeal erythema present. Tonsils are 1+ on the right. Tonsils are 2+ on the left. No tonsillar exudate.   Eyes: Pupils are equal, round, and reactive to light. Conjunctivae are normal.   Neck: Normal range of motion.   Cardiovascular:  Normal rate and regular rhythm.    Pulmonary/Chest: Effort normal and breath sounds normal.   Lymphadenopathy:     She has cervical adenopathy.   Vitals reviewed.        Assessment/Plan   Luda was seen today for sore throat.    Diagnoses and all orders for this visit:    Acute pharyngitis, unspecified etiology  -     POCT rapid strep A    Exposure to strep throat  -     POCT rapid strep A    Other orders  -     cefdinir (OMNICEF) 300 MG capsule; Take 1 capsule by mouth 2 (Two) Times a Day With Meals for 10 days.      Strep negative  Discard toothbrush

## 2024-01-04 NOTE — CASE MANAGEMENT/SOCIAL WORK
Case Management Discharge Note      Final Note[facility]         Selected Continued Care - Admitted Since 12/27/2023           Home Medical Care Coordination complete.      Service Provider Selected Services Address Phone Fax Patient Preferred    [facility] [address] [place] [address], [place] [id number] [phone] --                 Transportation Services  Private: Car    Final Discharge Disposition Code: 06 - home with home health care

## 2024-01-04 NOTE — DISCHARGE SUMMARY
[facility] Medicine Services  Discharge Summary    Date of Service: 2024  Patient Name: Leona Garcia  : 1945  MRN: [id number]    Date of Admission: 2023  Discharge Diagnosis:     Date of Discharge:  2024  Primary Care Physician: Alfred Liriano MD      Presenting Problem:   Influenza A [J10.1]  Elevated troponin [R79.89]  Respiratory failure with hypoxia [J96.91]  Acute respiratory failure with hypoxia [J96.01]  Chronic kidney disease, unspecified CKD stage [N18.9]  Congestive heart failure, unspecified HF chronicity, unspecified heart failure type [I50.9]    Active and Resolved Hospital Problems:  Active Hospital Problems    Diagnosis POA    **Respiratory failure with hypoxia [J96.91] Yes    Influenza A [J10.1] Yes    Hyperlipidemia [E78.5] Yes    Type 2 diabetes mellitus [E11.9] Yes    Essential hypertension [I10] Yes    Chronic renal insufficiency, stage III (moderate) [N18.30] Yes    Hypothyroidism [E03.9] Yes      Resolved Hospital Problems   No resolved problems to display.         Hospital Course     Hospital Course:  Leona Garcia is a 78 y.o. female with a previous medical history of HTN, CKD III, DM on insulin, Hyperlipidemia, and Dysphagia who presented to [facility] ED on 2023 with shortness of breath which has been worsening over the past week.  She states that her home health nurse has been checking oxygen and it has been fine.  She states that this morning, she was unable to transfer from her bed to the chair due to the shortness of breath.     She was admitted for acute hypoxemic respiratory failure in the settings of influenza A and heart failure.  Echocardiogram was normal, she is monitored bronchodilators, incentive spirometer and Acapella.  She required nicardipine drip for hypertensive urgency and was transitioned to oral antihypertensives.  She was started on amlodipine, carvedilol Lasix and hydralazine.  Home hydrochlorothiazide  and Cardizem was discontinued after she had a bradycardic episode in the hospital.  She was seen by cardiology.    Volume status improved, kidney function stabilized and patient has been discharged home.  She was seen by PT/OT who recommended discharge home with home health.    DISCHARGE Follow Up Recommendations for labs and diagnostics:   None        Reasons For Change In Medications and Indications for New Medications:      Day of Discharge     Vital Signs:  Temp:  [97.3 °F (36.3 °C)-98.3 °F (36.8 °C)] 97.3 °F (36.3 °C)  Heart Rate:  [71-80] 71  Resp:  [13-20] 16  BP: (139-169)/(56-96) 149/72  Flow (L/min):  [2] 2    Physical Exam:  General:  Alert and oriented ,   HENT:  Normocephalic, atraumatic   Respiratory: Diminished breath sounds bilaterally.   Cardiovascular: Regular rate, Regular, S1 auscultated. S2 auscultated , No edema   Gastrointestinal: Soft. Nontender, nondistended. Normal bowel sounds  Musculoskeletal: No tenderness   Neurologic: Alert, oriented. No focal defect   Psychiatric: Cooperative , appropriate mood and affect, no focal deficits      Pertinent  and/or Most Recent Results     LAB RESULTS:      Lab 01/04/24  0107 01/03/24  0436 12/31/23  2313 12/29/23  2353 12/29/23  0041   WBC 6.70 6.50 6.40 10.30 8.80   HEMOGLOBIN 9.7* 10.2* 10.1* 9.3* 8.9*   HEMATOCRIT 30.4* 31.5* 31.7* 28.9* 28.4*   PLATELETS 250 259 240 233 202   NEUTROS ABS  --  3.30 4.22 7.10* 7.00   LYMPHS ABS  --  2.30  --  2.20 1.20   MONOS ABS  --  0.60  --  0.90 0.50   EOS ABS  --  0.20 0.19 0.00 0.00   MCV 83.7 83.0 84.2 84.1 85.7         Lab 01/04/24  0107 01/03/24  0436 01/02/24  1028 12/31/23  2313 12/30/23  2301   SODIUM 141 137 140 140 138   POTASSIUM 3.6 3.4* 3.6 3.7 4.1   CHLORIDE 99 96* 100 101 100   CO2 34.0* 32.0* 32.0* 30.0* 29.0   ANION GAP 8.0 9.0 8.0 9.0 9.0   BUN 40* 48* 45* 52* 59*   CREATININE 1.52* 1.74* 1.76* 1.94* 2.15*   EGFR 35.2* 29.9* 29.5* 26.3* 23.2*   GLUCOSE 115* 141* 131* 109* 148*   CALCIUM 9.1 9.1  8.8 8.9 8.8   MAGNESIUM 1.9  --  2.1  --   --    PHOSPHORUS 3.7  --  3.9  --   --          Lab 01/02/24  1028   TOTAL PROTEIN 5.2*   ALBUMIN 2.9*   GLOBULIN 2.3   ALT (SGPT) 16   AST (SGOT) 13   BILIRUBIN 0.4   ALK PHOS 161*         Lab 01/02/24  1028   PROBNP 1,349.0                 Lab 01/01/24  0517   PH, ARTERIAL 7.396   PCO2, ARTERIAL 48.4*   PO2 ART 77.6*   O2 SATURATION ART 95.1   FIO2 28   HCO3 ART 29.7*   BASE EXCESS ART 4.1*     Brief Urine Lab Results  (Last result in the past 365 days)        Color   Clarity   Blood   Leuk Est   Nitrite   Protein   CREAT   Urine HCG        12/28/23 1325 Yellow   Hazy   Negative   Negative   Negative   100 mg/dL (2+)                 Microbiology Results (last 10 days)       Procedure Component Value - Date/Time    COVID-19 and FLU A/B PCR, 1 HR TAT - Swab, Nasopharynx [[id number]]  (Abnormal) Collected: 12/27/23 1556    Lab Status: Final result Specimen: Swab from Nasopharynx Updated: 12/27/23 1631     COVID19 Not Detected     Influenza A PCR Detected     Influenza B PCR Not Detected    Narrative:      Fact sheet for providers: https://www.fda.gov/media/926047/download    Fact sheet for patients: https://www.fda.gov/media/898456/download    Test performed by PCR.            US Renal Bilateral    Result Date: 12/28/2023  Impression: Impression: 1.Unremarkable appearance of the kidneys. 2.Urinary bladder inadequately visualized. 3.Incidental right pleural effusion. Electronically Signed: Dharmesh Cortés MD  12/28/2023 2:03 PM CST  Workstation ID: [id number]    XR Chest 1 View    Result Date: 12/27/2023  Impression: Impression: Stable cardiomegaly without acute process. Electronically Signed: Dexter Cartwright MD  12/27/2023 4:15 PM EST  Workstation ID: [id number]             Results for orders placed during the hospital encounter of 12/27/23    Adult Transthoracic Echo Complete W/ Cont if Necessary Per Protocol    Interpretation Summary    Left ventricular systolic function is normal.  Left ventricular ejection fraction appears to be 61 - 65%.    Left ventricular diastolic function is consistent with (grade I) impaired relaxation.    The left atrial cavity is mildly dilated.    The right atrial cavity is mildly  dilated.    There is mild, bileaflet mitral valve thickening present.    Estimated right ventricular systolic pressure from tricuspid regurgitation is markedly elevated (>55 mmHg).      Labs Pending at Discharge:  None      Procedures Performed  None         Consults:   Consults       Date and Time Order Name Status Description    12/28/2023 11:36 AM Inpatient Endocrinology Consult Completed     12/28/2023 10:48 AM Inpatient Nephrology Consult      12/28/2023 10:46 AM Inpatient Cardiology Consult                Discharge Details        Discharge Medications        New Medications        Instructions Start Date   amLODIPine 10 MG tablet  Commonly known as: NORVASC   10 mg, Oral, Every 24 Hours Scheduled   Start Date: January 5, 2024     carvedilol 6.25 MG tablet  Commonly known as: COREG   6.25 mg, Oral, 2 Times Daily With Meals             Continue These Medications        Instructions Start Date   ezetimibe 10 MG tablet  Commonly known as: ZETIA   10 mg, Oral, Daily      fluticasone 50 MCG/ACT nasal spray  Commonly known as: FLONASE   2 sprays, Nasal, Daily      furosemide 40 MG tablet  Commonly known as: LASIX   40 mg, Oral, Daily      hydrALAZINE 100 MG tablet  Commonly known as: APRESOLINE   100 mg, Oral, Daily      insulin NPH-insulin regular (70-30) 100 UNIT/ML injection  Commonly known as: humuLIN 70/30,novoLIN 70/30   10 Units, Subcutaneous, Nightly, 20 units QAM + 10 units QPM      insulin NPH-insulin regular (70-30) 100 UNIT/ML injection  Commonly known as: humuLIN 70/30,novoLIN 70/30   20 Units, Subcutaneous, Every Morning, 20 units QAM + 10 units QPM      levothyroxine 50 MCG tablet  Commonly known as: SYNTHROID, LEVOTHROID   50 mcg, Oral, Daily      losartan 100 MG  tablet  Commonly known as: COZAAR   100 mg, Oral, Daily      potassium chloride 10 MEQ CR tablet   10 mEq, Oral, Daily             Stop These Medications      dilTIAZem  MG 24 hr capsule  Commonly known as: CARDIZEM CD     hydroCHLOROthiazide 25 MG tablet  Commonly known as: HYDRODIURIL     meloxicam 15 MG tablet  Commonly known as: MOBIC              No Known Allergies      Discharge Disposition:   Home-Health Care [facility]    Diet:  Hospital:  Diet Order   Procedures    Diet: Cardiac Diets, Diabetic Diets; Healthy Heart (2-3 Na+); Consistent Carbohydrate; Texture: Mechanical Ground (NDD 2); Fluid Consistency: Thin (IDDSI 0)         Discharge Activity:   Activity Instructions       Activity as Tolerated                CODE STATUS:  Code Status and Medical Interventions:   Ordered at: 12/27/23 1930     Code Status (Patient has no pulse and is not breathing):    CPR (Attempt to Resuscitate)     Medical Interventions (Patient has pulse or is breathing):    Full Support         No future appointments.    Additional Instructions for the Follow-ups that You Need to Schedule       Ambulatory Referral to Home Health   As directed      Face to Face Visit Date: 1/4/2024   Follow-up provider for Plan of Care?: I treated the patient in an acute care facility and will not continue treatment after discharge.   Follow-up provider: ALFRED LIRIANO [[id number]]   Reason/Clinical Findings: Weakness   Describe mobility limitations that make leaving home difficult: Weakness   Nursing/Therapeutic Services Requested: Physical Therapy Occupational Therapy Skilled Nursing   Skilled nursing orders: CHF management Medication education   Frequency: 1 Week 1        Discharge Follow-up with PCP   As directed       Currently Documented PCP:    Alfred Liriano MD    PCP Phone Number:    [phone]     Follow Up Details: Routine PCP follow up        Discharge Follow-up with Specified Provider: Cardiology follow-up; 1 Month   As directed       To: Cardiology follow-up   Follow Up: 1 Month        Discharge Follow-up with Specified Provider: Nephrology follow up for further management of CKD; 3 Weeks   As directed      To: Nephrology follow up for further management of CKD   Follow Up: 3 Weeks                Time spent on Discharge including face to face service:  >30 minutes    Signature: Electronically signed by Dennis Ribeiro MD, 01/04/24, 14:50 EST.  [facility] Hospitalist Team

## 2024-01-04 NOTE — PLAN OF CARE
Goal Outcome Evaluation:     Bed mobility - Supervision supine to sit.  Pt slow at task but was able to complete without assist.  Transfers - Supervision and with rolling walker (rollator).   Ambulation - 20 + 20 feet Supervision and with rolling walker (rollator) slow pace, pt seems to bed aware of her safety and goes slow    If medically appropriate, Low Intensity Therapy recommended post-acute care - This is recommended as therapy feels this patient would require 2-3 visits per week. HH would be the best option.  Pt requires no DME at discharge.     Pt desires Home with family assist and and Home Health at discharge. Pt cooperative; agreeable to therapeutic recommendations and plan of care.

## 2024-01-04 NOTE — PROGRESS NOTES
Cardiology Progress Note      PATIENT IDENTIFICATION    Name: Leona Garcia  Age: 78 y.o. Sex: female : 1945  MRN: [id number]    Requesting Provider    Dennis Ribeiro MD     LOS: 7 days       Reason For Followup:  Acute respiratory failure      Subjective:    Interval History:  Seen and examined.  Chart labs reviewed.  Patient denies any chest pain.  She continues to be frustrated with his hospitalization and wants to either go home or to [facility] where her primary physician is located.  Her blood pressure has been markedly elevated and she was started on nicardipine.  Her blood pressure is currently controlled.  Patient wishes to go home  Shortness of breath and lower extremity edema has improved  Tmax is 98.2 pulse is 70 respirations are 16 blood pressure is 149/72 sats are 95%  Sodium is 141 potassium is 3.6 creatinine is 1.5 hemoglobin is 9.7    Review of Systems:  A complete review of systems was undertaken with the pertinent cardiovascular findings listed in history of present illness and all other systems being negative.     Assessment & Plan    Impressions:  Acute respiratory failure-multifactorial with contributing factors being volume overload, obesity hypoventilation syndrome, and influenza.  Abnormal BNP in the setting of acute on chronic kidney injury  Acute on chronic kidney disease  Volume overload state possibly related to diastolic heart failure  Chronic bilateral lower extremity edema with venous stasis changes noted to bilateral lower extremities    Recommendations:  Agree with oral diuretics  Discontinue nicardipine and monitor blood pressure  Hypertension is likely resulting from patient's angst and anxiety from being in the hospital.  It does seem to be situational  Renal function is stable  Wound care evaluation for lower extremity wraps  Current medications include Norvasc 10 mg p.o. once a day Coreg 6.25 mg p.o. twice daily patient is on Lasix 40 mg p.o. twice  a day patient is on hydralazine 100 mg p.o. every 8 hours patient is on losartan 100 mg p.o. once a day  Consider DVT prophylaxis  Echocardiogram with normal left ventricular systolic function.  No plans for further cardiac workup at the present time.  Patient will follow-up with primary cardiologist Dr. Mcdonough as an outpatient        Objective:    Medication Review:   Scheduled Meds:amLODIPine, 10 mg, Oral, Q24H  carvedilol, 6.25 mg, Oral, BID With Meals  furosemide, 40 mg, Oral, BID  hydrALAZINE, 100 mg, Oral, Q8H  insulin glargine, 20 Units, Subcutaneous, Nightly  insulin lispro, 2-9 Units, Subcutaneous, 4x Daily AC & at Bedtime  insulin lispro, 8 Units, Subcutaneous, TID With Meals  levothyroxine, 50 mcg, Oral, Daily  losartan, 100 mg, Oral, Q24H  sodium chloride, 10 mL, Intravenous, Q12H      Continuous Infusions:niCARdipine, 5-15 mg/hr, Last Rate: Stopped (01/02/24 0945)      PRN Meds:.  acetaminophen **OR** acetaminophen **OR** acetaminophen    senna-docusate sodium **AND** polyethylene glycol **AND** bisacodyl **AND** bisacodyl    cloNIDine    dextrose    dextrose    glucagon (human recombinant)    hydrALAZINE    influenza vaccine    ipratropium-albuterol    labetalol    melatonin    nitroglycerin    ondansetron    sodium chloride    sodium chloride    sodium chloride    sodium chloride      Respiratory failure with hypoxia    Essential hypertension    Type 2 diabetes mellitus    Chronic renal insufficiency, stage III (moderate)    Hyperlipidemia    Hypothyroidism    Influenza A         Physical Exam:    General: Alert, cooperative, no distress, appears stated age  Head:  Normocephalic, atraumatic, mucous membranes moist  Eyes:  Conjunctivae/corneas clear, EOMs intact     Neck:  Supple,  no bruit  Lungs:  Coarse and diminished bilaterally.  Chest wall: No tenderness  Heart::  Regular rate and rhythm, S1 and S2 normal, 1/6 holosystolic murmur.  No rub or gallop  Abdomen: Soft, nontender, nondistended, bowel  sounds active.  Obese  Extremities: No cyanosis, clubbing.  3+ edema  Pulses: Diminished pedal pulses  Skin:  Scaling and lichenification of lower extremities  Neuro/psych: A&O x3. CN II through XII are grossly intact with appropriate affect    Vital Signs:  Vitals:    01/04/24 0146 01/04/24 0507 01/04/24 0841 01/04/24 0900   BP: 160/69 143/56 139/58 139/58   BP Location: Left arm Left arm  Left arm   Patient Position: Lying Lying  Lying   Pulse: 78 80 77 79   Resp: 20 16  18   Temp: 97.3 °F (36.3 °C) 97.8 °F (36.6 °C)  98.3 °F (36.8 °C)   TempSrc: Oral Oral  Oral   SpO2: 94% 94%  96%   Weight:  130 kg (287 lb 7.7 oz)     Height:         Wt Readings from Last 1 Encounters:   01/04/24 130 kg (287 lb 7.7 oz)       Intake/Output Summary (Last 24 hours) at 1/4/2024 1216  Last data filed at 1/4/2024 1045  Gross per 24 hour   Intake 960 ml   Output 3750 ml   Net -2790 ml         Results Review:     CBC    Results from last 7 days   Lab Units 01/04/24  0107 01/03/24  0436 12/31/23  2313 12/29/23  2353 12/29/23  0041   WBC 10*3/mm3 6.70 6.50 6.40 10.30 8.80   HEMOGLOBIN g/dL 9.7* 10.2* 10.1* 9.3* 8.9*   PLATELETS 10*3/mm3 250 259 240 233 202     Cr Clearance Estimated Creatinine Clearance: 42.9 mL/min (A) (by C-G formula based on SCr of 1.52 mg/dL (H)).  Coag     HbA1C   Lab Results   Component Value Date    HGBA1C 9.30 (H) 10/18/2023     Blood Glucose   Glucose   Date/Time Value Ref Range Status   01/04/2024 1107 108 (H) 70 - 105 mg/dL Final     Comment:     Serial Number: [id number]:  [id number]   01/04/2024 0702 114 (H) 70 - 105 mg/dL Final     Comment:     Serial Number: [id number]:  [id number]   01/03/2024 2023 173 (H) 70 - 105 mg/dL Final     Comment:     Serial Number: [id number]:  [id number]   01/03/2024 1623 109 (H) 70 - 105 mg/dL Final     Comment:     Serial Number: [id number]:  [id number]   01/03/2024 1118 210 (H) 70 - 105 mg/dL Final     Comment:     Serial Number: [id number]:   "[id number]   01/03/2024 0713 132 (H) 70 - 105 mg/dL Final     Comment:     Serial Number: [id number]:  [id number]   01/02/2024 2005 164 (H) 70 - 105 mg/dL Final     Comment:     Serial Number: [id number]:  [id number]   01/02/2024 1628 99 70 - 105 mg/dL Final     Comment:     Serial Number: [id number]:  [id number]     Infection     CMP   Results from last 7 days   Lab Units 01/04/24  0107 01/03/24  0436 01/02/24  1028 12/31/23  2313 12/30/23  2301 12/29/23  2353 12/29/23  0041   SODIUM mmol/L 141 137 140 140 138 141 137   POTASSIUM mmol/L 3.6 3.4* 3.6 3.7 4.1 4.8 5.0   CHLORIDE mmol/L 99 96* 100 101 100 102 97*   CO2 mmol/L 34.0* 32.0* 32.0* 30.0* 29.0 28.0 24.0   BUN mg/dL 40* 48* 45* 52* 59* 67* 60*   CREATININE mg/dL 1.52* 1.74* 1.76* 1.94* 2.15* 2.50* 2.67*   GLUCOSE mg/dL 115* 141* 131* 109* 148* 198* 407*   ALBUMIN g/dL  --   --  2.9*  --   --   --   --    BILIRUBIN mg/dL  --   --  0.4  --   --   --   --    ALK PHOS U/L  --   --  161*  --   --   --   --    AST (SGOT) U/L  --   --  13  --   --   --   --    ALT (SGPT) U/L  --   --  16  --   --   --   --      ABG    Results from last 7 days   Lab Units 01/01/24  0517   PH, ARTERIAL pH units 7.396   PCO2, ARTERIAL mm Hg 48.4*   PO2 ART mm Hg 77.6*   O2 SATURATION ART % 95.1   BASE EXCESS ART mmol/L 4.1*     UA    Results from last 7 days   Lab Units 12/28/23  1325   NITRITE UA  Negative   WBC UA /HPF 0-2   BACTERIA UA /HPF 1+*   SQUAM EPITHEL UA /HPF 0-2     XIMENA  No results found for: \"POCMETH\", \"POCAMPHET\", \"POCBARBITUR\", \"POCBENZO\", \"POCCOCAINE\", \"POCOPIATES\", \"POCOXYCODO\", \"POCPHENCYC\", \"POCPROPOXY\", \"POCTHC\", \"POCTRICYC\"  Lysis Labs   Results from last 7 days   Lab Units 01/04/24  0107 01/03/24  0436 01/02/24  1028 12/31/23  2313 12/30/23  2301 12/29/23  2353 12/29/23  0041   HEMOGLOBIN g/dL 9.7* 10.2*  --  10.1*  --  9.3* 8.9*   PLATELETS 10*3/mm3 250 259  --  240  --  233 202   CREATININE mg/dL 1.52* 1.74* 1.76* 1.94* 2.15* 2.50* 2.67* "     Radiology(recent) No radiology results for the last day              Imaging Results (Last 24 Hours)       ** No results found for the last 24 hours. **            Cardiac Studies:  Echo- Results for orders placed during the hospital encounter of 12/27/23    Adult Transthoracic Echo Complete W/ Cont if Necessary Per Protocol    Interpretation Summary    Left ventricular systolic function is normal. Left ventricular ejection fraction appears to be 61 - 65%.    Left ventricular diastolic function is consistent with (grade I) impaired relaxation.    The left atrial cavity is mildly dilated.    The right atrial cavity is mildly  dilated.    There is mild, bileaflet mitral valve thickening present.    Estimated right ventricular systolic pressure from tricuspid regurgitation is markedly elevated (>55 mmHg).    Stress Myoview-  Cath-        Eleni Bean MD  01/04/24  12:16 EST

## 2024-01-04 NOTE — OUTREACH NOTE
Prep Survey      Flowsheet Row Responses   [facility] facility patient discharged from? [facility]   Is LACE score < 7 ? No   Eligibility Readm Mgmt   Discharge diagnosis Respiratory failure with hypoxia, Influenza A   Does the patient have one of the following disease processes/diagnoses(primary or secondary)? Other   Does the patient have Home health ordered? Yes   What is the Home health agency?  [facility]   Is there a DME ordered? No   Prep survey completed? Yes            Tish COTO - Registered Nurse

## 2024-01-04 NOTE — PROGRESS NOTES
"Subjective   Leona Garcia is a 78 y.o. female.   Seen for diabetes f/u.  Not eating much.        Objective     /66 (BP Location: Left arm, Patient Position: Lying)   Pulse 77   Temp 97.3 °F (36.3 °C) (Oral)   Resp 14   Ht 167.6 cm (66\")   Wt 132 kg (290 lb 12.6 oz)   SpO2 96%   BMI 46.93 kg/m²   Blood sugar  132 this am,  210 @ lunch, 109 @ supper    ASSESSMENT  Diabetes type 2, with hyperglycemia  Patient is stable    PLAN    Decreased meal time lispro to 8 units ac tid.         Avelino Morocho MD  1/3/2024  21:48 EST    "

## 2024-01-04 NOTE — PLAN OF CARE
Problem: Skin Injury Risk Increased  Goal: Skin Health and Integrity  Outcome: Ongoing, Progressing  Intervention: Optimize Skin Protection  Recent Flowsheet Documentation  Taken 1/4/2024 0800 by Elvi Aparicio RN  Pressure Reduction Techniques: frequent weight shift encouraged  Pressure Reduction Devices: pressure-redistributing mattress utilized  Skin Protection: tubing/devices free from skin contact     Problem: Fall Injury Risk  Goal: Absence of Fall and Fall-Related Injury  Outcome: Ongoing, Progressing  Intervention: Promote Injury-Free Environment  Recent Flowsheet Documentation  Taken 1/4/2024 0800 by Evli Aparicio, RN  Safety Promotion/Fall Prevention: safety round/check completed   Goal Outcome Evaluation:

## 2024-01-04 NOTE — PROGRESS NOTES
PROGRESS NOTE      Patient Name: Leona Garcia  : 1945  MRN: [id number]  Primary Care Physician: Alfred Liriano MD  Date of admission: 2023    Patient Care Team:  Alfred Liriano MD as PCP - General (Family Medicine)        Subjective   Subjective:     Patient is feeling better no new complaints still complaining of arthritis hip pain  Review of systems:  All other review of system unremarkable      Allergies:  No Known Allergies    Objective   Exam:     Vital Signs  Temp:  [97.3 °F (36.3 °C)-98.3 °F (36.8 °C)] 97.3 °F (36.3 °C)  Heart Rate:  [71-80] 71  Resp:  [13-20] 16  BP: (139-169)/(56-96) 149/72  SpO2:  [94 %-96 %] 95 %  on  Flow (L/min):  [2] 2;   Device (Oxygen Therapy): room air  Body mass index is 46.4 kg/m².    General: Elderly Afro-American female in no acute distress.    Head:      Normocephalic and atraumatic.    Eyes:      PERRL/EOM intact, conjunctiva and sclera clear with out nystagmus.    Neck:      No masses, thyromegaly,  trachea central with normal respiratory effort   Lungs:    Clear bilaterally to auscultation.    Heart:      Regular rate and rhythm, no murmur no gallop  Abd:        Soft, nontender, not distended, bowel sounds positive, no shifting dullness   Pulses:   Pulses palpable  Extr:        No cyanosis or clubbing--+1 edema.    Neuro:    No focal deficits.   alert oriented x3  Skin:       Intact without lesions or rashes.    Psych:    Alert and cooperative; normal mood and affect; .      Results Review:  I have personally reviewed most recent Data :  CBC    Results from last 7 days   Lab Units 24  0107 24  0436 23  2313 23  2353 23  0041   WBC 10*3/mm3 6.70 6.50 6.40 10.30 8.80   HEMOGLOBIN g/dL 9.7* 10.2* 10.1* 9.3* 8.9*   PLATELETS 10*3/mm3 250 259 240 233 202     CMP   Results from last 7 days   Lab Units 24  0107 24  0436 24  1028 23  2313 23  2301 23  2353 23  0041   SODIUM  mmol/L 141 137 140 140 138 141 137   POTASSIUM mmol/L 3.6 3.4* 3.6 3.7 4.1 4.8 5.0   CHLORIDE mmol/L 99 96* 100 101 100 102 97*   CO2 mmol/L 34.0* 32.0* 32.0* 30.0* 29.0 28.0 24.0   BUN mg/dL 40* 48* 45* 52* 59* 67* 60*   CREATININE mg/dL 1.52* 1.74* 1.76* 1.94* 2.15* 2.50* 2.67*   GLUCOSE mg/dL 115* 141* 131* 109* 148* 198* 407*   ALBUMIN g/dL  --   --  2.9*  --   --   --   --    BILIRUBIN mg/dL  --   --  0.4  --   --   --   --    ALK PHOS U/L  --   --  161*  --   --   --   --    AST (SGOT) U/L  --   --  13  --   --   --   --    ALT (SGPT) U/L  --   --  16  --   --   --   --      ABG    Results from last 7 days   Lab Units 01/01/24  0517   PH, ARTERIAL pH units 7.396   PCO2, ARTERIAL mm Hg 48.4*   PO2 ART mm Hg 77.6*   O2 SATURATION ART % 95.1   BASE EXCESS ART mmol/L 4.1*     No radiology results for the last day    Results for orders placed during the hospital encounter of 12/27/23    Adult Transthoracic Echo Complete W/ Cont if Necessary Per Protocol    Interpretation Summary    Left ventricular systolic function is normal. Left ventricular ejection fraction appears to be 61 - 65%.    Left ventricular diastolic function is consistent with (grade I) impaired relaxation.    The left atrial cavity is mildly dilated.    The right atrial cavity is mildly  dilated.    There is mild, bileaflet mitral valve thickening present.    Estimated right ventricular systolic pressure from tricuspid regurgitation is markedly elevated (>55 mmHg).    Scheduled Meds:amLODIPine, 10 mg, Oral, Q24H  carvedilol, 6.25 mg, Oral, BID With Meals  furosemide, 40 mg, Oral, BID  hydrALAZINE, 100 mg, Oral, Q8H  insulin glargine, 20 Units, Subcutaneous, Nightly  insulin lispro, 2-9 Units, Subcutaneous, 4x Daily AC & at Bedtime  insulin lispro, 8 Units, Subcutaneous, TID With Meals  levothyroxine, 50 mcg, Oral, Daily  losartan, 100 mg, Oral, Q24H  sodium chloride, 10 mL, Intravenous, Q12H      Continuous Infusions:niCARdipine, 5-15 mg/hr, Last  Rate: Stopped (01/02/24 0945)        PRN Meds:  acetaminophen **OR** acetaminophen **OR** acetaminophen    senna-docusate sodium **AND** polyethylene glycol **AND** bisacodyl **AND** bisacodyl    cloNIDine    dextrose    dextrose    glucagon (human recombinant)    hydrALAZINE    influenza vaccine    ipratropium-albuterol    labetalol    melatonin    nitroglycerin    ondansetron    sodium chloride    sodium chloride    sodium chloride    sodium chloride    Assessment & Plan   Assessment and Plan:         Respiratory failure with hypoxia    Essential hypertension    Type 2 diabetes mellitus    Chronic renal insufficiency, stage III (moderate)    Hyperlipidemia    Hypothyroidism    Influenza A    ASSESSMENT:  Acute kidney injury in a patient with chronic kidney disease stage III  Hypertension  Diabetes millitus type 2  Hip pain   Congestive heart failure with ejection fraction around 60% and grade 1 diastolic dysfunctions  Lower extremity edema    PLAN :      Patient has CKD stage IIIb with some fluctuation in creatinine depending upon volume status and underlying cardiac condition  ++some  lower extremity edema still present but close to baseline  Continue to monitor blood pressure closely continue calcium channel blocker beta-blocker angiotensin receptor blocker and loop diuretics  Potassium need to be replaced  Hold meloxicam because patient was taking at home that might be resulting in some progressive CKD   labs today and tomorrow morning  Continue same antihypertensive medications and adjust diuretics according to the volume status  Renal clinic 4-6 weeks          Electronically signed by Chu Lee MD,   [facility] kidney consultant  [phone]  1/4/2024  14:58 EST  ,

## 2024-01-04 NOTE — THERAPY TREATMENT NOTE
Subjective: Pt agreeable to therapeutic plan of care. Pt stating she just takes it slow & careful when she is at home.   Cognition: oriented to Person, Place, Time, and Situation    Objective:     Bed Mobility: Supervision   Functional Transfers: Supervision using rollator. Pt did use grab bar when getting on/off low commode in bathroom. She does not have a grab bar in her bathroom at home. but she has an elevated commode at both her & her sister's home per her report.       Balance: Sitting = good, standing = supervision using rollator.    Functional Ambulation: Supervision using rollator for room mobility.    Toileting: Supervision  ADL Position: supported sitting and supported standing  ADL Comments: pericare & clothing mgmt.     Lower Body Dressing: donning/doffing socks= min A at times. She states her sister helps her if she needs assist at home. She wears slip on shoes at home.   ADL Position: supported sitting  ADL Comments:     Grooming at sink = mod ind.     Vitals: WNL    Pain: 0 Education: Provided education on the importance of mobility in the acute care setting, ADL training, and Transfer Training, safety precautions.     Assessment: Leona Garcia presents with ADL impairments affecting function including endurance / activity tolerance and strength. Pt demo ability to complete functional mobility, grooming tasks at sink, toileting tasks using AE/DME with good . She completes tasks slow & steady, but safely. Demonstrated functioning below baseline abilities indicate the need for continued skilled intervention while inpatient. Tolerating session today without incident. Will continue to follow and progress as tolerated.     Plan/Recommendations:   Low Intensity Therapy recommended post-acute care - This is recommended as therapy feels this patient would require 2-3 visits per week. OP or HH would be the best option depending on patient's home bound status. Consider, if the patient has other   "\"skilled\" needs such as wounds, IV antibiotics, etc. Combined with \"low intensity\" could also equate to a SNF. If patient is medically complex, consider LTAC.. Pt requires no DME at discharge.     Pt desires Home with family assist and and Home Health at discharge. Pt cooperative; agreeable to therapeutic recommendations and plan of care.     Modified [place]: N/A = No pre-op stroke/TIA    Post-Tx Position: Up in Chair and Call light and personal items within reach  PPE: gloves   "

## 2024-01-08 ENCOUNTER — READMISSION MANAGEMENT (OUTPATIENT)
Dept: CALL CENTER | Facility: HOSPITAL | Age: 79
End: 2024-01-08
Payer: MEDICARE

## 2024-01-09 NOTE — OUTREACH NOTE
Medical Week 1 Survey      Flowsheet Row Responses   [facility] facility patient discharged from? [facility]   Does the patient have one of the following disease processes/diagnoses(primary or secondary)? Other   Week 1 attempt successful? No   Unsuccessful attempts Attempt 1            Jazmyne AGEE - Licensed Nurse

## 2024-01-15 ENCOUNTER — READMISSION MANAGEMENT (OUTPATIENT)
Dept: CALL CENTER | Facility: HOSPITAL | Age: 79
End: 2024-01-15
Payer: MEDICARE

## 2024-01-15 NOTE — OUTREACH NOTE
Medical Week 2 Survey      Flowsheet Row Responses   [facility] patient discharged from? [facility]   Does the patient have one of the following disease processes/diagnoses(primary or secondary)? Other   Week 2 attempt successful? No   Unsuccessful attempts Attempt 1  [niece gave alternative phone #[phone]]            Jazmyne AGEE - Licensed Nurse

## 2024-01-19 ENCOUNTER — READMISSION MANAGEMENT (OUTPATIENT)
Dept: CALL CENTER | Facility: HOSPITAL | Age: 79
End: 2024-01-19
Payer: MEDICARE

## 2024-01-19 NOTE — OUTREACH NOTE
Medical Week 2 Survey      Flowsheet Row Responses   [facility] facility patient discharged from? [facility]   Does the patient have one of the following disease processes/diagnoses(primary or secondary)? Other   Week 2 attempt successful? No   Unsuccessful attempts Attempt 2            Dominique STEVENS - Registered Nurse

## 2024-10-21 ENCOUNTER — HOSPITAL ENCOUNTER (INPATIENT)
Facility: HOSPITAL | Age: 79
LOS: 8 days | Discharge: NURSING, REHAB OR LONG-TERM CARE | End: 2024-10-29
Attending: EMERGENCY MEDICINE | Admitting: INTERNAL MEDICINE
Payer: MEDICARE

## 2024-10-21 ENCOUNTER — APPOINTMENT (OUTPATIENT)
Dept: CT IMAGING | Facility: HOSPITAL | Age: 79
End: 2024-10-21
Payer: MEDICARE

## 2024-10-21 ENCOUNTER — APPOINTMENT (OUTPATIENT)
Dept: GENERAL RADIOLOGY | Facility: HOSPITAL | Age: 79
End: 2024-10-21
Payer: MEDICARE

## 2024-10-21 DIAGNOSIS — N18.9 ACUTE RENAL FAILURE SUPERIMPOSED ON CHRONIC KIDNEY DISEASE, UNSPECIFIED ACUTE RENAL FAILURE TYPE, UNSPECIFIED CKD STAGE: Primary | ICD-10-CM

## 2024-10-21 DIAGNOSIS — R79.89 ELEVATED TROPONIN: ICD-10-CM

## 2024-10-21 DIAGNOSIS — R53.83 OTHER FATIGUE: ICD-10-CM

## 2024-10-21 DIAGNOSIS — N17.9 ACUTE RENAL FAILURE SUPERIMPOSED ON CHRONIC KIDNEY DISEASE, UNSPECIFIED ACUTE RENAL FAILURE TYPE, UNSPECIFIED CKD STAGE: Primary | ICD-10-CM

## 2024-10-21 LAB
ALBUMIN SERPL-MCNC: 3.5 G/DL (ref 3.5–5.2)
ALBUMIN/GLOB SERPL: 1.3 G/DL
ALP SERPL-CCNC: 192 U/L (ref 39–117)
ALT SERPL W P-5'-P-CCNC: 96 U/L (ref 1–33)
AMPHET+METHAMPHET UR QL: NEGATIVE
AMPHETAMINES UR QL: NEGATIVE
ANION GAP SERPL CALCULATED.3IONS-SCNC: 10.2 MMOL/L (ref 5–15)
ANION GAP SERPL CALCULATED.3IONS-SCNC: 12.7 MMOL/L (ref 5–15)
APAP SERPL-MCNC: 5.9 MCG/ML (ref 0–30)
AST SERPL-CCNC: 63 U/L (ref 1–32)
BARBITURATES UR QL SCN: NEGATIVE
BASOPHILS # BLD AUTO: 0.03 10*3/MM3 (ref 0–0.2)
BASOPHILS NFR BLD AUTO: 0.5 % (ref 0–1.5)
BENZODIAZ UR QL SCN: NEGATIVE
BILIRUB SERPL-MCNC: 0.4 MG/DL (ref 0–1.2)
BILIRUB UR QL STRIP: NEGATIVE
BUN SERPL-MCNC: 82 MG/DL (ref 8–23)
BUN SERPL-MCNC: 83 MG/DL (ref 8–23)
BUN/CREAT SERPL: 22.9 (ref 7–25)
BUN/CREAT SERPL: 23 (ref 7–25)
BUPRENORPHINE SERPL-MCNC: NEGATIVE NG/ML
CALCIUM SPEC-SCNC: 9.1 MG/DL (ref 8.6–10.5)
CALCIUM SPEC-SCNC: 9.2 MG/DL (ref 8.6–10.5)
CANNABINOIDS SERPL QL: NEGATIVE
CHLORIDE SERPL-SCNC: 104 MMOL/L (ref 98–107)
CHLORIDE SERPL-SCNC: 106 MMOL/L (ref 98–107)
CHOLEST SERPL-MCNC: 177 MG/DL (ref 0–200)
CK SERPL-CCNC: 263 U/L (ref 20–180)
CLARITY UR: ABNORMAL
CO2 SERPL-SCNC: 26.3 MMOL/L (ref 22–29)
CO2 SERPL-SCNC: 27.8 MMOL/L (ref 22–29)
COCAINE UR QL: NEGATIVE
COLOR UR: YELLOW
CREAT SERPL-MCNC: 3.57 MG/DL (ref 0.57–1)
CREAT SERPL-MCNC: 3.62 MG/DL (ref 0.57–1)
DEPRECATED RDW RBC AUTO: 52.6 FL (ref 37–54)
EGFRCR SERPLBLD CKD-EPI 2021: 12.3 ML/MIN/1.73
EGFRCR SERPLBLD CKD-EPI 2021: 12.5 ML/MIN/1.73
EOSINOPHIL # BLD AUTO: 0.16 10*3/MM3 (ref 0–0.4)
EOSINOPHIL NFR BLD AUTO: 2.7 % (ref 0.3–6.2)
ERYTHROCYTE [DISTWIDTH] IN BLOOD BY AUTOMATED COUNT: 16.1 % (ref 12.3–15.4)
ETHANOL BLD-MCNC: <10 MG/DL (ref 0–10)
ETHANOL UR QL: <0.01 %
FLUAV SUBTYP SPEC NAA+PROBE: NOT DETECTED
FLUBV RNA ISLT QL NAA+PROBE: NOT DETECTED
GEN 5 2HR TROPONIN T REFLEX: 228 NG/L
GLOBULIN UR ELPH-MCNC: 2.6 GM/DL
GLUCOSE BLDC GLUCOMTR-MCNC: 286 MG/DL (ref 70–105)
GLUCOSE SERPL-MCNC: 266 MG/DL (ref 65–99)
GLUCOSE SERPL-MCNC: 267 MG/DL (ref 65–99)
GLUCOSE UR STRIP-MCNC: ABNORMAL MG/DL
HBA1C MFR BLD: 9.65 % (ref 4.8–5.6)
HCT VFR BLD AUTO: 28 % (ref 34–46.6)
HDLC SERPL-MCNC: 82 MG/DL (ref 40–60)
HGB BLD-MCNC: 8.3 G/DL (ref 12–15.9)
HGB UR QL STRIP.AUTO: NEGATIVE
IMM GRANULOCYTES # BLD AUTO: 0.01 10*3/MM3 (ref 0–0.05)
IMM GRANULOCYTES NFR BLD AUTO: 0.2 % (ref 0–0.5)
KETONES UR QL STRIP: NEGATIVE
LDLC SERPL CALC-MCNC: 84 MG/DL (ref 0–100)
LDLC/HDLC SERPL: 1.02 {RATIO}
LEUKOCYTE ESTERASE UR QL STRIP.AUTO: NEGATIVE
LIPASE SERPL-CCNC: 21 U/L (ref 13–60)
LYMPHOCYTES # BLD AUTO: 0.79 10*3/MM3 (ref 0.7–3.1)
LYMPHOCYTES NFR BLD AUTO: 13.1 % (ref 19.6–45.3)
MAGNESIUM SERPL-MCNC: 2.6 MG/DL (ref 1.6–2.4)
MCH RBC QN AUTO: 26.9 PG (ref 26.6–33)
MCHC RBC AUTO-ENTMCNC: 29.6 G/DL (ref 31.5–35.7)
MCV RBC AUTO: 90.9 FL (ref 79–97)
METHADONE UR QL SCN: NEGATIVE
MONOCYTES # BLD AUTO: 0.52 10*3/MM3 (ref 0.1–0.9)
MONOCYTES NFR BLD AUTO: 8.6 % (ref 5–12)
NEUTROPHILS NFR BLD AUTO: 4.51 10*3/MM3 (ref 1.7–7)
NEUTROPHILS NFR BLD AUTO: 74.9 % (ref 42.7–76)
NITRITE UR QL STRIP: NEGATIVE
NT-PROBNP SERPL-MCNC: 8842 PG/ML (ref 0–1800)
NT-PROBNP SERPL-MCNC: 9565 PG/ML (ref 0–1800)
OPIATES UR QL: NEGATIVE
OXYCODONE UR QL SCN: NEGATIVE
PCP UR QL SCN: NEGATIVE
PH UR STRIP.AUTO: <=5 [PH] (ref 5–8)
PHOSPHATE SERPL-MCNC: 5.2 MG/DL (ref 2.5–4.5)
PLATELET # BLD AUTO: 166 10*3/MM3 (ref 140–450)
PMV BLD AUTO: 11.9 FL (ref 6–12)
POTASSIUM SERPL-SCNC: 4.5 MMOL/L (ref 3.5–5.2)
POTASSIUM SERPL-SCNC: 4.8 MMOL/L (ref 3.5–5.2)
PROT SERPL-MCNC: 6.1 G/DL (ref 6–8.5)
PROT UR QL STRIP: ABNORMAL
QT INTERVAL: 472 MS
QTC INTERVAL: 459 MS
RBC # BLD AUTO: 3.08 10*6/MM3 (ref 3.77–5.28)
SARS-COV-2 RNA RESP QL NAA+PROBE: NOT DETECTED
SODIUM SERPL-SCNC: 142 MMOL/L (ref 136–145)
SODIUM SERPL-SCNC: 145 MMOL/L (ref 136–145)
SP GR UR STRIP: 1.02 (ref 1–1.03)
TRICYCLICS UR QL SCN: NEGATIVE
TRIGL SERPL-MCNC: 57 MG/DL (ref 0–150)
TROPONIN T DELTA: -16 NG/L
TROPONIN T SERPL HS-MCNC: 244 NG/L
TROPONIN T SERPL HS-MCNC: 249 NG/L
TSH SERPL DL<=0.05 MIU/L-ACNC: 2.44 UIU/ML (ref 0.27–4.2)
UROBILINOGEN UR QL STRIP: ABNORMAL
VLDLC SERPL-MCNC: 11 MG/DL (ref 5–40)
WBC NRBC COR # BLD AUTO: 6.02 10*3/MM3 (ref 3.4–10.8)

## 2024-10-21 PROCEDURE — 25010000002 HYDRALAZINE PER 20 MG: Performed by: NURSE PRACTITIONER

## 2024-10-21 PROCEDURE — 87636 SARSCOV2 & INF A&B AMP PRB: CPT

## 2024-10-21 PROCEDURE — 84484 ASSAY OF TROPONIN QUANT: CPT | Performed by: NURSE PRACTITIONER

## 2024-10-21 PROCEDURE — 25010000002 HEPARIN (PORCINE) PER 1000 UNITS: Performed by: NURSE PRACTITIONER

## 2024-10-21 PROCEDURE — 25810000003 LACTATED RINGERS SOLUTION: Performed by: NURSE PRACTITIONER

## 2024-10-21 PROCEDURE — 93010 ELECTROCARDIOGRAM REPORT: CPT | Performed by: INTERNAL MEDICINE

## 2024-10-21 PROCEDURE — 99285 EMERGENCY DEPT VISIT HI MDM: CPT

## 2024-10-21 PROCEDURE — 82077 ASSAY SPEC XCP UR&BREATH IA: CPT | Performed by: EMERGENCY MEDICINE

## 2024-10-21 PROCEDURE — 25810000003 LACTATED RINGERS SOLUTION: Performed by: EMERGENCY MEDICINE

## 2024-10-21 PROCEDURE — 83036 HEMOGLOBIN GLYCOSYLATED A1C: CPT | Performed by: NURSE PRACTITIONER

## 2024-10-21 PROCEDURE — 93005 ELECTROCARDIOGRAM TRACING: CPT | Performed by: NURSE PRACTITIONER

## 2024-10-21 PROCEDURE — 83880 ASSAY OF NATRIURETIC PEPTIDE: CPT | Performed by: EMERGENCY MEDICINE

## 2024-10-21 PROCEDURE — 71046 X-RAY EXAM CHEST 2 VIEWS: CPT

## 2024-10-21 PROCEDURE — 82550 ASSAY OF CK (CPK): CPT | Performed by: NURSE PRACTITIONER

## 2024-10-21 PROCEDURE — 80143 DRUG ASSAY ACETAMINOPHEN: CPT | Performed by: EMERGENCY MEDICINE

## 2024-10-21 PROCEDURE — 82948 REAGENT STRIP/BLOOD GLUCOSE: CPT

## 2024-10-21 PROCEDURE — 83690 ASSAY OF LIPASE: CPT | Performed by: EMERGENCY MEDICINE

## 2024-10-21 PROCEDURE — 81001 URINALYSIS AUTO W/SCOPE: CPT | Performed by: EMERGENCY MEDICINE

## 2024-10-21 PROCEDURE — 84100 ASSAY OF PHOSPHORUS: CPT | Performed by: NURSE PRACTITIONER

## 2024-10-21 PROCEDURE — 84484 ASSAY OF TROPONIN QUANT: CPT | Performed by: EMERGENCY MEDICINE

## 2024-10-21 PROCEDURE — 80306 DRUG TEST PRSMV INSTRMNT: CPT | Performed by: EMERGENCY MEDICINE

## 2024-10-21 PROCEDURE — 63710000001 INSULIN LISPRO (HUMAN) PER 5 UNITS: Performed by: NURSE PRACTITIONER

## 2024-10-21 PROCEDURE — 80053 COMPREHEN METABOLIC PANEL: CPT | Performed by: EMERGENCY MEDICINE

## 2024-10-21 PROCEDURE — 85025 COMPLETE CBC W/AUTO DIFF WBC: CPT | Performed by: EMERGENCY MEDICINE

## 2024-10-21 PROCEDURE — 83880 ASSAY OF NATRIURETIC PEPTIDE: CPT | Performed by: NURSE PRACTITIONER

## 2024-10-21 PROCEDURE — 93010 ELECTROCARDIOGRAM REPORT: CPT | Performed by: EMERGENCY MEDICINE

## 2024-10-21 PROCEDURE — 99285 EMERGENCY DEPT VISIT HI MDM: CPT | Performed by: EMERGENCY MEDICINE

## 2024-10-21 PROCEDURE — 70450 CT HEAD/BRAIN W/O DYE: CPT

## 2024-10-21 PROCEDURE — 36415 COLL VENOUS BLD VENIPUNCTURE: CPT

## 2024-10-21 PROCEDURE — 83735 ASSAY OF MAGNESIUM: CPT | Performed by: NURSE PRACTITIONER

## 2024-10-21 PROCEDURE — 84443 ASSAY THYROID STIM HORMONE: CPT | Performed by: NURSE PRACTITIONER

## 2024-10-21 PROCEDURE — 80061 LIPID PANEL: CPT | Performed by: NURSE PRACTITIONER

## 2024-10-21 PROCEDURE — 93005 ELECTROCARDIOGRAM TRACING: CPT | Performed by: EMERGENCY MEDICINE

## 2024-10-21 RX ORDER — ACETAMINOPHEN 325 MG/1
650 TABLET ORAL EVERY 4 HOURS PRN
Status: DISCONTINUED | OUTPATIENT
Start: 2024-10-21 | End: 2024-10-29 | Stop reason: HOSPADM

## 2024-10-21 RX ORDER — SODIUM CHLORIDE 0.9 % (FLUSH) 0.9 %
10 SYRINGE (ML) INJECTION AS NEEDED
Status: DISCONTINUED | OUTPATIENT
Start: 2024-10-21 | End: 2024-10-29 | Stop reason: HOSPADM

## 2024-10-21 RX ORDER — HEPARIN SODIUM 5000 [USP'U]/ML
5000 INJECTION, SOLUTION INTRAVENOUS; SUBCUTANEOUS EVERY 12 HOURS SCHEDULED
Status: DISCONTINUED | OUTPATIENT
Start: 2024-10-21 | End: 2024-10-29 | Stop reason: HOSPADM

## 2024-10-21 RX ORDER — IBUPROFEN 600 MG/1
1 TABLET ORAL
Status: DISCONTINUED | OUTPATIENT
Start: 2024-10-21 | End: 2024-10-29 | Stop reason: HOSPADM

## 2024-10-21 RX ORDER — INSULIN LISPRO 100 [IU]/ML
2-7 INJECTION, SOLUTION INTRAVENOUS; SUBCUTANEOUS
Status: DISCONTINUED | OUTPATIENT
Start: 2024-10-21 | End: 2024-10-29 | Stop reason: HOSPADM

## 2024-10-21 RX ORDER — HYDRALAZINE HYDROCHLORIDE 20 MG/ML
10 INJECTION INTRAMUSCULAR; INTRAVENOUS EVERY 4 HOURS PRN
Status: DISCONTINUED | OUTPATIENT
Start: 2024-10-21 | End: 2024-10-29 | Stop reason: HOSPADM

## 2024-10-21 RX ORDER — ONDANSETRON 2 MG/ML
4 INJECTION INTRAMUSCULAR; INTRAVENOUS EVERY 6 HOURS PRN
Status: DISCONTINUED | OUTPATIENT
Start: 2024-10-21 | End: 2024-10-29 | Stop reason: HOSPADM

## 2024-10-21 RX ORDER — BISACODYL 5 MG/1
5 TABLET, DELAYED RELEASE ORAL DAILY PRN
Status: DISCONTINUED | OUTPATIENT
Start: 2024-10-21 | End: 2024-10-29 | Stop reason: HOSPADM

## 2024-10-21 RX ORDER — NITROGLYCERIN 0.4 MG/1
0.4 TABLET SUBLINGUAL
Status: DISCONTINUED | OUTPATIENT
Start: 2024-10-21 | End: 2024-10-29 | Stop reason: HOSPADM

## 2024-10-21 RX ORDER — DEXTROSE MONOHYDRATE 25 G/50ML
25 INJECTION, SOLUTION INTRAVENOUS
Status: DISCONTINUED | OUTPATIENT
Start: 2024-10-21 | End: 2024-10-29 | Stop reason: HOSPADM

## 2024-10-21 RX ORDER — NICOTINE POLACRILEX 4 MG
15 LOZENGE BUCCAL
Status: DISCONTINUED | OUTPATIENT
Start: 2024-10-21 | End: 2024-10-29 | Stop reason: HOSPADM

## 2024-10-21 RX ORDER — AMOXICILLIN 250 MG
2 CAPSULE ORAL 2 TIMES DAILY PRN
Status: DISCONTINUED | OUTPATIENT
Start: 2024-10-21 | End: 2024-10-29 | Stop reason: HOSPADM

## 2024-10-21 RX ORDER — POLYETHYLENE GLYCOL 3350 17 G/17G
17 POWDER, FOR SOLUTION ORAL DAILY PRN
Status: DISCONTINUED | OUTPATIENT
Start: 2024-10-21 | End: 2024-10-29 | Stop reason: HOSPADM

## 2024-10-21 RX ORDER — BISACODYL 10 MG
10 SUPPOSITORY, RECTAL RECTAL DAILY PRN
Status: DISCONTINUED | OUTPATIENT
Start: 2024-10-21 | End: 2024-10-29 | Stop reason: HOSPADM

## 2024-10-21 RX ORDER — SODIUM CHLORIDE 0.9 % (FLUSH) 0.9 %
10 SYRINGE (ML) INJECTION EVERY 12 HOURS SCHEDULED
Status: DISCONTINUED | OUTPATIENT
Start: 2024-10-21 | End: 2024-10-29 | Stop reason: HOSPADM

## 2024-10-21 RX ORDER — PANTOPRAZOLE SODIUM 40 MG/10ML
40 INJECTION, POWDER, LYOPHILIZED, FOR SOLUTION INTRAVENOUS
Status: DISCONTINUED | OUTPATIENT
Start: 2024-10-21 | End: 2024-10-22

## 2024-10-21 RX ADMIN — INSULIN LISPRO 4 UNITS: 100 INJECTION, SOLUTION INTRAVENOUS; SUBCUTANEOUS at 21:57

## 2024-10-21 RX ADMIN — PANTOPRAZOLE SODIUM 40 MG: 40 INJECTION, POWDER, FOR SOLUTION INTRAVENOUS at 21:58

## 2024-10-21 RX ADMIN — SODIUM CHLORIDE, POTASSIUM CHLORIDE, SODIUM LACTATE AND CALCIUM CHLORIDE 500 ML: 600; 310; 30; 20 INJECTION, SOLUTION INTRAVENOUS at 14:58

## 2024-10-21 RX ADMIN — Medication 10 ML: at 22:03

## 2024-10-21 RX ADMIN — HYDRALAZINE HYDROCHLORIDE 10 MG: 20 INJECTION INTRAMUSCULAR; INTRAVENOUS at 23:28

## 2024-10-21 RX ADMIN — SODIUM CHLORIDE, SODIUM LACTATE, POTASSIUM CHLORIDE, AND CALCIUM CHLORIDE 500 ML: .6; .31; .03; .02 INJECTION, SOLUTION INTRAVENOUS at 22:03

## 2024-10-21 RX ADMIN — HEPARIN SODIUM 5000 UNITS: 5000 INJECTION INTRAVENOUS; SUBCUTANEOUS at 21:58

## 2024-10-21 NOTE — FSED PROVIDER NOTE
Subjective   History of Present Illness  Ms. Garcia is a 79-year-old female with history of chronic kidney disease diabetes hypothyroid and hypertension.  She brought in by her sister who complains that the patient has been very slow and fatigued and not as responsive as normal since they buried their other sister last week.  The patient and the dead sister lives together until she  and then the patient subsequently moved in with the sister that brings her in for evaluation today.  She says she just does not seem herself and is very slow to respond and sometimes seems like she is confused.  The patient denies forgetting things says she just does not feel like talking sometimes.  Patient denies chest pain denies shortness of breath denies abdominal pain denies nausea or vomiting denies dysuria denies urinary frequency.    Review of Systems   Constitutional:  Positive for fatigue.   Psychiatric/Behavioral:          As noted HPI and patient notes that she is taking her sisters death with a significant amount of difficulty.       Past Medical History:   Diagnosis Date    Anemia     Chronic kidney disease (CKD)     Diabetes mellitus     Disease of thyroid gland     Hypertension        Allergies   Allergen Reactions    Codeine Nausea Only, Unknown - Low Severity and Other (See Comments)     Dizziness    Amlodipine Unknown - Low Severity       History reviewed. No pertinent surgical history.    History reviewed. No pertinent family history.    Social History     Socioeconomic History    Marital status:    Tobacco Use    Smoking status: Never    Smokeless tobacco: Never   Vaping Use    Vaping status: Never Used   Substance and Sexual Activity    Alcohol use: Never    Drug use: Never    Sexual activity: Not Currently           Objective   Physical Exam  Nursing note reviewed.  INITIAL VITAL SIGNS: Reviewed by me.  Pulse ox normal  GENERAL: Alert and interactive. No acute distress.  HEAD: Head is  normocephalic.  EYES: EOMI. PERRL. No scleral icterus. No conjunctival injection.  ENT: Moist mucous membranes.   NECK: Supple. Full range of motion.  RESPIRATORY: No tachypnea. Clear breath sounds bilaterally. No wheezing. No rales. No rhonchi.  CV: Regular rate and rhythm. No murmurs. No rubs or gallops.  ABDOMEN: Soft, obese, nontender.  BACK:  No obvious deformity.  EXTREMITIES: No deformity. No clubbing or cyanosis.  Notable lymphedema in the right leg more than left, both sisters report this is chronic  SKIN: Warm and dry. No diaphoresis. No obvious rashes.   NEUROLOGIC: Alert and oriented to person place and time, face is symmetric, speech is slightly slow as though tired but normal pronunciation and easily understood, can move all extremities, right leg slightly less well than left but this is also reported is chronic.  Has normal sensation.      Procedures           ED Course  ED Course as of 10/21/24 1724   Mon Oct 21, 2024   1433 H&P as above, not suspicious for stroke.  Patient complains of severe fatigue and sister is concerned with her mental status, will workup to rule out cardiovascular cause of fatigue, metabolic issues or infection.  Will check head CT as well as her some questionable memory loss but I am not suspicious for stroke based on history and physical. [RO]   1553 EKG shows sinus bradycardia rate of 57, normal TX interval, normal QRS duration normal axis, no elevations or depressions concerning for acute ischemia [RO]   1605 Workup reveals worsening kidney function to the kidney failure range, CBC more or less in line with prior, troponin 244, EKG without acute ischemic change and no change compared to prior EKGs.  Head CT and chest x-ray without acute findings.  Will admit patient for further management. [RO]   1620 D/w KARYN De Santiago who accepts on behalf of Dr. Espinosa, requesting cards recs re: heparin prior to physical transfer. Page put out to cards [RO]   7837 Spoke with Dr. Bean,  doesn't think need heparin unless repeat trop has significant delta. Cards will follow in house [RO]   1709 Repeat troponin 228 [RO]      ED Course User Index  [RO] Brad Sexton MD                                           Medical Decision Making  Problems Addressed:  Acute renal failure superimposed on chronic kidney disease, unspecified acute renal failure type, unspecified CKD stage: complicated acute illness or injury  Elevated troponin: complicated acute illness or injury  Other fatigue: complicated acute illness or injury    Amount and/or Complexity of Data Reviewed  Labs: ordered. Decision-making details documented in ED Course.  Radiology: ordered. Decision-making details documented in ED Course.  ECG/medicine tests: ordered and independent interpretation performed. Decision-making details documented in ED Course.  Discussion of management or test interpretation with external provider(s): Dr. Bean of cardiology    Risk  Prescription drug management.  Decision regarding hospitalization.        Final diagnoses:   Acute renal failure superimposed on chronic kidney disease, unspecified acute renal failure type, unspecified CKD stage   Elevated troponin   Other fatigue       ED Disposition  ED Disposition       ED Disposition   Decision to Admit    Condition   --    Comment   Level of Care: Telemetry [5]   Admitting Physician: ROSARIO ROTH [[id number]   Patient Class: Inpatient [101]   Bed Request Comments: CVU                 No follow-up provider specified.       Medication List      No changes were made to your prescriptions during this visit.

## 2024-10-21 NOTE — ED NOTES
[facility] (ALS) on site to transport pt to [id number]; status board shows room clean and ready . Pt in stable condition.

## 2024-10-21 NOTE — ED NOTES
Pt HR in 40's ; pt awake and alert sitting up on stretcher; no complaints at this time. MD aware of HR. Calling EMS at this time for ETA.

## 2024-10-21 NOTE — LETTER
EMS Transport Request  For use at [facility], [place], [place], [place], and [place] only   Patient Name: Leona Garcia : 1945   Weight:105 kg (230 lb 9.6 oz) Pick-up Location: [facility] BLS/ALS: BLS/ALS: BLS   Insurance: MEDICARE Auth End Date: 10/29/2024   Pre-Cert #: D/C Summary complete:    Destination: Other [place], [place]   Contact Precautions: None   Equipment (O2, Fluids, etc.): O2, settings 2L NC   Arrive By Date/Time: 10/29/2024 will call Stretcher/WC: Wheelchair   CM Requesting: Ale Pulido RN Ext: [phone]   Notes/Medical Necessity: Max assist x2 for transfers, Oxygen, Painful Lower Extremities w/ Dressings to wounds. Cognitive deficits.     ______________________________________________________________________    *Only 2 patient bags OR 1 carry-on size bag are permitted.  Wheelchairs and walkers CANNOT transported with the patient. Acknowledge: Yes

## 2024-10-22 ENCOUNTER — APPOINTMENT (OUTPATIENT)
Dept: CARDIOLOGY | Facility: HOSPITAL | Age: 79
End: 2024-10-22
Payer: MEDICARE

## 2024-10-22 ENCOUNTER — APPOINTMENT (OUTPATIENT)
Dept: ULTRASOUND IMAGING | Facility: HOSPITAL | Age: 79
End: 2024-10-22
Payer: MEDICARE

## 2024-10-22 LAB
ANION GAP SERPL CALCULATED.3IONS-SCNC: 10 MMOL/L (ref 5–15)
AORTIC DIMENSIONLESS INDEX: 0.67 (DI)
BACTERIA UR QL AUTO: ABNORMAL /HPF
BACTERIA UR QL AUTO: ABNORMAL /HPF
BASOPHILS # BLD AUTO: 0.03 10*3/MM3 (ref 0–0.2)
BASOPHILS NFR BLD AUTO: 0.5 % (ref 0–1.5)
BH CV ECHO MEAS - AO MAX PG: 16.5 MMHG
BH CV ECHO MEAS - AO MEAN PG: 9 MMHG
BH CV ECHO MEAS - AO V2 MAX: 203 CM/SEC
BH CV ECHO MEAS - AO V2 VTI: 37.4 CM
BH CV ECHO MEAS - AVA(I,D): 2.25 CM2
BH CV ECHO MEAS - EDV(CUBED): 110.6 ML
BH CV ECHO MEAS - EDV(MOD-SP4): 120 ML
BH CV ECHO MEAS - EF(MOD-SP4): 61.7 %
BH CV ECHO MEAS - ESV(CUBED): 35.9 ML
BH CV ECHO MEAS - ESV(MOD-SP4): 45.9 ML
BH CV ECHO MEAS - FS: 31.3 %
BH CV ECHO MEAS - IVS/LVPW: 1.1 CM
BH CV ECHO MEAS - IVSD: 1.1 CM
BH CV ECHO MEAS - LA DIMENSION: 3.9 CM
BH CV ECHO MEAS - LAT PEAK E' VEL: 9.5 CM/SEC
BH CV ECHO MEAS - LV DIASTOLIC VOL/BSA (35-75): 51.5 CM2
BH CV ECHO MEAS - LV MASS(C)D: 181.9 GRAMS
BH CV ECHO MEAS - LV MAX PG: 7.3 MMHG
BH CV ECHO MEAS - LV MEAN PG: 4 MMHG
BH CV ECHO MEAS - LV SYSTOLIC VOL/BSA (12-30): 19.7 CM2
BH CV ECHO MEAS - LV V1 MAX: 135 CM/SEC
BH CV ECHO MEAS - LV V1 VTI: 29.7 CM
BH CV ECHO MEAS - LVIDD: 4.8 CM
BH CV ECHO MEAS - LVIDS: 3.3 CM
BH CV ECHO MEAS - LVOT AREA: 2.8 CM2
BH CV ECHO MEAS - LVOT DIAM: 1.9 CM
BH CV ECHO MEAS - LVPWD: 1 CM
BH CV ECHO MEAS - MED PEAK E' VEL: 7.3 CM/SEC
BH CV ECHO MEAS - MV A MAX VEL: 135 CM/SEC
BH CV ECHO MEAS - MV DEC SLOPE: 509 CM/SEC2
BH CV ECHO MEAS - MV DEC TIME: 0.22 SEC
BH CV ECHO MEAS - MV E MAX VEL: 128 CM/SEC
BH CV ECHO MEAS - MV E/A: 0.95
BH CV ECHO MEAS - MV MAX PG: 8.8 MMHG
BH CV ECHO MEAS - MV MEAN PG: 4 MMHG
BH CV ECHO MEAS - MV P1/2T: 88 MSEC
BH CV ECHO MEAS - MV V2 VTI: 38.5 CM
BH CV ECHO MEAS - MVA(P1/2T): 2.5 CM2
BH CV ECHO MEAS - MVA(VTI): 2.19 CM2
BH CV ECHO MEAS - PA ACC TIME: 0.18 SEC
BH CV ECHO MEAS - PA V2 MAX: 119 CM/SEC
BH CV ECHO MEAS - RAP SYSTOLE: 15 MMHG
BH CV ECHO MEAS - RV MAX PG: 2.16 MMHG
BH CV ECHO MEAS - RV V1 MAX: 73.4 CM/SEC
BH CV ECHO MEAS - RV V1 VTI: 19.1 CM
BH CV ECHO MEAS - RVSP: 51.2 MMHG
BH CV ECHO MEAS - SV(LVOT): 84.2 ML
BH CV ECHO MEAS - SV(MOD-SP4): 74.1 ML
BH CV ECHO MEAS - SVI(LVOT): 36.2 ML/M2
BH CV ECHO MEAS - SVI(MOD-SP4): 31.8 ML/M2
BH CV ECHO MEAS - TAPSE (>1.6): 1.85 CM
BH CV ECHO MEAS - TR MAX PG: 36.2 MMHG
BH CV ECHO MEAS - TR MAX VEL: 301 CM/SEC
BH CV ECHO MEASUREMENTS AVERAGE E/E' RATIO: 15.24
BH CV XLRA - TDI S': 10.4 CM/SEC
BILIRUB UR QL STRIP: NEGATIVE
BUN SERPL-MCNC: 84 MG/DL (ref 8–23)
BUN/CREAT SERPL: 22.3 (ref 7–25)
C3 SERPL-MCNC: 115 MG/DL (ref 82–167)
C4 SERPL-MCNC: 28 MG/DL (ref 14–44)
CALCIUM SPEC-SCNC: 9.2 MG/DL (ref 8.6–10.5)
CHLORIDE SERPL-SCNC: 108 MMOL/L (ref 98–107)
CLARITY UR: CLEAR
CO2 SERPL-SCNC: 29 MMOL/L (ref 22–29)
COLOR UR: YELLOW
CREAT SERPL-MCNC: 3.76 MG/DL (ref 0.57–1)
CREAT UR-MCNC: 75.9 MG/DL
CREAT UR-MCNC: 84.1 MG/DL
DEPRECATED RDW RBC AUTO: 52.8 FL (ref 37–54)
EGFRCR SERPLBLD CKD-EPI 2021: 11.8 ML/MIN/1.73
EOSINOPHIL # BLD AUTO: 0.17 10*3/MM3 (ref 0–0.4)
EOSINOPHIL NFR BLD AUTO: 3.1 % (ref 0.3–6.2)
ERYTHROCYTE [DISTWIDTH] IN BLOOD BY AUTOMATED COUNT: 16 % (ref 12.3–15.4)
GLUCOSE BLDC GLUCOMTR-MCNC: 198 MG/DL (ref 70–105)
GLUCOSE BLDC GLUCOMTR-MCNC: 201 MG/DL (ref 70–105)
GLUCOSE BLDC GLUCOMTR-MCNC: 252 MG/DL (ref 70–105)
GLUCOSE BLDC GLUCOMTR-MCNC: 264 MG/DL (ref 70–105)
GLUCOSE SERPL-MCNC: 275 MG/DL (ref 65–99)
GLUCOSE UR STRIP-MCNC: NEGATIVE MG/DL
GRAN CASTS URNS QL MICRO: ABNORMAL /LPF
HCT VFR BLD AUTO: 26.2 % (ref 34–46.6)
HGB BLD-MCNC: 8 G/DL (ref 12–15.9)
HGB UR QL STRIP.AUTO: ABNORMAL
HYALINE CASTS UR QL AUTO: ABNORMAL /LPF
HYALINE CASTS UR QL AUTO: ABNORMAL /LPF
IMM GRANULOCYTES # BLD AUTO: 0.02 10*3/MM3 (ref 0–0.05)
IMM GRANULOCYTES NFR BLD AUTO: 0.4 % (ref 0–0.5)
KETONES UR QL STRIP: NEGATIVE
LEFT ATRIUM VOLUME INDEX: 38.9 ML/M2
LEUKOCYTE ESTERASE UR QL STRIP.AUTO: NEGATIVE
LYMPHOCYTES # BLD AUTO: 1.29 10*3/MM3 (ref 0.7–3.1)
LYMPHOCYTES NFR BLD AUTO: 23.6 % (ref 19.6–45.3)
MAGNESIUM SERPL-MCNC: 3.1 MG/DL (ref 1.6–2.4)
MCH RBC QN AUTO: 27.9 PG (ref 26.6–33)
MCHC RBC AUTO-ENTMCNC: 30.5 G/DL (ref 31.5–35.7)
MCV RBC AUTO: 91.3 FL (ref 79–97)
MONOCYTES # BLD AUTO: 0.55 10*3/MM3 (ref 0.1–0.9)
MONOCYTES NFR BLD AUTO: 10.1 % (ref 5–12)
MUCOUS THREADS URNS QL MICRO: ABNORMAL /HPF
NEUTROPHILS NFR BLD AUTO: 3.4 10*3/MM3 (ref 1.7–7)
NEUTROPHILS NFR BLD AUTO: 62.3 % (ref 42.7–76)
NITRITE UR QL STRIP: NEGATIVE
NRBC BLD AUTO-RTO: 0 /100 WBC (ref 0–0.2)
OSMOLALITY UR: 379 MOSM/KG (ref 300–800)
PH UR STRIP.AUTO: <=5 [PH] (ref 5–8)
PLATELET # BLD AUTO: 154 10*3/MM3 (ref 140–450)
PMV BLD AUTO: 11.4 FL (ref 6–12)
POTASSIUM SERPL-SCNC: 4.7 MMOL/L (ref 3.5–5.2)
PROT ?TM UR-MCNC: 95.6 MG/DL
PROT UR QL STRIP: ABNORMAL
QT INTERVAL: 388 MS
QT INTERVAL: 512 MS
QTC INTERVAL: 404 MS
QTC INTERVAL: 464 MS
RBC # BLD AUTO: 2.87 10*6/MM3 (ref 3.77–5.28)
RBC # UR STRIP: ABNORMAL /HPF
RBC # UR STRIP: ABNORMAL /HPF
REF LAB TEST METHOD: ABNORMAL
REF LAB TEST METHOD: ABNORMAL
SINUS: 2.8 CM
SODIUM SERPL-SCNC: 147 MMOL/L (ref 136–145)
SODIUM UR-SCNC: 52 MMOL/L
SP GR UR STRIP: 1.02 (ref 1–1.03)
SQUAMOUS #/AREA URNS HPF: ABNORMAL /HPF
SQUAMOUS #/AREA URNS HPF: ABNORMAL /HPF
STJ: 1.5 CM
TROPONIN T SERPL HS-MCNC: 227 NG/L
UROBILINOGEN UR QL STRIP: ABNORMAL
UUN 24H UR-MCNC: 341 MG/DL
WBC # UR STRIP: ABNORMAL /HPF
WBC # UR STRIP: ABNORMAL /HPF
WBC NRBC COR # BLD AUTO: 5.46 10*3/MM3 (ref 3.4–10.8)

## 2024-10-22 PROCEDURE — 80048 BASIC METABOLIC PNL TOTAL CA: CPT | Performed by: NURSE PRACTITIONER

## 2024-10-22 PROCEDURE — 25010000002 BUMETANIDE PER 0.5 MG: Performed by: INTERNAL MEDICINE

## 2024-10-22 PROCEDURE — 84540 ASSAY OF URINE/UREA-N: CPT | Performed by: NURSE PRACTITIONER

## 2024-10-22 PROCEDURE — 83735 ASSAY OF MAGNESIUM: CPT | Performed by: NURSE PRACTITIONER

## 2024-10-22 PROCEDURE — 84165 PROTEIN E-PHORESIS SERUM: CPT

## 2024-10-22 PROCEDURE — 86160 COMPLEMENT ANTIGEN: CPT

## 2024-10-22 PROCEDURE — 84156 ASSAY OF PROTEIN URINE: CPT | Performed by: INTERNAL MEDICINE

## 2024-10-22 PROCEDURE — 76775 US EXAM ABDO BACK WALL LIM: CPT

## 2024-10-22 PROCEDURE — 82570 ASSAY OF URINE CREATININE: CPT | Performed by: INTERNAL MEDICINE

## 2024-10-22 PROCEDURE — 93005 ELECTROCARDIOGRAM TRACING: CPT | Performed by: NURSE PRACTITIONER

## 2024-10-22 PROCEDURE — 25010000002 HEPARIN (PORCINE) PER 1000 UNITS: Performed by: NURSE PRACTITIONER

## 2024-10-22 PROCEDURE — 93306 TTE W/DOPPLER COMPLETE: CPT

## 2024-10-22 PROCEDURE — 63710000001 INSULIN LISPRO (HUMAN) PER 5 UNITS: Performed by: NURSE PRACTITIONER

## 2024-10-22 PROCEDURE — 83521 IG LIGHT CHAINS FREE EACH: CPT

## 2024-10-22 PROCEDURE — 82948 REAGENT STRIP/BLOOD GLUCOSE: CPT | Performed by: NURSE PRACTITIONER

## 2024-10-22 PROCEDURE — 82948 REAGENT STRIP/BLOOD GLUCOSE: CPT

## 2024-10-22 PROCEDURE — 83935 ASSAY OF URINE OSMOLALITY: CPT | Performed by: NURSE PRACTITIONER

## 2024-10-22 PROCEDURE — 81001 URINALYSIS AUTO W/SCOPE: CPT | Performed by: INTERNAL MEDICINE

## 2024-10-22 PROCEDURE — 99222 1ST HOSP IP/OBS MODERATE 55: CPT | Performed by: INTERNAL MEDICINE

## 2024-10-22 PROCEDURE — 63710000001 INSULIN LISPRO PROTAMINE-INSULIN LISPRO (75-25) 100 UNIT/ML SUSPENSION: Performed by: INTERNAL MEDICINE

## 2024-10-22 PROCEDURE — 84300 ASSAY OF URINE SODIUM: CPT | Performed by: NURSE PRACTITIONER

## 2024-10-22 PROCEDURE — 84155 ASSAY OF PROTEIN SERUM: CPT

## 2024-10-22 PROCEDURE — 93306 TTE W/DOPPLER COMPLETE: CPT | Performed by: INTERNAL MEDICINE

## 2024-10-22 PROCEDURE — 93010 ELECTROCARDIOGRAM REPORT: CPT | Performed by: INTERNAL MEDICINE

## 2024-10-22 PROCEDURE — 86334 IMMUNOFIX E-PHORESIS SERUM: CPT

## 2024-10-22 PROCEDURE — 82784 ASSAY IGA/IGD/IGG/IGM EACH: CPT

## 2024-10-22 PROCEDURE — 85025 COMPLETE CBC W/AUTO DIFF WBC: CPT | Performed by: NURSE PRACTITIONER

## 2024-10-22 PROCEDURE — 25010000002 HYDRALAZINE PER 20 MG: Performed by: NURSE PRACTITIONER

## 2024-10-22 RX ORDER — ASPIRIN 81 MG/1
81 TABLET, CHEWABLE ORAL DAILY
Status: DISCONTINUED | OUTPATIENT
Start: 2024-10-22 | End: 2024-10-29 | Stop reason: HOSPADM

## 2024-10-22 RX ORDER — LEVOTHYROXINE SODIUM 50 UG/1
50 TABLET ORAL
Status: DISCONTINUED | OUTPATIENT
Start: 2024-10-23 | End: 2024-10-29 | Stop reason: HOSPADM

## 2024-10-22 RX ORDER — HYDRALAZINE HYDROCHLORIDE 25 MG/1
100 TABLET, FILM COATED ORAL EVERY 12 HOURS SCHEDULED
Status: DISCONTINUED | OUTPATIENT
Start: 2024-10-22 | End: 2024-10-23

## 2024-10-22 RX ORDER — METOLAZONE 2.5 MG/1
10 TABLET ORAL ONCE
Status: COMPLETED | OUTPATIENT
Start: 2024-10-22 | End: 2024-10-22

## 2024-10-22 RX ORDER — ASPIRIN 81 MG/1
1 TABLET, CHEWABLE ORAL DAILY
COMMUNITY

## 2024-10-22 RX ORDER — SEVELAMER CARBONATE 800 MG/1
800 TABLET, FILM COATED ORAL
Status: DISCONTINUED | OUTPATIENT
Start: 2024-10-22 | End: 2024-10-29 | Stop reason: HOSPADM

## 2024-10-22 RX ORDER — HYDRALAZINE HYDROCHLORIDE 25 MG/1
100 TABLET, FILM COATED ORAL DAILY
Status: DISCONTINUED | OUTPATIENT
Start: 2024-10-22 | End: 2024-10-22

## 2024-10-22 RX ADMIN — INSULIN LISPRO 4 UNITS: 100 INJECTION, SOLUTION INTRAVENOUS; SUBCUTANEOUS at 11:32

## 2024-10-22 RX ADMIN — HYDRALAZINE HYDROCHLORIDE 100 MG: 25 TABLET ORAL at 20:51

## 2024-10-22 RX ADMIN — SEVELAMER CARBONATE 800 MG: 800 TABLET, FILM COATED ORAL at 20:51

## 2024-10-22 RX ADMIN — HEPARIN SODIUM 5000 UNITS: 5000 INJECTION INTRAVENOUS; SUBCUTANEOUS at 09:45

## 2024-10-22 RX ADMIN — INSULIN LISPRO 4 UNITS: 100 INJECTION, SOLUTION INTRAVENOUS; SUBCUTANEOUS at 09:46

## 2024-10-22 RX ADMIN — INSULIN LISPRO 3 UNITS: 100 INJECTION, SOLUTION INTRAVENOUS; SUBCUTANEOUS at 21:03

## 2024-10-22 RX ADMIN — HYDRALAZINE HYDROCHLORIDE 10 MG: 20 INJECTION INTRAMUSCULAR; INTRAVENOUS at 23:47

## 2024-10-22 RX ADMIN — HYDRALAZINE HYDROCHLORIDE 10 MG: 20 INJECTION INTRAMUSCULAR; INTRAVENOUS at 17:08

## 2024-10-22 RX ADMIN — INSULIN LISPRO 10 UNITS: 100 INJECTION, SUSPENSION SUBCUTANEOUS at 22:57

## 2024-10-22 RX ADMIN — HEPARIN SODIUM 5000 UNITS: 5000 INJECTION INTRAVENOUS; SUBCUTANEOUS at 20:52

## 2024-10-22 RX ADMIN — Medication 10 ML: at 10:00

## 2024-10-22 RX ADMIN — ASPIRIN 81 MG CHEWABLE TABLET 81 MG: 81 TABLET CHEWABLE at 13:18

## 2024-10-22 RX ADMIN — PANTOPRAZOLE SODIUM 40 MG: 40 INJECTION, POWDER, FOR SOLUTION INTRAVENOUS at 05:31

## 2024-10-22 RX ADMIN — HYDRALAZINE HYDROCHLORIDE 10 MG: 20 INJECTION INTRAMUSCULAR; INTRAVENOUS at 09:46

## 2024-10-22 RX ADMIN — INSULIN LISPRO 2 UNITS: 100 INJECTION, SOLUTION INTRAVENOUS; SUBCUTANEOUS at 18:05

## 2024-10-22 RX ADMIN — HYDRALAZINE HYDROCHLORIDE 10 MG: 20 INJECTION INTRAMUSCULAR; INTRAVENOUS at 05:31

## 2024-10-22 RX ADMIN — Medication 10 ML: at 21:22

## 2024-10-22 RX ADMIN — HYDRALAZINE HYDROCHLORIDE 100 MG: 25 TABLET ORAL at 13:17

## 2024-10-22 RX ADMIN — BUMETANIDE 1 MG/HR: 0.25 INJECTION INTRAMUSCULAR; INTRAVENOUS at 11:54

## 2024-10-22 RX ADMIN — METOLAZONE 10 MG: 2.5 TABLET ORAL at 11:31

## 2024-10-22 NOTE — NURSING NOTE
WOCN note:    79 yr old female admitted 10/21/24 with acute kidney injury. Patient has a hx of CKD, DM, hypothyroid and HTN. WOCN consult received for compression wraps for PVD, weeping and edema.     Patient presents with BLE edema with one partial thickness wound to her left anterior lower leg. The wound measures approximately 2.5 x 1.5 cm and is draining a moderate amount of serous exudate. The wound and both lower legs were cleansed with Vashe. A Maxorb dressing and ABD pad were applied to the wound and both lower legs were wrapped with kerlix and 2 ACE wraps from the base of the toes to the bend of the knee.     Patient also has altered skin texture with papillomas and skin changes consistent with chronic venous stasis and lymphedema. Patient reports she has CircAid compression garments at home but needs assist with donning. Recommend to change and re-wrap every other day or as needed to maintain light even compression. We will follow as needed.

## 2024-10-22 NOTE — CONSULTS
INITIAL CONSULT NOTE      Patient Name: Leona Garcia  : 1945  MRN: [id number]  Primary Care Physician: Alfred Liriano MD  Date of admission: 10/21/2024    Patient Care Team:  Alfred Liriano MD as PCP - General (Family Medicine)        Reason for Consult:       JOSELITO on CKD     Subjective   History of Present Illness:   Chief Complaint:   Chief Complaint   Patient presents with    Weakness - Generalized    Memory Loss     HISTORY:    Leona Garcia is a 79 year old female with PMH of DM, HTN, CKD stage 3, hypothyroidism, anemia who presented to ED with weakness, lethargy over past week. Initial work up in ED revealed JOSELITO with creatinine of 3.5 and with elevated troponin. HR 40-50 while in ED. She was admitted with cardiology and nephrology consultation. Today, creatinine worsened to 3.76. Baseline around 1.7-1.9 mg/dL. She is now s/p total 1L of LR, and 1X dose of Metolazone 10 mg. Currently on Bumex gtt which was started earlier today. Not on any ACE/ARBs or diuretics. Home medications pertinent for Losartan, Lasix. Most recent ECHO from 2023 showed an EF of 61 to 65% with grade 1 diastolic dysfunction and mild TR. UA done which showed trace blood, 6-10 rbc, UPCR 1.1 grams, urine sodium 52. Renal US from  was unremarkable. No recent contrast exposure. No NSAID use. Nephrology services have been requested for JOSELITO on CKD management        Review of systems:    +fatigue, remaining ROS negative    Personal History:     Past Medical History:   Past Medical History:   Diagnosis Date    Anemia     Chronic kidney disease (CKD)     Diabetes mellitus     Disease of thyroid gland     Hypertension        Surgical History:    History reviewed. No pertinent surgical history.    Family History: family history is not on file. Otherwise pertinent FHx was reviewed and unremarkable.     Social History:  reports that she has never smoked. She has never used smokeless tobacco. She reports that she  does not drink alcohol and does not use drugs.    Medications:  Prior to Admission medications    Medication Sig Start Date End Date Taking? Authorizing Provider   aspirin 81 MG chewable tablet Chew 1 tablet Daily.   Yes Miriam Baldwin MD   ezetimibe (ZETIA) 10 MG tablet Take 1 tablet by mouth Daily.   Yes Miriam Baldwin MD   furosemide (LASIX) 40 MG tablet Take 1 tablet by mouth Daily.   Yes Miriam Baldwin MD   hydrALAZINE (APRESOLINE) 100 MG tablet Take 1 tablet by mouth Daily.   Yes Miriam Baldwin MD   insulin NPH-insulin regular (humuLIN 70/30,novoLIN 70/30) (70-30) 100 UNIT/ML injection Inject 10 Units under the skin into the appropriate area as directed Every Night. 20 units QAM + 10 units QPM   Yes Miriam Baldwin MD   insulin NPH-insulin regular (humuLIN 70/30,novoLIN 70/30) (70-30) 100 UNIT/ML injection Inject 20 Units under the skin into the appropriate area as directed Every Morning. 20 units QAM + 10 units QPM   Yes Miriam Baldwin MD   levothyroxine (SYNTHROID, LEVOTHROID) 50 MCG tablet Take 1 tablet by mouth Daily.   Yes Miriam Baldwin MD   losartan (COZAAR) 100 MG tablet Take 1 tablet by mouth Daily.   Yes Miriam Baldwin MD   potassium chloride 10 MEQ CR tablet Take 1 tablet by mouth Daily.   Yes Miriam Baldwin MD   carvedilol (COREG) 6.25 MG tablet Take 1 tablet by mouth 2 (Two) Times a Day With Meals for 30 days. 1/4/24 2/3/24  Dennis Ribeiro MD   fluticasone (FLONASE) 50 MCG/ACT nasal spray Administer 2 sprays into the nostril(s) as directed by provider Daily.    Miriam Baldwin MD     Scheduled Meds:heparin (porcine), 5,000 Units, Subcutaneous, Q12H  insulin lispro, 2-7 Units, Subcutaneous, 4x Daily AC & at Bedtime  sodium chloride, 10 mL, Intravenous, Q12H      Continuous Infusions:bumetanide, 1 mg/hr, Last Rate: 1 mg/hr (10/22/24 1114)      PRN Meds:  acetaminophen    senna-docusate sodium **AND** polyethylene glycol **AND**  bisacodyl **AND** bisacodyl    dextrose    dextrose    glucagon (human recombinant)    hydrALAZINE    influenza vaccine    Magnesium Standard Dose Replacement - Follow Nurse / BPA Driven Protocol    nitroglycerin    ondansetron    Phosphorus Replacement - Follow Nurse / BPA Driven Protocol    Potassium Replacement - Follow Nurse / BPA Driven Protocol    [COMPLETED] Insert peripheral IV **AND** sodium chloride    sodium chloride  Allergies:    Allergies   Allergen Reactions    Codeine Nausea Only, Unknown - Low Severity and Other (See Comments)     Dizziness    Amlodipine Unknown - Low Severity       Objective   Exam:     Vital Signs  Temp:  [96.8 °F (36 °C)-98.3 °F (36.8 °C)] 97 °F (36.1 °C)  Heart Rate:  [45-94] 76  Resp:  [11-18] 14  BP: (135-175)/(53-75) 138/63  SpO2:  [90 %-100 %] 94 %  on  Flow (L/min) (Oxygen Therapy):  [2] 2;   Device (Oxygen Therapy): nasal cannula  Body mass index is 46.16 kg/m².  EXAM  General: female in no acute distress.    Head:      Normocephalic and atraumatic.    Eyes:      PERRL/EOM intact, conjunctivae and sclerae clear without nystagmus.    Neck:      No masses, thyromegaly,  trachea central   Lungs:    Clear bilaterally to auscultation.    Heart:      Regular rate and rhythm, no murmur no gallop  Abd:        Soft, nontender, not distended, bowel sounds positive, no shifting dullness.  Msk:        No deformity or scoliosis noted of thoracic or lumbar spine.    Pulses:   Pulses normal in all 4 extremities.    Extremities:        No cyanosis or clubbing--++ edema.    Neuro:    No focal deficits.   alert oriented x3  Skin:       Intact without lesions or rashes.    Psych:    Alert and cooperative; normal mood and affect; normal attention span       Results Review:  I have personally reviewed most recent Data :  BMP @LABRCNT(creatinine:10)  CBC    Results from last 7 days   Lab Units 10/22/24  0417 10/21/24  1400   WBC 10*3/mm3 5.46 6.02   HEMOGLOBIN g/dL 8.0* 8.3*   PLATELETS  10*3/mm3 154 166     CMP   Results from last 7 days   Lab Units 10/22/24  0417 10/21/24  2040 10/21/24  1400   SODIUM mmol/L 147* 145 142   POTASSIUM mmol/L 4.7 4.8 4.5   CHLORIDE mmol/L 108* 106 104   CO2 mmol/L 29.0 26.3 27.8   BUN mg/dL 84* 83* 82*   CREATININE mg/dL 3.76* 3.62* 3.57*   GLUCOSE mg/dL 275* 266* 267*   ALBUMIN g/dL  --   --  3.5   BILIRUBIN mg/dL  --   --  0.4   ALK PHOS U/L  --   --  192*   AST (SGOT) U/L  --   --  63*   ALT (SGPT) U/L  --   --  96*   LIPASE U/L  --   --  21     ABG      XR Chest 2 View    Result Date: 10/21/2024  Impression: 1. Stable cardiomegaly. 2. No acute process. Electronically Signed: Dexter Cartwright MD  10/21/2024 2:56 PM EDT  Workstation ID: [id number]    CT Head Without Contrast    Result Date: 10/21/2024  Impression: 1. 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 2. Mild chronic microvascular disease features. 3. No acute intracranial findings. Electronically Signed: Arely Jaramillo MD  10/21/2024 2:38 PM EDT  Workstation ID: [id number]     Results for orders placed during the hospital encounter of 12/27/23    Adult Transthoracic Echo Complete W/ Cont if Necessary Per Protocol    Interpretation Summary    Left ventricular systolic function is normal. Left ventricular ejection fraction appears to be 61 - 65%.    Left ventricular diastolic function is consistent with (grade I) impaired relaxation.    The left atrial cavity is mildly dilated.    The right atrial cavity is mildly  dilated.    There is mild, bileaflet mitral valve thickening present.    Estimated right ventricular systolic pressure from tricuspid regurgitation is markedly elevated (>55 mmHg).        Assessment & Plan   Assessment and Plan:         JOSELITO (acute kidney injury)    ASSESSMENT:  JOSELITO now etiology  CKD stage III  Volume overload with increased edema  HTN stable   DM  Anemia  CHF with  right failure with diastolic heart failure   Hypernatremia ??? Etiology uncertain dont look like free water deficit    ECHO from 2023: EF 61- 65% with grade 1 diastolic dysfunction and mild TR.          PLAN :     Patient with JOSELITO, creatinine increased to 3.7 today. Baseline around 1.7-1.9 I think. Etiology likely from volume overload, maybe some CRS. Has received 1X dose of Metolazone.   Bumex drip started at this time for volume if UO not better patient will need RRT by AM  Urine studies reviewed, urine sodium 52, did show some hematuria as well as 1.1 grams proteinuria. Likely from diabetic nephropathy but need to monitor and will check serology   Check repeat Renal US   ECHO : diastolic heart failre with some valvular issues  Started on Bumex gtt, if no significant improvement may require RRT in next day or so   Electrolytes/acid base stable, mildly elevated sodium likely secondary to loop diuretic   Anemia, hgb low but stable, check iron studies if begins to trend down   Daily labs   Thank you for this consultation, we will gladly follow   Start phos binders      [facility]  10/22/2024  12:13 EDT

## 2024-10-22 NOTE — PROGRESS NOTES
[facility] MEDICINE SERVICE  DAILY PROGRESS NOTE    NAME: Leona Garcia  : 1945  MRN: [id number]      LOS: 1 day     PROVIDER OF SERVICE: Sharron Grissom MD    Chief Complaint: JOSELITO (acute kidney injury)    Subjective:     Interval History:  History taken from: patient    No new complaint        Review of Systems:   Review of Systems   All other systems reviewed and are negative.      Objective:     Vital Signs  Temp:  [96.8 °F (36 °C)-98.3 °F (36.8 °C)] 97 °F (36.1 °C)  Heart Rate:  [45-79] 76  Resp:  [11-14] 14  BP: (135-175)/(53-75) 163/66  Flow (L/min) (Oxygen Therapy):  [2] 2   Body mass index is 46.16 kg/m².    Physical Exam  Physical Exam  Constitutional:       Appearance: Normal appearance.   HENT:      Head: Normocephalic and atraumatic.      Nose: Nose normal.      Mouth/Throat:      Mouth: Mucous membranes are moist.   Eyes:      Extraocular Movements: Extraocular movements intact.      Pupils: Pupils are equal, round, and reactive to light.   Cardiovascular:      Rate and Rhythm: Normal rate and regular rhythm.   Pulmonary:      Effort: Pulmonary effort is normal.      Breath sounds: Normal breath sounds.   Abdominal:      General: Abdomen is flat. Bowel sounds are normal.      Palpations: Abdomen is soft.   Musculoskeletal:         General: Normal range of motion.      Cervical back: Normal range of motion and neck supple.   Skin:     General: Skin is warm and dry.   Neurological:      General: No focal deficit present.      Mental Status: She is alert and oriented to person, place, and time.   Psychiatric:         Mood and Affect: Mood normal.         Behavior: Behavior normal.         Thought Content: Thought content normal.         Judgment: Judgment normal.         Current Medications:  Scheduled Meds:aspirin, 81 mg, Oral, Daily  heparin (porcine), 5,000 Units, Subcutaneous, Q12H  hydrALAZINE, 100 mg, Oral, Daily  insulin lispro, 2-7 Units, Subcutaneous, 4x Daily AC & at  Bedtime  insulin lispro protamine-insulin lispro, 10 Units, Subcutaneous, Nightly  insulin lispro protamine-insulin lispro, 20 Units, Subcutaneous, Daily With Breakfast  [START ON 10/23/2024] levothyroxine, 50 mcg, Oral, Q AM  sodium chloride, 10 mL, Intravenous, Q12H    Levothyroxine 50 mcg oral every morning  Continuous Infusions:bumetanide, 1 mg/hr, Last Rate: 1 mg/hr (10/22/24 1154)      PRN Meds:.  acetaminophen    senna-docusate sodium **AND** polyethylene glycol **AND** bisacodyl **AND** bisacodyl    dextrose    dextrose    glucagon (human recombinant)    hydrALAZINE    influenza vaccine    Magnesium Standard Dose Replacement - Follow Nurse / BPA Driven Protocol    nitroglycerin    ondansetron    Phosphorus Replacement - Follow Nurse / BPA Driven Protocol    Potassium Replacement - Follow Nurse / BPA Driven Protocol    [COMPLETED] Insert peripheral IV **AND** sodium chloride    sodium chloride       Diagnostic Data    Results from last 7 days   Lab Units 10/22/24  0417 10/21/24  2040 10/21/24  1400   WBC 10*3/mm3 5.46  --  6.02   HEMOGLOBIN g/dL 8.0*  --  8.3*   HEMATOCRIT % 26.2*  --  28.0*   PLATELETS 10*3/mm3 154  --  166   GLUCOSE mg/dL 275*   < > 267*   CREATININE mg/dL 3.76*   < > 3.57*   BUN mg/dL 84*   < > 82*   SODIUM mmol/L 147*   < > 142   POTASSIUM mmol/L 4.7   < > 4.5   AST (SGOT) U/L  --   --  63*   ALT (SGPT) U/L  --   --  96*   ALK PHOS U/L  --   --  192*   BILIRUBIN mg/dL  --   --  0.4   ANION GAP mmol/L 10.0   < > 10.2    < > = values in this interval not displayed.       XR Chest 2 View    Result Date: 10/21/2024  Impression: 1. Stable cardiomegaly. 2. No acute process. Electronically Signed: Dexter Cartwright MD  10/21/2024 2:56 PM EDT  Workstation ID: [id number]    CT Head Without Contrast    Result Date: 10/21/2024  Impression: 1. 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  2. Mild chronic microvascular disease features. 3. No acute intracranial findings. Electronically Signed: Arely Jaramillo MD  10/21/2024 2:38 PM EDT  Workstation ID: [id number]       I reviewed the patient's new clinical results.    Assessment/Plan:     Active and Resolved Problems  Active Hospital Problems    Diagnosis  POA    **JOSELITO (acute kidney injury) [N17.9]  Yes      Resolved Hospital Problems   No resolved problems to display.       JOSELITO on CKD stage 3:  -Worsening renal function.  Nephrology considering CRRT if no improvement of renal function on Bumex drip.  Defer further recommendations to nephrology.  Continue to monitor renal function     Elevated Troponin  -Follow-up on echo ordered.  Troponins elevated and trending slightly upwards.  EKG with slight T wave flattening in anterolateral leads.  Cardiology following.     Bradycardia:  -Resolved     Hypertension:  -BP persistently elevated.  Start hydralazine    Diabetes mellitus type 2:  -Blood glucose is uncontrolled.  Restart home dose of of 70/30 insulin/substitute with 75/25 insulin available here at the same home doses.  Check hemoglobin A1c and monitor blood glucose trend.     Hypothyroidism:  -Continue levothyroxine    VTE Prophylaxis:  Pharmacologic & mechanical VTE prophylaxis orders are present.             Disposition Planning:     Barriers to Discharge: Pending clinical improvement  Anticipated Date of Discharge: Pending clinical course and clinical improvement  Place of Discharge: Home        Code Status and Medical Interventions: CPR (Attempt to Resuscitate); Full Support   Ordered at: 10/21/24 2019     Code Status (Patient has no pulse and is not breathing):    CPR (Attempt to Resuscitate)     Medical Interventions (Patient has pulse or is breathing):    Full Support       Signature: Electronically signed by Sharron Grissom MD, 10/22/24, 14:26 EDT.  [facility] Hospitalist Team

## 2024-10-22 NOTE — CASE MANAGEMENT/SOCIAL WORK
Discharge Planning Assessment   [place]     Patient Name: Leona Garcia  MRN: [id number]  Today's Date: 10/22/2024    Admit Date: 10/21/2024    Plan: [place] Plan: Anticipate routine home with sister camilla clinical course and PT/OT evaluations.   Discharge Needs Assessment       Row Name 10/22/24 1205       Living Environment    People in Home sibling(s)    Name(s) of People in Home Sister Louann Moreira    Current Living Arrangements home    Potentially Unsafe Housing Conditions none    In the past 12 months has the electric, gas, oil, or water company threatened to shut off services in your home? No    Primary Care Provided by self    Provides Primary Care For no one;no one, unable/limited ability to care for self    Family Caregiver if Needed sibling(s)    Family Caregiver Names Sister Louann Moreira    Quality of Family Relationships helpful;involved;supportive    Able to Return to Prior Arrangements yes       Resource/Environmental Concerns    Resource/Environmental Concerns none    Transportation Concerns none       Transportation Needs    In the past 12 months, has lack of transportation kept you from medical appointments or from getting medications? no    In the past 12 months, has lack of transportation kept you from meetings, work, or from getting things needed for daily living? No       Food Insecurity    Within the past 12 months, you worried that your food would run out before you got the money to buy more. Never true    Within the past 12 months, the food you bought just didn't last and you didn't have money to get more. Never true       Transition Planning    Patient/Family Anticipates Transition to home with family    Patient/Family Anticipated Services at Transition none    Transportation Anticipated family or friend will provide       Discharge Needs Assessment    Readmission Within the Last 30 Days no previous admission in last 30 days    Concerns to be Addressed discharge planning    Anticipated  Changes Related to Illness none    Equipment Needed After Discharge none    Provided Post Acute Provider List? N/A    Provided Post Acute Provider Quality & Resource List? N/A    Current Discharge Risk chronically ill                   Discharge Plan       Row Name 10/22/24 1201       Plan    Plan DC Plan: Anticipate routine home with sister pendign clinical course and PT/OT evaluations.    Patient/Family in Agreement with Plan yes    Provided Post Acute Provider List? N/A    Provided Post Acute Provider Quality & Resource List? N/A    Plan Comments CM spoke with patient at bedside to discuss admission assessment and discharge planning. Patient confirms PCP and pharmacy. Patient confirms she is agreeable to meds to bed program at this time. CM updated pharmacy in [email or web address]. Patient denies any difficulty affording medications or food at this time. Patient denies any additional needs for services or DME at this time. Patient confirms she has used [facility] in the past but would not prefer to use again. Patient also confirms she has been to [facility] in the past and would be agreeable to return there if recommended. Patient reports IADL's at baseline w/moderate mobility but does not drive. Patient sister will provide transportation when ready for DC if appropriate.CM will continue to follow for any additional needs and adjust DC plan accordingly. DC Barriers: Cardiac monitoring, Bumex gtt, Monitor Renal Function, Pending Echocardiogram, and Glucose control.              Demographic Summary       Row Name 10/22/24 1206       General Information    Admission Type inpatient    Arrived From emergency department;home    Required Notices Provided Important Message from Medicare    Referral Source admission list    Reason for Consult discharge planning    Preferred Language English       Contact Information    Permission Granted to Share Info With                    Functional Status       Row Name 10/22/24 5767        Functional Status    Usual Activity Tolerance moderate    Current Activity Tolerance moderate       Physical Activity    On average, how many days per week do you engage in moderate to strenuous exercise (like a brisk walk)? 0 days    On average, how many minutes do you engage in exercise at this level? 0 min    Number of minutes of exercise per week 0       Functional Status, IADL    Medications independent    Meal Preparation independent    Housekeeping independent    Laundry independent    Shopping independent    If for any reason you need help with day-to-day activities such as bathing, preparing meals, shopping, managing finances, etc., do you get the help you need? I get all the help I need       Mental Status    General Appearance WDL WDL       Mental Status Summary    Recent Changes in Mental Status/Cognitive Functioning no changes       Employment/    Employment Status retired    Current or Previous Occupation not applicable                  Patient Forms       Row Name 10/22/24 1030       Patient Forms    Important Message from Medicare (IMM) Delivered    Delivered to Patient    Method of delivery In person  Patient verbalized understanding. Signed copy per sister at bedside and left at bedside.                  Met with patient in room   Maintain distance greater than six feet and spent less than fifteen minutes in the room.      Ale Pulido RN     Office Phone: [phone]  Office Cell:     [phone]

## 2024-10-22 NOTE — CONSULTS
Initial spiritual care visit. Pt in bed eating and sister in recliner next to her. Pt welcomed  and asked for healing prayer. Sister then asked if  would pray for a family situation which was causing stress and sadness. Sister then explained the situation, which concerned the pt, and asked that be prayed for also.  prayed for all requests and the pt was tearful at the end of the prayer.  affirmed the pt and sister's feelings and offered to return tomorrow for support if needed. They appreciated and accepted the offer.  will continue to follow for support. No other needs at this time.

## 2024-10-22 NOTE — H&P
[facility] Medicine Services  History & Physical    Patient Name: Leona Garcia  : 1945  MRN: [id number]  Primary Care Physician:  Alfred Liriano MD  Date of admission: 10/21/2024  Date and Time of Service: 10/21/2024 at 2030    Subjective      Chief Complaint: lethargy, weakness    History of Present Illness: Leona Garcia is a 79 y.o. female with a CMH of DM, HTN, CKD stage 3, hypothyroidism, anemia who presented to [facility] on 10/21/2024 with lethargy and weakness. Lives at home with sister, uses walker for ambulation. Recent stressors of out of town sister death last week and unable to go to .  Presented to free standing ED for weakness, fatigue, lethargy over past week. Sister reports slightly confused but patient denies any recent confusion or forgetfulness. Denies dyspnea, CP, N/V/D. On arrival to ED VS: 97.9-94-/55-97% room air. While in ED HR down to 40-50s. Cardiology consulted by ED provider. Due to elevated creatinine and troponin levels, transfer to this facility for further management.     Upon evaluation, awake, alert, resting in bed, sister at bedside. Current VS: 48-/70-98% 2 lpm/NC. Denies use of home oxygen. Denies pain. On exam with lymphedema BLE right > left. Speech slow to respond and tearful at times.   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 . Qtc 459 repeat EKG: SB with nonspecific T abnormality rate 49. , Qtc 464.   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   Blood work reveals WBC 6.02, Hgb 8.3, Hct 28, glucose 267, BUN 82, creatinine 3.57, ALT 96, AST 63, alk phos 192, GFR 12, proBNP 9565, , A1c 9.65, magnesium 2.6, phosphorus 5.2,   Troponin 244 -> 228 -> 249  Urinalysis reveals cloudy urine, 100 glucose, 100  protein,         Review of Systems   Constitutional:  Positive for activity change, appetite change and fatigue. Negative for fever.   Respiratory:  Negative for shortness of breath.    Cardiovascular:  Negative for chest pain.   Gastrointestinal:  Negative for abdominal pain and nausea.   Genitourinary:  Positive for decreased urine volume. Negative for dysuria.   Neurological:  Positive for weakness.       Personal History     Past Medical History:   Diagnosis Date    Anemia     Chronic kidney disease (CKD)     Diabetes mellitus     Disease of thyroid gland     Hypertension        History reviewed. No pertinent surgical history.    Family History: family history is not on file. Otherwise pertinent FHx was reviewed and not pertinent to current issue.    Social History:  reports that she has never smoked. She has never used smokeless tobacco. She reports that she does not drink alcohol and does not use drugs.    Home Medications:  Prior to Admission Medications       Prescriptions Last Dose Informant Patient Reported? Taking?    carvedilol (COREG) 6.25 MG tablet   No No    Take 1 tablet by mouth 2 (Two) Times a Day With Meals for 30 days.    ezetimibe (ZETIA) 10 MG tablet   Yes No    Take 1 tablet by mouth Daily.    fluticasone (FLONASE) 50 MCG/ACT nasal spray   Yes No    2 sprays into the nostril(s) as directed by provider Daily.    furosemide (LASIX) 40 MG tablet   Yes No    Take 1 tablet by mouth Daily.    hydrALAZINE (APRESOLINE) 100 MG tablet   Yes No    Take 1 tablet by mouth Daily.    insulin NPH-insulin regular (humuLIN 70/30,novoLIN 70/30) (70-30) 100 UNIT/ML injection   Yes No    Inject 10 Units under the skin into the appropriate area as directed Every Night. 20 units QAM + 10 units QPM    insulin NPH-insulin regular (humuLIN 70/30,novoLIN 70/30) (70-30) 100 UNIT/ML injection   Yes No    Inject 20 Units under the skin into the appropriate area as directed Every Morning. 20 units QAM + 10 units QPM     levothyroxine (SYNTHROID, LEVOTHROID) 50 MCG tablet   Yes No    Take 1 tablet by mouth Daily.    losartan (COZAAR) 100 MG tablet   Yes No    Take 1 tablet by mouth Daily.    potassium chloride 10 MEQ CR tablet   Yes No    Take 1 tablet by mouth Daily.              Allergies:  Allergies   Allergen Reactions    Codeine Nausea Only, Unknown - Low Severity and Other (See Comments)     Dizziness    Amlodipine Unknown - Low Severity       Objective      Vitals:   Temp:  [97.3 °F (36.3 °C)-97.9 °F (36.6 °C)] 97.3 °F (36.3 °C)  Heart Rate:  [48-94] 48  Resp:  [12-18] 12  BP: (146-166)/(55-70) 166/70  Body mass index is 46.22 kg/m².  Physical Exam  Constitutional:       Appearance: Normal appearance.   HENT:      Head: Normocephalic and atraumatic.      Mouth/Throat:      Comments: Slightly dry  Eyes:      General: No scleral icterus.     Extraocular Movements: Extraocular movements intact.      Pupils: Pupils are equal, round, and reactive to light.   Cardiovascular:      Rate and Rhythm: Regular rhythm. Bradycardia present.      Pulses: Normal pulses.      Heart sounds: Normal heart sounds.      Comments: Denies chest pain or reproducible chest pain  Pulmonary:      Effort: Pulmonary effort is normal.      Breath sounds: Normal breath sounds.      Comments: diminished  Abdominal:      General: Bowel sounds are normal. There is no distension.      Palpations: Abdomen is soft.      Tenderness: There is no abdominal tenderness.   Musculoskeletal:      Comments: Bilateral lymphedema right > left.   Chronic venous stasis skin changes to BLE   Skin:     General: Skin is warm and dry.   Neurological:      Mental Status: She is alert and oriented to person, place, and time. Mental status is at baseline.      Motor: Weakness present.      Comments: Slow to respond at times  Tearful at times          Diagnostic Data:  Lab Results (last 24 hours)       Procedure Component Value Units Date/Time    POC Glucose Once [[id number]]   (Abnormal) Collected: 10/21/24 2149    Specimen: Blood Updated: 10/21/24 2152     Glucose 286 mg/dL      Comment: Serial Number: [id number]:  [id number]       BNP [[id number]]  (Abnormal) Collected: 10/21/24 2040    Specimen: Blood Updated: 10/21/24 2124     proBNP 8,842.0 pg/mL     Narrative:      This assay is used as an aid in the diagnosis of individuals suspected of having heart failure. It can be used as an aid in the diagnosis of acute decompensated heart failure (ADHF) in patients presenting with signs and symptoms of ADHF to the emergency department (ED). In addition, NT-proBNP of <300 pg/mL indicates ADHF is not likely.    Age Range Result Interpretation  NT-proBNP Concentration (pg/mL:      <50             Positive            >450                   Gray                 300-450                    Negative             <300    50-75           Positive            >900                  Gray                300-900                  Negative            <300      >75             Positive            >1800                  Gray                300-1800                  Negative            <300    High Sensitivity Troponin T [[id number]]  (Abnormal) Collected: 10/21/24 2040    Specimen: Blood Updated: 10/21/24 2116     HS Troponin T 249 ng/L     Narrative:      High Sensitive Troponin T Reference Range:  <14.0 ng/L- Negative Female for AMI  <22.0 ng/L- Negative Male for AMI  >=14 - Abnormal Female indicating possible myocardial injury.  >=22 - Abnormal Male indicating possible myocardial injury.   Clinicians would have to utilize clinical acumen, EKG, Troponin, and serial changes to determine if it is an Acute Myocardial Infarction or myocardial injury due to an underlying chronic condition.         Basic Metabolic Panel [[id number]]  (Abnormal) Collected: 10/21/24 2040    Specimen: Blood Updated: 10/21/24 2113     Glucose 266 mg/dL      BUN 83 mg/dL      Creatinine 3.62 mg/dL      Sodium 145 mmol/L      Potassium  4.8 mmol/L      Chloride 106 mmol/L      CO2 26.3 mmol/L      Calcium 9.2 mg/dL      BUN/Creatinine Ratio 22.9     Anion Gap 12.7 mmol/L      eGFR 12.3 mL/min/1.73      Comment: <15 Indicative of kidney failure       Narrative:      GFR Normal >60  Chronic Kidney Disease <60  Kidney Failure <15    The GFR formula is only valid for adults with stable renal function between ages 18 and 70.    CK [[id number]]  (Abnormal) Collected: 10/21/24 2040    Specimen: Blood Updated: 10/21/24 2113     Creatine Kinase 263 U/L     Magnesium [[id number]]  (Abnormal) Collected: 10/21/24 2040    Specimen: Blood Updated: 10/21/24 2113     Magnesium 2.6 mg/dL     Phosphorus [[id number]]  (Abnormal) Collected: 10/21/24 2040    Specimen: Blood Updated: 10/21/24 2113     Phosphorus 5.2 mg/dL     TSH Rfx On Abnormal To Free T4 [[id number]]  (Normal) Collected: 10/21/24 2040    Specimen: Blood Updated: 10/21/24 2113     TSH 2.440 uIU/mL     Hemoglobin A1c [[id number]]  (Abnormal) Collected: 10/21/24 2040    Specimen: Blood Updated: 10/21/24 2103     Hemoglobin A1C 9.65 %     High Sensitivity Troponin T 2Hr [[id number]]  (Abnormal) Collected: 10/21/24 1636    Specimen: Blood Updated: 10/21/24 1708     HS Troponin T 228 ng/L      Troponin T Delta -16 ng/L     Narrative:      High Sensitive Troponin T Reference Range:  <14.0 ng/L- Negative Female for AMI  <22.0 ng/L- Negative Male for AMI  >=14 - Abnormal Female indicating possible myocardial injury.  >=22 - Abnormal Male indicating possible myocardial injury.   Clinicians would have to utilize clinical acumen, EKG, Troponin, and serial changes to determine if it is an Acute Myocardial Infarction or myocardial injury due to an underlying chronic condition.         BNP [[id number]]  (Abnormal) Collected: 10/21/24 1400    Specimen: Blood Updated: 10/21/24 1633     proBNP 9,565.0 pg/mL     Narrative:      This assay is used as an aid in the diagnosis of individuals suspected of having heart failure. It  can be used as an aid in the diagnosis of acute decompensated heart failure (ADHF) in patients presenting with signs and symptoms of ADHF to the emergency department (ED). In addition, NT-proBNP of <300 pg/mL indicates ADHF is not likely.    Age Range Result Interpretation  NT-proBNP Concentration (pg/mL:      <50             Positive            >450                   Gray                 300-450                    Negative             <300    50-75           Positive            >900                  Gray                300-900                  Negative            <300      >75             Positive            >1800                  Gray                300-1800                  Negative            <300    Single High Sensitivity Troponin T [[id number]]  (Abnormal) Collected: 10/21/24 1400    Specimen: Blood Updated: 10/21/24 1603     HS Troponin T 244 ng/L     Narrative:      High Sensitive Troponin T Reference Range:  <14.0 ng/L- Negative Female for AMI  <22.0 ng/L- Negative Male for AMI  >=14 - Abnormal Female indicating possible myocardial injury.  >=22 - Abnormal Male indicating possible myocardial injury.   Clinicians would have to utilize clinical acumen, EKG, Troponin, and serial changes to determine if it is an Acute Myocardial Infarction or myocardial injury due to an underlying chronic condition.         Ethanol [[id number]] Collected: 10/21/24 1400    Specimen: Blood Updated: 10/21/24 1443     Ethanol <10 mg/dL      Ethanol % <0.010 %     Narrative:      Plasma Ethanol Clinical Symptoms:    ETOH (%)               Clinical Symptom  .01-.05              No apparent influence  .03-.12              Euphoria, Diminished judgment and attention   .09-.25              Impaired comprehension, Muscle incoordination  .18-.30              Confusion, Staggered gait, Slurred speech  .25-.40              Markedly decreased response to stimuli, unable to stand or                        walk, vomitting, sleep or  stupor  .35-.50              Comatose, Anesthesia, Subnormal body temperature        Acetaminophen Level [[id number]]  (Normal) Collected: 10/21/24 1400    Specimen: Blood Updated: 10/21/24 1443     Acetaminophen 5.9 mcg/mL     Narrative:      Acetaminophen Therapeutic Range  5-20 ug/mL      Hours after ingestion            Toxic Value    4 Hours                           150 ug/mL    8 Hours                            70 ug/mL   12 Hours                            40 ug/mL   16 Hours                            20 ug/mL    These values apply to a single ingestion only.     Lipase [[id number]]  (Normal) Collected: 10/21/24 1400    Specimen: Blood Updated: 10/21/24 1443     Lipase 21 U/L     Comprehensive Metabolic Panel [[id number]]  (Abnormal) Collected: 10/21/24 1400    Specimen: Blood Updated: 10/21/24 1443     Glucose 267 mg/dL      BUN 82 mg/dL      Creatinine 3.57 mg/dL      Sodium 142 mmol/L      Potassium 4.5 mmol/L      Chloride 104 mmol/L      CO2 27.8 mmol/L      Calcium 9.1 mg/dL      Total Protein 6.1 g/dL      Albumin 3.5 g/dL      ALT (SGPT) 96 U/L      AST (SGOT) 63 U/L      Alkaline Phosphatase 192 U/L      Total Bilirubin 0.4 mg/dL      Globulin 2.6 gm/dL      A/G Ratio 1.3 g/dL      BUN/Creatinine Ratio 23.0     Anion Gap 10.2 mmol/L      eGFR 12.5 mL/min/1.73      Comment: <15 Indicative of kidney failure       Narrative:      GFR Normal >60  Chronic Kidney Disease <60  Kidney Failure <15    The GFR formula is only valid for adults with stable renal function between ages 18 and 70.    Urine Drug Screen - Urine, Clean Catch [[id number]]  (Normal) Collected: 10/21/24 1422    Specimen: Urine, Clean Catch Updated: 10/21/24 1439     THC, Screen, Urine Negative     Phencyclidine (PCP), Urine Negative     Cocaine Screen, Urine Negative     Methamphetamine, Ur Negative     Opiate Screen Negative     Amphetamine Screen, Urine Negative     Benzodiazepine Screen, Urine Negative     Tricyclic Antidepressants  Screen Negative     Methadone Screen, Urine Negative     Barbiturates Screen, Urine Negative     Oxycodone Screen, Urine Negative     Buprenorphine, Screen, Urine Negative    Narrative:      Cutoff For Drugs Screened:    Amphetamines               500 ng/ml  Barbiturates               200 ng/ml  Benzodiazepines            150 ng/ml  Cocaine                    150 ng/ml  Methadone                  200 ng/ml  Opiates                    100 ng/ml  Phencyclidine               25 ng/ml  THC                         50 ng/ml  Methamphetamine            500 ng/ml  Tricyclic Antidepressants  300 ng/ml  Oxycodone                  100 ng/ml  Buprenorphine               10 ng/ml    The normal value for all drugs tested is negative. This report includes unconfirmed screening results, with the cutoff values listed, to be used for medical treatment purposes only.  Unconfirmed results must not be used for non-medical purposes such as employment or legal testing.  Clinical consideration should be applied to any drug of abuse test, particularly when unconfirmed results are used.      Urinalysis With Culture If Indicated - Urine, Clean Catch [[id number]]  (Abnormal) Collected: 10/21/24 1423    Specimen: Urine, Clean Catch Updated: 10/21/24 1429     Color, UA Yellow     Appearance, UA Cloudy     pH, UA <=5.0     Specific Gravity, UA 1.025     Glucose,  mg/dL (Trace)     Ketones, UA Negative     Bilirubin, UA Negative     Blood, UA Negative     Protein,  mg/dL (2+)     Leuk Esterase, UA Negative     Nitrite, UA Negative     Urobilinogen, UA 0.2 E.U./dL    Narrative:      In absence of clinical symptoms, the presence of pyuria, bacteria, and/or nitrites on the urinalysis result does not correlate with infection.    [place] Urine Culture Tube - Urine, Clean Catch [[id number]] Collected: 10/21/24 1423    Specimen: Urine, Clean Catch Updated: 10/21/24 1429    Urinalysis, Microscopic Only - Urine, Clean Catch [[id number]] Collected:  10/21/24 1423    Specimen: Urine, Clean Catch Updated: 10/21/24 1428    CBC & Differential [[id number]]  (Abnormal) Collected: 10/21/24 1400    Specimen: Blood Updated: 10/21/24 1404    Narrative:      The following orders were created for panel order CBC & Differential.  Procedure                               Abnormality         Status                     ---------                               -----------         ------                     CBC Auto Differential[[id number]]        Abnormal            Final result                 Please view results for these tests on the individual orders.    CBC Auto Differential [[id number]]  (Abnormal) Collected: 10/21/24 1400    Specimen: Blood Updated: 10/21/24 1404     WBC 6.02 10*3/mm3      RBC 3.08 10*6/mm3      Hemoglobin 8.3 g/dL      Hematocrit 28.0 %      MCV 90.9 fL      MCH 26.9 pg      MCHC 29.6 g/dL      RDW 16.1 %      RDW-SD 52.6 fl      MPV 11.9 fL      Platelets 166 10*3/mm3      Neutrophil % 74.9 %      Lymphocyte % 13.1 %      Monocyte % 8.6 %      Eosinophil % 2.7 %      Basophil % 0.5 %      Immature Grans % 0.2 %      Neutrophils, Absolute 4.51 10*3/mm3      Lymphocytes, Absolute 0.79 10*3/mm3      Monocytes, Absolute 0.52 10*3/mm3      Eosinophils, Absolute 0.16 10*3/mm3      Basophils, Absolute 0.03 10*3/mm3      Immature Grans, Absolute 0.01 10*3/mm3     COVID-19 and FLU A/B PCR, 1 HR TAT - Swab, Nasopharynx [[id number]]  (Normal) Collected: 10/21/24 1312    Specimen: Swab from Nasopharynx Updated: 10/21/24 1334     COVID19 Not Detected     Influenza A PCR Not Detected     Influenza B PCR Not Detected    Narrative:      Fact sheet for providers: https://www.fda.gov/media/427508/download    Fact sheet for patients: https://www.fda.gov/media/150083/download    Test performed by PCR.             Imaging Results (Last 24 Hours)       Procedure Component Value Units Date/Time    XR Chest 2 View [[id number]] Collected: 10/21/24 1445     Updated: 10/21/24 1456     Narrative:      XR CHEST 2 VW    Date of Exam: 10/21/2024 2:10 PM EDT    Indication: fatigue    Comparison: 12/27/2023      Findings:  Heart is enlarged, similar to the prior study. There is no superimposed consolidation. No pneumothorax or pleural effusion. Aortic arch atherosclerosis. The osseous structures appear intact.      Impression:      Impression:  1. Stable cardiomegaly.  2. No acute process.        Electronically Signed: Dexter Cartwright MD    10/21/2024 2:56 PM EDT    Workstation ID: [id number]    CT Head Without Contrast [[id number]] Collected: 10/21/24 1436     Updated: 10/21/24 1440    Narrative:      CT HEAD WO CONTRAST    Date of Exam: 10/21/2024 2:20 PM EDT    Indication: Memory loss.    Comparison: None available.    Technique: Axial CT images were obtained of the head without contrast administration.  Coronal reconstructions were performed.  Automated exposure control and iterative reconstruction methods were used.      Findings:  No acute intracranial hemorrhage, mass lesion, mass effect, midline shift or evidence of acute evolving infarct. Chronic right parietal encephalomalacia with porencephalic dilation of the posterior horn of the right lateral ventricle. Mild deep white   matter hypodensities are nonspecific but favored to reflect changes of chronic microvascular disease. Trace fluid is seen dependently within the left maxillary sinus. Mastoid air cells are clear. No calvarial abnormality is identified.      Impression:      Impression:    1. 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  2. Mild chronic microvascular disease features.  3. No acute intracranial findings.      Electronically Signed: Arely Jaramillo MD    10/21/2024 2:38 PM EDT    Workstation ID: [id number]              Assessment & Plan        This is a 79 y.o. female with PMH of DM, HTN, CKD stage 3,  hypothyroidism, anemia who presented to ED with weakness, lethargy over past week. Initial work up in ED revealed JOSELITO with elevated troponin. HR 40-50 while in ED. Admission to this facility with cardiology and nephrology consultation.         Active and Resolved Problems  Active Hospital Problems    Diagnosis  POA    **JOSELITO (acute kidney injury) [N17.9]  Yes      Resolved Hospital Problems   No resolved problems to display.     JOSELITO on CKD stage 3:  -admission creatinine 3.57 with baseline 1.52  -admission GFR 12 with baseline 35  -daily BMP to monitor renal function.   -strict I/Os, daily weight, avoid nephrotoxins.   -holding losartan and lasix.   -received 1 L fluid bolus. Will defer further IVFs until evaluated by nephrology.   -obtain urine for lytes  -states decreased activity, urine output, and oral intake over past week.   -previous renal US in 2023 revealed unremarkable appearance of kidneys.     Elevated Troponin  -no CP to suggest ACS  -mild elevation in troponin likely secondary to demand ischemia from acute cardiac stressors as above  -obtain serial troponins to ensure flat or downward trend  -Troponin 244 -> 228 -> 249. If troponin level rises at repeat MN will start heparin infusion protocol.   -telemetry  -cardiology consult. Notified by ED provider.   -echo: 12/28/2023 revealed EF 61-65%, grade I diastolic dysfunction. LA and RA mildly dilated. RVSP > 55 mmHg.     Bradycardia:  -HR 40-50s.   -EKG on arrival: SB with nonspecific T abnormality rate 49. , Qtc 464.   -cardiology consult  -holding beta blockers until evaluated by cardiology.   -repeat EKG in AM    Hypertension:  -/70 on arrival.   -losartan and lasix on hold due to #1.   -holding beta blockers due to bradycardia.   -continue hydralazine   -hydralazine IV PRN for SBP > 165.   -cardiology consulted per above.     Diabetes mellitus type 2:  -accu check AC/HS with SSI and hypoglycemic protocols.   -A1c 9.65% on admission  -lipid  panel pending  -ADA diabetic diet.   -continue home insulin once dosing verified by pharmacy.     Hypothyroidism:  -admission TSH 2.44  -continue home levothyroxine once dosing verified by pharmacy.     Grief disorder:  -tearful during exam.   -recent death of sister last week, unable to attend .   -would benefit from OP grief support on discharge.             VTE Prophylaxis:  Pharmacologic & mechanical VTE prophylaxis orders are present.        The patient desires to be as follows:    CODE STATUS:    Code Status (Patient has no pulse and is not breathing): CPR (Attempt to Resuscitate)  Medical Interventions (Patient has pulse or is breathing): Full Support        Louann Moreira, , who can be contacted at [phone], is the designated person to make medical decisions on the patient's behalf if She is incapable of doing so. This was clarified with patient and/or next of kin on 10/21/2024 during the course of this H&P.    Admission Status:  I believe this patient meets IP status.    Expected Length of Stay: 3 days    PDMP and Medication Dispenses via Sidebar reviewed and consistent with patient reported medications.    I discussed the patient's findings and my recommendations with patient, family, and nursing staff.      Signature:     This document has been electronically signed by ORVILLE Hodge on 2024 22:05 EDT   [facility] Hospitalist Team

## 2024-10-22 NOTE — CONSULTS
[facility] CARDIOLOGY CONSULT  [facility]      Cardiology assessment and plan      Abnormal elevated troponin in the setting of renal failure  Flat troponin with no significant trend  No chest discomfort  EKG with no acute ST changes  Acute on chronic diastolic dysfunction  Abnormal elevated proBNP secondary diastolic dysfunction  Chronic lower extremity venous edema  Chronic RV dysfunction  Diastolic dysfunction  Acute on chronic kidney injury  Hypertension  Hyperlipidemia  Diabetes mellitus  Hypothyroidism  Anemia  Chronic longstanding lower extremity edema  Invasive workup in the past deferred secondary to renal failure  Repeat echocardiogram to assess LV systolic function  Obtain medical records from the primary cardiologist  Tmax is 97.8 pulse is 76 respirations are 16 blood pressure is 135/54 to 175/64 sats are 96%  Sodium is 147 potassium is 4.7 creatinine is 3.7 hemoglobin is 8.0  Diagnosis and treatment options reviewed and discussed with patient  Current medications include aspirin 81 mg p.o. once a day patient is currently on IV Bumex drip for diuresis  Patient is on hydralazine 100 mg p.o. every 12 hours p  Continue current medical therapy continue close monitoring  Further recommendation based on patient course          Subjective:     Encounter Date:10/21/2024      Patient ID: Leona Garcia is a 79 y.o. female.    Chief Complaint: Weakness      HPI:  Leona Garcia is a 79 y.o. female known to Dr. Mcdonough with a past cardiac history of chronic diastolic heart failure.  She had nuclear stress testing performed in 2019 that showed mild ischemia but cath was deferred due to renal dysfunction.  Last 2D echo 12/2023 showed an EF of 61 to 65% with grade 1 diastolic dysfunction and mild TR.  PMH includes HTN, HLD, DM, hypothyroidism, and stage III chronic kidney disease followed by Dr. Lee.  Patient presented to the ER with complaints of weakness and found with  "elevated troponin in which cardiology was consulted.    Patient is accompanied today by her sister at the bedside.  She tells me that 2 weeks ago their sister passed away and their in-laws prohibited them from coming to the .  Since this time patient has developed progressive weakness and lethargy.  She reports worsened bilateral lower extremity edema and abdominal distention.  She states she is getting adequate p.o. intake but does spend much of her time in bed now.  Yesterday her sister states that patient had a \"far away look in her eyes\" and was not initially responding to her.  Therefore she brought her to the ER.  Patient denies any chest pain, shortness of breath, or PND/orthopnea.      Past Medical History:   Diagnosis Date    Anemia     Chronic kidney disease (CKD)     Diabetes mellitus     Disease of thyroid gland     Hypertension          History reviewed. No pertinent surgical history.      Social History     Socioeconomic History    Marital status:    Tobacco Use    Smoking status: Never    Smokeless tobacco: Never   Vaping Use    Vaping status: Never Used   Substance and Sexual Activity    Alcohol use: Never    Drug use: Never    Sexual activity: Not Currently       History reviewed. No pertinent family history.      Allergies   Allergen Reactions    Codeine Nausea Only, Unknown - Low Severity and Other (See Comments)     Dizziness    Amlodipine Unknown - Low Severity       Current Medications:   Scheduled Meds:heparin (porcine), 5,000 Units, Subcutaneous, Q12H  insulin lispro, 2-7 Units, Subcutaneous, 4x Daily AC & at Bedtime  metOLazone, 10 mg, Oral, Once  pantoprazole, 40 mg, Intravenous, Q AM  sodium chloride, 10 mL, Intravenous, Q12H      Continuous Infusions:bumetanide, 1 mg/hr        Review of Systems   Constitutional: Negative for chills, decreased appetite and malaise/fatigue.   HENT:  Negative for congestion and nosebleeds.    Eyes:  Negative for blurred vision and double " "vision.   Cardiovascular:  Positive for leg swelling, orthopnea and paroxysmal nocturnal dyspnea. Negative for chest pain and near-syncope.   Respiratory:  Positive for shortness of breath. Negative for cough.    Hematologic/Lymphatic: Negative for adenopathy. Does not bruise/bleed easily.   Skin:  Positive for color change. Negative for rash.   Musculoskeletal:  Negative for back pain and joint pain.   Gastrointestinal:  Negative for bloating, abdominal pain, hematemesis and hematochezia.   Genitourinary:  Negative for flank pain and hematuria.   Neurological:  Negative for dizziness and focal weakness.   Psychiatric/Behavioral:  Negative for altered mental status. The patient does not have insomnia.      All other systems reviewed and are negative.       Objective:         /69   Pulse 76   Temp 97 °F (36.1 °C)   Resp 13   Ht 167.6 cm (66\")   Wt 130 kg (286 lb 6 oz)   SpO2 97%   BMI 46.22 kg/m²       General: Elderly, in NAD.  Neuro: AAOx3. No gross deficits.  HEENT: Sclerae clear, no xanthelasmas.  CV: S1S2, RRR. Grade 2-3/6 systolic murmurs or gallops. Positive JVD.  Resp: Breathing is unlabored. Lungs CTA throughout.  GI: BS+. Abdomen soft and NTTP.  Ext: Pedal pulses are palpable. Extremities are edematous bilaterally.  MS: moves all extremities, generalized weakness.  Skin: warm, dry.  Psych: calm and cooperative, blunted affect            Lab Review:     Results from last 7 days   Lab Units 10/22/24  0417 10/21/24  2040 10/21/24  1400   SODIUM mmol/L 147* 145 142   POTASSIUM mmol/L 4.7 4.8 4.5   CHLORIDE mmol/L 108* 106 104   CO2 mmol/L 29.0 26.3 27.8   BUN mg/dL 84* 83* 82*   CREATININE mg/dL 3.76* 3.62* 3.57*   GLUCOSE mg/dL 275* 266* 267*   CALCIUM mg/dL 9.2 9.2 9.1   AST (SGOT) U/L  --   --  63*   ALT (SGPT) U/L  --   --  96*     Results from last 7 days   Lab Units 10/21/24  2338 10/21/24  2040 10/21/24  1636 10/21/24  1400   CK TOTAL U/L  --  263*  --   --    HSTROP T ng/L 227* 249* 228* " 244*     Results from last 7 days   Lab Units 10/22/24  0417 10/21/24  1400   WBC 10*3/mm3 5.46 6.02   HEMOGLOBIN g/dL 8.0* 8.3*   HEMATOCRIT % 26.2* 28.0*   PLATELETS 10*3/mm3 154 166         Results from last 7 days   Lab Units 10/22/24  0417 10/21/24  2040   MAGNESIUM mg/dL 3.1* 2.6*     Results from last 7 days   Lab Units 10/21/24  2040   CHOLESTEROL mg/dL 177   TRIGLYCERIDES mg/dL 57   HDL CHOL mg/dL 82*     Results from last 7 days   Lab Units 10/21/24  2040 10/21/24  1400   PROBNP pg/mL 8,842.0* 9,565.0*     Results from last 7 days   Lab Units 10/21/24  2040   TSH uIU/mL 2.440       Recent Radiology:  Imaging Results (Most Recent)       Procedure Component Value Units Date/Time    XR Chest 2 View [[id number]] Collected: 10/21/24 1445     Updated: 10/21/24 1458    Narrative:      XR CHEST 2 VW    Date of Exam: 10/21/2024 2:10 PM EDT    Indication: fatigue    Comparison: 12/27/2023      Findings:  Heart is enlarged, similar to the prior study. There is no superimposed consolidation. No pneumothorax or pleural effusion. Aortic arch atherosclerosis. The osseous structures appear intact.      Impression:      Impression:  1. Stable cardiomegaly.  2. No acute process.        Electronically Signed: Dexter Cartwright MD    10/21/2024 2:56 PM EDT    Workstation ID: [id number]    CT Head Without Contrast [[id number]] Collected: 10/21/24 1436     Updated: 10/21/24 1440    Narrative:      CT HEAD WO CONTRAST    Date of Exam: 10/21/2024 2:20 PM EDT    Indication: Memory loss.    Comparison: None available.    Technique: Axial CT images were obtained of the head without contrast administration.  Coronal reconstructions were performed.  Automated exposure control and iterative reconstruction methods were used.      Findings:  No acute intracranial hemorrhage, mass lesion, mass effect, midline shift or evidence of acute evolving infarct. Chronic right parietal encephalomalacia with porencephalic dilation of the posterior horn of  the right lateral ventricle. Mild deep white   matter hypodensities are nonspecific but favored to reflect changes of chronic microvascular disease. Trace fluid is seen dependently within the left maxillary sinus. Mastoid air cells are clear. No calvarial abnormality is identified.      Impression:      Impression:    1. 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  2. Mild chronic microvascular disease features.  3. No acute intracranial findings.      Electronically Signed: Arely Jaramillo MD    10/21/2024 2:38 PM EDT    Workstation ID: [id number]              ECHOCARDIOGRAM:    Results for orders placed during the hospital encounter of 12/27/23    Adult Transthoracic Echo Complete W/ Cont if Necessary Per Protocol    Interpretation Summary    Left ventricular systolic function is normal. Left ventricular ejection fraction appears to be 61 - 65%.    Left ventricular diastolic function is consistent with (grade I) impaired relaxation.    The left atrial cavity is mildly dilated.    The right atrial cavity is mildly  dilated.    There is mild, bileaflet mitral valve thickening present.    Estimated right ventricular systolic pressure from tricuspid regurgitation is markedly elevated (>55 mmHg).            Assessment:         Active Hospital Problems    Diagnosis  POA    **JOSELITO (acute kidney injury) [N17.9]  Yes     1) Elevated troponin  -High-sensitivity troponin 244, 228, 249, 227  -EKG shows no acute ST abnormality  -She had nuclear stress testing performed in 2019 that showed mild ischemia but cath was deferred due to renal dysfunction.      2) Acute on chronic diastolic heart failure   -BNP 8842  -TSH WNL  -CXR shows no acute findings  - Last 2D echo 12/2023 showed an EF of 61 to 65% with grade 1 diastolic dysfunction and mild TR.      3) JOSELITO on CKD stage 3  - Cr 3.76    4) HTN    5) HLD    6)  DM    7) hypothyroidism    8) anemia             Plan:   Cardiac enzymes are elevated but flat in the setting of renal dysfunction and HF.  EKG shows no acute ST abnormalities and patient is without complaints of angina.  Check 2D echo for eval possible Takotsubo cardiomyopathy.  She appears hypervolemic though there could be a component of third spacing.  As discussed with Dr. Lee he would like a prelim read of the IVC on echo to consider Bumex drip versus dialysis.       Electronically signed by ORVILLE Weber, 10/22/24, 11:07 AM EDT.

## 2024-10-23 LAB
ALBUMIN SERPL ELPH-MCNC: 3.2 G/DL (ref 2.9–4.4)
ALBUMIN SERPL-MCNC: 3.3 G/DL (ref 3.5–5.2)
ALBUMIN/GLOB SERPL: 1.5 G/DL
ALBUMIN/GLOB SERPL: 1.6 {RATIO} (ref 0.7–1.7)
ALP SERPL-CCNC: 161 U/L (ref 39–117)
ALPHA1 GLOB SERPL ELPH-MCNC: 0.3 G/DL (ref 0–0.4)
ALPHA2 GLOB SERPL ELPH-MCNC: 0.5 G/DL (ref 0.4–1)
ALT SERPL W P-5'-P-CCNC: 82 U/L (ref 1–33)
ANION GAP SERPL CALCULATED.3IONS-SCNC: 8.5 MMOL/L (ref 5–15)
AST SERPL-CCNC: 43 U/L (ref 1–32)
B-GLOBULIN SERPL ELPH-MCNC: 0.8 G/DL (ref 0.7–1.3)
BASOPHILS # BLD AUTO: 0.04 10*3/MM3 (ref 0–0.2)
BASOPHILS NFR BLD AUTO: 0.6 % (ref 0–1.5)
BILIRUB SERPL-MCNC: 0.3 MG/DL (ref 0–1.2)
BUN SERPL-MCNC: 81 MG/DL (ref 8–23)
BUN/CREAT SERPL: 24.3 (ref 7–25)
CALCIUM SPEC-SCNC: 9.2 MG/DL (ref 8.6–10.5)
CHLORIDE SERPL-SCNC: 104 MMOL/L (ref 98–107)
CO2 SERPL-SCNC: 31.5 MMOL/L (ref 22–29)
CREAT SERPL-MCNC: 3.33 MG/DL (ref 0.57–1)
DEPRECATED RDW RBC AUTO: 52.9 FL (ref 37–54)
EGFRCR SERPLBLD CKD-EPI 2021: 13.6 ML/MIN/1.73
EOSINOPHIL # BLD AUTO: 0.2 10*3/MM3 (ref 0–0.4)
EOSINOPHIL NFR BLD AUTO: 3 % (ref 0.3–6.2)
ERYTHROCYTE [DISTWIDTH] IN BLOOD BY AUTOMATED COUNT: 16.2 % (ref 12.3–15.4)
GAMMA GLOB SERPL ELPH-MCNC: 0.6 G/DL (ref 0.4–1.8)
GLOBULIN SER-MCNC: 2.1 G/DL (ref 2.2–3.9)
GLOBULIN UR ELPH-MCNC: 2.2 GM/DL
GLUCOSE BLDC GLUCOMTR-MCNC: 109 MG/DL (ref 70–105)
GLUCOSE BLDC GLUCOMTR-MCNC: 115 MG/DL (ref 70–105)
GLUCOSE BLDC GLUCOMTR-MCNC: 145 MG/DL (ref 70–105)
GLUCOSE BLDC GLUCOMTR-MCNC: 202 MG/DL (ref 70–105)
GLUCOSE SERPL-MCNC: 136 MG/DL (ref 65–99)
HCT VFR BLD AUTO: 26.7 % (ref 34–46.6)
HGB BLD-MCNC: 8.1 G/DL (ref 12–15.9)
IGA SERPL-MCNC: 210 MG/DL (ref 64–422)
IGG SERPL-MCNC: 735 MG/DL (ref 586–1602)
IGM SERPL-MCNC: 44 MG/DL (ref 26–217)
IMM GRANULOCYTES # BLD AUTO: 0.03 10*3/MM3 (ref 0–0.05)
IMM GRANULOCYTES NFR BLD AUTO: 0.5 % (ref 0–0.5)
INTERPRETATION SERPL IEP-IMP: ABNORMAL
IRON 24H UR-MRATE: 48 MCG/DL (ref 37–145)
IRON SATN MFR SERPL: 14 % (ref 20–50)
KAPPA LC FREE SER-MCNC: 76.4 MG/L (ref 3.3–19.4)
KAPPA LC FREE/LAMBDA FREE SER: 2.16 {RATIO} (ref 0.26–1.65)
LABORATORY COMMENT REPORT: ABNORMAL
LAMBDA LC FREE SERPL-MCNC: 35.4 MG/L (ref 5.7–26.3)
LYMPHOCYTES # BLD AUTO: 1.68 10*3/MM3 (ref 0.7–3.1)
LYMPHOCYTES NFR BLD AUTO: 25.3 % (ref 19.6–45.3)
M PROTEIN SERPL ELPH-MCNC: ABNORMAL G/DL
MAGNESIUM SERPL-MCNC: 2.4 MG/DL (ref 1.6–2.4)
MCH RBC QN AUTO: 27.6 PG (ref 26.6–33)
MCHC RBC AUTO-ENTMCNC: 30.3 G/DL (ref 31.5–35.7)
MCV RBC AUTO: 90.8 FL (ref 79–97)
MONOCYTES # BLD AUTO: 0.71 10*3/MM3 (ref 0.1–0.9)
MONOCYTES NFR BLD AUTO: 10.7 % (ref 5–12)
NEUTROPHILS NFR BLD AUTO: 3.97 10*3/MM3 (ref 1.7–7)
NEUTROPHILS NFR BLD AUTO: 59.9 % (ref 42.7–76)
NRBC BLD AUTO-RTO: 0 /100 WBC (ref 0–0.2)
PHOSPHATE SERPL-MCNC: 4.9 MG/DL (ref 2.5–4.5)
PLATELET # BLD AUTO: 160 10*3/MM3 (ref 140–450)
PMV BLD AUTO: 11 FL (ref 6–12)
POTASSIUM SERPL-SCNC: 3.9 MMOL/L (ref 3.5–5.2)
PROT SERPL-MCNC: 5.3 G/DL (ref 6–8.5)
PROT SERPL-MCNC: 5.5 G/DL (ref 6–8.5)
RBC # BLD AUTO: 2.94 10*6/MM3 (ref 3.77–5.28)
SODIUM SERPL-SCNC: 144 MMOL/L (ref 136–145)
TIBC SERPL-MCNC: 353 MCG/DL (ref 298–536)
TRANSFERRIN SERPL-MCNC: 237 MG/DL (ref 200–360)
WBC NRBC COR # BLD AUTO: 6.63 10*3/MM3 (ref 3.4–10.8)

## 2024-10-23 PROCEDURE — 83735 ASSAY OF MAGNESIUM: CPT | Performed by: NURSE PRACTITIONER

## 2024-10-23 PROCEDURE — 25010000002 HEPARIN (PORCINE) PER 1000 UNITS: Performed by: NURSE PRACTITIONER

## 2024-10-23 PROCEDURE — 99233 SBSQ HOSP IP/OBS HIGH 50: CPT | Performed by: INTERNAL MEDICINE

## 2024-10-23 PROCEDURE — 25010000002 HYDRALAZINE PER 20 MG: Performed by: NURSE PRACTITIONER

## 2024-10-23 PROCEDURE — 97162 PT EVAL MOD COMPLEX 30 MIN: CPT

## 2024-10-23 PROCEDURE — 80053 COMPREHEN METABOLIC PANEL: CPT | Performed by: INTERNAL MEDICINE

## 2024-10-23 PROCEDURE — 63710000001 INSULIN LISPRO PROTAMINE-INSULIN LISPRO (75-25) 100 UNIT/ML SUSPENSION: Performed by: INTERNAL MEDICINE

## 2024-10-23 PROCEDURE — 25010000002 BUMETANIDE PER 0.5 MG: Performed by: INTERNAL MEDICINE

## 2024-10-23 PROCEDURE — 84100 ASSAY OF PHOSPHORUS: CPT | Performed by: INTERNAL MEDICINE

## 2024-10-23 PROCEDURE — 83540 ASSAY OF IRON: CPT | Performed by: INTERNAL MEDICINE

## 2024-10-23 PROCEDURE — 82948 REAGENT STRIP/BLOOD GLUCOSE: CPT

## 2024-10-23 PROCEDURE — 63710000001 INSULIN LISPRO (HUMAN) PER 5 UNITS: Performed by: NURSE PRACTITIONER

## 2024-10-23 PROCEDURE — 84466 ASSAY OF TRANSFERRIN: CPT | Performed by: INTERNAL MEDICINE

## 2024-10-23 PROCEDURE — 97165 OT EVAL LOW COMPLEX 30 MIN: CPT

## 2024-10-23 PROCEDURE — 82948 REAGENT STRIP/BLOOD GLUCOSE: CPT | Performed by: NURSE PRACTITIONER

## 2024-10-23 PROCEDURE — 85025 COMPLETE CBC W/AUTO DIFF WBC: CPT | Performed by: NURSE PRACTITIONER

## 2024-10-23 RX ORDER — BLOOD SUGAR DIAGNOSTIC
1 STRIP MISCELLANEOUS
Qty: 100 EACH | Refills: 2 | Status: SHIPPED | OUTPATIENT
Start: 2024-10-23

## 2024-10-23 RX ORDER — HYDRALAZINE HYDROCHLORIDE 25 MG/1
100 TABLET, FILM COATED ORAL EVERY 8 HOURS SCHEDULED
Status: DISCONTINUED | OUTPATIENT
Start: 2024-10-23 | End: 2024-10-29 | Stop reason: HOSPADM

## 2024-10-23 RX ORDER — BLOOD-GLUCOSE METER
1 EACH MISCELLANEOUS ONCE
Qty: 1 KIT | Refills: 0 | Status: SHIPPED | OUTPATIENT
Start: 2024-10-23 | End: 2024-10-23

## 2024-10-23 RX ORDER — CARVEDILOL 6.25 MG/1
6.25 TABLET ORAL 2 TIMES DAILY WITH MEALS
Status: DISCONTINUED | OUTPATIENT
Start: 2024-10-23 | End: 2024-10-25

## 2024-10-23 RX ORDER — METOLAZONE 2.5 MG/1
10 TABLET ORAL ONCE
Status: COMPLETED | OUTPATIENT
Start: 2024-10-23 | End: 2024-10-23

## 2024-10-23 RX ORDER — LANCETS
1 EACH MISCELLANEOUS
Qty: 100 EACH | Refills: 2 | Status: SHIPPED | OUTPATIENT
Start: 2024-10-23

## 2024-10-23 RX ADMIN — ASPIRIN 81 MG CHEWABLE TABLET 81 MG: 81 TABLET CHEWABLE at 08:01

## 2024-10-23 RX ADMIN — LEVOTHYROXINE SODIUM 50 MCG: 0.05 TABLET ORAL at 05:33

## 2024-10-23 RX ADMIN — Medication 10 ML: at 08:02

## 2024-10-23 RX ADMIN — Medication 10 ML: at 22:21

## 2024-10-23 RX ADMIN — INSULIN LISPRO 3 UNITS: 100 INJECTION, SOLUTION INTRAVENOUS; SUBCUTANEOUS at 21:54

## 2024-10-23 RX ADMIN — CARVEDILOL 6.25 MG: 6.25 TABLET, FILM COATED ORAL at 13:15

## 2024-10-23 RX ADMIN — HYDRALAZINE HYDROCHLORIDE 100 MG: 25 TABLET ORAL at 08:03

## 2024-10-23 RX ADMIN — HYDRALAZINE HYDROCHLORIDE 10 MG: 20 INJECTION INTRAMUSCULAR; INTRAVENOUS at 11:07

## 2024-10-23 RX ADMIN — HYDRALAZINE HYDROCHLORIDE 10 MG: 20 INJECTION INTRAMUSCULAR; INTRAVENOUS at 17:09

## 2024-10-23 RX ADMIN — HYDRALAZINE HYDROCHLORIDE 100 MG: 25 TABLET ORAL at 13:15

## 2024-10-23 RX ADMIN — HEPARIN SODIUM 5000 UNITS: 5000 INJECTION INTRAVENOUS; SUBCUTANEOUS at 21:57

## 2024-10-23 RX ADMIN — SEVELAMER CARBONATE 800 MG: 800 TABLET, FILM COATED ORAL at 12:37

## 2024-10-23 RX ADMIN — SEVELAMER CARBONATE 800 MG: 800 TABLET, FILM COATED ORAL at 17:09

## 2024-10-23 RX ADMIN — INSULIN LISPRO 10 UNITS: 100 INJECTION, SUSPENSION SUBCUTANEOUS at 21:58

## 2024-10-23 RX ADMIN — HYDRALAZINE HYDROCHLORIDE 10 MG: 20 INJECTION INTRAMUSCULAR; INTRAVENOUS at 04:09

## 2024-10-23 RX ADMIN — SEVELAMER CARBONATE 800 MG: 800 TABLET, FILM COATED ORAL at 08:01

## 2024-10-23 RX ADMIN — HEPARIN SODIUM 5000 UNITS: 5000 INJECTION INTRAVENOUS; SUBCUTANEOUS at 08:01

## 2024-10-23 RX ADMIN — BUMETANIDE 1 MG/HR: 0.25 INJECTION INTRAMUSCULAR; INTRAVENOUS at 04:32

## 2024-10-23 RX ADMIN — HYDRALAZINE HYDROCHLORIDE 100 MG: 25 TABLET ORAL at 21:56

## 2024-10-23 RX ADMIN — METOLAZONE 10 MG: 2.5 TABLET ORAL at 10:33

## 2024-10-23 RX ADMIN — INSULIN LISPRO 20 UNITS: 100 INJECTION, SUSPENSION SUBCUTANEOUS at 08:11

## 2024-10-23 NOTE — PROGRESS NOTES
PROGRESS NOTE      Patient Name: Leona Garcia  : 1945  MRN: [id number]  Primary Care Physician: Alfred Liriano MD  Date of admission: 10/21/2024    Patient Care Team:  Alfred Liriano MD as PCP - General (Family Medicine)    JOSELITO    Subjective   Subjective:     Patient seen and examined   Renal functions improving   UOP better     Review of systems:  Negative      Allergies:    Allergies   Allergen Reactions    Codeine Nausea Only, Unknown - Low Severity and Other (See Comments)     Dizziness    Amlodipine Unknown - Low Severity       Objective   Exam:     Vital Signs  Temp:  [96.3 °F (35.7 °C)-97.5 °F (36.4 °C)] 97.3 °F (36.3 °C)  Heart Rate:  [68-83] 75  Resp:  [11-18] 14  BP: (135-180)/() 174/70  SpO2:  [90 %-99 %] 98 %  on  Flow (L/min) (Oxygen Therapy):  [1-2] 1;   Device (Oxygen Therapy): nasal cannula  Body mass index is 45.44 kg/m².    General: female in no acute distress.    Head:      Normocephalic and atraumatic.    Eyes:      PERRL/EOM intact, conjunctivae and sclerae clear without nystagmus.    Neck:      No masses, thyromegaly,  trachea central   Lungs:    Clear bilaterally to auscultation.    Heart:      Regular rate and rhythm, no murmur no gallop  Abd:        Soft, nontender, not distended, bowel sounds positive, no shifting dullness.  Msk:        No deformity or scoliosis noted of thoracic or lumbar spine.    Pulses:   Pulses normal in all 4 extremities.    Extremities:        No cyanosis or clubbing--++ edema.    Neuro:    No focal deficits.   alert oriented x3  Skin:       Intact without lesions or rashes.    Psych:    Alert and cooperative; normal mood and affect; normal attention span       Results Review:  I have personally reviewed most recent Data :  CBC    Results from last 7 days   Lab Units 10/23/24  0419 10/22/24  0417 10/21/24  1400   WBC 10*3/mm3 6.63 5.46 6.02   HEMOGLOBIN g/dL 8.1* 8.0* 8.3*   PLATELETS 10*3/mm3 160 154 166     CMP   Results from last  7 days   Lab Units 10/23/24  0419 10/22/24  0417 10/21/24  2040 10/21/24  1400   SODIUM mmol/L 144 147* 145 142   POTASSIUM mmol/L 3.9 4.7 4.8 4.5   CHLORIDE mmol/L 104 108* 106 104   CO2 mmol/L 31.5* 29.0 26.3 27.8   BUN mg/dL 81* 84* 83* 82*   CREATININE mg/dL 3.33* 3.76* 3.62* 3.57*   GLUCOSE mg/dL 136* 275* 266* 267*   ALBUMIN g/dL 3.3*  --   --  3.5   BILIRUBIN mg/dL 0.3  --   --  0.4   ALK PHOS U/L 161*  --   --  192*   AST (SGOT) U/L 43*  --   --  63*   ALT (SGPT) U/L 82*  --   --  96*   LIPASE U/L  --   --   --  21     ABG      US Renal Bilateral    Result Date: 10/22/2024  Impression: 1.No definite acute right renal abnormality. Left kidney is not appreciated 2.Woodall catheter in the bladder making assessment limited Electronically Signed: Jacob Eller MD  10/22/2024 2:47 PM EDT  Workstation ID: [id number]    XR Chest 2 View    Result Date: 10/21/2024  Impression: 1. Stable cardiomegaly. 2. No acute process. Electronically Signed: Dexter Cartwright MD  10/21/2024 2:56 PM EDT  Workstation ID: [id number]    CT Head Without Contrast    Result Date: 10/21/2024  Impression: 1. 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 2. Mild chronic microvascular disease features. 3. No acute intracranial findings. Electronically Signed: Arely Jaramillo MD  10/21/2024 2:38 PM EDT  Workstation ID: [id number]     Results for orders placed during the hospital encounter of 10/21/24    Adult Transthoracic Echo Complete W/ Cont if Necessary Per Protocol    Interpretation Summary    Left ventricular ejection fraction appears to be 56 - 60%.    Left ventricular wall thickness is consistent with mild concentric hypertrophy.    Left ventricular diastolic function is consistent with (grade I) impaired relaxation.    The right ventricular cavity is mildly dilated.    The left atrial cavity is moderate to severely dilated.    The  right atrial cavity is moderately  dilated.    Moderate to severe tricuspid valve regurgitation is present.    Estimated right ventricular systolic pressure from tricuspid regurgitation is moderately elevated (45-55 mmHg).    Moderate pulmonary hypertension is present.    Scheduled Meds:aspirin, 81 mg, Oral, Daily  heparin (porcine), 5,000 Units, Subcutaneous, Q12H  hydrALAZINE, 100 mg, Oral, Q12H  insulin lispro, 2-7 Units, Subcutaneous, 4x Daily AC & at Bedtime  insulin lispro protamine-insulin lispro, 10 Units, Subcutaneous, Nightly  insulin lispro protamine-insulin lispro, 20 Units, Subcutaneous, Daily With Breakfast  levothyroxine, 50 mcg, Oral, Q AM  sevelamer, 800 mg, Oral, TID With Meals  sodium chloride, 10 mL, Intravenous, Q12H      Continuous Infusions:bumetanide, 1 mg/hr, Last Rate: 1 mg/hr (10/23/24 0802)      PRN Meds:  acetaminophen    senna-docusate sodium **AND** polyethylene glycol **AND** bisacodyl **AND** bisacodyl    dextrose    dextrose    glucagon (human recombinant)    hydrALAZINE    influenza vaccine    Magnesium Standard Dose Replacement - Follow Nurse / BPA Driven Protocol    nitroglycerin    ondansetron    Phosphorus Replacement - Follow Nurse / BPA Driven Protocol    Potassium Replacement - Follow Nurse / BPA Driven Protocol    [COMPLETED] Insert peripheral IV **AND** sodium chloride    sodium chloride    Assessment & Plan   Assessment and Plan:         JOSELITO (acute kidney injury)    ASSESSMENT:  JOSELITO now etiology  CKD stage III  Volume overload with increased edema  HTN stable   DM  Anemia  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 Etiology uncertain dont look like free water deficit     ECHO from 2023: EF 61- 65% with grade 1 diastolic dysfunction and mild TR.           PLAN :      Patient with JOSELITO, creatinine improved to 3.3 today from 3.7 Baseline around 1.7-1.9 I think. Etiology likely from volume overload, maybe some CRS.   Bumex drip started yesterday,  now with much better UOP, can hold off on RRT for now   Urine studies reviewed, urine sodium 52, did show some hematuria as well as 1.1 grams proteinuria. Likely from diabetic nephropathy but need to monitor and will check serology   Check repeat Renal US   Will give another dose of metolazone to improve urine output and continue Bumex drip for another 24-hour  Clinically patient is better much more alert oriented  ECHO : diastolic heart failre with some valvular issues  Electrolytes/acid base stable, mildly elevated sodium likely secondary to loop diuretic. Continue phos binders    Anemia, hgb low but stable, check iron studies if begins to trend down   Daily labs   We will continue to follow     Chu Lee MD.  Electronically signed by   [facility] kidney consultant  [phone]  10/23/2024  11:09 EDT

## 2024-10-23 NOTE — PROGRESS NOTES
[facility] MEDICINE SERVICE  DAILY PROGRESS NOTE    NAME: Leona Garcia  : 1945  MRN: [id number]      LOS: 2 days     PROVIDER OF SERVICE: Sharron Grissom MD    Chief Complaint: JOSELITO (acute kidney injury)    Subjective:     Interval History:  History taken from: patient    No new complaint        Review of Systems:   Review of Systems   All other systems reviewed and are negative.      Objective:     Vital Signs  Temp:  [96.3 °F (35.7 °C)-97.5 °F (36.4 °C)] 97.4 °F (36.3 °C)  Heart Rate:  [68-83] 74  Resp:  [11-20] 20  BP: (144-188)/() 177/68  Flow (L/min) (Oxygen Therapy):  [1] 1   Body mass index is 45.44 kg/m².    Physical Exam  Physical Exam  Constitutional:       Appearance: Normal appearance.   HENT:      Head: Normocephalic and atraumatic.      Nose: Nose normal.      Mouth/Throat:      Mouth: Mucous membranes are moist.   Eyes:      Extraocular Movements: Extraocular movements intact.      Pupils: Pupils are equal, round, and reactive to light.   Cardiovascular:      Rate and Rhythm: Normal rate and regular rhythm.   Pulmonary:      Effort: Pulmonary effort is normal.      Breath sounds: Normal breath sounds.   Abdominal:      General: Abdomen is flat. Bowel sounds are normal.      Palpations: Abdomen is soft.   Musculoskeletal:         General: Normal range of motion.      Cervical back: Normal range of motion and neck supple.   Skin:     General: Skin is warm and dry.   Neurological:      General: No focal deficit present.      Mental Status: She is alert and oriented to person, place, and time.   Psychiatric:         Mood and Affect: Mood normal.         Behavior: Behavior normal.         Thought Content: Thought content normal.         Judgment: Judgment normal.         Current Medications:  Scheduled Meds:aspirin, 81 mg, Oral, Daily  carvedilol, 6.25 mg, Oral, BID With Meals  heparin (porcine), 5,000 Units, Subcutaneous, Q12H  hydrALAZINE, 100 mg, Oral, Q12H  insulin  lispro, 2-7 Units, Subcutaneous, 4x Daily AC & at Bedtime  insulin lispro protamine-insulin lispro, 10 Units, Subcutaneous, Nightly  insulin lispro protamine-insulin lispro, 20 Units, Subcutaneous, Daily With Breakfast  levothyroxine, 50 mcg, Oral, Q AM  sevelamer, 800 mg, Oral, TID With Meals  sodium chloride, 10 mL, Intravenous, Q12H    Levothyroxine 50 mcg oral every morning  Continuous Infusions:bumetanide, 1 mg/hr, Last Rate: 1 mg/hr (10/23/24 0802)      PRN Meds:.  acetaminophen    senna-docusate sodium **AND** polyethylene glycol **AND** bisacodyl **AND** bisacodyl    dextrose    dextrose    glucagon (human recombinant)    hydrALAZINE    influenza vaccine    Magnesium Standard Dose Replacement - Follow Nurse / BPA Driven Protocol    nitroglycerin    ondansetron    Phosphorus Replacement - Follow Nurse / BPA Driven Protocol    Potassium Replacement - Follow Nurse / BPA Driven Protocol    [COMPLETED] Insert peripheral IV **AND** sodium chloride    sodium chloride       Diagnostic Data    Results from last 7 days   Lab Units 10/23/24  0419   WBC 10*3/mm3 6.63   HEMOGLOBIN g/dL 8.1*   HEMATOCRIT % 26.7*   PLATELETS 10*3/mm3 160   GLUCOSE mg/dL 136*   CREATININE mg/dL 3.33*   BUN mg/dL 81*   SODIUM mmol/L 144   POTASSIUM mmol/L 3.9   AST (SGOT) U/L 43*   ALT (SGPT) U/L 82*   ALK PHOS U/L 161*   BILIRUBIN mg/dL 0.3   ANION GAP mmol/L 8.5       US Renal Bilateral    Result Date: 10/22/2024  Impression: 1.No definite acute right renal abnormality. Left kidney is not appreciated 2.Woodall catheter in the bladder making assessment limited Electronically Signed: Jacob Eller MD  10/22/2024 2:47 PM EDT  Workstation ID: [id number]    XR Chest 2 View    Result Date: 10/21/2024  Impression: 1. Stable cardiomegaly. 2. No acute process. Electronically Signed: Dexter Cartwright MD  10/21/2024 2:56 PM EDT  Workstation ID: [id number]    CT Head Without Contrast    Result Date: 10/21/2024  Impression: 1. 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 2. Mild chronic microvascular disease features. 3. No acute intracranial findings. Electronically Signed: Arely Jaramillo MD  10/21/2024 2:38 PM EDT  Workstation ID: [id number]       I reviewed the patient's new clinical results.    Assessment/Plan:     Active and Resolved Problems  Active Hospital Problems    Diagnosis  POA    **JOSELITO (acute kidney injury) [N17.9]  Yes      Resolved Hospital Problems   No resolved problems to display.       JOSELITO on CKD stage 3:  -Improving, continue Bumex drip.  Nephrology had considered CRRT if no improvement of renal function.  Continue on Bumex drip.  Defer further recommendations to nephrology.  Continue to monitor renal function.  Avoid nephrotoxic drugs.     Acute diastolic CHF exacerbation EF of 56 to 60%  -proBNP is elevated at 8,842  - Continue Bumex drip as well as optimize medical management for CHF exacerbation, monitor strict I's and O's, daily weights, 2 g sodium diet.  I's and O's reflects fluid balance of -470.    Elevated Troponin  -Echo results reviewed.  EF of 56 to 60%.  No wall motion abnormalities noted.  Troponins elevated and trending slightly upwards.  EKG with slight T wave flattening in anterolateral leads.  Cardiology following.     Bradycardia:  -Resolved     Hypertension:  -BP persistently elevated.  Restart home dose of Coreg and increase hydralazine.    Diabetes mellitus type 2:  -Blood glucose is better controlled.  Continue current insulin regimen.  Hemoglobin A1c is 9.65%     Hypothyroidism:  -Continue levothyroxine    VTE Prophylaxis:  Pharmacologic & mechanical VTE prophylaxis orders are present.             Disposition Planning:     Barriers to Discharge: Pending clinical improvement  Anticipated Date of Discharge: Pending clinical course and clinical improvement.  Place of Discharge: Home        Code Status and  Medical Interventions: CPR (Attempt to Resuscitate); Full Support   Ordered at: 10/21/24 2019     Code Status (Patient has no pulse and is not breathing):    CPR (Attempt to Resuscitate)     Medical Interventions (Patient has pulse or is breathing):    Full Support       Signature: Electronically signed by Sharron Grissom MD, 10/23/24, 12:55 EDT.  [facility] Hospitalist Team

## 2024-10-23 NOTE — CASE MANAGEMENT/SOCIAL WORK
Continued Stay Note   [facility]     Patient Name: Leona Garcia  MRN: [id number]  Today's Date: 10/23/2024    Admit Date: 10/21/2024    Plan: DC Plan: Anticipate routine home with sister pending clinical course and PT/OT evaluations.   Discharge Plan       Row Name 10/23/24 1402       Plan    Plan DC Plan: Anticipate routine home with sister pending clinical course and PT/OT evaluations.    Patient/Family in Agreement with Plan yes    Provided Post Acute Provider List? N/A    Provided Post Acute Provider Quality & Resource List? N/A    Plan Comments CM spoke with Hospitalist and nursing for morning rounds and reviewed chart for clinical updates. MD reports patient Renal function improving slightly. Will continue to diurese for now. DC timing pending renal function improvement.CM will continue to follow for any additional needs and adjust DC plan accordingly. DC Barriers: Cardiac monitoring, O2@1L nc, Bumex gtt, and monitor renal function closely.               Expected Discharge Date and Time       Expected Discharge Date Expected Discharge Time    Oct 26, 2024           Phone communication or documentation only- no physical contact with patient or family.      Ale Pulido RN     Office Phone: [phone]  Office Cell:     [phone]

## 2024-10-23 NOTE — THERAPY EVALUATION
Patient Name: Leona Garcia  : 1945    MRN: [id number]                              Today's Date: 10/23/2024       Admit Date: 10/21/2024    Visit Dx:     ICD-10-CM ICD-9-CM   1. Acute renal failure superimposed on chronic kidney disease, unspecified acute renal failure type, unspecified CKD stage  N17.9 584.9    N18.9 585.9   2. Elevated troponin  R79.89 790.6   3. Other fatigue  R53.83 780.79     Patient Active Problem List   Diagnosis    Right hip pain    Essential hypertension    Type 2 diabetes mellitus    Respiratory failure with hypoxia    Chronic renal insufficiency, stage III (moderate)    Hyperlipidemia    Hypothyroidism    Influenza A    JOSELITO (acute kidney injury)     Past Medical History:   Diagnosis Date    Anemia     Chronic kidney disease (CKD)     Diabetes mellitus     Disease of thyroid gland     Hypertension      History reviewed. No pertinent surgical history.   General Information       Row Name 10/23/24 1255          Physical Therapy Time and Intention    Document Type evaluation  -BR     Mode of Treatment physical therapy  -BR       Row Name 10/23/24 1255          General Information    Patient Profile Reviewed yes  -BR     Prior Level of Function independent:;all household mobility;gait;transfer  Pt uses a rolling walker at baseline. Pt's sister dons pt's socks for her.  -BR     Existing Precautions/Restrictions fall  Bilateral lower legs and feet are ace wrapped.  -BR     Barriers to Rehab medically complex  -BR       Row Name 10/23/24 1255          Living Environment    People in Home sibling(s)  -BR       Row Name 10/23/24 1255          Home Main Entrance    Number of Stairs, Main Entrance none  -BR       Row Name 10/23/24 1255          Stairs Within Home, Primary    Number of Stairs, Within Home, Primary none  -BR       Row Name 10/23/24 1255          Cognition    Orientation Status (Cognition) oriented x 4  -BR       Row Name 10/23/24 1255          Safety Issues/Impairments  Affecting Functional Mobility    Impairments Affecting Function (Mobility) endurance/activity tolerance;pain;strength;balance;sensation/sensory awareness;range of motion (ROM)  -BR               User Key  (r) = Recorded By, (t) = Taken By, (c) = Cosigned By      Initials Name Provider Type    Cori Tong PT Physical Therapist                   Mobility       Row Name 10/23/24 1257          Bed Mobility    Bed Mobility supine-sit  -BR     Supine-Sit [place] (Bed Mobility) moderate assist (50% patient effort);1 person assist;1 person to manage equipment  -BR     Assistive Device (Bed Mobility) head of bed elevated;repositioning sheet  -BR       Row Name 10/23/24 1257          Bed-Chair Transfer    Bed-Chair [place] (Transfers) moderate assist (50% patient effort);1 person assist;1 person to manage equipment  -BR     Assistive Device (Bed-Chair Transfers) walker, front-wheeled  -BR       Row Name 10/23/24 1257          Sit-Stand Transfer    Sit-Stand [place] (Transfers) minimum assist (75% patient effort);2 person assist;verbal cues  -BR     Assistive Device (Sit-Stand Transfers) walker, front-wheeled  -BR       Row Name 10/23/24 1257          Gait/Stairs (Locomotion)    Patient was able to Ambulate yes  -BR     Deviations/Abnormal Patterns (Gait) anna decreased;stride length decreased  -BR     Bilateral Gait Deviations weight shift ability decreased  -BR     Comment, (Gait/Stairs) Pt performed small steps for bed to recliner transfer with rollingf walker.  -BR               User Key  (r) = Recorded By, (t) = Taken By, (c) = Cosigned By      Initials Name Provider Type    Cori Tong PT Physical Therapist                   Obj/Interventions       Row Name 10/23/24 1259          Range of Motion Comprehensive    Comment, General Range of Motion ~50% limitation in AROM bilateral hips and knees; ~25% limitation at ankles  -BR       Row Name 10/23/24 1259          Strength Comprehensive  (MMT)    Comment, General Manual Muscle Testing (MMT) Assessment BLE strength grossly 3-/5  -BR       Row Name 10/23/24 1259          Balance    Balance Assessment sitting static balance;standing static balance;sitting dynamic balance  -BR     Static Sitting Balance supervision  -BR     Dynamic Sitting Balance unable to assess  -BR     Position, Sitting Balance unsupported;sitting edge of bed  -BR     Static Standing Balance moderate assist  -BR     Position/Device Used, Standing Balance supported;walker, rolling  -BR       Row Name 10/23/24 1259          Sensory Assessment (Somatosensory)    Sensory Assessment (Somatosensory) other (see comments)  Bilateral lower legs are ace wraped with decreased sensation  -BR               User Key  (r) = Recorded By, (t) = Taken By, (c) = Cosigned By      Initials Name Provider Type    BR Cori Velasquez, PT Physical Therapist                   Goals/Plan       Row Name 10/23/24 1310          Bed Mobility Goal 1 (PT)    Activity/Assistive Device (Bed Mobility Goal 1, PT) bed mobility activities, all  -BR     Wise Level/Cues Needed (Bed Mobility Goal 1, PT) modified independence  -BR     Time Frame (Bed Mobility Goal 1, PT) long term goal (LTG);2 weeks  -BR       Row Name 10/23/24 1310          Transfer Goal 1 (PT)    Activity/Assistive Device (Transfer Goal 1, PT) transfers, all  -BR     Wise Level/Cues Needed (Transfer Goal 1, PT) modified independence  -BR     Time Frame (Transfer Goal 1, PT) long term goal (LTG);2 weeks  -BR       Row Name 10/23/24 1310          Gait Training Goal 1 (PT)    Activity/Assistive Device (Gait Training Goal 1, PT) gait (walking locomotion);assistive device use;decrease fall risk  -BR     Wise Level (Gait Training Goal 1, PT) modified independence  -BR     Distance (Gait Training Goal 1, PT) 100  -BR     Time Frame (Gait Training Goal 1, PT) long term goal (LTG);2 weeks  -BR       Row Name 10/23/24 1310          Therapy  Assessment/Plan (PT)    Planned Therapy Interventions (PT) balance training;bed mobility training;gait training;patient/family education;transfer training;strengthening;ROM (range of motion)  -BR               User Key  (r) = Recorded By, (t) = Taken By, (c) = Cosigned By      Initials Name Provider Type    BR Cori Velasquez, ARTHUR Physical Therapist                   Clinical Impression       Row Name 10/23/24 1300          Pain    Pretreatment Pain Rating 3/10  -BR     Posttreatment Pain Rating 5/10  -BR     Pain Location other (see comments)  left thigh muscle cramps  -BR     Pain Side/Orientation left  -BR       Row Name 10/23/24 1309 10/23/24 1300       Plan of Care Review    Plan of Care Reviewed With -- patient  -BR    Outcome Evaluation Leona Garcia presents as a 79 y.o. F with a CMH of heart failure, DM, HTN, CKD stage 3, hypothyroidism, anemia who presented to [facility] on 10/21/2024 with lethargy and weakness. Per chart review, recent stressors of out of town sister death last week and unable to go to .Pt has partial thickness wound to her left anterior lower leg.Pt has BMI of 45.44. CXR shows stable cardiomegaly. CT head (-). Pt being treated for JOSELITO. She has F/C in place. Pt A and O x 4. Pt lives with her sister in [facility] with 0 OTONIEL and she uses a rolling walker for independence for household mobility; pt has been unable to walk much the past week. Pt is hypertensive- Nsg aware. BP pre-tx 179/71 and BP post tx 166/64. This date, pt transfers supine>sit with mod/max  assist of 1 and pt performed pivoting steps with rolling walker with mod assist of 1 from bed over to recliner. Pt was very fatigued following the bed >recliner transfer. Pt had c/o muscle cramps left thigh-hospitalist aware. Patient is a high risk of injurious falls and unsafe to return to prior living environment at this time.  Pt is below her PLOF for all areas of mobility. PT will follow pt while in hospital for  aforementioned deficits. PT recommendation is Skilled Nursing Facility.  -BR --      Row Name 10/23/24 1309          Therapy Assessment/Plan (PT)    Rehab Potential (PT) good  -BR     Criteria for Skilled Interventions Met (PT) yes;meets criteria;skilled treatment is necessary  -BR     Therapy Frequency (PT) 5 times/wk  -BR     Predicted Duration of Therapy Intervention (PT) until D/C  -BR       Row Name 10/23/24 1309          Vital Signs    Pre Systolic BP Rehab 179  -BR     Pre Treatment Diastolic BP 71  -BR     Post Systolic BP Rehab 166  -BR     Post Treatment Diastolic BP 64  -BR     Pretreatment Heart Rate (beats/min) 75  -BR     Posttreatment Heart Rate (beats/min) 77  -BR     Pre SpO2 (%) 98  -BR     O2 Delivery Pre Treatment nasal cannula  1L  -BR     Post SpO2 (%) 95  -BR     O2 Delivery Post Treatment room air  -BR     Pre Patient Position Supine  -BR     Intra Patient Position Standing  -BR     Post Patient Position Sitting  -BR       Row Name 10/23/24 1309          Positioning and Restraints    Pre-Treatment Position in bed  -BR     Post Treatment Position chair  -BR     In Chair notified nsg;reclined;call light within reach;encouraged to call for assist;exit alarm on;legs elevated  -BR               User Key  (r) = Recorded By, (t) = Taken By, (c) = Cosigned By      Initials Name Provider Type    BR Cori Velasquez, PT Physical Therapist                   Outcome Measures       Row Name 10/23/24 1311 10/23/24 0812       How much help from another person do you currently need...    Turning from your back to your side while in flat bed without using bedrails? 2  -BR 2  -KB    Moving from lying on back to sitting on the side of a flat bed without bedrails? 2  -BR 2  -KB    Moving to and from a bed to a chair (including a wheelchair)? 2  -BR 2  -KB    Standing up from a chair using your arms (e.g., wheelchair, bedside chair)? 2  -BR 2  -KB    Climbing 3-5 steps with a railing? 1  -BR 1  -KB    To walk in  hospital room? 2  -BR 2  -KB    AM-PAC 6 Clicks Score (PT) 11  -BR 11  -KB    Highest Level of Mobility Goal 4 --> Transfer to chair/commode  -BR 4 --> Transfer to chair/commode  -KB      Row Name 10/23/24 1311          Functional Assessment    Outcome Measure Options AM-PAC 6 Clicks Basic Mobility (PT)  -BR               User Key  (r) = Recorded By, (t) = Taken By, (c) = Cosigned By      Initials Name Provider Type    BR Cori Velasquez, PT Physical Therapist    Theodora Allred, RN Registered Nurse                                 Physical Therapy Education       Title: PT OT SLP Therapies (Done)       Topic: Physical Therapy (Done)       Point: Mobility training (Done)       Learning Progress Summary            Patient Acceptance, E,D, VU,DU by BR at 10/23/2024 1311                      Point: Body mechanics (Done)       Learning Progress Summary            Patient Acceptance, E,D, VU,DU by BR at 10/23/2024 1311                      Point: Precautions (Done)       Learning Progress Summary            Patient Acceptance, E,D, VU,DU by BR at 10/23/2024 1311                                      User Key       Initials Effective Dates Name Provider Type Discipline     22 -  Cori Velasquez PT Physical Therapist PT                  PT Recommendation and Plan  Planned Therapy Interventions (PT): balance training, bed mobility training, gait training, patient/family education, transfer training, strengthening, ROM (range of motion)  Outcome Evaluation: Leona Garcia presents as a 79 y.o. F with a CMH of heart failure, DM, HTN, CKD stage 3, hypothyroidism, anemia who presented to [facility] on 10/21/2024 with lethargy and weakness. Per chart review, recent stressors of out of town sister death last week and unable to go to .Pt has partial thickness wound to her left anterior lower leg.Pt has BMI of 45.44. CXR shows stable cardiomegaly. CT head (-). Pt being treated for JOSELITO. She has F/C in  place. Pt A and O x 4. Pt lives with her sister in [facility] with 0 OTONIEL and she uses a rolling walker for independence for household mobility; pt has been unable to walk much the past week. Pt is hypertensive- Nsg aware. BP pre-tx 179/71 and BP post tx 166/64. This date, pt transfers supine>sit with mod/max  assist of 1 and pt performed pivoting steps with rolling walker with mod assist of 1 from bed over to recliner. Pt was very fatigued following the bed >recliner transfer. Pt had c/o muscle cramps left thigh-hospitalist aware. Patient is a high risk of injurious falls and unsafe to return to prior living environment at this time.  Pt is below her PLOF for all areas of mobility. PT will follow pt while in hospital for aforementioned deficits. PT recommendation is Skilled Nursing Facility.     Time Calculation:         PT Charges       Row Name 10/23/24 1312             Time Calculation    Start Time 1005  -BR      Stop Time 1032  -BR      Time Calculation (min) 27 min  -BR      PT Received On 10/23/24  -BR      PT - Next Appointment 10/24/24  -BR      PT Goal Re-Cert Due Date 11/06/24  -BR         Time Calculation- PT    Total Timed Code Minutes- PT 0 minute(s)  -BR                User Key  (r) = Recorded By, (t) = Taken By, (c) = Cosigned By      Initials Name Provider Type    Cori Tong PT Physical Therapist                  Therapy Charges for Today       Code Description Service Date Service Provider Modifiers Qty    01998616305 HC PT EVAL MOD COMPLEXITY 4 10/23/2024 Cori Velasquez, PT GP 1            PT G-Codes  Outcome Measure Options: AM-PAC 6 Clicks Basic Mobility (PT)  AM-PAC 6 Clicks Score (PT): 11  PT Discharge Summary  Anticipated Discharge Disposition (PT): skilled nursing facility    Cori Velasquez PT  10/23/2024

## 2024-10-23 NOTE — PROGRESS NOTES
[facility] CARDIOLOGY  [facility]        LOS:  LOS: 2 days   Patient Name: Leona Garcia  Age/Sex: 79 y.o. female  : 1945  MRN: [id number]    Day of Service: 10/23/24   Length of Stay: 2  Encounter Provider: ORVILLE Weber  Place of Service: [facility] CARDIOLOGY  Patient Care Team:  Alfred Liriano MD as PCP - General (Family Medicine)    Cardiology assessment and plan        Abnormal elevated troponin in the setting of renal failure  Flat troponin with no significant trend  No chest discomfort  EKG with no acute ST changes  Acute on chronic diastolic dysfunction  Abnormal elevated proBNP secondary diastolic dysfunction  Chronic lower extremity venous edema  Chronic RV dysfunction  Diastolic dysfunction  Acute on chronic kidney injury  Hypertension  Hyperlipidemia  Diabetes mellitus  Hypothyroidism  Anemia  Chronic longstanding lower extremity edema  Invasive workup in the past deferred secondary to renal failure  Repeat echocardiogram to assess LV systolic function  Medical records from primary cardiologist reviewed  Patient never had a cardiac catheterization  Cardiac catheterization was withheld secondary to underlying renal failure and no significant ischemic burden on the stress test  Poor functional status  Tmax is 97.4 pulse is 70 respirations are 13 blood pressure is 150/57 to 188/71 sats are 96%  Sodium is 144 potassium is 3.9 creatinine is 3.3 LFTs are abnormal with AST of 43 ALT is 82  Renal function is stable  Hemoglobin is 8.1  Patient is currently -2.8 L  Volume status is better  Echocardiogram with normal LV systolic function with RV dysfunction moderate to severe tricuspid regurgitation with a dilated IVC  Agree with diuresis and close monitoring of renal function  Continue current medical therapy continue close monitoring  Further recommendation based on patient course                     Subjective:      Chief Complaint:  F/U HF    Subjective:   Patient sitting up in chair with brighter affect today.  Reports good urine output.  Legs feel a little softer today but remain edematous.    Current Medications:   Scheduled Meds:aspirin, 81 mg, Oral, Daily  carvedilol, 6.25 mg, Oral, BID With Meals  heparin (porcine), 5,000 Units, Subcutaneous, Q12H  hydrALAZINE, 100 mg, Oral, Q8H  insulin lispro, 2-7 Units, Subcutaneous, 4x Daily AC & at Bedtime  insulin lispro protamine-insulin lispro, 10 Units, Subcutaneous, Nightly  insulin lispro protamine-insulin lispro, 20 Units, Subcutaneous, Daily With Breakfast  levothyroxine, 50 mcg, Oral, Q AM  sevelamer, 800 mg, Oral, TID With Meals  sodium chloride, 10 mL, Intravenous, Q12H      Continuous Infusions:bumetanide, 1 mg/hr, Last Rate: 1 mg/hr (10/23/24 0802)        Allergies:  Allergies   Allergen Reactions    Codeine Nausea Only, Unknown - Low Severity and Other (See Comments)     Dizziness    Amlodipine Unknown - Low Severity       Review of Systems   Constitutional: Negative for chills, decreased appetite and malaise/fatigue.   HENT:  Negative for congestion and nosebleeds.    Eyes:  Negative for blurred vision and double vision.   Cardiovascular:  Positive for leg swelling. Negative for chest pain, dyspnea on exertion, irregular heartbeat, orthopnea, palpitations and paroxysmal nocturnal dyspnea.   Respiratory:  Negative for cough and shortness of breath.    Hematologic/Lymphatic: Negative for adenopathy. Does not bruise/bleed easily.   Skin:  Negative for color change and rash.   Musculoskeletal:  Negative for back pain and joint pain.   Gastrointestinal:  Negative for bloating, abdominal pain, hematemesis and hematochezia.   Genitourinary:  Negative for flank pain and hematuria.   Neurological:  Negative for dizziness and focal weakness.   Psychiatric/Behavioral:  Negative for altered mental status. The patient does not have insomnia.        Objective:     Temp:  [96.3  °F (35.7 °C)-97.5 °F (36.4 °C)] 97.4 °F (36.3 °C)  Heart Rate:  [68-83] 73  Resp:  [11-20] 13  BP: (144-188)/() 167/65     Intake/Output Summary (Last 24 hours) at 10/23/2024 1547  Last data filed at 10/23/2024 1509  Gross per 24 hour   Intake 2400 ml   Output 7055 ml   Net -4655 ml     Body mass index is 45.44 kg/m².      10/21/24  2016 10/22/24  1134 10/23/24  0535   Weight: 130 kg (286 lb 6 oz) 130 kg (286 lb) 128 kg (281 lb 8.4 oz)         Physical Exam:  Neuro:  CV:  Resp:  GI:  Ext:  Tele: AAOx3, no gross deficits  S1S2 RRR, grade 2/6 systolic murmur  Nonlabored, CTA  BS+, abd soft  Pedal pulses palp, pitting BLE edema  SR with PACs and PVCs                                                   Lab Review:   Results from last 7 days   Lab Units 10/23/24  0419 10/22/24  0417 10/21/24  2040 10/21/24  1400   SODIUM mmol/L 144 147*   < > 142   POTASSIUM mmol/L 3.9 4.7   < > 4.5   CHLORIDE mmol/L 104 108*   < > 104   CO2 mmol/L 31.5* 29.0   < > 27.8   BUN mg/dL 81* 84*   < > 82*   CREATININE mg/dL 3.33* 3.76*   < > 3.57*   GLUCOSE mg/dL 136* 275*   < > 267*   CALCIUM mg/dL 9.2 9.2   < > 9.1   AST (SGOT) U/L 43*  --   --  63*   ALT (SGPT) U/L 82*  --   --  96*    < > = values in this interval not displayed.     Results from last 7 days   Lab Units 10/21/24  2338 10/21/24  2040 10/21/24  1636 10/21/24  1400   CK TOTAL U/L  --  263*  --   --    HSTROP T ng/L 227* 249* 228* 244*     Results from last 7 days   Lab Units 10/23/24  0419 10/22/24  0417   WBC 10*3/mm3 6.63 5.46   HEMOGLOBIN g/dL 8.1* 8.0*   HEMATOCRIT % 26.7* 26.2*   PLATELETS 10*3/mm3 160 154         Results from last 7 days   Lab Units 10/23/24  0419 10/22/24  0417   MAGNESIUM mg/dL 2.4 3.1*     Results from last 7 days   Lab Units 10/21/24  2040   CHOLESTEROL mg/dL 177   TRIGLYCERIDES mg/dL 57   HDL CHOL mg/dL 82*     Results from last 7 days   Lab Units 10/21/24  2040 10/21/24  1400   PROBNP pg/mL 8,842.0* 9,565.0*     Results from last 7 days   Lab  Units 10/21/24  2040   TSH uIU/mL 2.440       Recent Radiology:  Imaging Results (Most Recent)       Procedure Component Value Units Date/Time    US Renal Bilateral [[id number]] Collected: 10/22/24 1445     Updated: 10/22/24 1449    Narrative:      US RENAL BILATERAL    Date of Exam: 10/22/2024 12:54 PM EDT    Indication: JOSELITO.    Comparison: Ultrasound December 28, 2023    Technique: Grayscale and color Doppler ultrasound evaluation of the kidneys and urinary bladder was performed.      Findings:  Incidental note is made of a right pleural effusion.    Right kidney: 9.3 cm in length. There is no hydronephrosis.    Left kidney: Obscured by bowel gas.    Bladder: Woodall catheter is present making assessment limited      Impression:      Impression:  1.No definite acute right renal abnormality. Left kidney is not appreciated  2.Woodall catheter in the bladder making assessment limited        Electronically Signed: Jacob Eller MD    10/22/2024 2:47 PM EDT    Workstation ID: [id number]    XR Chest 2 View [[id number]] Collected: 10/21/24 1445     Updated: 10/21/24 1458    Narrative:      XR CHEST 2 VW    Date of Exam: 10/21/2024 2:10 PM EDT    Indication: fatigue    Comparison: 12/27/2023      Findings:  Heart is enlarged, similar to the prior study. There is no superimposed consolidation. No pneumothorax or pleural effusion. Aortic arch atherosclerosis. The osseous structures appear intact.      Impression:      Impression:  1. Stable cardiomegaly.  2. No acute process.        Electronically Signed: Dexter Cartwright MD    10/21/2024 2:56 PM EDT    Workstation ID: [id number]    CT Head Without Contrast [[id number]] Collected: 10/21/24 1436     Updated: 10/21/24 1440    Narrative:      CT HEAD WO CONTRAST    Date of Exam: 10/21/2024 2:20 PM EDT    Indication: Memory loss.    Comparison: None available.    Technique: Axial CT images were obtained of the head without contrast administration.  Coronal reconstructions were performed.   Automated exposure control and iterative reconstruction methods were used.      Findings:  No acute intracranial hemorrhage, mass lesion, mass effect, midline shift or evidence of acute evolving infarct. Chronic right parietal encephalomalacia with porencephalic dilation of the posterior horn of the right lateral ventricle. Mild deep white   matter hypodensities are nonspecific but favored to reflect changes of chronic microvascular disease. Trace fluid is seen dependently within the left maxillary sinus. Mastoid air cells are clear. No calvarial abnormality is identified.      Impression:      Impression:    1. 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  2. Mild chronic microvascular disease features.  3. No acute intracranial findings.      Electronically Signed: Arely Jaramillo MD    10/21/2024 2:38 PM EDT    Workstation ID: [id number]            ECHOCARDIOGRAM:    Results for orders placed during the hospital encounter of 10/21/24    Adult Transthoracic Echo Complete W/ Cont if Necessary Per Protocol    Interpretation Summary    Left ventricular ejection fraction appears to be 56 - 60%.    Left ventricular wall thickness is consistent with mild concentric hypertrophy.    Left ventricular diastolic function is consistent with (grade I) impaired relaxation.    The right ventricular cavity is mildly dilated.    The left atrial cavity is moderate to severely dilated.    The right atrial cavity is moderately  dilated.    Moderate to severe tricuspid valve regurgitation is present.    Estimated right ventricular systolic pressure from tricuspid regurgitation is moderately elevated (45-55 mmHg).    Moderate pulmonary hypertension is present.        I reviewed the patient's new clinical results.    EKG:      Assessment:       JOSELITO (acute kidney injury)    1) Elevated troponin  -High-sensitivity troponin  244, 228, 249, 227  -EKG shows no acute ST abnormality  -She had nuclear stress testing performed in 2019 that showed mild ischemia but cath was deferred due to renal dysfunction.       2) Acute on chronic diastolic heart failure   -BNP 8842  -TSH WNL  -CXR shows no acute findings  - Last 2D echo 12/2023 showed an EF of 61 to 65% with grade 1 diastolic dysfunction and mild TR.    - Repeat 2D echo 10/2024 showed EF 55-60% with grade 1 diastolic dysfunction, moderate to severe TR, and the IVC is dilated.  - started on bumex gtt     3) JOSELITO on CKD stage 3  - Cr 3.76 --> 3.33     4) HTN     5) HLD     6) DM     7) hypothyroidism     8) anemia    Plan:   Patient is net -2.8 L with improving hypervolemia on Bumex drip with diuretics managed by nephrology.  Cr is improving.  Repeat 2D echo shows a preserved EF with moderate to severe TR and dilation of the IVC.        Electronically signed by ORVILLE Weber, 10/23/24, 3:53 PM EDT.

## 2024-10-23 NOTE — THERAPY EVALUATION
Patient Name: Leona Garcia  : 1945    MRN: [id number]                              Today's Date: 10/23/2024       Admit Date: 10/21/2024    Visit Dx:     ICD-10-CM ICD-9-CM   1. Acute renal failure superimposed on chronic kidney disease, unspecified acute renal failure type, unspecified CKD stage  N17.9 584.9    N18.9 585.9   2. Elevated troponin  R79.89 790.6   3. Other fatigue  R53.83 780.79     Patient Active Problem List   Diagnosis    Right hip pain    Essential hypertension    Type 2 diabetes mellitus    Respiratory failure with hypoxia    Chronic renal insufficiency, stage III (moderate)    Hyperlipidemia    Hypothyroidism    Influenza A    JOSELITO (acute kidney injury)     Past Medical History:   Diagnosis Date    Anemia     Chronic kidney disease (CKD)     Diabetes mellitus     Disease of thyroid gland     Hypertension      History reviewed. No pertinent surgical history.   General Information       Row Name 10/23/24 1641          OT Time and Intention    Document Type evaluation  -MP     Mode of Treatment occupational therapy  -MP     Patient Effort good  -MP       Row Name 10/23/24 1641          General Information    Patient Profile Reviewed yes  -MP     Prior Level of Function independent:;ADL's  -MP     Existing Precautions/Restrictions fall  -MP       Row Name 10/23/24 1641          Living Environment    People in Home sibling(s)  -MP       Row Name 10/23/24 1641          Cognition    Orientation Status (Cognition) oriented x 4  -MP       Row Name 10/23/24 1641          Safety Issues/Impairments Affecting Functional Mobility    Impairments Affecting Function (Mobility) endurance/activity tolerance;pain;strength;balance;sensation/sensory awareness;range of motion (ROM)  -MP               User Key  (r) = Recorded By, (t) = Taken By, (c) = Cosigned By      Initials Name Provider Type    MP Robert Ruffin OT Occupational Therapist                     Mobility/ADL's       Row Name 10/23/24  1641          Bed Mobility    Bed Mobility supine-sit  -MP       Row Name 10/23/24 1641          Activities of Daily Living    BADL Assessment/Intervention lower body dressing  -MP       Row Name 10/23/24 1641          Lower Body Dressing Assessment/Training    [place] Level (Lower Body Dressing) lower body dressing skills;maximum assist (25% patient effort)  -MP               User Key  (r) = Recorded By, (t) = Taken By, (c) = Cosigned By      Initials Name Provider Type    Robert Duenas OT Occupational Therapist                   Obj/Interventions       Row Name 10/23/24 1641          Range of Motion Comprehensive    Comment, General Range of Motion B shoulder AROM impacted 25-50%  -       Row Name 10/23/24 1641          Strength Comprehensive (MMT)    Comment, General Manual Muscle Testing (MMT) Assessment BUE 4/5  -MP       Row Name 10/23/24 1641          Balance    Balance Interventions sitting;standing;sit to stand;supported;static;dynamic  -MP               User Key  (r) = Recorded By, (t) = Taken By, (c) = Cosigned By      Initials Name Provider Type    Robert Duenas OT Occupational Therapist                   Goals/Plan       Row Name 10/23/24 1644          Bed Mobility Goal 1 (OT)    Activity/Assistive Device (Bed Mobility Goal 1, OT) bed mobility activities, all  -MP     [place] Level/Cues Needed (Bed Mobility Goal 1, OT) minimum assist (75% or more patient effort)  -MP     Time Frame (Bed Mobility Goal 1, OT) long term goal (LTG);2 weeks  -       Row Name 10/23/24 1644          Transfer Goal 1 (OT)    Activity/Assistive Device (Transfer Goal 1, OT) sit-to-stand/stand-to-sit;toilet  -MP     [place] Level/Cues Needed (Transfer Goal 1, OT) contact guard required  -MP     Time Frame (Transfer Goal 1, OT) 2 weeks;long term goal (LTG)  -       Row Name 10/23/24 1644          Dressing Goal 1 (OT)    Activity/Device (Dressing Goal 1, OT) lower body dressing  -MP      [place]/Cues Needed (Dressing Goal 1, OT) moderate assist (50-74% patient effort)  -MP     Time Frame (Dressing Goal 1, OT) long term goal (LTG);2 weeks  -MP               User Key  (r) = Recorded By, (t) = Taken By, (c) = Cosigned By      Initials Name Provider Type    MP Robert Ruffin, MARY Occupational Therapist                   Clinical Impression       Row Name 10/23/24 1642          Pain Assessment    Pretreatment Pain Rating 4/10  -MP     Posttreatment Pain Rating 4/10  -MP       Row Name 10/23/24 1642          Plan of Care Review    Plan of Care Reviewed With patient  -MP     Progress no change  -MP     Outcome Evaluation Pt. is a 79 y.o. F with a CMH of heart failure, DM, HTN, CKD stage 3, hypothyroidism, anemia who presented to [facility] on 10/21/2024 with lethargy and weakness. Pt. states she is typically ADL independence, lives w/ sister who is able to provide IADL support. Pt. hypertensive this afternoon, SBP 170s/180s, limiting OOB activity. Pt. requires max A for all LB ADLs this date, per PT patient requires increased assist x 2 for sit to stand transfers. Far from baseline functional indpendence, will require SNF placement at d/c pending progress. Will follow up w/ pt. 1-3x per week at [facility].  -MP       Row Name 10/23/24 1642          Therapy Assessment/Plan (OT)    Rehab Potential (OT) good  -MP     Criteria for Skilled Therapeutic Interventions Met (OT) yes;meets criteria;skilled treatment is necessary  -MP     Therapy Frequency (OT) 3 times/wk  -MP       Row Name 10/23/24 1642          Therapy Plan Review/Discharge Plan (OT)    Anticipated Discharge Disposition (OT) skilled nursing facility  -MP       Row Name 10/23/24 1642          Vital Signs    Pre Patient Position Sitting  -MP     Intra Patient Position Sitting  -MP     Post Patient Position Sitting  -MP       Row Name 10/23/24 1642          Positioning and Restraints    Pre-Treatment Position sitting in chair/recliner  -MP      Post Treatment Position chair  -MP     In Chair sitting;call light within reach;encouraged to call for assist;exit alarm on  -MP               User Key  (r) = Recorded By, (t) = Taken By, (c) = Cosigned By      Initials Name Provider Type    Robert Duenas OT Occupational Therapist                   Outcome Measures       Row Name 10/23/24 1311 10/23/24 0812       How much help from another person do you currently need...    Turning from your back to your side while in flat bed without using bedrails? 2  -BR 2  -KB    Moving from lying on back to sitting on the side of a flat bed without bedrails? 2  -BR 2  -KB    Moving to and from a bed to a chair (including a wheelchair)? 2  -BR 2  -KB    Standing up from a chair using your arms (e.g., wheelchair, bedside chair)? 2  -BR 2  -KB    Climbing 3-5 steps with a railing? 1  -BR 1  -KB    To walk in hospital room? 2  -BR 2  -KB    AM-PAC 6 Clicks Score (PT) 11  -BR 11  -KB    Highest Level of Mobility Goal 4 --> Transfer to chair/commode  -BR 4 --> Transfer to chair/commode  -KB      Row Name 10/23/24 1311          Functional Assessment    Outcome Measure Options AM-PAC 6 Clicks Basic Mobility (PT)  -BR               User Key  (r) = Recorded By, (t) = Taken By, (c) = Cosigned By      Initials Name Provider Type    Cori Tong, PT Physical Therapist    Theodora Allred, RN Registered Nurse                    Occupational Therapy Education       Title: PT OT SLP Therapies (In Progress)       Topic: Occupational Therapy (In Progress)       Point: ADL training (Not Started)       Description:   Instruct learner(s) on proper safety adaptation and remediation techniques during self care or transfers.   Instruct in proper use of assistive devices.                  Learner Progress:  Not documented in this visit.              Point: Home exercise program (Not Started)       Description:   Instruct learner(s) on appropriate technique for monitoring,  assisting and/or progressing therapeutic exercises/activities.                  Learner Progress:  Not documented in this visit.              Point: Precautions (Not Started)       Description:   Instruct learner(s) on prescribed precautions during self-care and functional transfers.                  Learner Progress:  Not documented in this visit.              Point: Body mechanics (Done)       Description:   Instruct learner(s) on proper positioning and spine alignment during self-care, functional mobility activities and/or exercises.                  Learning Progress Summary            Patient Acceptance, E,TB, VU by  at 10/23/2024 1645                                      User Key       Initials Effective Dates Name Provider Type Discipline    MP 06/16/21 -  Robert Ruffin OT Occupational Therapist OT                  OT Recommendation and Plan  Therapy Frequency (OT): 3 times/wk  Plan of Care Review  Plan of Care Reviewed With: patient  Progress: no change  Outcome Evaluation: Pt. is a 79 y.o. F with a CMH of heart failure, DM, HTN, CKD stage 3, hypothyroidism, anemia who presented to [facility] on 10/21/2024 with lethargy and weakness. Pt. states she is typically ADL independence, lives w/ sister who is able to provide IADL support. Pt. hypertensive this afternoon, SBP 170s/180s, limiting OOB activity. Pt. requires max A for all LB ADLs this date, per PT patient requires increased assist x 2 for sit to stand transfers. Far from baseline functional indpendence, will require SNF placement at d/c pending progress. Will follow up w/ pt. 1-3x per week at [facility].     Time Calculation:         Time Calculation- OT       Row Name 10/23/24 1646             Time Calculation- OT    OT Start Time 1210  -MP      OT Stop Time 1225  -      OT Time Calculation (min) 15 min  -      Total Timed Code Minutes- OT 0 minute(s)  -      OT Received On 10/23/24  -      OT - Next Appointment 10/25/24  -      OT  Goal Re-Cert Due Date 11/06/24  -MATT                User Key  (r) = Recorded By, (t) = Taken By, (c) = Cosigned By      Initials Name Provider Type    Robert Duenas OT Occupational Therapist                  Therapy Charges for Today       Code Description Service Date Service Provider Modifiers Qty    [id number]  OT EVAL LOW COMPLEXITY 3 10/23/2024 Robert Ruffin OT GO 1                 Robert Ruffin OT  10/23/2024

## 2024-10-23 NOTE — CONSULTS
Diabetes Education    Patient Name:  Leona Garcia  YOB: 1945  MRN: [id number]  Admit Date:  10/21/2024        Follow-up call to patient's sister oLuann to see when able to meet to review how to check blood sugar. Louann stated that she is too worn out to visit today and stated that she understands how to check blood sugar being that she checks her own blood sugar. Louann stated that she has used different glucometers before and she is sure she can figure it out. No further needs related to diabetes at this time.       Electronically signed by:  Louann Marroquin RN  10/23/24 09:42 EDT

## 2024-10-23 NOTE — PLAN OF CARE
Goal Outcome Evaluation:  Plan of Care Reviewed With: patient        Progress: no change  Outcome Evaluation: Pt. is a 79 y.o. F with a CMH of heart failure, DM, HTN, CKD stage 3, hypothyroidism, anemia who presented to [facility] on 10/21/2024 with lethargy and weakness. Pt. states she is typically ADL independence, lives w/ sister who is able to provide IADL support. Pt. hypertensive this afternoon, SBP 170s/180s, limiting OOB activity. Pt. requires max A for all LB ADLs this date, per PT patient requires increased assist x 2 for sit to stand transfers. Far from baseline functional indpendence, will require SNF placement at d/c pending progress. Will follow up w/ pt. 1-3x per week at [facility].    Anticipated Discharge Disposition (OT): skilled nursing facility

## 2024-10-23 NOTE — CONSULTS
"Diabetes Education  Assessment/Teaching    Patient Name:  Leona Garcia  YOB: 1945  MRN: [id number]  Admit Date:  10/21/2024      Assessment Date:  10/22/2024  Flowsheet Row Most Recent Value   General Information     Referral From: MD order  [Consult received due to A1c >7%. A1c this adm 9.65%. Adm bs 267.]   Height 167.6 cm (66\")   Weight 130 kg (286 lb)   Pregnancy Assessment    Diabetes History    What type of diabetes do you have? Type 2   Length of Diabetes Diagnosis --  [Pt with long hx]   Do you test your blood sugar at home? yes   Frequency of checks 2-3 times/day   Meter type Pt has [employer] meter from [place], sister has been checking pt's bs with her meter, [employer].   Who performs the test? sister   Have you had low blood sugar? (<70mg/dl) yes   How often do you have low blood sugar? occasionally   Education Preferences    Nutrition Information    Assessment Topics    DM Goals             Flowsheet Row Most Recent Value   DM Education Needs    Meter Needs meter  [Pt wanting name-brand meter like sister has. Explained that [employer] would need to be gotten through work insurance. Discussed ordering name-brand meter for pt. Educator started rxs for One Touch Verio Flex, test strips and lancets.]   Meter Type One Touch   Frequency of Testing 3 times a day   Blood Glucose Target Range Discussed A1c result of 9.65%. Discussed healthy bs range and healthy A1c target. Discussed importance of bs control.   Medication Insulin, Pen  [Pt taking Humulin 70/30 20 units am and 10 units in the pm per home med list. Pt states she gets 15 units in am. Pt states sister preparing pen and giving pt the injections.]   Problem Solving Hypoglycemia, Hyperglycemia, Signs, Symptoms, Treatment  [Discussed importance of keeping low bs tx available. Pt states she has glucose tabs.]   Reducing Risks A1C testing  [Gave A1c info sheet.]   Healthy Eating --  [Pt usually eating 3 meals/day.]   Motivation Engaged "   Teaching Method Explanation, Discussion, Handouts   Patient Response Verbalized understanding              Other Comments:  Met with pt at bedside. Pt has PCP and sees routinely. Educator contacted sister per phone call. Discussed pt wanting name-brand meter. Sister states she will be visiting pt tomorrow afternoon. Educator will attempt follow up tomorrow to educate sister in use of meter. Discussed importance of pt having own bs meter so sister not having to use her monitoring supplies for pt. Sister states she does not think the ReliOn brand meter gives consistent bs readings like the [employer] meter. Rxs started for One Touch Verio Flex, test strips and lancets.       Electronically signed by:  Cori May RN  10/22/24 20:19 EDT

## 2024-10-23 NOTE — PLAN OF CARE
Goal Outcome Evaluation:  Plan of Care Reviewed With: patient   Leona Garcia presents as a 79 y.o. F with a CMH of heart failure, DM, HTN, CKD stage 3, hypothyroidism, anemia who presented to [facility] on 10/21/2024 with lethargy and weakness. Per chart review, recent stressors of out of town sister death last week and unable to go to .Pt has partial thickness wound to her left anterior lower leg.Pt has BMI of 45.44. CXR shows stable cardiomegaly. CT head (-). Pt being treated for JOSELITO. She has F/C in place. Pt A and O x 4. Pt lives with her sister in [facility] with 0 OTONIEL and she uses a rolling walker for independence for household mobility; pt has been unable to walk much the past week. Pt is hypertensive- Nsg aware. BP pre-tx 179/71 and BP post tx 166/64. This date, pt transfers supine>sit with mod/max  assist of 1 and pt performed pivoting steps with rolling walker with mod assist of 1 from bed over to recliner. Pt was very fatigued following the bed >recliner transfer. Pt had c/o muscle cramps left thigh-hospitalist aware. Patient is a high risk of injurious falls and unsafe to return to prior living environment at this time.  Pt is below her PLOF for all areas of mobility. PT will follow pt while in hospital for aforementioned deficits. PT recommendation is Skilled Nursing Facility.

## 2024-10-23 NOTE — PLAN OF CARE
Problem: Acute Kidney Injury/Impairment  Goal: Fluid and Electrolyte Balance  Outcome: Progressing   Goal Outcome Evaluation:

## 2024-10-24 LAB
ABO GROUP BLD: NORMAL
ANION GAP SERPL CALCULATED.3IONS-SCNC: 9.7 MMOL/L (ref 5–15)
BASOPHILS # BLD AUTO: 0.03 10*3/MM3 (ref 0–0.2)
BASOPHILS NFR BLD AUTO: 0.4 % (ref 0–1.5)
BLD GP AB SCN SERPL QL: NEGATIVE
BUN SERPL-MCNC: 77 MG/DL (ref 8–23)
BUN/CREAT SERPL: 22.6 (ref 7–25)
CALCIUM SPEC-SCNC: 9.1 MG/DL (ref 8.6–10.5)
CHLORIDE SERPL-SCNC: 100 MMOL/L (ref 98–107)
CO2 SERPL-SCNC: 33.3 MMOL/L (ref 22–29)
CREAT SERPL-MCNC: 3.4 MG/DL (ref 0.57–1)
DEPRECATED RDW RBC AUTO: 51.7 FL (ref 37–54)
EGFRCR SERPLBLD CKD-EPI 2021: 13.3 ML/MIN/1.73
EOSINOPHIL # BLD AUTO: 0.14 10*3/MM3 (ref 0–0.4)
EOSINOPHIL NFR BLD AUTO: 2 % (ref 0.3–6.2)
ERYTHROCYTE [DISTWIDTH] IN BLOOD BY AUTOMATED COUNT: 15.9 % (ref 12.3–15.4)
GLUCOSE BLDC GLUCOMTR-MCNC: 147 MG/DL (ref 70–105)
GLUCOSE BLDC GLUCOMTR-MCNC: 156 MG/DL (ref 70–105)
GLUCOSE BLDC GLUCOMTR-MCNC: 173 MG/DL (ref 70–105)
GLUCOSE BLDC GLUCOMTR-MCNC: 273 MG/DL (ref 70–105)
GLUCOSE SERPL-MCNC: 160 MG/DL (ref 65–99)
HCT VFR BLD AUTO: 22.2 % (ref 34–46.6)
HCT VFR BLD AUTO: 29 % (ref 34–46.6)
HGB BLD-MCNC: 6.9 G/DL (ref 12–15.9)
HGB BLD-MCNC: 9 G/DL (ref 12–15.9)
IMM GRANULOCYTES # BLD AUTO: 0.03 10*3/MM3 (ref 0–0.05)
IMM GRANULOCYTES NFR BLD AUTO: 0.4 % (ref 0–0.5)
LYMPHOCYTES # BLD AUTO: 1.49 10*3/MM3 (ref 0.7–3.1)
LYMPHOCYTES NFR BLD AUTO: 21.3 % (ref 19.6–45.3)
MAGNESIUM SERPL-MCNC: 2.2 MG/DL (ref 1.6–2.4)
MCH RBC QN AUTO: 28 PG (ref 26.6–33)
MCHC RBC AUTO-ENTMCNC: 31.1 G/DL (ref 31.5–35.7)
MCV RBC AUTO: 90.2 FL (ref 79–97)
MONOCYTES # BLD AUTO: 0.8 10*3/MM3 (ref 0.1–0.9)
MONOCYTES NFR BLD AUTO: 11.4 % (ref 5–12)
NEUTROPHILS NFR BLD AUTO: 4.52 10*3/MM3 (ref 1.7–7)
NEUTROPHILS NFR BLD AUTO: 64.5 % (ref 42.7–76)
NRBC BLD AUTO-RTO: 0 /100 WBC (ref 0–0.2)
PLATELET # BLD AUTO: 173 10*3/MM3 (ref 140–450)
PMV BLD AUTO: 12 FL (ref 6–12)
POTASSIUM SERPL-SCNC: 3.6 MMOL/L (ref 3.5–5.2)
POTASSIUM SERPL-SCNC: 3.7 MMOL/L (ref 3.5–5.2)
RBC # BLD AUTO: 2.46 10*6/MM3 (ref 3.77–5.28)
RH BLD: POSITIVE
SODIUM SERPL-SCNC: 143 MMOL/L (ref 136–145)
T&S EXPIRATION DATE: NORMAL
WBC NRBC COR # BLD AUTO: 7.01 10*3/MM3 (ref 3.4–10.8)

## 2024-10-24 PROCEDURE — 86901 BLOOD TYPING SEROLOGIC RH(D): CPT | Performed by: INTERNAL MEDICINE

## 2024-10-24 PROCEDURE — P9016 RBC LEUKOCYTES REDUCED: HCPCS

## 2024-10-24 PROCEDURE — 63710000001 INSULIN LISPRO PROTAMINE-INSULIN LISPRO (75-25) 100 UNIT/ML SUSPENSION: Performed by: INTERNAL MEDICINE

## 2024-10-24 PROCEDURE — 36430 TRANSFUSION BLD/BLD COMPNT: CPT

## 2024-10-24 PROCEDURE — 85018 HEMOGLOBIN: CPT | Performed by: INTERNAL MEDICINE

## 2024-10-24 PROCEDURE — 83735 ASSAY OF MAGNESIUM: CPT | Performed by: NURSE PRACTITIONER

## 2024-10-24 PROCEDURE — 80048 BASIC METABOLIC PNL TOTAL CA: CPT | Performed by: NURSE PRACTITIONER

## 2024-10-24 PROCEDURE — 86900 BLOOD TYPING SEROLOGIC ABO: CPT

## 2024-10-24 PROCEDURE — 86850 RBC ANTIBODY SCREEN: CPT | Performed by: INTERNAL MEDICINE

## 2024-10-24 PROCEDURE — 85014 HEMATOCRIT: CPT | Performed by: INTERNAL MEDICINE

## 2024-10-24 PROCEDURE — 82948 REAGENT STRIP/BLOOD GLUCOSE: CPT | Performed by: NURSE PRACTITIONER

## 2024-10-24 PROCEDURE — 25010000002 BUMETANIDE PER 0.5 MG: Performed by: INTERNAL MEDICINE

## 2024-10-24 PROCEDURE — 97116 GAIT TRAINING THERAPY: CPT

## 2024-10-24 PROCEDURE — 85025 COMPLETE CBC W/AUTO DIFF WBC: CPT | Performed by: NURSE PRACTITIONER

## 2024-10-24 PROCEDURE — 63710000001 INSULIN LISPRO (HUMAN) PER 5 UNITS: Performed by: NURSE PRACTITIONER

## 2024-10-24 PROCEDURE — 86901 BLOOD TYPING SEROLOGIC RH(D): CPT

## 2024-10-24 PROCEDURE — 99233 SBSQ HOSP IP/OBS HIGH 50: CPT | Performed by: INTERNAL MEDICINE

## 2024-10-24 PROCEDURE — 84132 ASSAY OF SERUM POTASSIUM: CPT | Performed by: INTERNAL MEDICINE

## 2024-10-24 PROCEDURE — 97112 NEUROMUSCULAR REEDUCATION: CPT

## 2024-10-24 PROCEDURE — 97530 THERAPEUTIC ACTIVITIES: CPT

## 2024-10-24 PROCEDURE — 86900 BLOOD TYPING SEROLOGIC ABO: CPT | Performed by: INTERNAL MEDICINE

## 2024-10-24 PROCEDURE — 25010000002 HEPARIN (PORCINE) PER 1000 UNITS: Performed by: NURSE PRACTITIONER

## 2024-10-24 PROCEDURE — 97110 THERAPEUTIC EXERCISES: CPT

## 2024-10-24 PROCEDURE — 86923 COMPATIBILITY TEST ELECTRIC: CPT

## 2024-10-24 PROCEDURE — 97535 SELF CARE MNGMENT TRAINING: CPT

## 2024-10-24 PROCEDURE — 82948 REAGENT STRIP/BLOOD GLUCOSE: CPT

## 2024-10-24 RX ORDER — POTASSIUM CHLORIDE 1500 MG/1
20 TABLET, EXTENDED RELEASE ORAL ONCE
Status: COMPLETED | OUTPATIENT
Start: 2024-10-24 | End: 2024-10-24

## 2024-10-24 RX ORDER — METOLAZONE 2.5 MG/1
5 TABLET ORAL ONCE
Status: COMPLETED | OUTPATIENT
Start: 2024-10-24 | End: 2024-10-24

## 2024-10-24 RX ORDER — BUMETANIDE 0.25 MG/ML
2 INJECTION, SOLUTION INTRAMUSCULAR; INTRAVENOUS EVERY 12 HOURS
Status: DISCONTINUED | OUTPATIENT
Start: 2024-10-24 | End: 2024-10-25

## 2024-10-24 RX ADMIN — Medication 10 ML: at 09:12

## 2024-10-24 RX ADMIN — ASPIRIN 81 MG CHEWABLE TABLET 81 MG: 81 TABLET CHEWABLE at 09:11

## 2024-10-24 RX ADMIN — INSULIN LISPRO 4 UNITS: 100 INJECTION, SOLUTION INTRAVENOUS; SUBCUTANEOUS at 20:56

## 2024-10-24 RX ADMIN — HYDRALAZINE HYDROCHLORIDE 100 MG: 25 TABLET ORAL at 06:21

## 2024-10-24 RX ADMIN — HEPARIN SODIUM 5000 UNITS: 5000 INJECTION INTRAVENOUS; SUBCUTANEOUS at 09:12

## 2024-10-24 RX ADMIN — INSULIN LISPRO 20 UNITS: 100 INJECTION, SUSPENSION SUBCUTANEOUS at 09:11

## 2024-10-24 RX ADMIN — Medication 10 ML: at 20:56

## 2024-10-24 RX ADMIN — POTASSIUM CHLORIDE 20 MEQ: 1500 TABLET, EXTENDED RELEASE ORAL at 09:11

## 2024-10-24 RX ADMIN — HEPARIN SODIUM 5000 UNITS: 5000 INJECTION INTRAVENOUS; SUBCUTANEOUS at 20:55

## 2024-10-24 RX ADMIN — CARVEDILOL 6.25 MG: 6.25 TABLET, FILM COATED ORAL at 17:08

## 2024-10-24 RX ADMIN — CARVEDILOL 6.25 MG: 6.25 TABLET, FILM COATED ORAL at 09:11

## 2024-10-24 RX ADMIN — HYDRALAZINE HYDROCHLORIDE 100 MG: 25 TABLET ORAL at 14:23

## 2024-10-24 RX ADMIN — HYDRALAZINE HYDROCHLORIDE 100 MG: 25 TABLET ORAL at 21:04

## 2024-10-24 RX ADMIN — SEVELAMER CARBONATE 800 MG: 800 TABLET, FILM COATED ORAL at 09:11

## 2024-10-24 RX ADMIN — BUMETANIDE 1 MG/HR: 0.25 INJECTION INTRAMUSCULAR; INTRAVENOUS at 02:15

## 2024-10-24 RX ADMIN — METOLAZONE 5 MG: 2.5 TABLET ORAL at 11:46

## 2024-10-24 RX ADMIN — SEVELAMER CARBONATE 800 MG: 800 TABLET, FILM COATED ORAL at 11:46

## 2024-10-24 RX ADMIN — SEVELAMER CARBONATE 800 MG: 800 TABLET, FILM COATED ORAL at 17:08

## 2024-10-24 RX ADMIN — INSULIN LISPRO 10 UNITS: 100 INJECTION, SUSPENSION SUBCUTANEOUS at 20:55

## 2024-10-24 RX ADMIN — INSULIN LISPRO 2 UNITS: 100 INJECTION, SOLUTION INTRAVENOUS; SUBCUTANEOUS at 12:09

## 2024-10-24 RX ADMIN — INSULIN LISPRO 2 UNITS: 100 INJECTION, SOLUTION INTRAVENOUS; SUBCUTANEOUS at 09:11

## 2024-10-24 RX ADMIN — ACETAMINOPHEN 650 MG: 325 TABLET, FILM COATED ORAL at 02:27

## 2024-10-24 RX ADMIN — BUMETANIDE 2 MG: 0.25 INJECTION INTRAMUSCULAR; INTRAVENOUS at 11:46

## 2024-10-24 RX ADMIN — LEVOTHYROXINE SODIUM 50 MCG: 0.05 TABLET ORAL at 06:20

## 2024-10-24 NOTE — THERAPY TREATMENT NOTE
Subjective: Pt agreeable to therapeutic plan of care.  Cognition: oriented to Person, Time, and Situation    Objective:     Precautions - Falls     Bed Mobility: Mod-A and Assist x 2   Functional Transfers: Mod-A and Assist x 2     Balance: sitting EOB and unsupported CGA  Functional Ambulation: Min-A, Assist x 2, and with rolling walker    Grooming: Min-A and set up assist  ADL Position: supported sitting  ADL Comments: denture and oral care     Grooming: SBA  ADL Position: supported sitting  ADL Comments: face/hand hygiene     Therapeutic Exercise - 10 Reps Digit felx/ext B UE AROM supported sitting / chair    Vitals: WNL    Pain: 0 VAS  Location:   Interventions for pain: N/A  Education: Provided education on the importance of mobility in the acute care setting, Verbal/Tactile Cues, Transfer Training, and HEP      Assessment: Leona Garcia presents with ADL impairments affecting function including balance, coordination, endurance / activity tolerance, and range of motion (ROM). Pt upright in chair upon OT arrival. Pt minimally verbally responsive this date, however intermittently joked with her sister who was present throughout session. She completed oral care and denture care with Akiko to rinse her dentures. Pt required ModA x2 to come to stand but stand step transfer from bed to chair with Akiko x2 and use of front wheeled walker. Pt completed digit exercises with noted fatigue by end of set. Pt with noted difficulty with scooting towards HOB, was assisted with MaxA x2 and use of draw sheet. She continues to be limited by decreased endurance but is making steady progress. Demonstrated functioning below baseline abilities indicate the need for continued skilled intervention while inpatient. Tolerating session today without incident. Will continue to follow and progress as tolerated.     Plan/Recommendations:   Moderate Intensity Therapy recommended post-acute care. This is recommended as therapy feels the  "patient would require 3-4 days per week and wouldn't tolerate \"3 hour daily\" rehab intensity. SNF would be the preferred choice. If the patient does not agree to SNF, arrange HH or OP depending on home bound status. If patient is medically complex, consider LTACH.. Pt requires no DME at discharge.     Pt desires Skilled Rehab placement at discharge. Pt cooperative; agreeable to therapeutic recommendations and plan of care.     Modified [place]: N/A = No pre-op stroke/TIA    Post-Tx Position: Supine with HOB Elevated, Alarms activated, and Call light and personal items within reach  PPE: gloves    "

## 2024-10-24 NOTE — PROGRESS NOTES
[facility] CARDIOLOGY  [facility]        LOS:  LOS: 3 days   Patient Name: Leona Garcia  Age/Sex: 79 y.o. female  : 1945  MRN: [id number]    Day of Service: 10/24/24   Length of Stay: 3  Encounter Provider: Eleni Bean MD  Place of Service: [facility] CARDIOLOGY  Patient Care Team:  Alfred Liriano MD as PCP - General (Family Medicine)    Cardiology assessment and plan        Abnormal elevated troponin in the setting of renal failure  Flat troponin with no significant trend  No chest discomfort  EKG with no acute ST changes  Acute on chronic diastolic dysfunction  Abnormal elevated proBNP secondary diastolic dysfunction  Chronic lower extremity venous edema  Chronic RV dysfunction  Diastolic dysfunction  Acute on chronic kidney injury  Hypertension  Hyperlipidemia  Diabetes mellitus  Hypothyroidism  Anemia  Chronic longstanding lower extremity edema  Invasive workup in the past deferred secondary to renal failure  Repeat echocardiogram to assess LV systolic function  Medical records from primary cardiologist reviewed  Patient never had a cardiac catheterization  Cardiac catheterization was withheld secondary to underlying renal failure and no significant ischemic burden on the stress test  Poor functional status  Tmax is 98.5 pulse is 77 respirations are 16 blood pressure is 130/62 174/67 sats are 93%  Sodium is 143 potassium is 3.6 creatinine is 3.4 hemoglobin is 6.9 status post transfusion with a repeat hemoglobin of 9.0  Patient is currently -5L  Volume status is better  Echocardiogram with normal LV systolic function with RV dysfunction moderate to severe tricuspid regurgitation with a dilated IVC  Agree with diuresis and close monitoring of renal function  Diagnosis and treatment options reviewed and discussed with patient  Continue current medical therapy continue close monitoring  Further recommendation based on  patient course                     Subjective:     Chief Complaint:  F/U HF    Subjective:   Patient sitting up in chair with brighter affect today.  Reports good urine output.  Legs feel a little softer today but remain edematous.    Current Medications:   Scheduled Meds:aspirin, 81 mg, Oral, Daily  bumetanide, 2 mg, Intravenous, Q12H  carvedilol, 6.25 mg, Oral, BID With Meals  heparin (porcine), 5,000 Units, Subcutaneous, Q12H  hydrALAZINE, 100 mg, Oral, Q8H  insulin lispro, 2-7 Units, Subcutaneous, 4x Daily AC & at Bedtime  insulin lispro protamine-insulin lispro, 10 Units, Subcutaneous, Nightly  insulin lispro protamine-insulin lispro, 20 Units, Subcutaneous, Daily With Breakfast  levothyroxine, 50 mcg, Oral, Q AM  sevelamer, 800 mg, Oral, TID With Meals  sodium chloride, 10 mL, Intravenous, Q12H      Continuous Infusions:       Allergies:  Allergies   Allergen Reactions    Codeine Nausea Only, Unknown - Low Severity and Other (See Comments)     Dizziness    Amlodipine Unknown - Low Severity       Review of Systems   Constitutional: Negative for chills, decreased appetite and malaise/fatigue.   HENT:  Negative for congestion and nosebleeds.    Eyes:  Negative for blurred vision and double vision.   Cardiovascular:  Positive for leg swelling. Negative for chest pain, dyspnea on exertion, irregular heartbeat, orthopnea, palpitations and paroxysmal nocturnal dyspnea.   Respiratory:  Negative for cough and shortness of breath.    Hematologic/Lymphatic: Negative for adenopathy. Does not bruise/bleed easily.   Skin:  Negative for color change and rash.   Musculoskeletal:  Negative for back pain and joint pain.   Gastrointestinal:  Negative for bloating, abdominal pain, hematemesis and hematochezia.   Genitourinary:  Negative for flank pain and hematuria.   Neurological:  Negative for dizziness and focal weakness.   Psychiatric/Behavioral:  Negative for altered mental status. The patient does not have insomnia.         Objective:     Temp:  [97.5 °F (36.4 °C)-98.8 °F (37.1 °C)] 98.5 °F (36.9 °C)  Heart Rate:  [71-82] 77  Resp:  [12-19] 16  BP: (131-175)/(45-70) 174/67     Intake/Output Summary (Last 24 hours) at 10/24/2024 1718  Last data filed at 10/24/2024 1530  Gross per 24 hour   Intake 1543 ml   Output 5390 ml   Net -3847 ml     Body mass index is 43.41 kg/m².      10/22/24  1134 10/23/24  0535 10/24/24  0600   Weight: 130 kg (286 lb) 128 kg (281 lb 8.4 oz) 122 kg (268 lb 15.4 oz)         Physical Exam:  Neuro:  CV:  Resp:  GI:  Ext:  Tele: AAOx3, no gross deficits  S1S2 RRR, grade 2/6 systolic murmur  Nonlabored, CTA  BS+, abd soft  Pedal pulses palp, pitting BLE edema  SR with PACs and PVCs                                                   Lab Review:   Results from last 7 days   Lab Units 10/24/24  1423 10/24/24  0523 10/23/24  0419 10/21/24  2040 10/21/24  1400   SODIUM mmol/L  --  143 144   < > 142   POTASSIUM mmol/L 3.7 3.6 3.9   < > 4.5   CHLORIDE mmol/L  --  100 104   < > 104   CO2 mmol/L  --  33.3* 31.5*   < > 27.8   BUN mg/dL  --  77* 81*   < > 82*   CREATININE mg/dL  --  3.40* 3.33*   < > 3.57*   GLUCOSE mg/dL  --  160* 136*   < > 267*   CALCIUM mg/dL  --  9.1 9.2   < > 9.1   AST (SGOT) U/L  --   --  43*  --  63*   ALT (SGPT) U/L  --   --  82*  --  96*    < > = values in this interval not displayed.     Results from last 7 days   Lab Units 10/21/24  2338 10/21/24  2040 10/21/24  1636 10/21/24  1400   CK TOTAL U/L  --  263*  --   --    HSTROP T ng/L 227* 249* 228* 244*     Results from last 7 days   Lab Units 10/24/24  1423 10/24/24  0523 10/23/24  0419   WBC 10*3/mm3  --  7.01 6.63   HEMOGLOBIN g/dL 9.0* 6.9* 8.1*   HEMATOCRIT % 29.0* 22.2* 26.7*   PLATELETS 10*3/mm3  --  173 160         Results from last 7 days   Lab Units 10/24/24  0523 10/23/24  0419   MAGNESIUM mg/dL 2.2 2.4     Results from last 7 days   Lab Units 10/21/24  2040   CHOLESTEROL mg/dL 177   TRIGLYCERIDES mg/dL 57   HDL CHOL mg/dL 82*      Results from last 7 days   Lab Units 10/21/24  2040 10/21/24  1400   PROBNP pg/mL 8,842.0* 9,565.0*     Results from last 7 days   Lab Units 10/21/24  2040   TSH uIU/mL 2.440       Recent Radiology:  Imaging Results (Most Recent)       Procedure Component Value Units Date/Time    US Renal Bilateral [[id number]] Collected: 10/22/24 1445     Updated: 10/22/24 1449    Narrative:      US RENAL BILATERAL    Date of Exam: 10/22/2024 12:54 PM EDT    Indication: JOSELITO.    Comparison: Ultrasound December 28, 2023    Technique: Grayscale and color Doppler ultrasound evaluation of the kidneys and urinary bladder was performed.      Findings:  Incidental note is made of a right pleural effusion.    Right kidney: 9.3 cm in length. There is no hydronephrosis.    Left kidney: Obscured by bowel gas.    Bladder: Woodall catheter is present making assessment limited      Impression:      Impression:  1.No definite acute right renal abnormality. Left kidney is not appreciated  2.Woodall catheter in the bladder making assessment limited        Electronically Signed: Jacob Eller MD    10/22/2024 2:47 PM EDT    Workstation ID: [id number]    XR Chest 2 View [[id number]] Collected: 10/21/24 1445     Updated: 10/21/24 1458    Narrative:      XR CHEST 2 VW    Date of Exam: 10/21/2024 2:10 PM EDT    Indication: fatigue    Comparison: 12/27/2023      Findings:  Heart is enlarged, similar to the prior study. There is no superimposed consolidation. No pneumothorax or pleural effusion. Aortic arch atherosclerosis. The osseous structures appear intact.      Impression:      Impression:  1. Stable cardiomegaly.  2. No acute process.        Electronically Signed: Dexter Cartwright MD    10/21/2024 2:56 PM EDT    Workstation ID: [id number]    CT Head Without Contrast [[id number]] Collected: 10/21/24 1436     Updated: 10/21/24 1440    Narrative:      CT HEAD WO CONTRAST    Date of Exam: 10/21/2024 2:20 PM EDT    Indication: Memory loss.    Comparison: None  available.    Technique: Axial CT images were obtained of the head without contrast administration.  Coronal reconstructions were performed.  Automated exposure control and iterative reconstruction methods were used.      Findings:  No acute intracranial hemorrhage, mass lesion, mass effect, midline shift or evidence of acute evolving infarct. Chronic right parietal encephalomalacia with porencephalic dilation of the posterior horn of the right lateral ventricle. Mild deep white   matter hypodensities are nonspecific but favored to reflect changes of chronic microvascular disease. Trace fluid is seen dependently within the left maxillary sinus. Mastoid air cells are clear. No calvarial abnormality is identified.      Impression:      Impression:    1. 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  2. Mild chronic microvascular disease features.  3. No acute intracranial findings.      Electronically Signed: Arely Jaramillo MD    10/21/2024 2:38 PM EDT    Workstation ID: [id number]            ECHOCARDIOGRAM:    Results for orders placed during the hospital encounter of 10/21/24    Adult Transthoracic Echo Complete W/ Cont if Necessary Per Protocol    Interpretation Summary    Left ventricular ejection fraction appears to be 56 - 60%.    Left ventricular wall thickness is consistent with mild concentric hypertrophy.    Left ventricular diastolic function is consistent with (grade I) impaired relaxation.    The right ventricular cavity is mildly dilated.    The left atrial cavity is moderate to severely dilated.    The right atrial cavity is moderately  dilated.    Moderate to severe tricuspid valve regurgitation is present.    Estimated right ventricular systolic pressure from tricuspid regurgitation is moderately elevated (45-55 mmHg).    Moderate pulmonary hypertension is present.        I reviewed  the patient's new clinical results.    EKG:      Assessment:       JOSELITO (acute kidney injury)    1) Elevated troponin  -High-sensitivity troponin 244, 228, 249, 227  -EKG shows no acute ST abnormality  -She had nuclear stress testing performed in 2019 that showed mild ischemia but cath was deferred due to renal dysfunction.       2) Acute on chronic diastolic heart failure   -BNP 8842  -TSH WNL  -CXR shows no acute findings  - Last 2D echo 12/2023 showed an EF of 61 to 65% with grade 1 diastolic dysfunction and mild TR.    - Repeat 2D echo 10/2024 showed EF 55-60% with grade 1 diastolic dysfunction, moderate to severe TR, and the IVC is dilated.  - started on bumex gtt     3) JOSELITO on CKD stage 3  - Cr 3.76 --> 3.33     4) HTN     5) HLD     6) DM     7) hypothyroidism     8) anemia

## 2024-10-24 NOTE — PLAN OF CARE
"Goal Outcome Evaluation:  Plan of Care Reviewed With: patient    Assessment: Leona Garcia presents with functional mobility impairments which indicate the need for skilled intervention. Pt with flat affect today. Pt has a Hgb of 6.7 and nursing is preparing to give pt a blood transfusion. Nursing wants pt to be up in recliner and pt is agreeable. Pt is not as talkative today. She is requiring more assist today for an ambulatory transfer from bed to recliner. Vitals were stable with activity. Pt was very fatigued following the transfer. Pt's edematous lower legs were propped on pillows. PT is recommending SNF for this pt as she is far below her baseline for all functional mobility skills. Tolerating session today without incident. Will continue to follow and progress as tolerated.     Plan/Recommendations:   If medically appropriate, Moderate Intensity Therapy recommended post-acute care. This is recommended as therapy feels the patient would require 3-4 days per week and wouldn't tolerate \"3 hour daily\" rehab intensity. SNF would be the preferred choice. If the patient does not agree to SNF, arrange HH or OP depending on home bound status. If patient is medically complex, consider LTACH. Pt requires no DME at discharge.     Pt desires Skilled Rehab placement at discharge. Pt cooperative; agreeable to therapeutic recommendations and plan of care.                        "

## 2024-10-24 NOTE — THERAPY TREATMENT NOTE
"Subjective: Pt agreeable to therapeutic plan of care.    Objective:     Precautions - HFR    Bed mobility - Mod-A and Assist x 2  Transfers - Mod-A, Assist x 2, and with rolling walker  Ambulation - 5 feet Mod-A, Assist x 2, and with rolling walker    Vitals: WNL    Pain: 0VAS   Location: N/A  Intervention for pain: N/A    Education: Provided education on the importance of mobility in the acute care setting, Verbal/Tactile Cues, Transfer Training, Gait Training, and Energy conservation strategies    Assessment: Leona Garcia presents with functional mobility impairments which indicate the need for skilled intervention. Pt with flat affect today. Pt has a Hgb of 6.7 and nursing is preparing to give pt a blood transfusion. Nursing wants pt to be up in recliner and pt is agreeable. Pt is not as talkative today. She is requiring more assist today for an ambulatory transfer from bed to recliner. Vitals were stable with activity. Pt was very fatigued following the transfer. Pt's edematous lower legs were propped on pillows. PT is recommending SNF for this pt as she is far below her baseline for all functional mobility skills. Tolerating session today without incident. Will continue to follow and progress as tolerated.     Plan/Recommendations:   If medically appropriate, Moderate Intensity Therapy recommended post-acute care. This is recommended as therapy feels the patient would require 3-4 days per week and wouldn't tolerate \"3 hour daily\" rehab intensity. SNF would be the preferred choice. If the patient does not agree to SNF, arrange HH or OP depending on home bound status. If patient is medically complex, consider LTACH. Pt requires no DME at discharge.     Pt desires Skilled Rehab placement at discharge. Pt cooperative; agreeable to therapeutic recommendations and plan of care.         Basic Mobility 6-click:  Rollin = Total, A lot = 2, A little = 3; 4 = None  Supine>Sit:   1 = Total, A lot = 2, A " little = 3; 4 = None   Sit>Stand with arms:  1 = Total, A lot = 2, A little = 3; 4 = None  Bed>Chair:   1 = Total, A lot = 2, A little = 3; 4 = None  Ambulate in room:  1 = Total, A lot = 2, A little = 3; 4 = None  3-5 Steps with railin = Total, A lot = 2, A little = 3; 4 = None  Score: 11    Modified [place]: N/A = No pre-op stroke/TIA    Post-Tx Position: Up in Chair, Staff Present, Alarms activated, and Call light and personal items within reach  PPE: gloves    Therapy Charges for Today       Code Description Service Date Service Provider Modifiers Qty    81912783525 HC PT EVAL MOD COMPLEXITY 4 10/23/2024 Cori Velasquez, PT GP 1    00756735853 HC PT NEUROMUSC RE EDUCATION EA 15 MIN 10/24/2024 Cori Velasquez, PT GP 1    [id number] HC GAIT TRAINING EA 15 MIN 10/24/2024 Cori Velasquez, PT GP 1

## 2024-10-24 NOTE — PLAN OF CARE
"Assessment: Leona Garcia presents with ADL impairments affecting function including balance, coordination, endurance / activity tolerance, and range of motion (ROM). Pt upright in chair upon OT arrival. Pt minimally verbally responsive this date, however intermittently joked with her sister who was present throughout session. She completed oral care and denture care with Akiko to rinse her dentures. Pt required ModA x2 to come to stand but stand step transfer from bed to chair with Akiko x2 and use of front wheeled walker. Pt completed digit exercises with noted fatigue by end of set. Pt with noted difficulty with scooting towards HOB, was assisted with MaxA x2 and use of draw sheet. She continues to be limited by decreased endurance but is making steady progress. Demonstrated functioning below baseline abilities indicate the need for continued skilled intervention while inpatient. Tolerating session today without incident. Will continue to follow and progress as tolerated.      Plan/Recommendations:   Moderate Intensity Therapy recommended post-acute care. This is recommended as therapy feels the patient would require 3-4 days per week and wouldn't tolerate \"3 hour daily\" rehab intensity. SNF would be the preferred choice. If the patient does not agree to SNF, arrange HH or OP depending on home bound status. If patient is medically complex, consider LTACH.. Pt requires no DME at discharge.                         "

## 2024-10-24 NOTE — PROGRESS NOTES
PROGRESS NOTE      Patient Name: Leona Garcia  : 1945  MRN: [id number]  Primary Care Physician: Alfred Liriano MD  Date of admission: 10/21/2024    Patient Care Team:  Alfred Liriano MD as PCP - General (Family Medicine)    JOSELITO    Subjective   Subjective:     Patient seen and examined   Renal functions improving   UOP better     Review of systems:  Negative      Allergies:    Allergies   Allergen Reactions    Codeine Nausea Only, Unknown - Low Severity and Other (See Comments)     Dizziness    Amlodipine Unknown - Low Severity       Objective   Exam:     Vital Signs  Temp:  [97.4 °F (36.3 °C)-98.8 °F (37.1 °C)] 98.5 °F (36.9 °C)  Heart Rate:  [72-82] 75  Resp:  [12-20] 16  BP: (131-188)/(45-87) 143/54  SpO2:  [89 %-98 %] 97 %  on  Flow (L/min) (Oxygen Therapy):  [1] 1;   Device (Oxygen Therapy): nasal cannula  Body mass index is 43.41 kg/m².    General: female in no acute distress.    Head:      Normocephalic and atraumatic.    Eyes:      PERRL/EOM intact, conjunctivae and sclerae clear without nystagmus.    Neck:      No masses, thyromegaly,  trachea central   Lungs:    Clear bilaterally to auscultation.    Heart:      Regular rate and rhythm, no murmur no gallop  Abd:        Soft, nontender, not distended, bowel sounds positive, no shifting dullness.  Msk:        No deformity or scoliosis noted of thoracic or lumbar spine.    Pulses:   Pulses normal in all 4 extremities.    Extremities:        No cyanosis or clubbing--++ edema.    Neuro:    No focal deficits.   alert oriented x3  Skin:       Intact without lesions or rashes.    Psych:    Alert and cooperative; normal mood and affect; normal attention span       Results Review:  I have personally reviewed most recent Data :  CBC    Results from last 7 days   Lab Units 10/24/24  0523 10/23/24  0419 10/22/24  0417 10/21/24  1400   WBC 10*3/mm3 7.01 6.63 5.46 6.02   HEMOGLOBIN g/dL 6.9* 8.1* 8.0* 8.3*   PLATELETS 10*3/mm3 173 160 154 166      CMP   Results from last 7 days   Lab Units 10/24/24  0523 10/23/24  0419 10/22/24  1313 10/22/24  0417 10/21/24  2040 10/21/24  1400   SODIUM mmol/L 143 144  --  147* 145 142   POTASSIUM mmol/L 3.6 3.9  --  4.7 4.8 4.5   CHLORIDE mmol/L 100 104  --  108* 106 104   CO2 mmol/L 33.3* 31.5*  --  29.0 26.3 27.8   BUN mg/dL 77* 81*  --  84* 83* 82*   CREATININE mg/dL 3.40* 3.33*  --  3.76* 3.62* 3.57*   GLUCOSE mg/dL 160* 136*  --  275* 266* 267*   ALBUMIN g/dL  --  3.3* 3.2  --   --  3.5   BILIRUBIN mg/dL  --  0.3  --   --   --  0.4   ALK PHOS U/L  --  161*  --   --   --  192*   AST (SGOT) U/L  --  43*  --   --   --  63*   ALT (SGPT) U/L  --  82*  --   --   --  96*   LIPASE U/L  --   --   --   --   --  21     ABG      US Renal Bilateral    Result Date: 10/22/2024  Impression: 1.No definite acute right renal abnormality. Left kidney is not appreciated 2.Woodall catheter in the bladder making assessment limited Electronically Signed: Jacob Eller MD  10/22/2024 2:47 PM EDT  Workstation ID: [id number]     Results for orders placed during the hospital encounter of 10/21/24    Adult Transthoracic Echo Complete W/ Cont if Necessary Per Protocol    Interpretation Summary    Left ventricular ejection fraction appears to be 56 - 60%.    Left ventricular wall thickness is consistent with mild concentric hypertrophy.    Left ventricular diastolic function is consistent with (grade I) impaired relaxation.    The right ventricular cavity is mildly dilated.    The left atrial cavity is moderate to severely dilated.    The right atrial cavity is moderately  dilated.    Moderate to severe tricuspid valve regurgitation is present.    Estimated right ventricular systolic pressure from tricuspid regurgitation is moderately elevated (45-55 mmHg).    Moderate pulmonary hypertension is present.    Scheduled Meds:aspirin, 81 mg, Oral, Daily  bumetanide, 2 mg, Intravenous, Q12H  carvedilol, 6.25 mg, Oral, BID With Meals  heparin (porcine),  5,000 Units, Subcutaneous, Q12H  hydrALAZINE, 100 mg, Oral, Q8H  insulin lispro, 2-7 Units, Subcutaneous, 4x Daily AC & at Bedtime  insulin lispro protamine-insulin lispro, 10 Units, Subcutaneous, Nightly  insulin lispro protamine-insulin lispro, 20 Units, Subcutaneous, Daily With Breakfast  levothyroxine, 50 mcg, Oral, Q AM  metOLazone, 5 mg, Oral, Once  sevelamer, 800 mg, Oral, TID With Meals  sodium chloride, 10 mL, Intravenous, Q12H      Continuous Infusions:     PRN Meds:  acetaminophen    senna-docusate sodium **AND** polyethylene glycol **AND** bisacodyl **AND** bisacodyl    dextrose    dextrose    glucagon (human recombinant)    hydrALAZINE    influenza vaccine    Magnesium Standard Dose Replacement - Follow Nurse / BPA Driven Protocol    nitroglycerin    ondansetron    Phosphorus Replacement - Follow Nurse / BPA Driven Protocol    Potassium Replacement - Follow Nurse / BPA Driven Protocol    [COMPLETED] Insert peripheral IV **AND** sodium chloride    sodium chloride    Assessment & Plan   Assessment and Plan:         JOSELITO (acute kidney injury)    ASSESSMENT:  JOSELITO now etiology  CKD stage III  Volume overload with increased edema  HTN stable   DM  Anemia  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 Etiology uncertain dont look like free water deficit     ECHO from 2023: EF 61- 65% with grade 1 diastolic dysfunction and mild TR.           PLAN :      Patient with JOSELITO, creatinine improved to 3.3 yesterday today at 3.4 from 3.7 Baseline around 1.7-1.9 I think. Etiology likely from volume overload, maybe some CRS.   Some increasing creatinine might be related to drop in the hemoglobin  As patient is slightly alkalotic I am decreasing Bumex to 2 mg every 12 hour  Will give extra dose of metolazone at 5 mg because of significant and persistent edema  May need to readjust diuretics again tomorrow morning  Urine studies reviewed, urine sodium 52, did show some hematuria as well as 1.1 grams  proteinuria. Likely from diabetic nephropathy but need to monitor and will check serology   Renal ultrasound left kidney not visualized because of some body habitus right kidney within normal limit   continue metolazone because of the high sodium  Clinically patient is better much more alert oriented  ECHO : diastolic heart failre with some valvular issues  Other electrolytes and acid-base acceptable  Significant anemia noted hemoglobin dropping to 6.9 from 8.1 patient will be getting blood transfusion  We will continue to follow     Chu Lee MD.  Electronically signed by   [facility] kidney consultant  [phone]  10/24/2024  11:07 EDT  Oh

## 2024-10-24 NOTE — CASE MANAGEMENT/SOCIAL WORK
Continued Stay Note  [facility]     Patient Name: Leona Garcia  MRN: [id number]  Today's Date: 10/24/2024    Admit Date: 10/21/2024    Plan: DC Plan: SNF pending patient/family choices. List provided. No precert required. PASRR approved.   Discharge Plan       Row Name 10/24/24 1420       Plan    Plan DC Plan: SNF pending patient/family choices. List provided. No precert required. PASRR approved.    Patient/Family in Agreement with Plan yes    Provided Post Acute Provider List? Yes    Post Acute Provider List Nursing Home    Provided Post Acute Provider Quality & Resource List? Yes    Post Acute Provider Quality and Resource List Nursing Home    Delivered To Patient    Method of Delivery In person    Plan Comments CM reviewed chart documentation for clinical updates. PT/OT reocommending SNF at discharge. CM spoke with patient at bedside to discuss DC planning. CM provided SNF list to patient. Patient wishes to review list with her sister this evening and will provide CM with choices in the morning. CM updated MD on plan during rounds.CM will continue to follow for any additional needs and adjust DC plan accordingly. DC Barriers: Cardiac monitoring, O2@1L nc, Bumex gtt, Monitor renal function, PRBC x2, and monitor H/H.               Expected Discharge Date and Time       Expected Discharge Date Expected Discharge Time    Oct 28, 2024           Met with patient in room   Maintain distance greater than six feet and spent less than fifteen minutes in the room.      Ale Pulido RN    Office Phone: [phone]  Office Cell:     [phone]

## 2024-10-24 NOTE — PROGRESS NOTES
[facility] MEDICINE SERVICE  DAILY PROGRESS NOTE    NAME: Leona Garcia  : 1945  MRN: [id number]      LOS: 3 days     PROVIDER OF SERVICE: Sharron Grissom MD    Chief Complaint: JOSELITO (acute kidney injury)    Subjective:     Interval History:  History taken from: patient    Complaining of cold sensation in her right foot        Review of Systems:   Review of Systems   All other systems reviewed and are negative.      Objective:     Vital Signs  Temp:  [97.4 °F (36.3 °C)-98.8 °F (37.1 °C)] 98.2 °F (36.8 °C)  Heart Rate:  [72-82] 72  Resp:  [12-19] 14  BP: (131-183)/(45-87) 156/59  Flow (L/min) (Oxygen Therapy):  [1] 1   Body mass index is 43.41 kg/m².    Physical Exam  Physical Exam  Constitutional:       Appearance: Normal appearance.   HENT:      Head: Normocephalic and atraumatic.      Nose: Nose normal.      Mouth/Throat:      Mouth: Mucous membranes are moist.   Eyes:      Extraocular Movements: Extraocular movements intact.      Pupils: Pupils are equal, round, and reactive to light.   Cardiovascular:      Rate and Rhythm: Normal rate and regular rhythm.   Pulmonary:      Effort: Pulmonary effort is normal.      Breath sounds: Normal breath sounds.   Abdominal:      General: Abdomen is flat. Bowel sounds are normal.      Palpations: Abdomen is soft.   Musculoskeletal:      Cervical back: Normal range of motion and neck supple.      Comments: Bilateral feet are warm with palpable pulses.   Skin:     General: Skin is warm and dry.   Neurological:      General: No focal deficit present.      Mental Status: She is alert and oriented to person, place, and time.   Psychiatric:         Mood and Affect: Mood normal.         Behavior: Behavior normal.         Thought Content: Thought content normal.         Judgment: Judgment normal.         Current Medications:  Scheduled Meds:aspirin, 81 mg, Oral, Daily  bumetanide, 2 mg, Intravenous, Q12H  carvedilol, 6.25 mg, Oral, BID With Meals  heparin  (porcine), 5,000 Units, Subcutaneous, Q12H  hydrALAZINE, 100 mg, Oral, Q8H  insulin lispro, 2-7 Units, Subcutaneous, 4x Daily AC & at Bedtime  insulin lispro protamine-insulin lispro, 10 Units, Subcutaneous, Nightly  insulin lispro protamine-insulin lispro, 20 Units, Subcutaneous, Daily With Breakfast  levothyroxine, 50 mcg, Oral, Q AM  sevelamer, 800 mg, Oral, TID With Meals  sodium chloride, 10 mL, Intravenous, Q12H    Levothyroxine 50 mcg oral every morning  Continuous Infusions:     PRN Meds:.  acetaminophen    senna-docusate sodium **AND** polyethylene glycol **AND** bisacodyl **AND** bisacodyl    dextrose    dextrose    glucagon (human recombinant)    hydrALAZINE    influenza vaccine    Magnesium Standard Dose Replacement - Follow Nurse / BPA Driven Protocol    nitroglycerin    ondansetron    Phosphorus Replacement - Follow Nurse / BPA Driven Protocol    Potassium Replacement - Follow Nurse / BPA Driven Protocol    [COMPLETED] Insert peripheral IV **AND** sodium chloride    sodium chloride       Diagnostic Data    Results from last 7 days   Lab Units 10/24/24  0523 10/23/24  0419   WBC 10*3/mm3 7.01 6.63   HEMOGLOBIN g/dL 6.9* 8.1*   HEMATOCRIT % 22.2* 26.7*   PLATELETS 10*3/mm3 173 160   GLUCOSE mg/dL 160* 136*   CREATININE mg/dL 3.40* 3.33*   BUN mg/dL 77* 81*   SODIUM mmol/L 143 144   POTASSIUM mmol/L 3.6 3.9   AST (SGOT) U/L  --  43*   ALT (SGPT) U/L  --  82*   ALK PHOS U/L  --  161*   BILIRUBIN mg/dL  --  0.3   ANION GAP mmol/L 9.7 8.5       US Renal Bilateral    Result Date: 10/22/2024  Impression: 1.No definite acute right renal abnormality. Left kidney is not appreciated 2.Woodall catheter in the bladder making assessment limited Electronically Signed: Jacob Eller MD  10/22/2024 2:47 PM EDT  Workstation ID: [id number]       I reviewed the patient's new clinical results.    Assessment/Plan:     Active and Resolved Problems  Active Hospital Problems    Diagnosis  POA    **JOSELITO (acute kidney injury) [N17.9]   Yes      Resolved Hospital Problems   No resolved problems to display.     Anemia  - Hemoglobin of 6.9 today.  Transfuse 2 units of packed red blood cells.    JOSELITO on CKD stage 3:  -Mild worsening renal function.  Avoid nephrotoxic drugs.  Continue to monitor.  Nephrology following.     Acute diastolic CHF exacerbation EF of 56 to 60%  -proBNP is elevated at 8,842  - Improving.  Now off of Bumex drip.  On IV Bumex every 12 hours.  Optimize medical management for CHF exacerbation, monitor strict I's and O's, daily weights, 2 g sodium diet.  I's and O's reflects fluid balance of -470.    Elevated Troponin  -Echo results reviewed.  EF of 56 to 60%.  No wall motion abnormalities noted.  Troponins elevated and trending slightly upwards.  EKG with slight T wave flattening in anterolateral leads.  Cardiology following.     Bradycardia:  -Resolved     Hypertension:  -BP not quite well-controlled.  Increase Coreg, continue hydralazine, continue Bumex, avoid ACE and ARB's for now due to JOSELITO.    Diabetes mellitus type 2:  -Blood glucose is better controlled.  Continue current insulin regimen.  Hemoglobin A1c is 9.65%     Hypothyroidism:  -Continue levothyroxine    Complaints of cold right foot  - On exam bilateral feet are warm with palpable pulses.  Keep feet covered to help with the sensation of coldness.  Warm pack applied around right foot.    VTE Prophylaxis:  Pharmacologic & mechanical VTE prophylaxis orders are present.             Disposition Planning:     Barriers to Discharge: Pending clinical improvement  Anticipated Date of Discharge: Pending clinical course and clinical improvement.  Also pending SNF placement, with bed availability and insurance approval.  Place of Discharge: SNF        Code Status and Medical Interventions: CPR (Attempt to Resuscitate); Full Support   Ordered at: 10/21/24 2019     Code Status (Patient has no pulse and is not breathing):    CPR (Attempt to Resuscitate)     Medical Interventions  (Patient has pulse or is breathing):    Full Support       Signature: Electronically signed by Sharron Grissom MD, 10/24/24, 13:52 EDT.  [facility] Hospitalist Team

## 2024-10-25 ENCOUNTER — APPOINTMENT (OUTPATIENT)
Dept: GENERAL RADIOLOGY | Facility: HOSPITAL | Age: 79
End: 2024-10-25
Payer: MEDICARE

## 2024-10-25 LAB
ANION GAP SERPL CALCULATED.3IONS-SCNC: 9.1 MMOL/L (ref 5–15)
BASOPHILS # BLD AUTO: 0.03 10*3/MM3 (ref 0–0.2)
BASOPHILS NFR BLD AUTO: 0.4 % (ref 0–1.5)
BH BB BLOOD EXPIRATION DATE: NORMAL
BH BB BLOOD TYPE BARCODE: 6200
BH BB DISPENSE STATUS: NORMAL
BH BB PRODUCT CODE: NORMAL
BH BB UNIT NUMBER: NORMAL
BUN SERPL-MCNC: 75 MG/DL (ref 8–23)
BUN/CREAT SERPL: 21.6 (ref 7–25)
CALCIUM SPEC-SCNC: 9.2 MG/DL (ref 8.6–10.5)
CHLORIDE SERPL-SCNC: 94 MMOL/L (ref 98–107)
CO2 SERPL-SCNC: 35.9 MMOL/L (ref 22–29)
CREAT SERPL-MCNC: 3.47 MG/DL (ref 0.57–1)
CROSSMATCH INTERPRETATION: NORMAL
DEPRECATED RDW RBC AUTO: 52.5 FL (ref 37–54)
EGFRCR SERPLBLD CKD-EPI 2021: 13 ML/MIN/1.73
EOSINOPHIL # BLD AUTO: 0.15 10*3/MM3 (ref 0–0.4)
EOSINOPHIL NFR BLD AUTO: 2.1 % (ref 0.3–6.2)
ERYTHROCYTE [DISTWIDTH] IN BLOOD BY AUTOMATED COUNT: 16.2 % (ref 12.3–15.4)
GLUCOSE BLDC GLUCOMTR-MCNC: 170 MG/DL (ref 70–105)
GLUCOSE BLDC GLUCOMTR-MCNC: 174 MG/DL (ref 70–105)
GLUCOSE BLDC GLUCOMTR-MCNC: 187 MG/DL (ref 70–105)
GLUCOSE BLDC GLUCOMTR-MCNC: 199 MG/DL (ref 70–105)
GLUCOSE BLDC GLUCOMTR-MCNC: 208 MG/DL (ref 70–105)
GLUCOSE SERPL-MCNC: 219 MG/DL (ref 65–99)
HCT VFR BLD AUTO: 28.3 % (ref 34–46.6)
HGB BLD-MCNC: 8.9 G/DL (ref 12–15.9)
IMM GRANULOCYTES # BLD AUTO: 0.02 10*3/MM3 (ref 0–0.05)
IMM GRANULOCYTES NFR BLD AUTO: 0.3 % (ref 0–0.5)
LYMPHOCYTES # BLD AUTO: 1.83 10*3/MM3 (ref 0.7–3.1)
LYMPHOCYTES NFR BLD AUTO: 25.3 % (ref 19.6–45.3)
MCH RBC QN AUTO: 27.8 PG (ref 26.6–33)
MCHC RBC AUTO-ENTMCNC: 31.4 G/DL (ref 31.5–35.7)
MCV RBC AUTO: 88.4 FL (ref 79–97)
MONOCYTES # BLD AUTO: 0.81 10*3/MM3 (ref 0.1–0.9)
MONOCYTES NFR BLD AUTO: 11.2 % (ref 5–12)
NEUTROPHILS NFR BLD AUTO: 4.39 10*3/MM3 (ref 1.7–7)
NEUTROPHILS NFR BLD AUTO: 60.7 % (ref 42.7–76)
NRBC BLD AUTO-RTO: 0 /100 WBC (ref 0–0.2)
PLATELET # BLD AUTO: 171 10*3/MM3 (ref 140–450)
PMV BLD AUTO: 11.6 FL (ref 6–12)
POTASSIUM SERPL-SCNC: 3.6 MMOL/L (ref 3.5–5.2)
POTASSIUM SERPL-SCNC: 3.7 MMOL/L (ref 3.5–5.2)
RBC # BLD AUTO: 3.2 10*6/MM3 (ref 3.77–5.28)
SODIUM SERPL-SCNC: 139 MMOL/L (ref 136–145)
UNIT  ABO: NORMAL
UNIT  RH: NORMAL
WBC NRBC COR # BLD AUTO: 7.23 10*3/MM3 (ref 3.4–10.8)

## 2024-10-25 PROCEDURE — 25010000002 BUMETANIDE PER 0.5 MG: Performed by: INTERNAL MEDICINE

## 2024-10-25 PROCEDURE — 97110 THERAPEUTIC EXERCISES: CPT

## 2024-10-25 PROCEDURE — 25010000002 HEPARIN (PORCINE) PER 1000 UNITS: Performed by: NURSE PRACTITIONER

## 2024-10-25 PROCEDURE — 25010000002 HYDRALAZINE PER 20 MG: Performed by: NURSE PRACTITIONER

## 2024-10-25 PROCEDURE — 63710000001 INSULIN LISPRO PROTAMINE-INSULIN LISPRO (75-25) 100 UNIT/ML SUSPENSION: Performed by: INTERNAL MEDICINE

## 2024-10-25 PROCEDURE — 84132 ASSAY OF SERUM POTASSIUM: CPT | Performed by: INTERNAL MEDICINE

## 2024-10-25 PROCEDURE — 97116 GAIT TRAINING THERAPY: CPT

## 2024-10-25 PROCEDURE — 80048 BASIC METABOLIC PNL TOTAL CA: CPT | Performed by: INTERNAL MEDICINE

## 2024-10-25 PROCEDURE — 85025 COMPLETE CBC W/AUTO DIFF WBC: CPT | Performed by: INTERNAL MEDICINE

## 2024-10-25 PROCEDURE — 82948 REAGENT STRIP/BLOOD GLUCOSE: CPT | Performed by: NURSE PRACTITIONER

## 2024-10-25 PROCEDURE — 99233 SBSQ HOSP IP/OBS HIGH 50: CPT | Performed by: INTERNAL MEDICINE

## 2024-10-25 PROCEDURE — 71045 X-RAY EXAM CHEST 1 VIEW: CPT

## 2024-10-25 PROCEDURE — 82948 REAGENT STRIP/BLOOD GLUCOSE: CPT

## 2024-10-25 PROCEDURE — 63710000001 INSULIN LISPRO (HUMAN) PER 5 UNITS: Performed by: NURSE PRACTITIONER

## 2024-10-25 RX ORDER — BUMETANIDE 0.25 MG/ML
2 INJECTION, SOLUTION INTRAMUSCULAR; INTRAVENOUS EVERY 8 HOURS
Status: DISCONTINUED | OUTPATIENT
Start: 2024-10-25 | End: 2024-10-28

## 2024-10-25 RX ORDER — CARVEDILOL 6.25 MG/1
12.5 TABLET ORAL 2 TIMES DAILY WITH MEALS
Status: DISCONTINUED | OUTPATIENT
Start: 2024-10-25 | End: 2024-10-29 | Stop reason: HOSPADM

## 2024-10-25 RX ORDER — POTASSIUM CHLORIDE 1500 MG/1
20 TABLET, EXTENDED RELEASE ORAL ONCE
Status: COMPLETED | OUTPATIENT
Start: 2024-10-25 | End: 2024-10-25

## 2024-10-25 RX ORDER — METOLAZONE 2.5 MG/1
10 TABLET ORAL ONCE
Status: COMPLETED | OUTPATIENT
Start: 2024-10-25 | End: 2024-10-25

## 2024-10-25 RX ADMIN — INSULIN LISPRO 2 UNITS: 100 INJECTION, SOLUTION INTRAVENOUS; SUBCUTANEOUS at 13:44

## 2024-10-25 RX ADMIN — Medication 10 ML: at 09:17

## 2024-10-25 RX ADMIN — INSULIN LISPRO 10 UNITS: 100 INJECTION, SUSPENSION SUBCUTANEOUS at 20:47

## 2024-10-25 RX ADMIN — ASPIRIN 81 MG CHEWABLE TABLET 81 MG: 81 TABLET CHEWABLE at 09:37

## 2024-10-25 RX ADMIN — POTASSIUM CHLORIDE 20 MEQ: 1500 TABLET, EXTENDED RELEASE ORAL at 05:11

## 2024-10-25 RX ADMIN — HYDRALAZINE HYDROCHLORIDE 10 MG: 20 INJECTION INTRAMUSCULAR; INTRAVENOUS at 20:26

## 2024-10-25 RX ADMIN — HYDRALAZINE HYDROCHLORIDE 10 MG: 20 INJECTION INTRAMUSCULAR; INTRAVENOUS at 09:30

## 2024-10-25 RX ADMIN — LEVOTHYROXINE SODIUM 50 MCG: 0.05 TABLET ORAL at 05:11

## 2024-10-25 RX ADMIN — HYDRALAZINE HYDROCHLORIDE 100 MG: 25 TABLET ORAL at 05:11

## 2024-10-25 RX ADMIN — SEVELAMER CARBONATE 800 MG: 800 TABLET, FILM COATED ORAL at 18:07

## 2024-10-25 RX ADMIN — Medication 10 ML: at 21:33

## 2024-10-25 RX ADMIN — HYDRALAZINE HYDROCHLORIDE 100 MG: 25 TABLET ORAL at 21:31

## 2024-10-25 RX ADMIN — SEVELAMER CARBONATE 800 MG: 800 TABLET, FILM COATED ORAL at 09:17

## 2024-10-25 RX ADMIN — BUMETANIDE 2 MG: 0.25 INJECTION INTRAMUSCULAR; INTRAVENOUS at 01:32

## 2024-10-25 RX ADMIN — CARVEDILOL 12.5 MG: 6.25 TABLET, FILM COATED ORAL at 18:07

## 2024-10-25 RX ADMIN — HYDRALAZINE HYDROCHLORIDE 10 MG: 20 INJECTION INTRAMUSCULAR; INTRAVENOUS at 01:32

## 2024-10-25 RX ADMIN — HYDRALAZINE HYDROCHLORIDE 10 MG: 20 INJECTION INTRAMUSCULAR; INTRAVENOUS at 16:05

## 2024-10-25 RX ADMIN — BUMETANIDE 2 MG: 0.25 INJECTION INTRAMUSCULAR; INTRAVENOUS at 20:27

## 2024-10-25 RX ADMIN — INSULIN LISPRO 2 UNITS: 100 INJECTION, SOLUTION INTRAVENOUS; SUBCUTANEOUS at 20:46

## 2024-10-25 RX ADMIN — HEPARIN SODIUM 5000 UNITS: 5000 INJECTION INTRAVENOUS; SUBCUTANEOUS at 09:16

## 2024-10-25 RX ADMIN — INSULIN LISPRO 3 UNITS: 100 INJECTION, SOLUTION INTRAVENOUS; SUBCUTANEOUS at 09:17

## 2024-10-25 RX ADMIN — ACETAMINOPHEN 650 MG: 325 TABLET, FILM COATED ORAL at 16:05

## 2024-10-25 RX ADMIN — METOLAZONE 10 MG: 2.5 TABLET ORAL at 16:04

## 2024-10-25 RX ADMIN — INSULIN LISPRO 20 UNITS: 100 INJECTION, SUSPENSION SUBCUTANEOUS at 09:17

## 2024-10-25 RX ADMIN — HEPARIN SODIUM 5000 UNITS: 5000 INJECTION INTRAVENOUS; SUBCUTANEOUS at 20:27

## 2024-10-25 RX ADMIN — SEVELAMER CARBONATE 800 MG: 800 TABLET, FILM COATED ORAL at 13:44

## 2024-10-25 RX ADMIN — HYDRALAZINE HYDROCHLORIDE 100 MG: 25 TABLET ORAL at 13:43

## 2024-10-25 RX ADMIN — CARVEDILOL 6.25 MG: 6.25 TABLET, FILM COATED ORAL at 09:16

## 2024-10-25 NOTE — PROGRESS NOTES
PROGRESS NOTE      Patient Name: Leona Garcia  : 1945  MRN: [id number]  Primary Care Physician: Alfred Liriano MD  Date of admission: 10/21/2024    Patient Care Team:  Alfred Liriano MD as PCP - General (Family Medicine)    JOSELITO    Subjective   Subjective:     Patient seen and examined   Renal functions overall improved, stable today  UOP better     Review of systems:  Negative      Allergies:    Allergies   Allergen Reactions    Codeine Nausea Only, Unknown - Low Severity and Other (See Comments)     Dizziness    Amlodipine Unknown - Low Severity       Objective   Exam:     Vital Signs  Temp:  [97.6 °F (36.4 °C)-98.6 °F (37 °C)] 98 °F (36.7 °C)  Heart Rate:  [69-82] 69  Resp:  [11-16] 14  BP: (126-191)/(51-71) 148/52  SpO2:  [90 %-100 %] 98 %  on  Flow (L/min) (Oxygen Therapy):  [1] 1;   Device (Oxygen Therapy): nasal cannula  Body mass index is 42.74 kg/m².    General: female in no acute distress.    Head:      Normocephalic and atraumatic.    Eyes:      PERRL/EOM intact, conjunctivae and sclerae clear without nystagmus.    Neck:      No masses, thyromegaly,  trachea central   Lungs:    Clear bilaterally to auscultation.    Heart:      Regular rate and rhythm, no murmur no gallop  Abd:        Soft, nontender, not distended, bowel sounds positive, no shifting dullness.  Msk:        No deformity or scoliosis noted of thoracic or lumbar spine.    Pulses:   Pulses normal in all 4 extremities.    Extremities:        No cyanosis or clubbing--++ edema.    Neuro:    No focal deficits.   alert oriented x3  Skin:       Intact without lesions or rashes.    Psych:    Alert and cooperative; normal mood and affect; normal attention span       Results Review:  I have personally reviewed most recent Data :  CBC    Results from last 7 days   Lab Units 10/25/24  0355 10/24/24  1423 10/24/24  0523 10/23/24  0419 10/22/24  0417 10/21/24  1400   WBC 10*3/mm3 7.23  --  7.01 6.63 5.46 6.02   HEMOGLOBIN g/dL  8.9* 9.0* 6.9* 8.1* 8.0* 8.3*   PLATELETS 10*3/mm3 171  --  173 160 154 166     CMP   Results from last 7 days   Lab Units 10/25/24  0355 10/24/24  1423 10/24/24  0523 10/23/24  0419 10/22/24  1313 10/22/24  0417 10/21/24  2040 10/21/24  1400   SODIUM mmol/L 139  --  143 144  --  147* 145 142   POTASSIUM mmol/L 3.6 3.7 3.6 3.9  --  4.7 4.8 4.5   CHLORIDE mmol/L 94*  --  100 104  --  108* 106 104   CO2 mmol/L 35.9*  --  33.3* 31.5*  --  29.0 26.3 27.8   BUN mg/dL 75*  --  77* 81*  --  84* 83* 82*   CREATININE mg/dL 3.47*  --  3.40* 3.33*  --  3.76* 3.62* 3.57*   GLUCOSE mg/dL 219*  --  160* 136*  --  275* 266* 267*   ALBUMIN g/dL  --   --   --  3.3* 3.2  --   --  3.5   BILIRUBIN mg/dL  --   --   --  0.3  --   --   --  0.4   ALK PHOS U/L  --   --   --  161*  --   --   --  192*   AST (SGOT) U/L  --   --   --  43*  --   --   --  63*   ALT (SGPT) U/L  --   --   --  82*  --   --   --  96*   LIPASE U/L  --   --   --   --   --   --   --  21     ABG      No radiology results for the last day    Results for orders placed during the hospital encounter of 10/21/24    Adult Transthoracic Echo Complete W/ Cont if Necessary Per Protocol    Interpretation Summary    Left ventricular ejection fraction appears to be 56 - 60%.    Left ventricular wall thickness is consistent with mild concentric hypertrophy.    Left ventricular diastolic function is consistent with (grade I) impaired relaxation.    The right ventricular cavity is mildly dilated.    The left atrial cavity is moderate to severely dilated.    The right atrial cavity is moderately  dilated.    Moderate to severe tricuspid valve regurgitation is present.    Estimated right ventricular systolic pressure from tricuspid regurgitation is moderately elevated (45-55 mmHg).    Moderate pulmonary hypertension is present.    Scheduled Meds:aspirin, 81 mg, Oral, Daily  bumetanide, 2 mg, Intravenous, Q12H  carvedilol, 6.25 mg, Oral, BID With Meals  heparin (porcine), 5,000 Units,  Subcutaneous, Q12H  hydrALAZINE, 100 mg, Oral, Q8H  insulin lispro, 2-7 Units, Subcutaneous, 4x Daily AC & at Bedtime  insulin lispro protamine-insulin lispro, 10 Units, Subcutaneous, Nightly  insulin lispro protamine-insulin lispro, 20 Units, Subcutaneous, Daily With Breakfast  levothyroxine, 50 mcg, Oral, Q AM  sevelamer, 800 mg, Oral, TID With Meals  sodium chloride, 10 mL, Intravenous, Q12H      Continuous Infusions:     PRN Meds:  acetaminophen    senna-docusate sodium **AND** polyethylene glycol **AND** bisacodyl **AND** bisacodyl    dextrose    dextrose    glucagon (human recombinant)    hydrALAZINE    influenza vaccine    Magnesium Standard Dose Replacement - Follow Nurse / BPA Driven Protocol    nitroglycerin    ondansetron    Phosphorus Replacement - Follow Nurse / BPA Driven Protocol    Potassium Replacement - Follow Nurse / BPA Driven Protocol    [COMPLETED] Insert peripheral IV **AND** sodium chloride    sodium chloride    Assessment & Plan   Assessment and Plan:         JOSELITO (acute kidney injury)    ASSESSMENT:  JOSELITO now etiology  CKD stage III  Volume overload with increased edema  HTN stable   DM  Anemia  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 Etiology uncertain dont look like free water deficit     ECHO from 2023: EF 61- 65% with grade 1 diastolic dysfunction and mild TR.           PLAN :      Patient with JOSELITO, creatinine improved to 3.3 yesterday today at 3.4 from 3.7 Baseline around 1.7-1.9 I think. Etiology likely from volume overload, maybe some CRS.   Some increasing creatinine might be related to drop in the hemoglobin  As patient is slightly alkalotic, Bumex changed to 2 mg IV every 8 hours.  Metolazone 10 mg p.o. now.  Repeat chest x-ray.  Okay to NANO Woodall but monitor intake and output closely.  Spoke to RN.  May need to readjust diuretics again tomorrow morning  Urine studies reviewed, urine sodium 52, did show some hematuria as well as 1.1 grams proteinuria.  Likely from diabetic nephropathy but need to monitor and will check serology   Renal ultrasound left kidney not visualized because of some body habitus right kidney within normal limit   continue metolazone because of the high sodium  Clinically patient is better much more alert oriented  ECHO : diastolic heart failre with some valvular issues  Other electrolytes and acid-base acceptable  Significant anemia improved s/p pRBCs  We will continue to follow     Note transcribed for Dr Radha Garcia MD.  Electronically signed by   [facility] kidney consultant  [phone]  10/25/2024  11:57 EDT  Oh

## 2024-10-25 NOTE — THERAPY TREATMENT NOTE
"Subjective: Pt agreeable to therapeutic plan of care.    Objective:     Precautions - HFR, desats with activity    Bed mobility - Mod-A and Assist x 2  Transfers - Mod-A, Assist x 2, and with rolling walker  Ambulation - 5 feet Mod-A, Assist x 2, and with rolling walker    Therapeutic Exercise - 5 Reps Bilaterally AAROM lying supine and supported sitting / chair    Vitals: Desaturates  Pt was on room air upon PT arrival. Pt desaturated to 84% with bed to recliner transfer. Pt was at 95% post tx with O2 at 1 L.     Pain: 0 VAS   Location: N/A  Intervention for pain: N/A    Education: Provided education on the importance of mobility in the acute care setting, Verbal/Tactile Cues, Transfer Training, and Gait Training    Assessment: Leona Garcia presents with functional mobility impairments which indicate the need for skilled intervention. Pt had flat affect and conversed very little. Pt requiring mod assist of 2 for bed >recliner transfer with pt exhibiting very forward flexed posture and decreased foot clearance BLE. Pt requires 100% cueing for task segmentation.Patient is a high risk of injurious falls and unsafe to return to prior living environment at this time.  Tolerating session today without incident. Will continue to follow and progress as tolerated.     Plan/Recommendations:   If medically appropriate, Moderate Intensity Therapy recommended post-acute care. This is recommended as therapy feels the patient would require 3-4 days per week and wouldn't tolerate \"3 hour daily\" rehab intensity. SNF would be the preferred choice. If the patient does not agree to SNF, arrange HH or OP depending on home bound status. If patient is medically complex, consider LTACH. Pt requires no DME at discharge.     Pt desires Skilled Rehab placement at discharge. Pt cooperative; agreeable to therapeutic recommendations and plan of care.         Basic Mobility 6-click:  Rollin = Total, A lot = 2, A little = 3; 4 = " None  Supine>Sit:   1 = Total, A lot = 2, A little = 3; 4 = None   Sit>Stand with arms:  1 = Total, A lot = 2, A little = 3; 4 = None  Bed>Chair:   1 = Total, A lot = 2, A little = 3; 4 = None  Ambulate in room:  1 = Total, A lot = 2, A little = 3; 4 = None  3-5 Steps with railin = Total, A lot = 2, A little = 3; 4 = None  Score: 11    Modified Yue: N/A = No pre-op stroke/TIA    Post-Tx Position: Up in Chair, Staff Present, Alarms activated, and Call light and personal items within reach  PPE: gloves    Therapy Charges for Today       Code Description Service Date Service Provider Modifiers Qty    38488390198 HC PT NEUROMUSC RE EDUCATION EA 15 MIN 10/24/2024 Cori Velasquez, PT GP 1    [id number] HC GAIT TRAINING EA 15 MIN 10/24/2024 Cori Velasquez, PT GP 1    67029299011 HC GAIT TRAINING EA 15 MIN 10/25/2024 Cori Velasquez, PT GP 1    27857049944 HC PT THER PROC EA 15 MIN 10/25/2024 Cori Velasquez, PT GP 1

## 2024-10-25 NOTE — PROGRESS NOTES
[facility] MEDICINE SERVICE  DAILY PROGRESS NOTE    NAME: Leona Garcia  : 1945  MRN: [id number]      LOS: 4 days     PROVIDER OF SERVICE: Sharron Grissom MD    Chief Complaint: JOSELITO (acute kidney injury)    Subjective:     Interval History:  History taken from: patient    Complaining of cold sensation in her right foot        Review of Systems:   Review of Systems   All other systems reviewed and are negative.      Objective:     Vital Signs  Temp:  [97.6 °F (36.4 °C)-98.6 °F (37 °C)] 98 °F (36.7 °C)  Heart Rate:  [69-82] 69  Resp:  [11-16] 14  BP: (126-191)/(51-71) 148/52  Flow (L/min) (Oxygen Therapy):  [1] 1   Body mass index is 42.74 kg/m².    Physical Exam  Physical Exam  Constitutional:       Appearance: Normal appearance.   HENT:      Head: Normocephalic and atraumatic.      Nose: Nose normal.      Mouth/Throat:      Mouth: Mucous membranes are moist.   Eyes:      Extraocular Movements: Extraocular movements intact.      Pupils: Pupils are equal, round, and reactive to light.   Cardiovascular:      Rate and Rhythm: Normal rate and regular rhythm.   Pulmonary:      Effort: Pulmonary effort is normal.      Breath sounds: Normal breath sounds.   Abdominal:      General: Abdomen is flat. Bowel sounds are normal.      Palpations: Abdomen is soft.   Musculoskeletal:      Cervical back: Normal range of motion and neck supple.      Comments: Bilateral feet are warm with palpable pulses.   Skin:     General: Skin is warm and dry.   Neurological:      General: No focal deficit present.      Mental Status: She is alert and oriented to person, place, and time.   Psychiatric:         Mood and Affect: Mood normal.         Behavior: Behavior normal.         Thought Content: Thought content normal.         Judgment: Judgment normal.         Current Medications:  Scheduled Meds:aspirin, 81 mg, Oral, Daily  bumetanide, 2 mg, Intravenous, Q8H  carvedilol, 6.25 mg, Oral, BID With Meals  heparin  (porcine), 5,000 Units, Subcutaneous, Q12H  hydrALAZINE, 100 mg, Oral, Q8H  insulin lispro, 2-7 Units, Subcutaneous, 4x Daily AC & at Bedtime  insulin lispro protamine-insulin lispro, 10 Units, Subcutaneous, Nightly  insulin lispro protamine-insulin lispro, 20 Units, Subcutaneous, Daily With Breakfast  levothyroxine, 50 mcg, Oral, Q AM  metOLazone, 10 mg, Oral, Once  sevelamer, 800 mg, Oral, TID With Meals  sodium chloride, 10 mL, Intravenous, Q12H    Levothyroxine 50 mcg oral every morning  Continuous Infusions:     PRN Meds:.  acetaminophen    senna-docusate sodium **AND** polyethylene glycol **AND** bisacodyl **AND** bisacodyl    dextrose    dextrose    glucagon (human recombinant)    hydrALAZINE    influenza vaccine    Magnesium Standard Dose Replacement - Follow Nurse / BPA Driven Protocol    nitroglycerin    ondansetron    Phosphorus Replacement - Follow Nurse / BPA Driven Protocol    Potassium Replacement - Follow Nurse / BPA Driven Protocol    [COMPLETED] Insert peripheral IV **AND** sodium chloride    sodium chloride       Diagnostic Data    Results from last 7 days   Lab Units 10/25/24  0355 10/24/24  0523 10/23/24  0419   WBC 10*3/mm3 7.23   < > 6.63   HEMOGLOBIN g/dL 8.9*   < > 8.1*   HEMATOCRIT % 28.3*   < > 26.7*   PLATELETS 10*3/mm3 171   < > 160   GLUCOSE mg/dL 219*   < > 136*   CREATININE mg/dL 3.47*   < > 3.33*   BUN mg/dL 75*   < > 81*   SODIUM mmol/L 139   < > 144   POTASSIUM mmol/L 3.6   < > 3.9   AST (SGOT) U/L  --   --  43*   ALT (SGPT) U/L  --   --  82*   ALK PHOS U/L  --   --  161*   BILIRUBIN mg/dL  --   --  0.3   ANION GAP mmol/L 9.1   < > 8.5    < > = values in this interval not displayed.       No radiology results for the last day      I reviewed the patient's new clinical results.    Assessment/Plan:     Active and Resolved Problems  Active Hospital Problems    Diagnosis  POA    **JOSELITO (acute kidney injury) [N17.9]  Yes      Resolved Hospital Problems   No resolved problems to display.      Anemia  - H&H improved status post transfusion of 2 units of packed red blood cells.  Continue to monitor.    JOSELITO on CKD stage 3:  -Creatinine relatively unchanged over the past 24 hours.  Avoid nephrotoxic drugs.  Continue to monitor.  Nephrology following.     Acute diastolic CHF exacerbation EF of 56 to 60%  -proBNP is elevated at 8,842  - Improving.  Now off of Bumex drip.  On IV Bumex every 8 hours.  Optimize medical management for CHF exacerbation, monitor strict I's and O's, daily weights, 2 g sodium diet.  I's and O's reflects fluid balance of -470.    Elevated Troponin  -Echo results reviewed.  EF of 56 to 60%.  No wall motion abnormalities noted.  Troponins elevated and trending slightly upwards.  EKG with slight T wave flattening in anterolateral leads.  Cardiology following.     Bradycardia:  -Resolved     Hypertension:  -Continue Coreg, continue hydralazine, continue Bumex, avoid ACE and ARB's for now due to JOSELITO.    Diabetes mellitus type 2:  -Blood glucose is better controlled.  Continue current insulin regimen.  Hemoglobin A1c is 9.65%     Hypothyroidism:  -Continue levothyroxine    Complaints of cold right foot  - On exam bilateral feet are warm with palpable pulses.  Keep feet covered to help with the sensation of coldness.  Warm pack applied around right foot.    VTE Prophylaxis:  Pharmacologic & mechanical VTE prophylaxis orders are present.             Disposition Planning:     Barriers to Discharge: Pending clinical improvement  Anticipated Date of Discharge: Pending clinical course and clinical improvement.  Also pending SNF placement, with bed availability and insurance approval.  Place of Discharge: SNF        Code Status and Medical Interventions: CPR (Attempt to Resuscitate); Full Support   Ordered at: 10/21/24 2019     Code Status (Patient has no pulse and is not breathing):    CPR (Attempt to Resuscitate)     Medical Interventions (Patient has pulse or is breathing):    Full Support        Signature: Electronically signed by Sharron Grissom MD, 10/25/24, 13:34 EDT.  [facility] Team

## 2024-10-25 NOTE — PROGRESS NOTES
[facility] CARDIOLOGY  [facility]        LOS:  LOS: 4 days   Patient Name: Leona Garcia  Age/Sex: 79 y.o. female  : 1945  MRN: [id number]    Day of Service: 10/25/24   Length of Stay: 4  Encounter Provider: Eleni Bean MD  Place of Service: [facility] CARDIOLOGY  Patient Care Team:  Alfred Liriano MD as PCP - General (Family Medicine)    Cardiology assessment and plan        Abnormal elevated troponin in the setting of renal failure  Flat troponin with no significant trend  No chest discomfort  EKG with no acute ST changes  Acute on chronic diastolic dysfunction  Abnormal elevated proBNP secondary diastolic dysfunction  Chronic lower extremity venous edema  Chronic RV dysfunction  Diastolic dysfunction  Acute on chronic kidney injury  Hypertension  Hyperlipidemia  Diabetes mellitus  Hypothyroidism  Anemia  Chronic longstanding lower extremity edema  Invasive workup in the past deferred secondary to renal failure  Repeat echocardiogram to assess LV systolic function  Medical records from primary cardiologist reviewed  Patient never had a cardiac catheterization  Cardiac catheterization was withheld secondary to underlying renal failure and no significant ischemic burden on the stress test  Poor functional status  Tmax is 98 pulse is 69 respirations are 14 blood pressure is 148/52 sats are 98%  Sodium is 139 potassium is 3.6 creatinine is 3.4 hemoglobin is 8.9  Patient is currently -5L  Volume status is better  Echocardiogram with normal LV systolic function with RV dysfunction moderate to severe tricuspid regurgitation with a dilated IVC  Agree with diuresis and close monitoring of renal function  Diagnosis and treatment options reviewed and discussed with patient  Continue current medical therapy continue close monitoring  Further recommendation based on patient course                     Subjective:     Chief  Complaint:  F/U HF    Subjective:   Patient sitting up in chair with brighter affect today.  Reports good urine output.  Legs feel a little softer today but remain edematous.    Current Medications:   Scheduled Meds:aspirin, 81 mg, Oral, Daily  bumetanide, 2 mg, Intravenous, Q12H  carvedilol, 6.25 mg, Oral, BID With Meals  heparin (porcine), 5,000 Units, Subcutaneous, Q12H  hydrALAZINE, 100 mg, Oral, Q8H  insulin lispro, 2-7 Units, Subcutaneous, 4x Daily AC & at Bedtime  insulin lispro protamine-insulin lispro, 10 Units, Subcutaneous, Nightly  insulin lispro protamine-insulin lispro, 20 Units, Subcutaneous, Daily With Breakfast  levothyroxine, 50 mcg, Oral, Q AM  sevelamer, 800 mg, Oral, TID With Meals  sodium chloride, 10 mL, Intravenous, Q12H      Continuous Infusions:       Allergies:  Allergies   Allergen Reactions    Codeine Nausea Only, Unknown - Low Severity and Other (See Comments)     Dizziness    Amlodipine Unknown - Low Severity       Review of Systems   Constitutional: Negative for chills, decreased appetite and malaise/fatigue.   HENT:  Negative for congestion and nosebleeds.    Eyes:  Negative for blurred vision and double vision.   Cardiovascular:  Positive for leg swelling. Negative for chest pain, dyspnea on exertion, irregular heartbeat, orthopnea, palpitations and paroxysmal nocturnal dyspnea.   Respiratory:  Negative for cough and shortness of breath.    Hematologic/Lymphatic: Negative for adenopathy. Does not bruise/bleed easily.   Skin:  Negative for color change and rash.   Musculoskeletal:  Negative for back pain and joint pain.   Gastrointestinal:  Negative for bloating, abdominal pain, hematemesis and hematochezia.   Genitourinary:  Negative for flank pain and hematuria.   Neurological:  Negative for dizziness and focal weakness.   Psychiatric/Behavioral:  Negative for altered mental status. The patient does not have insomnia.        Objective:     Temp:  [97.6 °F (36.4 °C)-98.6 °F (37 °C)]  98 °F (36.7 °C)  Heart Rate:  [69-82] 69  Resp:  [11-16] 14  BP: (126-191)/(51-71) 148/52     Intake/Output Summary (Last 24 hours) at 10/25/2024 1158  Last data filed at 10/25/2024 0900  Gross per 24 hour   Intake 660 ml   Output 5525 ml   Net -4865 ml     Body mass index is 42.74 kg/m².      10/24/24  0600 10/24/24  2000 10/25/24  0600   Weight: 122 kg (268 lb 15.4 oz) 121 kg (266 lb 1.5 oz) 120 kg (264 lb 12.4 oz)         Physical Exam:  Neuro:  CV:  Resp:  GI:  Ext:  Tele: AAOx3, no gross deficits  S1S2 RRR, grade 2/6 systolic murmur  Nonlabored, CTA  BS+, abd soft  Pedal pulses palp, pitting BLE edema  SR with PACs and PVCs                                                   Lab Review:   Results from last 7 days   Lab Units 10/25/24  0355 10/24/24  1423 10/24/24  0523 10/23/24  0419 10/21/24  2040 10/21/24  1400   SODIUM mmol/L 139  --  143 144   < > 142   POTASSIUM mmol/L 3.6 3.7 3.6 3.9   < > 4.5   CHLORIDE mmol/L 94*  --  100 104   < > 104   CO2 mmol/L 35.9*  --  33.3* 31.5*   < > 27.8   BUN mg/dL 75*  --  77* 81*   < > 82*   CREATININE mg/dL 3.47*  --  3.40* 3.33*   < > 3.57*   GLUCOSE mg/dL 219*  --  160* 136*   < > 267*   CALCIUM mg/dL 9.2  --  9.1 9.2   < > 9.1   AST (SGOT) U/L  --   --   --  43*  --  63*   ALT (SGPT) U/L  --   --   --  82*  --  96*    < > = values in this interval not displayed.     Results from last 7 days   Lab Units 10/21/24  2338 10/21/24  2040 10/21/24  1636 10/21/24  1400   CK TOTAL U/L  --  263*  --   --    HSTROP T ng/L 227* 249* 228* 244*     Results from last 7 days   Lab Units 10/25/24  0355 10/24/24  1423 10/24/24  0523   WBC 10*3/mm3 7.23  --  7.01   HEMOGLOBIN g/dL 8.9* 9.0* 6.9*   HEMATOCRIT % 28.3* 29.0* 22.2*   PLATELETS 10*3/mm3 171  --  173         Results from last 7 days   Lab Units 10/24/24  0523 10/23/24  0419   MAGNESIUM mg/dL 2.2 2.4     Results from last 7 days   Lab Units 10/21/24  2040   CHOLESTEROL mg/dL 177   TRIGLYCERIDES mg/dL 57   HDL CHOL mg/dL 82*      Results from last 7 days   Lab Units 10/21/24  2040 10/21/24  1400   PROBNP pg/mL 8,842.0* 9,565.0*     Results from last 7 days   Lab Units 10/21/24  2040   TSH uIU/mL 2.440       Recent Radiology:  Imaging Results (Most Recent)       Procedure Component Value Units Date/Time    US Renal Bilateral [[id number]] Collected: 10/22/24 1445     Updated: 10/22/24 1449    Narrative:      US RENAL BILATERAL    Date of Exam: 10/22/2024 12:54 PM EDT    Indication: JOSELITO.    Comparison: Ultrasound December 28, 2023    Technique: Grayscale and color Doppler ultrasound evaluation of the kidneys and urinary bladder was performed.      Findings:  Incidental note is made of a right pleural effusion.    Right kidney: 9.3 cm in length. There is no hydronephrosis.    Left kidney: Obscured by bowel gas.    Bladder: Woodall catheter is present making assessment limited      Impression:      Impression:  1.No definite acute right renal abnormality. Left kidney is not appreciated  2.Woodall catheter in the bladder making assessment limited        Electronically Signed: Jacob Eller MD    10/22/2024 2:47 PM EDT    Workstation ID: [id number]    XR Chest 2 View [[id number]] Collected: 10/21/24 1445     Updated: 10/21/24 1458    Narrative:      XR CHEST 2 VW    Date of Exam: 10/21/2024 2:10 PM EDT    Indication: fatigue    Comparison: 12/27/2023      Findings:  Heart is enlarged, similar to the prior study. There is no superimposed consolidation. No pneumothorax or pleural effusion. Aortic arch atherosclerosis. The osseous structures appear intact.      Impression:      Impression:  1. Stable cardiomegaly.  2. No acute process.        Electronically Signed: Dexter Cartwright MD    10/21/2024 2:56 PM EDT    Workstation ID: [id number]    CT Head Without Contrast [[id number]] Collected: 10/21/24 1436     Updated: 10/21/24 1440    Narrative:      CT HEAD WO CONTRAST    Date of Exam: 10/21/2024 2:20 PM EDT    Indication: Memory loss.    Comparison: None  available.    Technique: Axial CT images were obtained of the head without contrast administration.  Coronal reconstructions were performed.  Automated exposure control and iterative reconstruction methods were used.      Findings:  No acute intracranial hemorrhage, mass lesion, mass effect, midline shift or evidence of acute evolving infarct. Chronic right parietal encephalomalacia with porencephalic dilation of the posterior horn of the right lateral ventricle. Mild deep white   matter hypodensities are nonspecific but favored to reflect changes of chronic microvascular disease. Trace fluid is seen dependently within the left maxillary sinus. Mastoid air cells are clear. No calvarial abnormality is identified.      Impression:      Impression:    1. 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  2. Mild chronic microvascular disease features.  3. No acute intracranial findings.      Electronically Signed: Arely Jaramillo MD    10/21/2024 2:38 PM EDT    Workstation ID: [id number]            ECHOCARDIOGRAM:    Results for orders placed during the hospital encounter of 10/21/24    Adult Transthoracic Echo Complete W/ Cont if Necessary Per Protocol    Interpretation Summary    Left ventricular ejection fraction appears to be 56 - 60%.    Left ventricular wall thickness is consistent with mild concentric hypertrophy.    Left ventricular diastolic function is consistent with (grade I) impaired relaxation.    The right ventricular cavity is mildly dilated.    The left atrial cavity is moderate to severely dilated.    The right atrial cavity is moderately  dilated.    Moderate to severe tricuspid valve regurgitation is present.    Estimated right ventricular systolic pressure from tricuspid regurgitation is moderately elevated (45-55 mmHg).    Moderate pulmonary hypertension is present.        I reviewed  the patient's new clinical results.    EKG:      Assessment:       JOSELITO (acute kidney injury)    1) Elevated troponin  -High-sensitivity troponin 244, 228, 249, 227  -EKG shows no acute ST abnormality  -She had nuclear stress testing performed in 2019 that showed mild ischemia but cath was deferred due to renal dysfunction.       2) Acute on chronic diastolic heart failure   -BNP 8842  -TSH WNL  -CXR shows no acute findings  - Last 2D echo 12/2023 showed an EF of 61 to 65% with grade 1 diastolic dysfunction and mild TR.    - Repeat 2D echo 10/2024 showed EF 55-60% with grade 1 diastolic dysfunction, moderate to severe TR, and the IVC is dilated.  - started on bumex gtt     3) JOSELITO on CKD stage 3  - Cr 3.76 --> 3.33     4) HTN     5) HLD     6) DM     7) hypothyroidism     8) anemia

## 2024-10-25 NOTE — DISCHARGE PLACEMENT REQUEST
"Chari Garcia (79 y.o. Female)       Date of Birth   1945    Social Security Number       Address   [address] [place] [facility]    Home Phone   [phone]    MRN   [id number]       [facility]    Marital Status                               Admission Date   10/21/24    Admission Type   Urgent    Admitting Provider   Sharron Grissom MD    Attending Provider   Sharron Grissom MD    Department, Room/Bed   [facility] CARDIOVASCULAR CARE UNIT, 3119/1       Discharge Date       Discharge Disposition       Discharge Destination                                 Attending Provider: Sharron Grissom MD    Allergies: Codeine, Amlodipine    Isolation: None   Infection: None   Code Status: CPR    Ht: 167.6 cm (66\")   Wt: 120 kg (264 lb 12.4 oz)    Admission Cmt: None   Principal Problem: JOSELITO (acute kidney injury) [N17.9]                   Active Insurance as of 10/21/2024       Primary Coverage       Payor Plan Insurance Group Employer/Plan Group    MEDICARE MEDICARE A & B        Payor Plan Address Payor Plan Phone Number Payor Plan Fax Number Effective Dates    [address] [phone]  7/1/2011 - None Entered    [facility] [id number]         Subscriber Name Subscriber Birth Date Member ID       CHARI GARCIA 1945 [id number]               Secondary Coverage       Payor Plan Insurance Group Employer/Plan Group    [employer]ET[employer] COMMERCIAL AETNA [id number]       Payor Plan Address Payor Plan Phone Number Payor Plan Fax Number Effective Dates    [address] [phone]  12/1/2012 - None Entered    [facility] [id number]         Subscriber Name Subscriber Birth Date Member ID       CHARI GARCIA 1945 [id number]                     Emergency Contacts        (Rel.) Home Phone Work Phone Mobile Phone    MOSES HUMPHREY (Sister) [phone] -- [phone]                 History & Physical        Silvana Hardwick, [place] at 10/21/24 "        Attestation signed by Shadia Alaniz DO at 10/21/24 2310    Attending Attestation:    This visit was performed by an NP/PA. I reviewed all aspects of the MDM as documented otherwise stated by my note.                         [facility]  History & Physical    Patient Name: Leona Garcia  : 1945  MRN: [id number]  Primary Care Physician:  Alfred Liriano MD  Date of admission: 10/21/2024  Date and Time of Service: 10/21/2024 at 2030    Subjective      Chief Complaint: lethargy, weakness    History of Present Illness: Leona Garcia is a 79 y.o. female with a CMH of DM, HTN, CKD stage 3, hypothyroidism, anemia who presented to [facility] on 10/21/2024 with lethargy and weakness. Lives at home with sister, uses walker for ambulation. Recent stressors of out of town sister death last week and unable to go to .  Presented to free standing ED for weakness, fatigue, lethargy over past week. Sister reports slightly confused but patient denies any recent confusion or forgetfulness. Denies dyspnea, CP, N/V/D. On arrival to ED VS: 97.9-94-/55-97% room air. While in ED HR down to 40-50s. Cardiology consulted by ED provider. Due to elevated creatinine and troponin levels, transfer to this facility for further management.     Upon evaluation, awake, alert, resting in bed, sister at bedside. Current VS: 48-/70-98% 2 lpm/NC. Denies use of home oxygen. Denies pain. On exam with lymphedema BLE right > left. Speech slow to respond and tearful at times.   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 . Qtc 459 repeat EKG: SB with nonspecific T abnormality rate 49. , Qtc 464.   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   Blood work reveals WBC 6.02, Hgb  8.3, Hct 28, glucose 267, BUN 82, creatinine 3.57, ALT 96, AST 63, alk phos 192, GFR 12, proBNP 9565, , A1c 9.65, magnesium 2.6, phosphorus 5.2,   Troponin 244 -> 228 -> 249  Urinalysis reveals cloudy urine, 100 glucose, 100 protein,         Review of Systems   Constitutional:  Positive for activity change, appetite change and fatigue. Negative for fever.   Respiratory:  Negative for shortness of breath.    Cardiovascular:  Negative for chest pain.   Gastrointestinal:  Negative for abdominal pain and nausea.   Genitourinary:  Positive for decreased urine volume. Negative for dysuria.   Neurological:  Positive for weakness.       Personal History     Past Medical History:   Diagnosis Date    Anemia     Chronic kidney disease (CKD)     Diabetes mellitus     Disease of thyroid gland     Hypertension        History reviewed. No pertinent surgical history.    Family History: family history is not on file. Otherwise pertinent FHx was reviewed and not pertinent to current issue.    Social History:  reports that she has never smoked. She has never used smokeless tobacco. She reports that she does not drink alcohol and does not use drugs.    Home Medications:  Prior to Admission Medications       Prescriptions Last Dose Informant Patient Reported? Taking?    carvedilol (COREG) 6.25 MG tablet   No No    Take 1 tablet by mouth 2 (Two) Times a Day With Meals for 30 days.    ezetimibe (ZETIA) 10 MG tablet   Yes No    Take 1 tablet by mouth Daily.    fluticasone (FLONASE) 50 MCG/ACT nasal spray   Yes No    2 sprays into the nostril(s) as directed by provider Daily.    furosemide (LASIX) 40 MG tablet   Yes No    Take 1 tablet by mouth Daily.    hydrALAZINE (APRESOLINE) 100 MG tablet   Yes No    Take 1 tablet by mouth Daily.    insulin NPH-insulin regular (humuLIN 70/30,novoLIN 70/30) (70-30) 100 UNIT/ML injection   Yes No    Inject 10 Units under the skin into the appropriate area as directed Every Night. 20 units QAM + 10  units QPM    insulin NPH-insulin regular (humuLIN 70/30,novoLIN 70/30) (70-30) 100 UNIT/ML injection   Yes No    Inject 20 Units under the skin into the appropriate area as directed Every Morning. 20 units QAM + 10 units QPM    levothyroxine (SYNTHROID, LEVOTHROID) 50 MCG tablet   Yes No    Take 1 tablet by mouth Daily.    losartan (COZAAR) 100 MG tablet   Yes No    Take 1 tablet by mouth Daily.    potassium chloride 10 MEQ CR tablet   Yes No    Take 1 tablet by mouth Daily.              Allergies:  Allergies   Allergen Reactions    Codeine Nausea Only, Unknown - Low Severity and Other (See Comments)     Dizziness    Amlodipine Unknown - Low Severity       Objective      Vitals:   Temp:  [97.3 °F (36.3 °C)-97.9 °F (36.6 °C)] 97.3 °F (36.3 °C)  Heart Rate:  [48-94] 48  Resp:  [12-18] 12  BP: (146-166)/(55-70) 166/70  Body mass index is 46.22 kg/m².  Physical Exam  Constitutional:       Appearance: Normal appearance.   HENT:      Head: Normocephalic and atraumatic.      Mouth/Throat:      Comments: Slightly dry  Eyes:      General: No scleral icterus.     Extraocular Movements: Extraocular movements intact.      Pupils: Pupils are equal, round, and reactive to light.   Cardiovascular:      Rate and Rhythm: Regular rhythm. Bradycardia present.      Pulses: Normal pulses.      Heart sounds: Normal heart sounds.      Comments: Denies chest pain or reproducible chest pain  Pulmonary:      Effort: Pulmonary effort is normal.      Breath sounds: Normal breath sounds.      Comments: diminished  Abdominal:      General: Bowel sounds are normal. There is no distension.      Palpations: Abdomen is soft.      Tenderness: There is no abdominal tenderness.   Musculoskeletal:      Comments: Bilateral lymphedema right > left.   Chronic venous stasis skin changes to BLE   Skin:     General: Skin is warm and dry.   Neurological:      Mental Status: She is alert and oriented to person, place, and time. Mental status is at baseline.       Motor: Weakness present.      Comments: Slow to respond at times  Tearful at times          Diagnostic Data:  Lab Results (last 24 hours)       Procedure Component Value Units Date/Time    POC Glucose Once [[id number]]  (Abnormal) Collected: 10/21/24 2149    Specimen: Blood Updated: 10/21/24 2152     Glucose 286 mg/dL      Comment: Serial Number: [id number]:  [id number]       BNP [[id number]]  (Abnormal) Collected: 10/21/24 2040    Specimen: Blood Updated: 10/21/24 2124     proBNP 8,842.0 pg/mL     Narrative:      This assay is used as an aid in the diagnosis of individuals suspected of having heart failure. It can be used as an aid in the diagnosis of acute decompensated heart failure (ADHF) in patients presenting with signs and symptoms of ADHF to the emergency department (ED). In addition, NT-proBNP of <300 pg/mL indicates ADHF is not likely.    Age Range Result Interpretation  NT-proBNP Concentration (pg/mL:      <50             Positive            >450                   Gray                 300-450                    Negative             <300    50-75           Positive            >900                  Gray                300-900                  Negative            <300      >75             Positive            >1800                  Gray                300-1800                  Negative            <300    High Sensitivity Troponin T [[id number]]  (Abnormal) Collected: 10/21/24 2040    Specimen: Blood Updated: 10/21/24 2116     HS Troponin T 249 ng/L     Narrative:      High Sensitive Troponin T Reference Range:  <14.0 ng/L- Negative Female for AMI  <22.0 ng/L- Negative Male for AMI  >=14 - Abnormal Female indicating possible myocardial injury.  >=22 - Abnormal Male indicating possible myocardial injury.   Clinicians would have to utilize clinical acumen, EKG, Troponin, and serial changes to determine if it is an Acute Myocardial Infarction or myocardial injury due to an underlying chronic condition.          Basic Metabolic Panel [[id number]]  (Abnormal) Collected: 10/21/24 2040    Specimen: Blood Updated: 10/21/24 2113     Glucose 266 mg/dL      BUN 83 mg/dL      Creatinine 3.62 mg/dL      Sodium 145 mmol/L      Potassium 4.8 mmol/L      Chloride 106 mmol/L      CO2 26.3 mmol/L      Calcium 9.2 mg/dL      BUN/Creatinine Ratio 22.9     Anion Gap 12.7 mmol/L      eGFR 12.3 mL/min/1.73      Comment: <15 Indicative of kidney failure       Narrative:      GFR Normal >60  Chronic Kidney Disease <60  Kidney Failure <15    The GFR formula is only valid for adults with stable renal function between ages 18 and 70.    CK [[id number]]  (Abnormal) Collected: 10/21/24 2040    Specimen: Blood Updated: 10/21/24 2113     Creatine Kinase 263 U/L     Magnesium [[id number]]  (Abnormal) Collected: 10/21/24 2040    Specimen: Blood Updated: 10/21/24 2113     Magnesium 2.6 mg/dL     Phosphorus [[id number]]  (Abnormal) Collected: 10/21/24 2040    Specimen: Blood Updated: 10/21/24 2113     Phosphorus 5.2 mg/dL     TSH Rfx On Abnormal To Free T4 [[id number]]  (Normal) Collected: 10/21/24 2040    Specimen: Blood Updated: 10/21/24 2113     TSH 2.440 uIU/mL     Hemoglobin A1c [[id number]]  (Abnormal) Collected: 10/21/24 2040    Specimen: Blood Updated: 10/21/24 2103     Hemoglobin A1C 9.65 %     High Sensitivity Troponin T 2Hr [[id number]]  (Abnormal) Collected: 10/21/24 1636    Specimen: Blood Updated: 10/21/24 1708     HS Troponin T 228 ng/L      Troponin T Delta -16 ng/L     Narrative:      High Sensitive Troponin T Reference Range:  <14.0 ng/L- Negative Female for AMI  <22.0 ng/L- Negative Male for AMI  >=14 - Abnormal Female indicating possible myocardial injury.  >=22 - Abnormal Male indicating possible myocardial injury.   Clinicians would have to utilize clinical acumen, EKG, Troponin, and serial changes to determine if it is an Acute Myocardial Infarction or myocardial injury due to an underlying chronic condition.         BNP [[id number]]   (Abnormal) Collected: 10/21/24 1400    Specimen: Blood Updated: 10/21/24 1633     proBNP 9,565.0 pg/mL     Narrative:      This assay is used as an aid in the diagnosis of individuals suspected of having heart failure. It can be used as an aid in the diagnosis of acute decompensated heart failure (ADHF) in patients presenting with signs and symptoms of ADHF to the emergency department (ED). In addition, NT-proBNP of <300 pg/mL indicates ADHF is not likely.    Age Range Result Interpretation  NT-proBNP Concentration (pg/mL:      <50             Positive            >450                   Gray                 300-450                    Negative             <300    50-75           Positive            >900                  Gray                300-900                  Negative            <300      >75             Positive            >1800                  Gray                300-1800                  Negative            <300    Single High Sensitivity Troponin T [[id number]]  (Abnormal) Collected: 10/21/24 1400    Specimen: Blood Updated: 10/21/24 1603     HS Troponin T 244 ng/L     Narrative:      High Sensitive Troponin T Reference Range:  <14.0 ng/L- Negative Female for AMI  <22.0 ng/L- Negative Male for AMI  >=14 - Abnormal Female indicating possible myocardial injury.  >=22 - Abnormal Male indicating possible myocardial injury.   Clinicians would have to utilize clinical acumen, EKG, Troponin, and serial changes to determine if it is an Acute Myocardial Infarction or myocardial injury due to an underlying chronic condition.         Ethanol [[id number]] Collected: 10/21/24 1400    Specimen: Blood Updated: 10/21/24 1443     Ethanol <10 mg/dL      Ethanol % <0.010 %     Narrative:      Plasma Ethanol Clinical Symptoms:    ETOH (%)               Clinical Symptom  .01-.05              No apparent influence  .03-.12              Euphoria, Diminished judgment and attention   .09-.25              Impaired comprehension, Muscle  incoordination  .18-.30              Confusion, Staggered gait, Slurred speech  .25-.40              Markedly decreased response to stimuli, unable to stand or                        walk, vomitting, sleep or stupor  .35-.50              Comatose, Anesthesia, Subnormal body temperature        Acetaminophen Level [[id number]]  (Normal) Collected: 10/21/24 1400    Specimen: Blood Updated: 10/21/24 1443     Acetaminophen 5.9 mcg/mL     Narrative:      Acetaminophen Therapeutic Range  5-20 ug/mL      Hours after ingestion            Toxic Value    4 Hours                           150 ug/mL    8 Hours                            70 ug/mL   12 Hours                            40 ug/mL   16 Hours                            20 ug/mL    These values apply to a single ingestion only.     Lipase [[id number]]  (Normal) Collected: 10/21/24 1400    Specimen: Blood Updated: 10/21/24 1443     Lipase 21 U/L     Comprehensive Metabolic Panel [[id number]]  (Abnormal) Collected: 10/21/24 1400    Specimen: Blood Updated: 10/21/24 1443     Glucose 267 mg/dL      BUN 82 mg/dL      Creatinine 3.57 mg/dL      Sodium 142 mmol/L      Potassium 4.5 mmol/L      Chloride 104 mmol/L      CO2 27.8 mmol/L      Calcium 9.1 mg/dL      Total Protein 6.1 g/dL      Albumin 3.5 g/dL      ALT (SGPT) 96 U/L      AST (SGOT) 63 U/L      Alkaline Phosphatase 192 U/L      Total Bilirubin 0.4 mg/dL      Globulin 2.6 gm/dL      A/G Ratio 1.3 g/dL      BUN/Creatinine Ratio 23.0     Anion Gap 10.2 mmol/L      eGFR 12.5 mL/min/1.73      Comment: <15 Indicative of kidney failure       Narrative:      GFR Normal >60  Chronic Kidney Disease <60  Kidney Failure <15    The GFR formula is only valid for adults with stable renal function between ages 18 and 70.    Urine Drug Screen - Urine, Clean Catch [[id number]]  (Normal) Collected: 10/21/24 1422    Specimen: Urine, Clean Catch Updated: 10/21/24 1439     THC, Screen, Urine Negative     Phencyclidine (PCP), Urine Negative      Cocaine Screen, Urine Negative     Methamphetamine, Ur Negative     Opiate Screen Negative     Amphetamine Screen, Urine Negative     Benzodiazepine Screen, Urine Negative     Tricyclic Antidepressants Screen Negative     Methadone Screen, Urine Negative     Barbiturates Screen, Urine Negative     Oxycodone Screen, Urine Negative     Buprenorphine, Screen, Urine Negative    Narrative:      Cutoff For Drugs Screened:    Amphetamines               500 ng/ml  Barbiturates               200 ng/ml  Benzodiazepines            150 ng/ml  Cocaine                    150 ng/ml  Methadone                  200 ng/ml  Opiates                    100 ng/ml  Phencyclidine               25 ng/ml  THC                         50 ng/ml  Methamphetamine            500 ng/ml  Tricyclic Antidepressants  300 ng/ml  Oxycodone                  100 ng/ml  Buprenorphine               10 ng/ml    The normal value for all drugs tested is negative. This report includes unconfirmed screening results, with the cutoff values listed, to be used for medical treatment purposes only.  Unconfirmed results must not be used for non-medical purposes such as employment or legal testing.  Clinical consideration should be applied to any drug of abuse test, particularly when unconfirmed results are used.      Urinalysis With Culture If Indicated - Urine, Clean Catch [[id number]]  (Abnormal) Collected: 10/21/24 1423    Specimen: Urine, Clean Catch Updated: 10/21/24 1429     Color, UA Yellow     Appearance, UA Cloudy     pH, UA <=5.0     Specific Gravity, UA 1.025     Glucose,  mg/dL (Trace)     Ketones, UA Negative     Bilirubin, UA Negative     Blood, UA Negative     Protein,  mg/dL (2+)     Leuk Esterase, UA Negative     Nitrite, UA Negative     Urobilinogen, UA 0.2 E.U./dL    Narrative:      In absence of clinical symptoms, the presence of pyuria, bacteria, and/or nitrites on the urinalysis result does not correlate with infection.    [place]  Urine Culture Tube - Urine, Clean Catch [[id number]] Collected: 10/21/24 1423    Specimen: Urine, Clean Catch Updated: 10/21/24 1429    Urinalysis, Microscopic Only - Urine, Clean Catch [[id number]] Collected: 10/21/24 1423    Specimen: Urine, Clean Catch Updated: 10/21/24 1428    CBC & Differential [[id number]]  (Abnormal) Collected: 10/21/24 1400    Specimen: Blood Updated: 10/21/24 1404    Narrative:      The following orders were created for panel order CBC & Differential.  Procedure                               Abnormality         Status                     ---------                               -----------         ------                     CBC Auto Differential[[id number]]        Abnormal            Final result                 Please view results for these tests on the individual orders.    CBC Auto Differential [[id number]]  (Abnormal) Collected: 10/21/24 1400    Specimen: Blood Updated: 10/21/24 1404     WBC 6.02 10*3/mm3      RBC 3.08 10*6/mm3      Hemoglobin 8.3 g/dL      Hematocrit 28.0 %      MCV 90.9 fL      MCH 26.9 pg      MCHC 29.6 g/dL      RDW 16.1 %      RDW-SD 52.6 fl      MPV 11.9 fL      Platelets 166 10*3/mm3      Neutrophil % 74.9 %      Lymphocyte % 13.1 %      Monocyte % 8.6 %      Eosinophil % 2.7 %      Basophil % 0.5 %      Immature Grans % 0.2 %      Neutrophils, Absolute 4.51 10*3/mm3      Lymphocytes, Absolute 0.79 10*3/mm3      Monocytes, Absolute 0.52 10*3/mm3      Eosinophils, Absolute 0.16 10*3/mm3      Basophils, Absolute 0.03 10*3/mm3      Immature Grans, Absolute 0.01 10*3/mm3     COVID-19 and FLU A/B PCR, 1 HR TAT - Swab, Nasopharynx [[id number]]  (Normal) Collected: 10/21/24 1312    Specimen: Swab from Nasopharynx Updated: 10/21/24 1334     COVID19 Not Detected     Influenza A PCR Not Detected     Influenza B PCR Not Detected    Narrative:      Fact sheet for providers: https://www.fda.gov/media/609635/download    Fact sheet for patients:  https://www.fda.gov/media/829142/download    Test performed by PCR.             Imaging Results (Last 24 Hours)       Procedure Component Value Units Date/Time    XR Chest 2 View [[id number]] Collected: 10/21/24 1445     Updated: 10/21/24 1458    Narrative:      XR CHEST 2 VW    Date of Exam: 10/21/2024 2:10 PM EDT    Indication: fatigue    Comparison: 12/27/2023      Findings:  Heart is enlarged, similar to the prior study. There is no superimposed consolidation. No pneumothorax or pleural effusion. Aortic arch atherosclerosis. The osseous structures appear intact.      Impression:      Impression:  1. Stable cardiomegaly.  2. No acute process.        Electronically Signed: Dexter Cartwright MD    10/21/2024 2:56 PM EDT    Workstation ID: [id number]    CT Head Without Contrast [[id number]] Collected: 10/21/24 1436     Updated: 10/21/24 1440    Narrative:      CT HEAD WO CONTRAST    Date of Exam: 10/21/2024 2:20 PM EDT    Indication: Memory loss.    Comparison: None available.    Technique: Axial CT images were obtained of the head without contrast administration.  Coronal reconstructions were performed.  Automated exposure control and iterative reconstruction methods were used.      Findings:  No acute intracranial hemorrhage, mass lesion, mass effect, midline shift or evidence of acute evolving infarct. Chronic right parietal encephalomalacia with porencephalic dilation of the posterior horn of the right lateral ventricle. Mild deep white   matter hypodensities are nonspecific but favored to reflect changes of chronic microvascular disease. Trace fluid is seen dependently within the left maxillary sinus. Mastoid air cells are clear. No calvarial abnormality is identified.      Impression:      Impression:    1. 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  2. Mild chronic microvascular disease  features.  3. No acute intracranial findings.      Electronically Signed: Arely Jaramillo MD    10/21/2024 2:38 PM EDT    Workstation ID: [id number]              Assessment & Plan        This is a 79 y.o. female with PMH of DM, HTN, CKD stage 3, hypothyroidism, anemia who presented to ED with weakness, lethargy over past week. Initial work up in ED revealed JOSELITO with elevated troponin. HR 40-50 while in ED. Admission to this facility with cardiology and nephrology consultation.         Active and Resolved Problems  Active Hospital Problems    Diagnosis  POA    **JOSELITO (acute kidney injury) [N17.9]  Yes      Resolved Hospital Problems   No resolved problems to display.     JOSELITO on CKD stage 3:  -admission creatinine 3.57 with baseline 1.52  -admission GFR 12 with baseline 35  -daily BMP to monitor renal function.   -strict I/Os, daily weight, avoid nephrotoxins.   -holding losartan and lasix.   -received 1 L fluid bolus. Will defer further IVFs until evaluated by nephrology.   -obtain urine for lytes  -states decreased activity, urine output, and oral intake over past week.   -previous renal US in 2023 revealed unremarkable appearance of kidneys.     Elevated Troponin  -no CP to suggest ACS  -mild elevation in troponin likely secondary to demand ischemia from acute cardiac stressors as above  -obtain serial troponins to ensure flat or downward trend  -Troponin 244 -> 228 -> 249. If troponin level rises at repeat MN will start heparin infusion protocol.   -telemetry  -cardiology consult. Notified by ED provider.   -echo: 12/28/2023 revealed EF 61-65%, grade I diastolic dysfunction. LA and RA mildly dilated. RVSP > 55 mmHg.     Bradycardia:  -HR 40-50s.   -EKG on arrival: SB with nonspecific T abnormality rate 49. , Qtc 464.   -cardiology consult  -holding beta blockers until evaluated by cardiology.   -repeat EKG in AM    Hypertension:  -/70 on arrival.   -losartan and lasix on hold due to #1.   -holding beta  blockers due to bradycardia.   -continue hydralazine   -hydralazine IV PRN for SBP > 165.   -cardiology consulted per above.     Diabetes mellitus type 2:  -accu check AC/HS with SSI and hypoglycemic protocols.   -A1c 9.65% on admission  -lipid panel pending  -ADA diabetic diet.   -continue home insulin once dosing verified by pharmacy.     Hypothyroidism:  -admission TSH 2.44  -continue home levothyroxine once dosing verified by pharmacy.     Grief disorder:  -tearful during exam.   -recent death of sister last week, unable to attend .   -would benefit from OP grief support on discharge.             VTE Prophylaxis:  Pharmacologic & mechanical VTE prophylaxis orders are present.        The patient desires to be as follows:    CODE STATUS:    Code Status (Patient has no pulse and is not breathing): CPR (Attempt to Resuscitate)  Medical Interventions (Patient has pulse or is breathing): Full Support        Louann Moreira, , who can be contacted at [phone], is the designated person to make medical decisions on the patient's behalf if She is incapable of doing so. This was clarified with patient and/or next of kin on 10/21/2024 during the course of this H&P.    Admission Status:  I believe this patient meets IP status.    Expected Length of Stay: 3 days    PDMP and Medication Dispenses via Sidebar reviewed and consistent with patient reported medications.    I discussed the patient's findings and my recommendations with patient, family, and nursing staff.      Signature:     This document has been electronically signed by ORVILLE Hodge on 2024 22:05 EDT   [facility]    Electronically signed by Shadia Alaniz DO at 10/21/24 2508

## 2024-10-25 NOTE — CASE MANAGEMENT/SOCIAL WORK
Continued Stay Note  [facility]     Patient Name: Leona Garcia  MRN: [id number]  Today's Date: 10/25/2024    Admit Date: 10/21/2024    Plan: DC Plan: [facility] accepted and following. Bed available. No precert required. PASRR approved.   Discharge Plan       Row Name 10/25/24 1521       Plan    Plan DC Plan: [facility] accepted and following. Bed available. No precert required. PASRR approved.    Patient/Family in Agreement with Plan yes    Provided Post Acute Provider List? N/A    Provided Post Acute Provider Quality & Resource List? N/A    Plan Comments Cm spoke with patient this morning to obtain choices for SNF. Patient is unsure, but is agreeable to CM reaching out to her sister for a decision. CM contacted patient's sister Louann via telephone and she has selected [facility] as first choice. CM placed referral in basket and liaison Samantha was notified. [facility] has confirmed acceptance and bed is ready when patient is ready. No precert required. Pharmacy updated in [facility]. CM notified MD and nursing of approval. Nursing to inform patient and family.CM will continue to follow for any additional needs and adjust DC plan accordingly. DC Barriers: Cardiac monitoring, O2@1L nc, IV Bumex, and monitor renal function.               Expected Discharge Date and Time       Expected Discharge Date Expected Discharge Time    Oct 26, 2024           Met with patient in room   Maintain distance greater than six feet and spent less than fifteen minutes in the room.      Ale Pulido RN     Office Phone: [phone]  Office Cell:     [phone]

## 2024-10-25 NOTE — PLAN OF CARE
"Goal Outcome Evaluation:  Plan of Care Reviewed With: patient    Assessment: Leona Garcia presents with functional mobility impairments which indicate the need for skilled intervention. Pt had flat affect and conversed very little. Pt requiring mod assist of 2 for bed >recliner transfer with pt exhibiting very forward flexed posture and decreased foot clearance BLE. Pt requires 100% cueing for task segmentation.Patient is a high risk of injurious falls and unsafe to return to prior living environment at this time.  Tolerating session today without incident. Will continue to follow and progress as tolerated.     Plan/Recommendations:   If medically appropriate, Moderate Intensity Therapy recommended post-acute care. This is recommended as therapy feels the patient would require 3-4 days per week and wouldn't tolerate \"3 hour daily\" rehab intensity. SNF would be the preferred choice. If the patient does not agree to SNF, arrange HH or OP depending on home bound status. If patient is medically complex, consider LTACH. Pt requires no DME at discharge.     Pt desires Skilled Rehab placement at discharge. Pt cooperative; agreeable to therapeutic recommendations and plan of care.     "

## 2024-10-26 LAB
ANION GAP SERPL CALCULATED.3IONS-SCNC: 11.6 MMOL/L (ref 5–15)
BUN SERPL-MCNC: 67 MG/DL (ref 8–23)
BUN/CREAT SERPL: 20.7 (ref 7–25)
CALCIUM SPEC-SCNC: 8.9 MG/DL (ref 8.6–10.5)
CHLORIDE SERPL-SCNC: 93 MMOL/L (ref 98–107)
CO2 SERPL-SCNC: 36.4 MMOL/L (ref 22–29)
CREAT SERPL-MCNC: 3.23 MG/DL (ref 0.57–1)
DEPRECATED RDW RBC AUTO: 51.3 FL (ref 37–54)
EGFRCR SERPLBLD CKD-EPI 2021: 14.2 ML/MIN/1.73
ERYTHROCYTE [DISTWIDTH] IN BLOOD BY AUTOMATED COUNT: 15.9 % (ref 12.3–15.4)
GLUCOSE BLDC GLUCOMTR-MCNC: 130 MG/DL (ref 70–105)
GLUCOSE BLDC GLUCOMTR-MCNC: 149 MG/DL (ref 70–105)
GLUCOSE BLDC GLUCOMTR-MCNC: 165 MG/DL (ref 70–105)
GLUCOSE BLDC GLUCOMTR-MCNC: 181 MG/DL (ref 70–105)
GLUCOSE SERPL-MCNC: 215 MG/DL (ref 65–99)
HCT VFR BLD AUTO: 29.8 % (ref 34–46.6)
HGB BLD-MCNC: 9.3 G/DL (ref 12–15.9)
MCH RBC QN AUTO: 27.5 PG (ref 26.6–33)
MCHC RBC AUTO-ENTMCNC: 31.2 G/DL (ref 31.5–35.7)
MCV RBC AUTO: 88.2 FL (ref 79–97)
PLATELET # BLD AUTO: 198 10*3/MM3 (ref 140–450)
PMV BLD AUTO: 11.5 FL (ref 6–12)
POTASSIUM SERPL-SCNC: 3.6 MMOL/L (ref 3.5–5.2)
POTASSIUM SERPL-SCNC: 3.8 MMOL/L (ref 3.5–5.2)
RBC # BLD AUTO: 3.38 10*6/MM3 (ref 3.77–5.28)
SODIUM SERPL-SCNC: 141 MMOL/L (ref 136–145)
WBC NRBC COR # BLD AUTO: 7.83 10*3/MM3 (ref 3.4–10.8)

## 2024-10-26 PROCEDURE — 25010000002 ONDANSETRON PER 1 MG: Performed by: NURSE PRACTITIONER

## 2024-10-26 PROCEDURE — 63710000001 INSULIN LISPRO PROTAMINE-INSULIN LISPRO (75-25) 100 UNIT/ML SUSPENSION: Performed by: INTERNAL MEDICINE

## 2024-10-26 PROCEDURE — 99232 SBSQ HOSP IP/OBS MODERATE 35: CPT | Performed by: INTERNAL MEDICINE

## 2024-10-26 PROCEDURE — 84132 ASSAY OF SERUM POTASSIUM: CPT | Performed by: INTERNAL MEDICINE

## 2024-10-26 PROCEDURE — 82948 REAGENT STRIP/BLOOD GLUCOSE: CPT

## 2024-10-26 PROCEDURE — 25810000003 SODIUM CHLORIDE 0.9 % SOLUTION 250 ML FLEX CONT: Performed by: STUDENT IN AN ORGANIZED HEALTH CARE EDUCATION/TRAINING PROGRAM

## 2024-10-26 PROCEDURE — 80048 BASIC METABOLIC PNL TOTAL CA: CPT | Performed by: INTERNAL MEDICINE

## 2024-10-26 PROCEDURE — 25010000002 HEPARIN (PORCINE) PER 1000 UNITS: Performed by: NURSE PRACTITIONER

## 2024-10-26 PROCEDURE — 25010000002 NICARDIPINE 2.5 MG/ML SOLUTION 10 ML VIAL: Performed by: STUDENT IN AN ORGANIZED HEALTH CARE EDUCATION/TRAINING PROGRAM

## 2024-10-26 PROCEDURE — 85027 COMPLETE CBC AUTOMATED: CPT | Performed by: INTERNAL MEDICINE

## 2024-10-26 PROCEDURE — 63710000001 INSULIN LISPRO (HUMAN) PER 5 UNITS: Performed by: NURSE PRACTITIONER

## 2024-10-26 PROCEDURE — 25010000002 BUMETANIDE PER 0.5 MG: Performed by: INTERNAL MEDICINE

## 2024-10-26 PROCEDURE — 82948 REAGENT STRIP/BLOOD GLUCOSE: CPT | Performed by: NURSE PRACTITIONER

## 2024-10-26 PROCEDURE — 25010000002 OLANZAPINE 10 MG RECONSTITUTED SOLUTION 1 EACH VIAL: Performed by: STUDENT IN AN ORGANIZED HEALTH CARE EDUCATION/TRAINING PROGRAM

## 2024-10-26 PROCEDURE — 25010000002 HYDRALAZINE PER 20 MG: Performed by: NURSE PRACTITIONER

## 2024-10-26 RX ORDER — POTASSIUM CHLORIDE 1500 MG/1
40 TABLET, EXTENDED RELEASE ORAL EVERY 4 HOURS
Status: COMPLETED | OUTPATIENT
Start: 2024-10-26 | End: 2024-10-26

## 2024-10-26 RX ORDER — OLANZAPINE 10 MG/2ML
2.5 INJECTION, POWDER, LYOPHILIZED, FOR SOLUTION INTRAMUSCULAR ONCE
Status: COMPLETED | OUTPATIENT
Start: 2024-10-27 | End: 2024-10-26

## 2024-10-26 RX ORDER — OLANZAPINE 10 MG/2ML
5 INJECTION, POWDER, LYOPHILIZED, FOR SOLUTION INTRAMUSCULAR ONCE
Status: DISCONTINUED | OUTPATIENT
Start: 2024-10-27 | End: 2024-10-26

## 2024-10-26 RX ORDER — METOLAZONE 2.5 MG/1
10 TABLET ORAL ONCE
Status: COMPLETED | OUTPATIENT
Start: 2024-10-26 | End: 2024-10-26

## 2024-10-26 RX ORDER — POTASSIUM CHLORIDE 1500 MG/1
20 TABLET, EXTENDED RELEASE ORAL ONCE
Status: COMPLETED | OUTPATIENT
Start: 2024-10-26 | End: 2024-10-26

## 2024-10-26 RX ORDER — ACETAZOLAMIDE 250 MG/1
250 TABLET ORAL 4 TIMES DAILY
Status: COMPLETED | OUTPATIENT
Start: 2024-10-26 | End: 2024-10-27

## 2024-10-26 RX ADMIN — NICARDIPINE HYDROCHLORIDE 5 MG/HR: 25 INJECTION, SOLUTION INTRAVENOUS at 23:04

## 2024-10-26 RX ADMIN — POTASSIUM CHLORIDE 40 MEQ: 1500 TABLET, EXTENDED RELEASE ORAL at 21:52

## 2024-10-26 RX ADMIN — INSULIN LISPRO 10 UNITS: 100 INJECTION, SUSPENSION SUBCUTANEOUS at 21:53

## 2024-10-26 RX ADMIN — HYDRALAZINE HYDROCHLORIDE 100 MG: 25 TABLET ORAL at 21:52

## 2024-10-26 RX ADMIN — HYDRALAZINE HYDROCHLORIDE 10 MG: 20 INJECTION INTRAMUSCULAR; INTRAVENOUS at 18:40

## 2024-10-26 RX ADMIN — HEPARIN SODIUM 5000 UNITS: 5000 INJECTION INTRAVENOUS; SUBCUTANEOUS at 08:39

## 2024-10-26 RX ADMIN — Medication 10 ML: at 08:35

## 2024-10-26 RX ADMIN — HEPARIN SODIUM 5000 UNITS: 5000 INJECTION INTRAVENOUS; SUBCUTANEOUS at 21:53

## 2024-10-26 RX ADMIN — BUMETANIDE 2 MG: 0.25 INJECTION INTRAMUSCULAR; INTRAVENOUS at 06:06

## 2024-10-26 RX ADMIN — SEVELAMER CARBONATE 800 MG: 800 TABLET, FILM COATED ORAL at 12:33

## 2024-10-26 RX ADMIN — INSULIN LISPRO 2 UNITS: 100 INJECTION, SOLUTION INTRAVENOUS; SUBCUTANEOUS at 08:39

## 2024-10-26 RX ADMIN — CARVEDILOL 12.5 MG: 6.25 TABLET, FILM COATED ORAL at 18:30

## 2024-10-26 RX ADMIN — POTASSIUM CHLORIDE 40 MEQ: 1500 TABLET, EXTENDED RELEASE ORAL at 15:26

## 2024-10-26 RX ADMIN — HYDRALAZINE HYDROCHLORIDE 10 MG: 20 INJECTION INTRAMUSCULAR; INTRAVENOUS at 22:39

## 2024-10-26 RX ADMIN — ONDANSETRON 4 MG: 2 INJECTION, SOLUTION INTRAMUSCULAR; INTRAVENOUS at 08:35

## 2024-10-26 RX ADMIN — NICARDIPINE HYDROCHLORIDE 5 MG/HR: 25 INJECTION, SOLUTION INTRAVENOUS at 23:10

## 2024-10-26 RX ADMIN — ACETAZOLAMIDE 250 MG: 250 TABLET ORAL at 18:30

## 2024-10-26 RX ADMIN — BUMETANIDE 2 MG: 0.25 INJECTION INTRAMUSCULAR; INTRAVENOUS at 21:52

## 2024-10-26 RX ADMIN — METOLAZONE 10 MG: 2.5 TABLET ORAL at 15:26

## 2024-10-26 RX ADMIN — INSULIN LISPRO 20 UNITS: 100 INJECTION, SUSPENSION SUBCUTANEOUS at 08:39

## 2024-10-26 RX ADMIN — LEVOTHYROXINE SODIUM 50 MCG: 0.05 TABLET ORAL at 06:06

## 2024-10-26 RX ADMIN — NICARDIPINE HYDROCHLORIDE 5 MG/HR: 25 INJECTION, SOLUTION INTRAVENOUS at 00:26

## 2024-10-26 RX ADMIN — HYDRALAZINE HYDROCHLORIDE 100 MG: 25 TABLET ORAL at 15:27

## 2024-10-26 RX ADMIN — SEVELAMER CARBONATE 800 MG: 800 TABLET, FILM COATED ORAL at 08:39

## 2024-10-26 RX ADMIN — BUMETANIDE 2 MG: 0.25 INJECTION INTRAMUSCULAR; INTRAVENOUS at 12:32

## 2024-10-26 RX ADMIN — INSULIN LISPRO 2 UNITS: 100 INJECTION, SOLUTION INTRAVENOUS; SUBCUTANEOUS at 12:32

## 2024-10-26 RX ADMIN — POTASSIUM CHLORIDE 20 MEQ: 1500 TABLET, EXTENDED RELEASE ORAL at 08:39

## 2024-10-26 RX ADMIN — ACETAZOLAMIDE 250 MG: 250 TABLET ORAL at 21:52

## 2024-10-26 RX ADMIN — OLANZAPINE 2.5 MG: 10 INJECTION, POWDER, FOR SOLUTION INTRAMUSCULAR at 23:16

## 2024-10-26 RX ADMIN — Medication 10 ML: at 21:53

## 2024-10-26 RX ADMIN — SEVELAMER CARBONATE 800 MG: 800 TABLET, FILM COATED ORAL at 18:30

## 2024-10-26 RX ADMIN — ASPIRIN 81 MG CHEWABLE TABLET 81 MG: 81 TABLET CHEWABLE at 08:39

## 2024-10-26 RX ADMIN — CARVEDILOL 12.5 MG: 6.25 TABLET, FILM COATED ORAL at 08:39

## 2024-10-26 RX ADMIN — HYDRALAZINE HYDROCHLORIDE 100 MG: 25 TABLET ORAL at 06:05

## 2024-10-26 NOTE — PROGRESS NOTES
[facility] MEDICINE SERVICE  DAILY PROGRESS NOTE    NAME: Leona Garcia  : 1945  MRN: [id number]      LOS: 5 days     PROVIDER OF SERVICE: Sharron Grissom MD    Chief Complaint: JOSELITO (acute kidney injury)    Subjective:     Interval History:  History taken from: patient    Complaining of cold sensation in her right foot        Review of Systems:   Review of Systems   All other systems reviewed and are negative.      Objective:     Vital Signs  Temp:  [97.3 °F (36.3 °C)-98 °F (36.7 °C)] 98 °F (36.7 °C)  Heart Rate:  [64-74] 68  Resp:  [10-15] 15  BP: (110-209)/() 160/64  Flow (L/min) (Oxygen Therapy):  [1-2] 2   Body mass index is 41.99 kg/m².    Physical Exam  Physical Exam  Constitutional:       Appearance: Normal appearance.   HENT:      Head: Normocephalic and atraumatic.      Nose: Nose normal.      Mouth/Throat:      Mouth: Mucous membranes are moist.   Eyes:      Extraocular Movements: Extraocular movements intact.      Pupils: Pupils are equal, round, and reactive to light.   Cardiovascular:      Rate and Rhythm: Normal rate and regular rhythm.   Pulmonary:      Effort: Pulmonary effort is normal.      Breath sounds: Normal breath sounds.   Abdominal:      General: Abdomen is flat. Bowel sounds are normal.      Palpations: Abdomen is soft.   Musculoskeletal:      Cervical back: Normal range of motion and neck supple.      Comments: Bilateral feet are warm with palpable pulses.   Skin:     General: Skin is warm and dry.   Neurological:      General: No focal deficit present.      Mental Status: She is alert and oriented to person, place, and time.   Psychiatric:         Mood and Affect: Mood normal.         Behavior: Behavior normal.         Thought Content: Thought content normal.         Judgment: Judgment normal.         Current Medications:  Scheduled Meds:acetaZOLAMIDE, 250 mg, Oral, 4x Daily  aspirin, 81 mg, Oral, Daily  bumetanide, 2 mg, Intravenous, Q8H  carvedilol, 12.5  mg, Oral, BID With Meals  heparin (porcine), 5,000 Units, Subcutaneous, Q12H  hydrALAZINE, 100 mg, Oral, Q8H  insulin lispro, 2-7 Units, Subcutaneous, 4x Daily AC & at Bedtime  insulin lispro protamine-insulin lispro, 10 Units, Subcutaneous, Nightly  insulin lispro protamine-insulin lispro, 20 Units, Subcutaneous, Daily With Breakfast  levothyroxine, 50 mcg, Oral, Q AM  potassium chloride, 40 mEq, Oral, Q4H  sevelamer, 800 mg, Oral, TID With Meals  sodium chloride, 10 mL, Intravenous, Q12H    Levothyroxine 50 mcg oral every morning  Continuous Infusions:niCARdipine, 5-15 mg/hr, Last Rate: Stopped (10/26/24 0625)        PRN Meds:.  acetaminophen    senna-docusate sodium **AND** polyethylene glycol **AND** bisacodyl **AND** bisacodyl    dextrose    dextrose    glucagon (human recombinant)    hydrALAZINE    influenza vaccine    Magnesium Standard Dose Replacement - Follow Nurse / BPA Driven Protocol    nitroglycerin    ondansetron    Phosphorus Replacement - Follow Nurse / BPA Driven Protocol    Potassium Replacement - Follow Nurse / BPA Driven Protocol    [COMPLETED] Insert peripheral IV **AND** sodium chloride    sodium chloride       Diagnostic Data    Results from last 7 days   Lab Units 10/26/24  1344 10/26/24  0616 10/24/24  0523 10/23/24  0419   WBC 10*3/mm3  --  7.83   < > 6.63   HEMOGLOBIN g/dL  --  9.3*   < > 8.1*   HEMATOCRIT %  --  29.8*   < > 26.7*   PLATELETS 10*3/mm3  --  198   < > 160   GLUCOSE mg/dL  --  215*   < > 136*   CREATININE mg/dL  --  3.23*   < > 3.33*   BUN mg/dL  --  67*   < > 81*   SODIUM mmol/L  --  141   < > 144   POTASSIUM mmol/L 3.8 3.6   < > 3.9   AST (SGOT) U/L  --   --   --  43*   ALT (SGPT) U/L  --   --   --  82*   ALK PHOS U/L  --   --   --  161*   BILIRUBIN mg/dL  --   --   --  0.3   ANION GAP mmol/L  --  11.6   < > 8.5    < > = values in this interval not displayed.       XR Chest 1 View    Result Date: 10/25/2024  Impression: Stable cardiomegaly with vascular congestive  features. Left basilar airspace disease obscures the diaphragmatic margin, may represent atelectasis or pneumonia. Probable mild right basilar atelectasis. Suspected trace bibasilar pleural fluid. Electronically Signed: Arely Jaramillo MD  10/25/2024 2:10 PM EDT  Workstation ID: [id number]       I reviewed the patient's new clinical results.    Assessment/Plan:     Active and Resolved Problems  Active Hospital Problems    Diagnosis  POA    **JOSELITO (acute kidney injury) [N17.9]  Yes      Resolved Hospital Problems   No resolved problems to display.     Anemia  - H&H improved status post transfusion of 2 units of packed red blood cells.  Continue to monitor.    JOSELITO on CKD stage 3:  -Creatinine relatively unchanged over the past 24 hours.  Avoid nephrotoxic drugs.  Continue to monitor.  Nephrology following.     Acute diastolic CHF exacerbation EF of 56 to 60%  -proBNP is elevated at 8,842  - Improving.  Now off of Bumex drip.  On IV Bumex every 8 hours.  Optimize medical management for CHF exacerbation, monitor strict I's and O's, daily weights, 2 g sodium diet.  I's and O's reflects fluid balance of -470.    Elevated Troponin  -Echo results reviewed.  EF of 56 to 60%.  No wall motion abnormalities noted.  Troponins elevated and trending slightly upwards.  EKG with slight T wave flattening in anterolateral leads.  Cardiology following.     Bradycardia:  -Resolved     Hypertension:  -Continue Coreg, continue hydralazine, continue Bumex, avoid ACE and ARB's for now due to JOSELITO.    Diabetes mellitus type 2:  -Blood glucose is better controlled.  Continue current insulin regimen.  Hemoglobin A1c is 9.65%     Hypothyroidism:  -Continue levothyroxine    Complaints of cold right foot  - On exam bilateral feet are warm with palpable pulses.  Keep feet covered to help with the sensation of coldness.  Warm pack applied around right foot.    VTE Prophylaxis:  Pharmacologic & mechanical VTE prophylaxis orders are present.              Disposition Planning:     Barriers to Discharge: Pending clinical improvement  Anticipated Date of Discharge: Pending clinical course and clinical improvement.  Also pending SNF placement, with bed availability and insurance approval.  Place of Discharge: SNF        Code Status and Medical Interventions: CPR (Attempt to Resuscitate); Full Support   Ordered at: 10/21/24 2019     Code Status (Patient has no pulse and is not breathing):    CPR (Attempt to Resuscitate)     Medical Interventions (Patient has pulse or is breathing):    Full Support       Signature: Electronically signed by Sharron Grissom MD, 10/26/24, 16:04 EDT.  [facility] Hospitalist Team

## 2024-10-26 NOTE — PROGRESS NOTES
Referring Provider: Sharron Grissom MD    Reason for follow-up:  Elevated troponin     Patient Care Team:  Alfred Liriano MD as PCP - General (Family Medicine)    Subjective .      ROS    Today, the patient has been without any chest discomfort ,shortness of breath, palpitations, dizziness or syncope.  Denies having any headache ,abdominal pain ,nausea, vomiting , diarrhea constipation, loss of weight or loss of appetite.  Denies having any excessive bruising ,hematuria or blood in the stool.    Review of all systems negative except as indicated    History  Past Medical History:   Diagnosis Date    Anemia     Chronic kidney disease (CKD)     Diabetes mellitus     Disease of thyroid gland     Hypertension        History reviewed. No pertinent surgical history.    History reviewed. No pertinent family history.    Social History     Tobacco Use    Smoking status: Never    Smokeless tobacco: Never   Vaping Use    Vaping status: Never Used   Substance Use Topics    Alcohol use: Never    Drug use: Never        Medications Prior to Admission   Medication Sig Dispense Refill Last Dose/Taking    aspirin 81 MG chewable tablet Chew 1 tablet Daily.   10/21/2024 at  9:00 AM    ezetimibe (ZETIA) 10 MG tablet Take 1 tablet by mouth Daily.   10/21/2024 Morning    furosemide (LASIX) 40 MG tablet Take 1 tablet by mouth Daily.   10/21/2024 at  9:00 AM    hydrALAZINE (APRESOLINE) 100 MG tablet Take 1 tablet by mouth Daily.   10/21/2024 at  9:00 AM    insulin NPH-insulin regular (humuLIN 70/30,novoLIN 70/30) (70-30) 100 UNIT/ML injection Inject 10 Units under the skin into the appropriate area as directed Every Night. 20 units QAM + 10 units QPM   10/21/2024    insulin NPH-insulin regular (humuLIN 70/30,novoLIN 70/30) (70-30) 100 UNIT/ML injection Inject 20 Units under the skin into the appropriate area as directed Every Morning. 20 units QAM + 10 units QPM   10/21/2024    levothyroxine (SYNTHROID, LEVOTHROID) 50 MCG tablet  Take 1 tablet by mouth Daily.   10/21/2024 at  9:00 AM    losartan (COZAAR) 100 MG tablet Take 1 tablet by mouth Daily.   10/21/2024 at  9:00 AM    potassium chloride 10 MEQ CR tablet Take 1 tablet by mouth Daily.   10/21/2024 at  9:00 AM    carvedilol (COREG) 6.25 MG tablet Take 1 tablet by mouth 2 (Two) Times a Day With Meals for 30 days. 60 tablet 0     fluticasone (FLONASE) 50 MCG/ACT nasal spray Administer 2 sprays into the nostril(s) as directed by provider Daily.   Unknown       Allergies  Codeine and Amlodipine    Scheduled Meds:aspirin, 81 mg, Oral, Daily  bumetanide, 2 mg, Intravenous, Q8H  carvedilol, 12.5 mg, Oral, BID With Meals  heparin (porcine), 5,000 Units, Subcutaneous, Q12H  hydrALAZINE, 100 mg, Oral, Q8H  insulin lispro, 2-7 Units, Subcutaneous, 4x Daily AC & at Bedtime  insulin lispro protamine-insulin lispro, 10 Units, Subcutaneous, Nightly  insulin lispro protamine-insulin lispro, 20 Units, Subcutaneous, Daily With Breakfast  levothyroxine, 50 mcg, Oral, Q AM  sevelamer, 800 mg, Oral, TID With Meals  sodium chloride, 10 mL, Intravenous, Q12H      Continuous Infusions:niCARdipine, 5-15 mg/hr, Last Rate: Stopped (10/26/24 0625)      PRN Meds:.  acetaminophen    senna-docusate sodium **AND** polyethylene glycol **AND** bisacodyl **AND** bisacodyl    dextrose    dextrose    glucagon (human recombinant)    hydrALAZINE    influenza vaccine    Magnesium Standard Dose Replacement - Follow Nurse / BPA Driven Protocol    nitroglycerin    ondansetron    Phosphorus Replacement - Follow Nurse / BPA Driven Protocol    Potassium Replacement - Follow Nurse / BPA Driven Protocol    [COMPLETED] Insert peripheral IV **AND** sodium chloride    sodium chloride    Objective     VITAL SIGNS  Vitals:    10/26/24 0730 10/26/24 0800 10/26/24 0900 10/26/24 0930   BP: 127/64 158/63 150/56 118/47   BP Location:       Patient Position:       Pulse: 74 74 73 68   Resp:       Temp:       TempSrc:       SpO2: 94% 95% 93% 94%  "  Weight:       Height:           Flowsheet Rows      Flowsheet Row First Filed Value   Admission Height 167.6 cm (66\") Documented at 10/21/2024 1309   Admission Weight 130 kg (286 lb 9.6 oz) Documented at 10/21/2024 1309              Intake/Output Summary (Last 24 hours) at 10/26/2024 1149  Last data filed at 10/26/2024 0600  Gross per 24 hour   Intake 240 ml   Output 2400 ml   Net -2160 ml        TELEMETRY: Sinus rhythm    Physical Exam:  The patient is alert, oriented and in no distress.  Vital signs as noted above.  Head and neck revealed no carotid bruits or jugular venous distention.  No thyromegaly or lymphadenopathy is present  Lungs clear.  No wheezing.  Breath sounds are normal bilaterally.  Heart normal first and second heart sounds.  No murmur. No precordial rub is present.  No gallop is present.  Abdomen soft and nontender.  No organomegaly is present.  Extremities with good peripheral pulses without any pedal edema.  Skin warm and dry.  Musculoskeletal system is grossly normal  CNS grossly normal    Reviewed and updated.    Results Review:   I reviewed the patient's new clinical results.  Lab Results (last 24 hours)       Procedure Component Value Units Date/Time    POC Glucose 4x Daily Before Meals & at Bedtime [[id number]]  (Abnormal) Collected: 10/26/24 0805    Specimen: Blood Updated: 10/26/24 0806     Glucose 181 mg/dL      Comment: Serial Number: [id number]:  [id number]       Basic Metabolic Panel [[id number]]  (Abnormal) Collected: 10/26/24 0616    Specimen: Blood Updated: 10/26/24 0646     Glucose 215 mg/dL      BUN 67 mg/dL      Creatinine 3.23 mg/dL      Sodium 141 mmol/L      Potassium 3.6 mmol/L      Comment: Specimen hemolyzed.  Result may be falsely elevated.        Chloride 93 mmol/L      CO2 36.4 mmol/L      Calcium 8.9 mg/dL      BUN/Creatinine Ratio 20.7     Anion Gap 11.6 mmol/L      eGFR 14.2 mL/min/1.73      Comment: <15 Indicative of kidney failure       Narrative:      GFR " Normal >60  Chronic Kidney Disease <60  Kidney Failure <15    The GFR formula is only valid for adults with stable renal function between ages 18 and 70.    CBC (No Diff) [[id number]]  (Abnormal) Collected: 10/26/24 0616    Specimen: Blood Updated: 10/26/24 0624     WBC 7.83 10*3/mm3      RBC 3.38 10*6/mm3      Hemoglobin 9.3 g/dL      Hematocrit 29.8 %      MCV 88.2 fL      MCH 27.5 pg      MCHC 31.2 g/dL      RDW 15.9 %      RDW-SD 51.3 fl      MPV 11.5 fL      Platelets 198 10*3/mm3     POC Glucose Once [[id number]]  (Abnormal) Collected: 10/25/24 2039    Specimen: Blood Updated: 10/25/24 2040     Glucose 187 mg/dL      Comment: Serial Number: [id number]:  [id number]       POC Glucose Once [[id number]]  (Abnormal) Collected: 10/25/24 1932    Specimen: Blood Updated: 10/25/24 1934     Glucose 170 mg/dL      Comment: Serial Number: [id number]:  [id number]       POC Glucose 4x Daily Before Meals & at Bedtime [[id number]]  (Abnormal) Collected: 10/25/24 1838    Specimen: Blood Updated: 10/25/24 1840     Glucose 199 mg/dL      Comment: Serial Number: [id number]:  [id number]       Potassium [[id number]]  (Normal) Collected: 10/25/24 1358    Specimen: Blood from Cannula Updated: 10/25/24 1418     Potassium 3.7 mmol/L     POC Glucose 4x Daily Before Meals & at Bedtime [[id number]]  (Abnormal) Collected: 10/25/24 1242    Specimen: Blood Updated: 10/25/24 1244     Glucose 174 mg/dL      Comment: Serial Number: [id number]:  [id number]               Imaging Results (Last 24 Hours)       Procedure Component Value Units Date/Time    XR Chest 1 View [[id number]] Collected: 10/25/24 1409     Updated: 10/25/24 1412    Narrative:      XR CHEST 1 VW    Date of Exam: 10/25/2024 1:58 PM EDT    Indication: chf    Comparison: AP chest    10/21/2024    Findings:  Stable cardiomegaly. Features of suggestive of mild central vascular congestion. Bibasilar airspace disease, left greater than right, with probable trace  bibasilar pleural fluid. No pneumothorax or acute osseous abnormality.      Impression:      Impression:  Stable cardiomegaly with vascular congestive features.  Left basilar airspace disease obscures the diaphragmatic margin, may represent atelectasis or pneumonia. Probable mild right basilar atelectasis.  Suspected trace bibasilar pleural fluid.      Electronically Signed: Arely Jaramillo MD    10/25/2024 2:10 PM EDT    Workstation ID: [id number]        LAB RESULTS (LAST 7 DAYS)    CBC  Results from last 7 days   Lab Units 10/26/24  0616 10/25/24  0355 10/24/24  1423 10/24/24  0523 10/23/24  0419 10/22/24  0417 10/21/24  1400   WBC 10*3/mm3 7.83 7.23  --  7.01 6.63 5.46 6.02   RBC 10*6/mm3 3.38* 3.20*  --  2.46* 2.94* 2.87* 3.08*   HEMOGLOBIN g/dL 9.3* 8.9* 9.0* 6.9* 8.1* 8.0* 8.3*   HEMATOCRIT % 29.8* 28.3* 29.0* 22.2* 26.7* 26.2* 28.0*   MCV fL 88.2 88.4  --  90.2 90.8 91.3 90.9   PLATELETS 10*3/mm3 198 171  --  173 160 154 166       BMP  Results from last 7 days   Lab Units 10/26/24  0616 10/25/24  1358 10/25/24  0355 10/24/24  1423 10/24/24  0523 10/23/24  0419 10/22/24  0417 10/21/24  2040 10/21/24  1400   SODIUM mmol/L 141  --  139  --  143 144 147* 145 142   POTASSIUM mmol/L 3.6 3.7 3.6 3.7 3.6 3.9 4.7 4.8 4.5   CHLORIDE mmol/L 93*  --  94*  --  100 104 108* 106 104   CO2 mmol/L 36.4*  --  35.9*  --  33.3* 31.5* 29.0 26.3 27.8   BUN mg/dL 67*  --  75*  --  77* 81* 84* 83* 82*   CREATININE mg/dL 3.23*  --  3.47*  --  3.40* 3.33* 3.76* 3.62* 3.57*   GLUCOSE mg/dL 215*  --  219*  --  160* 136* 275* 266* 267*   MAGNESIUM mg/dL  --   --   --   --  2.2 2.4 3.1* 2.6*  --    PHOSPHORUS mg/dL  --   --   --   --   --  4.9*  --  5.2*  --        CMP   Results from last 7 days   Lab Units 10/26/24  0616 10/25/24  1358 10/25/24  0355 10/24/24  1423 10/24/24  0523 10/23/24  0419 10/22/24  1313 10/22/24  0417 10/21/24  2040 10/21/24  1400   SODIUM mmol/L 141  --  139  --  143 144  --  147* 145 142   POTASSIUM mmol/L 3.6 3.7  3.6 3.7 3.6 3.9  --  4.7 4.8 4.5   CHLORIDE mmol/L 93*  --  94*  --  100 104  --  108* 106 104   CO2 mmol/L 36.4*  --  35.9*  --  33.3* 31.5*  --  29.0 26.3 27.8   BUN mg/dL 67*  --  75*  --  77* 81*  --  84* 83* 82*   CREATININE mg/dL 3.23*  --  3.47*  --  3.40* 3.33*  --  3.76* 3.62* 3.57*   GLUCOSE mg/dL 215*  --  219*  --  160* 136*  --  275* 266* 267*   ALBUMIN g/dL  --   --   --   --   --  3.3* 3.2  --   --  3.5   BILIRUBIN mg/dL  --   --   --   --   --  0.3  --   --   --  0.4   ALK PHOS U/L  --   --   --   --   --  161*  --   --   --  192*   AST (SGOT) U/L  --   --   --   --   --  43*  --   --   --  63*   ALT (SGPT) U/L  --   --   --   --   --  82*  --   --   --  96*   LIPASE U/L  --   --   --   --   --   --   --   --   --  21         BNP        TROPONIN  Results from last 7 days   Lab Units 10/21/24  2338 10/21/24  2040   CK TOTAL U/L  --  263*   HSTROP T ng/L 227* 249*       CoAg        Creatinine Clearance  Estimated Creatinine Clearance: 18.8 mL/min (A) (by C-G formula based on SCr of 3.23 mg/dL (H)).    ABG        Radiology  XR Chest 1 View    Result Date: 10/25/2024  Impression: Stable cardiomegaly with vascular congestive features. Left basilar airspace disease obscures the diaphragmatic margin, may represent atelectasis or pneumonia. Probable mild right basilar atelectasis. Suspected trace bibasilar pleural fluid. Electronically Signed: Arely Jaramillo MD  10/25/2024 2:10 PM EDT  Workstation ID: [id number]         EKG                        I personally viewed and interpreted the patient's EKG/Telemetry data: Sinus bradycardia    ECHOCARDIOGRAM:    Results for orders placed during the hospital encounter of 10/21/24    Adult Transthoracic Echo Complete W/ Cont if Necessary Per Protocol    Interpretation Summary    Left ventricular ejection fraction appears to be 56 - 60%.    Left ventricular wall thickness is consistent with mild concentric hypertrophy.    Left ventricular diastolic function is consistent  "with (grade I) impaired relaxation.    The right ventricular cavity is mildly dilated.    The left atrial cavity is moderate to severely dilated.    The right atrial cavity is moderately  dilated.    Moderate to severe tricuspid valve regurgitation is present.    Estimated right ventricular systolic pressure from tricuspid regurgitation is moderately elevated (45-55 mmHg).    Moderate pulmonary hypertension is present.          STRESS TEST        Cardiolite (Tc-99m sestamibi) stress test    CARDIAC CATHETERIZATION  No results found for this or any previous visit.                OTHER:         Assessment & Plan     Principal Problem:    JOSELITO (acute kidney injury)      Primary cardiologist note was reviewed.    \"Cardiology assessment and plan        Abnormal elevated troponin in the setting of renal failure  Flat troponin with no significant trend  No chest discomfort  EKG with no acute ST changes  Acute on chronic diastolic dysfunction  Abnormal elevated proBNP secondary diastolic dysfunction  Chronic lower extremity venous edema  Chronic RV dysfunction  Diastolic dysfunction  Acute on chronic kidney injury  Hypertension  Hyperlipidemia  Diabetes mellitus  Hypothyroidism  Anemia  Chronic longstanding lower extremity edema  Invasive workup in the past deferred secondary to renal failure  Repeat echocardiogram to assess LV systolic function  Medical records from primary cardiologist reviewed  Patient never had a cardiac catheterization  Cardiac catheterization was withheld secondary to underlying renal failure and no significant ischemic burden on the stress test  Poor functional status  Tmax is 98 pulse is 69 respirations are 14 blood pressure is 148/52 sats are 98%  Sodium is 139 potassium is 3.6 creatinine is 3.4 hemoglobin is 8.9  Patient is currently -5L  Volume status is better  Echocardiogram with normal LV systolic function with RV dysfunction moderate to severe tricuspid regurgitation with a dilated IVC  Agree " "with diuresis and close monitoring of renal function  Diagnosis and treatment options reviewed and discussed with patient  Continue current medical therapy continue close monitoring  Further recommendation based on patient course\"    ]]]]]]]]]]]]]]]]]]]]]  10/26/2024.     Elevated troponin level of renal dysfunction.    BUN/creatinine 67/3.23: 10/26/2024    Echocardiogram 10/21/2024    Left ventricular ejection fraction appears to be 56 - 60%.    Left ventricular wall thickness is consistent with mild concentric hypertrophy.    Left ventricular diastolic function is consistent with (grade I) impaired relaxation.    The right ventricular cavity is mildly dilated.    The left atrial cavity is moderate to severely dilated.    The right atrial cavity is moderately  dilated.    Moderate to severe tricuspid valve regurgitation is present.    Estimated right ventricular systolic pressure from tricuspid regurgitation is moderately elevated (45-55 mmHg).    Moderate pulmonary hypertension is present.    Hypertension dyslipidemia diabetes hypothyroidism.    Patient would benefit from further ischemic workup.  Primary cardiologist is planning on having cardiac catheterization as an outpatient depending on patient's renal function.  Further plan will depend on patient's progress.  ]]]]]]]]]]]]]]]]]]      Dianne Dickens MD  10/26/24  11:49 EDT                "

## 2024-10-26 NOTE — PROGRESS NOTES
PROGRESS NOTE      Patient Name: Leona Garcia  : 1945  MRN: [id number]  Primary Care Physician: Alfred Liriano MD  Date of admission: 10/21/2024    Patient Care Team:  Alfred Liriano MD as PCP - General (Family Medicine)    JOSELITO    Subjective   Subjective:     Patient seen and examined   Renal functions stable, urine output 2.9 L, creatinine 3.23K is 3.6.  Developing alkalosis but is still has increased extracellular fluid volume.    Review of systems:  Negative      Allergies:    Allergies   Allergen Reactions    Codeine Nausea Only, Unknown - Low Severity and Other (See Comments)     Dizziness    Amlodipine Unknown - Low Severity       Objective   Exam:     Vital Signs  Temp:  [97.3 °F (36.3 °C)-98 °F (36.7 °C)] 98 °F (36.7 °C)  Heart Rate:  [64-74] 66  Resp:  [10-15] 15  BP: (110-209)/() 147/60  SpO2:  [86 %-99 %] 94 %  on  Flow (L/min) (Oxygen Therapy):  [1-2] 2;   Device (Oxygen Therapy): nasal cannula  Body mass index is 41.99 kg/m².    General: female in no acute distress.    Head:      Normocephalic and atraumatic.    Eyes:      PERRL/EOM intact, conjunctivae and sclerae clear without nystagmus.    Neck:      No masses, thyromegaly,  trachea central   Lungs:    Clear bilaterally to auscultation.    Heart:      Regular rate and rhythm, no murmur no gallop  Abd:        Soft, nontender, not distended, bowel sounds positive, no shifting dullness.  Msk:        No deformity or scoliosis noted of thoracic or lumbar spine.    Pulses:   Pulses normal in all 4 extremities.    Extremities:        No cyanosis or clubbing--++ edema.    Neuro:    No focal deficits.   alert oriented x3  Skin:       Intact without lesions or rashes.    Psych:    Alert and cooperative; normal mood and affect; normal attention span       Results Review:  I have personally reviewed most recent Data :  CBC    Results from last 7 days   Lab Units 10/26/24  0616 10/25/24  0355 10/24/24  1423 10/24/24  0573  10/23/24  0419 10/22/24  0417 10/21/24  1400   WBC 10*3/mm3 7.83 7.23  --  7.01 6.63 5.46 6.02   HEMOGLOBIN g/dL 9.3* 8.9* 9.0* 6.9* 8.1* 8.0* 8.3*   PLATELETS 10*3/mm3 198 171  --  173 160 154 166     CMP   Results from last 7 days   Lab Units 10/26/24  0616 10/25/24  1358 10/25/24  0355 10/24/24  1423 10/24/24  0523 10/23/24  0419 10/22/24  1313 10/22/24  0417 10/21/24  2040 10/21/24  1400   SODIUM mmol/L 141  --  139  --  143 144  --  147* 145 142   POTASSIUM mmol/L 3.6 3.7 3.6 3.7 3.6 3.9  --  4.7 4.8 4.5   CHLORIDE mmol/L 93*  --  94*  --  100 104  --  108* 106 104   CO2 mmol/L 36.4*  --  35.9*  --  33.3* 31.5*  --  29.0 26.3 27.8   BUN mg/dL 67*  --  75*  --  77* 81*  --  84* 83* 82*   CREATININE mg/dL 3.23*  --  3.47*  --  3.40* 3.33*  --  3.76* 3.62* 3.57*   GLUCOSE mg/dL 215*  --  219*  --  160* 136*  --  275* 266* 267*   ALBUMIN g/dL  --   --   --   --   --  3.3* 3.2  --   --  3.5   BILIRUBIN mg/dL  --   --   --   --   --  0.3  --   --   --  0.4   ALK PHOS U/L  --   --   --   --   --  161*  --   --   --  192*   AST (SGOT) U/L  --   --   --   --   --  43*  --   --   --  63*   ALT (SGPT) U/L  --   --   --   --   --  82*  --   --   --  96*   LIPASE U/L  --   --   --   --   --   --   --   --   --  21     ABG      XR Chest 1 View    Result Date: 10/25/2024  Impression: Stable cardiomegaly with vascular congestive features. Left basilar airspace disease obscures the diaphragmatic margin, may represent atelectasis or pneumonia. Probable mild right basilar atelectasis. Suspected trace bibasilar pleural fluid. Electronically Signed: Arely Jaramillo MD  10/25/2024 2:10 PM EDT  Workstation ID: [id number]     Results for orders placed during the hospital encounter of 10/21/24    Adult Transthoracic Echo Complete W/ Cont if Necessary Per Protocol    Interpretation Summary    Left ventricular ejection fraction appears to be 56 - 60%.    Left ventricular wall thickness is consistent with mild concentric hypertrophy.    Left  ventricular diastolic function is consistent with (grade I) impaired relaxation.    The right ventricular cavity is mildly dilated.    The left atrial cavity is moderate to severely dilated.    The right atrial cavity is moderately  dilated.    Moderate to severe tricuspid valve regurgitation is present.    Estimated right ventricular systolic pressure from tricuspid regurgitation is moderately elevated (45-55 mmHg).    Moderate pulmonary hypertension is present.    Scheduled Meds:acetaZOLAMIDE, 250 mg, Oral, 4x Daily  aspirin, 81 mg, Oral, Daily  bumetanide, 2 mg, Intravenous, Q8H  carvedilol, 12.5 mg, Oral, BID With Meals  heparin (porcine), 5,000 Units, Subcutaneous, Q12H  hydrALAZINE, 100 mg, Oral, Q8H  insulin lispro, 2-7 Units, Subcutaneous, 4x Daily AC & at Bedtime  insulin lispro protamine-insulin lispro, 10 Units, Subcutaneous, Nightly  insulin lispro protamine-insulin lispro, 20 Units, Subcutaneous, Daily With Breakfast  levothyroxine, 50 mcg, Oral, Q AM  metOLazone, 10 mg, Oral, Once  potassium chloride, 40 mEq, Oral, Q4H  sevelamer, 800 mg, Oral, TID With Meals  sodium chloride, 10 mL, Intravenous, Q12H      Continuous Infusions:niCARdipine, 5-15 mg/hr, Last Rate: Stopped (10/26/24 0625)        PRN Meds:  acetaminophen    senna-docusate sodium **AND** polyethylene glycol **AND** bisacodyl **AND** bisacodyl    dextrose    dextrose    glucagon (human recombinant)    hydrALAZINE    influenza vaccine    Magnesium Standard Dose Replacement - Follow Nurse / BPA Driven Protocol    nitroglycerin    ondansetron    Phosphorus Replacement - Follow Nurse / BPA Driven Protocol    Potassium Replacement - Follow Nurse / BPA Driven Protocol    [COMPLETED] Insert peripheral IV **AND** sodium chloride    sodium chloride    Assessment & Plan   Assessment and Plan:         JOSELITO (acute kidney injury)    ASSESSMENT:  JOSELITO now etiology  CKD stage III  Volume overload with increased edema  HTN stable   DM  Anemia  CHF with right  failure with diastolic heart failure   Hypernatremia ??? Etiology uncertain dont look like free water deficit     ECHO from 2023: EF 61- 65% with grade 1 diastolic dysfunction and mild TR.           PLAN :      Patient with JOSELITO, creatinine improved .Baseline around 1.7-1.9 I think. Etiology likely from volume overload, maybe some CRS.   Still has significantly increased volume but developing alkalosis has extracellular fluid.    Continue Bumex  2 mg IV every 8 hours.  Metolazone 10 mg p.o. now.  Restart Diamox 250 mg 4 times a day x 4.    Supplement potassium chloride.    Woodall catheter removed.   Urine studies reviewed, urine sodium 52, did show some hematuria as well as 1.1 grams proteinuria. Likely from diabetic nephropathy but need to monitor and will check serology   Renal ultrasound left kidney not visualized because of some body habitus right kidney within normal limit   continue metolazone because of the high sodium  Clinically patient is better much more alert oriented  ECHO : diastolic heart failre with some valvular issues  Other electrolytes and acid-base acceptable  Significant anemia improved s/p pRBCs  We will continue to follow       Kehinde Garcia MD.  Electronically signed by   [facility] kidney consultant  [phone]  10/26/2024  13:49 EDT  Oh

## 2024-10-27 ENCOUNTER — APPOINTMENT (OUTPATIENT)
Dept: GENERAL RADIOLOGY | Facility: HOSPITAL | Age: 79
End: 2024-10-27
Payer: MEDICARE

## 2024-10-27 LAB
ANION GAP SERPL CALCULATED.3IONS-SCNC: 10.2 MMOL/L (ref 5–15)
ARTERIAL PATENCY WRIST A: POSITIVE
ATMOSPHERIC PRESS: ABNORMAL MM[HG]
BASE EXCESS BLDA CALC-SCNC: 16.9 MMOL/L (ref 0–3)
BDY SITE: ABNORMAL
BUN SERPL-MCNC: 63 MG/DL (ref 8–23)
BUN/CREAT SERPL: 20.8 (ref 7–25)
CALCIUM SPEC-SCNC: 9.9 MG/DL (ref 8.6–10.5)
CHLORIDE SERPL-SCNC: 92 MMOL/L (ref 98–107)
CO2 BLDA-SCNC: 45.1 MMOL/L (ref 22–29)
CO2 SERPL-SCNC: 38.8 MMOL/L (ref 22–29)
CREAT SERPL-MCNC: 3.03 MG/DL (ref 0.57–1)
DEPRECATED RDW RBC AUTO: 51 FL (ref 37–54)
EGFRCR SERPLBLD CKD-EPI 2021: 15.3 ML/MIN/1.73
ERYTHROCYTE [DISTWIDTH] IN BLOOD BY AUTOMATED COUNT: 15.6 % (ref 12.3–15.4)
GLUCOSE BLDC GLUCOMTR-MCNC: 107 MG/DL (ref 70–105)
GLUCOSE BLDC GLUCOMTR-MCNC: 114 MG/DL (ref 70–105)
GLUCOSE BLDC GLUCOMTR-MCNC: 127 MG/DL (ref 70–105)
GLUCOSE BLDC GLUCOMTR-MCNC: 154 MG/DL (ref 70–105)
GLUCOSE BLDC GLUCOMTR-MCNC: 160 MG/DL (ref 74–100)
GLUCOSE SERPL-MCNC: 116 MG/DL (ref 65–99)
HCO3 BLDA-SCNC: 43.2 MMOL/L (ref 21–28)
HCT VFR BLD AUTO: 32.7 % (ref 34–46.6)
HEMODILUTION: NO
HGB BLD-MCNC: 10.1 G/DL (ref 12–15.9)
INHALED O2 CONCENTRATION: 32 %
MCH RBC QN AUTO: 27.7 PG (ref 26.6–33)
MCHC RBC AUTO-ENTMCNC: 30.9 G/DL (ref 31.5–35.7)
MCV RBC AUTO: 89.6 FL (ref 79–97)
MODALITY: ABNORMAL
PCO2 BLDA: 61.1 MM HG (ref 35–48)
PH BLDA: 7.46 PH UNITS (ref 7.35–7.45)
PLATELET # BLD AUTO: 205 10*3/MM3 (ref 140–450)
PMV BLD AUTO: 11.4 FL (ref 6–12)
PO2 BLD: 261 MM[HG] (ref 0–500)
PO2 BLDA: 83.6 MM HG (ref 83–108)
POTASSIUM SERPL-SCNC: 4 MMOL/L (ref 3.5–5.2)
RBC # BLD AUTO: 3.65 10*6/MM3 (ref 3.77–5.28)
SAO2 % BLDCOA: 96.3 % (ref 94–98)
SODIUM SERPL-SCNC: 141 MMOL/L (ref 136–145)
WBC NRBC COR # BLD AUTO: 8.59 10*3/MM3 (ref 3.4–10.8)

## 2024-10-27 PROCEDURE — 25010000002 HEPARIN (PORCINE) PER 1000 UNITS: Performed by: NURSE PRACTITIONER

## 2024-10-27 PROCEDURE — 82803 BLOOD GASES ANY COMBINATION: CPT

## 2024-10-27 PROCEDURE — 25810000003 SODIUM CHLORIDE 0.9 % SOLUTION 250 ML FLEX CONT: Performed by: STUDENT IN AN ORGANIZED HEALTH CARE EDUCATION/TRAINING PROGRAM

## 2024-10-27 PROCEDURE — 94761 N-INVAS EAR/PLS OXIMETRY MLT: CPT

## 2024-10-27 PROCEDURE — 25010000002 HYDRALAZINE PER 20 MG: Performed by: NURSE PRACTITIONER

## 2024-10-27 PROCEDURE — 94640 AIRWAY INHALATION TREATMENT: CPT

## 2024-10-27 PROCEDURE — 71046 X-RAY EXAM CHEST 2 VIEWS: CPT

## 2024-10-27 PROCEDURE — 63710000001 INSULIN LISPRO PROTAMINE-INSULIN LISPRO (75-25) 100 UNIT/ML SUSPENSION: Performed by: INTERNAL MEDICINE

## 2024-10-27 PROCEDURE — 99232 SBSQ HOSP IP/OBS MODERATE 35: CPT | Performed by: INTERNAL MEDICINE

## 2024-10-27 PROCEDURE — 94664 DEMO&/EVAL PT USE INHALER: CPT

## 2024-10-27 PROCEDURE — 80048 BASIC METABOLIC PNL TOTAL CA: CPT | Performed by: INTERNAL MEDICINE

## 2024-10-27 PROCEDURE — 82948 REAGENT STRIP/BLOOD GLUCOSE: CPT

## 2024-10-27 PROCEDURE — 94799 UNLISTED PULMONARY SVC/PX: CPT

## 2024-10-27 PROCEDURE — 25010000002 NICARDIPINE 2.5 MG/ML SOLUTION 10 ML VIAL: Performed by: STUDENT IN AN ORGANIZED HEALTH CARE EDUCATION/TRAINING PROGRAM

## 2024-10-27 PROCEDURE — 25010000002 BUMETANIDE PER 0.5 MG: Performed by: INTERNAL MEDICINE

## 2024-10-27 PROCEDURE — 36600 WITHDRAWAL OF ARTERIAL BLOOD: CPT

## 2024-10-27 PROCEDURE — 85027 COMPLETE CBC AUTOMATED: CPT | Performed by: INTERNAL MEDICINE

## 2024-10-27 PROCEDURE — 82948 REAGENT STRIP/BLOOD GLUCOSE: CPT | Performed by: NURSE PRACTITIONER

## 2024-10-27 RX ORDER — IPRATROPIUM BROMIDE AND ALBUTEROL SULFATE 2.5; .5 MG/3ML; MG/3ML
3 SOLUTION RESPIRATORY (INHALATION)
Status: DISCONTINUED | OUTPATIENT
Start: 2024-10-27 | End: 2024-10-29

## 2024-10-27 RX ORDER — ACETAZOLAMIDE 250 MG/1
250 TABLET ORAL 4 TIMES DAILY
Status: COMPLETED | OUTPATIENT
Start: 2024-10-27 | End: 2024-10-28

## 2024-10-27 RX ADMIN — CARVEDILOL 12.5 MG: 6.25 TABLET, FILM COATED ORAL at 10:48

## 2024-10-27 RX ADMIN — SEVELAMER CARBONATE 800 MG: 800 TABLET, FILM COATED ORAL at 14:05

## 2024-10-27 RX ADMIN — Medication 10 ML: at 10:50

## 2024-10-27 RX ADMIN — HEPARIN SODIUM 5000 UNITS: 5000 INJECTION INTRAVENOUS; SUBCUTANEOUS at 10:49

## 2024-10-27 RX ADMIN — ACETAZOLAMIDE 250 MG: 250 TABLET ORAL at 10:49

## 2024-10-27 RX ADMIN — ACETAZOLAMIDE 250 MG: 250 TABLET ORAL at 20:47

## 2024-10-27 RX ADMIN — SEVELAMER CARBONATE 800 MG: 800 TABLET, FILM COATED ORAL at 17:50

## 2024-10-27 RX ADMIN — HYDRALAZINE HYDROCHLORIDE 100 MG: 25 TABLET ORAL at 20:46

## 2024-10-27 RX ADMIN — HYDRALAZINE HYDROCHLORIDE 100 MG: 25 TABLET ORAL at 06:00

## 2024-10-27 RX ADMIN — SEVELAMER CARBONATE 800 MG: 800 TABLET, FILM COATED ORAL at 10:49

## 2024-10-27 RX ADMIN — SENNOSIDES AND DOCUSATE SODIUM 2 TABLET: 50; 8.6 TABLET ORAL at 20:47

## 2024-10-27 RX ADMIN — ACETAZOLAMIDE 250 MG: 250 TABLET ORAL at 17:50

## 2024-10-27 RX ADMIN — HYDRALAZINE HYDROCHLORIDE 10 MG: 20 INJECTION INTRAMUSCULAR; INTRAVENOUS at 23:24

## 2024-10-27 RX ADMIN — INSULIN LISPRO 10 UNITS: 100 INJECTION, SUSPENSION SUBCUTANEOUS at 20:46

## 2024-10-27 RX ADMIN — ACETAZOLAMIDE 250 MG: 250 TABLET ORAL at 14:05

## 2024-10-27 RX ADMIN — BUMETANIDE 2 MG: 0.25 INJECTION INTRAMUSCULAR; INTRAVENOUS at 20:47

## 2024-10-27 RX ADMIN — ASPIRIN 81 MG CHEWABLE TABLET 81 MG: 81 TABLET CHEWABLE at 10:48

## 2024-10-27 RX ADMIN — NICARDIPINE HYDROCHLORIDE 7.5 MG/HR: 25 INJECTION, SOLUTION INTRAVENOUS at 06:44

## 2024-10-27 RX ADMIN — BUMETANIDE 2 MG: 0.25 INJECTION INTRAMUSCULAR; INTRAVENOUS at 14:05

## 2024-10-27 RX ADMIN — HYDRALAZINE HYDROCHLORIDE 100 MG: 25 TABLET ORAL at 14:05

## 2024-10-27 RX ADMIN — INSULIN LISPRO 20 UNITS: 100 INJECTION, SUSPENSION SUBCUTANEOUS at 10:49

## 2024-10-27 RX ADMIN — CARVEDILOL 12.5 MG: 6.25 TABLET, FILM COATED ORAL at 17:50

## 2024-10-27 RX ADMIN — IPRATROPIUM BROMIDE AND ALBUTEROL SULFATE 3 ML: .5; 3 SOLUTION RESPIRATORY (INHALATION) at 22:13

## 2024-10-27 RX ADMIN — BUMETANIDE 2 MG: 0.25 INJECTION INTRAMUSCULAR; INTRAVENOUS at 03:15

## 2024-10-27 RX ADMIN — HEPARIN SODIUM 5000 UNITS: 5000 INJECTION INTRAVENOUS; SUBCUTANEOUS at 20:47

## 2024-10-27 RX ADMIN — LEVOTHYROXINE SODIUM 50 MCG: 0.05 TABLET ORAL at 06:00

## 2024-10-27 RX ADMIN — NICARDIPINE HYDROCHLORIDE 7.5 MG/HR: 25 INJECTION, SOLUTION INTRAVENOUS at 03:14

## 2024-10-27 NOTE — PLAN OF CARE
Goal Outcome Evaluation:      Cardene drip that was ordered overnight was stopped this morning as blood pressures come under better control. ABG ordered for increased confusion over night into this morning. As the day progressed the confusion resolved.

## 2024-10-27 NOTE — PLAN OF CARE
Problem: Acute Kidney Injury/Impairment  Goal: Fluid and Electrolyte Balance  Outcome: Progressing  Goal: Improved Oral Intake  Outcome: Progressing  Goal: Effective Renal Function  Outcome: Progressing  Intervention: Monitor and Support Renal Function  Recent Flowsheet Documentation  Taken 10/27/2024 0400 by Yoko Trujillo RN  Stabilization Measures: verbal stimulation provided  Medication Review/Management: medications reviewed  Taken 10/27/2024 0200 by Yoko Trujillo RN  Medication Review/Management: medications reviewed  Taken 10/27/2024 0000 by Yoko Trujillo RN  Stabilization Measures: verbal stimulation provided  Medication Review/Management: medications reviewed  Taken 10/26/2024 2200 by Yoko Trujillo RN  Medication Review/Management: medications reviewed  Taken 10/26/2024 2000 by Yoko Trujillo RN  Medication Review/Management: medications reviewed     Problem: Adult Inpatient Plan of Care  Goal: Plan of Care Review  Outcome: Progressing  Flowsheets (Taken 10/27/2024 0508)  Progress: no change  Goal: Patient-Specific Goal (Individualized)  Outcome: Progressing  Goal: Absence of Hospital-Acquired Illness or Injury  Outcome: Progressing  Intervention: Identify and Manage Fall Risk  Recent Flowsheet Documentation  Taken 10/27/2024 0400 by Yoko Trujillo RN  Safety Promotion/Fall Prevention: safety round/check completed  Taken 10/27/2024 0200 by Yoko Trujillo RN  Safety Promotion/Fall Prevention: safety round/check completed  Taken 10/27/2024 0000 by Yoko Trujillo RN  Safety Promotion/Fall Prevention:   activity supervised   assistive device/personal items within reach   clutter free environment maintained   safety round/check completed  Taken 10/26/2024 2200 by Yoko Trujillo RN  Safety Promotion/Fall Prevention: safety round/check completed  Taken 10/26/2024 2000 by oYko Trujillo RN  Safety Promotion/Fall Prevention: safety round/check completed  Intervention: Prevent Skin Injury  Recent  Flowsheet Documentation  Taken 10/27/2024 0400 by Yoko Trujillo RN  Body Position: turned  Skin Protection: incontinence pads utilized  Taken 10/27/2024 0200 by Yoko Trujillo RN  Body Position: turned  Taken 10/27/2024 0000 by Yoko Trujillo RN  Body Position: turned  Skin Protection: incontinence pads utilized  Taken 10/26/2024 2200 by Yoko Trujillo RN  Body Position: turned  Taken 10/26/2024 2000 by Yoko Trujillo RN  Body Position: turned  Skin Protection: incontinence pads utilized  Intervention: Prevent Infection  Recent Flowsheet Documentation  Taken 10/27/2024 0400 by Yoko Trujillo RN  Infection Prevention: hand hygiene promoted  Taken 10/27/2024 0200 by Yoko Trujillo RN  Infection Prevention: hand hygiene promoted  Taken 10/27/2024 0000 by Yoko Trujillo RN  Infection Prevention: hand hygiene promoted  Taken 10/26/2024 2200 by Yoko Trujillo RN  Infection Prevention: hand hygiene promoted  Taken 10/26/2024 2000 by Yoko Trujillo RN  Infection Prevention: hand hygiene promoted  Goal: Optimal Comfort and Wellbeing  Outcome: Progressing  Intervention: Provide Person-Centered Care  Recent Flowsheet Documentation  Taken 10/27/2024 0000 by Yoko Trujillo RN  Trust Relationship/Rapport:   care explained   choices provided   emotional support provided   empathic listening provided   questions encouraged   questions answered   reassurance provided   thoughts/feelings acknowledged  Taken 10/26/2024 2000 by Yoko Trujillo RN  Trust Relationship/Rapport:   care explained   choices provided   emotional support provided   empathic listening provided   questions answered   reassurance provided   questions encouraged   thoughts/feelings acknowledged  Goal: Readiness for Transition of Care  Outcome: Progressing     Problem: Skin Injury Risk Increased  Goal: Skin Health and Integrity  Outcome: Progressing  Intervention: Optimize Skin Protection  Recent Flowsheet Documentation  Taken 10/27/2024 0400 by Ronnie  ROMY Babcock  Activity Management: activity encouraged  Pressure Reduction Techniques: frequent weight shift encouraged  Head of Bed (HOB) Positioning:   HOB elevated   HOB at 30 degrees  Pressure Reduction Devices: positioning supports utilized  Skin Protection: incontinence pads utilized  Taken 10/27/2024 0200 by Yoko Trujillo RN  Activity Management: activity encouraged  Head of Bed (HOB) Positioning:   HOB elevated   HOB at 30 degrees  Taken 10/27/2024 0000 by Yoko Trujillo RN  Activity Management: activity encouraged  Pressure Reduction Techniques: frequent weight shift encouraged  Head of Bed (HOB) Positioning:   HOB elevated   HOB at 30 degrees  Pressure Reduction Devices: positioning supports utilized  Skin Protection: incontinence pads utilized  Taken 10/26/2024 2200 by Yoko Trujillo RN  Activity Management: activity encouraged  Head of Bed (HOB) Positioning:   HOB elevated   HOB at 30 degrees  Taken 10/26/2024 2000 by Yoko Trujillo RN  Activity Management: activity encouraged  Pressure Reduction Techniques: frequent weight shift encouraged  Head of Bed (HOB) Positioning:   HOB elevated   HOB at 30 degrees  Pressure Reduction Devices: positioning supports utilized  Skin Protection: incontinence pads utilized     Problem: Comorbidity Management  Goal: Maintenance of Asthma Control  Outcome: Progressing  Intervention: Maintain Asthma Symptom Control  Recent Flowsheet Documentation  Taken 10/27/2024 0400 by Yoko Trujillo RN  Medication Review/Management: medications reviewed  Taken 10/27/2024 0200 by Yoko Trujillo RN  Medication Review/Management: medications reviewed  Taken 10/27/2024 0000 by Yoko Trujillo RN  Medication Review/Management: medications reviewed  Taken 10/26/2024 2200 by Yoko Trujillo RN  Medication Review/Management: medications reviewed  Taken 10/26/2024 2000 by Yoko Trujillo RN  Medication Review/Management: medications reviewed  Goal: Maintenance of Behavioral Health Symptom  Control  Outcome: Progressing  Intervention: Maintain Behavioral Health Symptom Control  Recent Flowsheet Documentation  Taken 10/27/2024 0400 by Yoko Trujillo RN  Medication Review/Management: medications reviewed  Taken 10/27/2024 0200 by Yoko Trujillo RN  Medication Review/Management: medications reviewed  Taken 10/27/2024 0000 by Yoko Trujillo RN  Medication Review/Management: medications reviewed  Taken 10/26/2024 2200 by Yoko Trujillo RN  Medication Review/Management: medications reviewed  Taken 10/26/2024 2000 by Yoko Trujillo RN  Medication Review/Management: medications reviewed  Goal: Maintenance of COPD Symptom Control  Outcome: Progressing  Intervention: Maintain COPD (Chronic Obstructive Pulmonary Disease) Symptom Control  Recent Flowsheet Documentation  Taken 10/27/2024 0400 by Yoko Trujillo RN  Medication Review/Management: medications reviewed  Taken 10/27/2024 0200 by Yoko Trujillo RN  Medication Review/Management: medications reviewed  Taken 10/27/2024 0000 by Yoko Trujillo RN  Medication Review/Management: medications reviewed  Taken 10/26/2024 2200 by Yoko Trujillo RN  Medication Review/Management: medications reviewed  Taken 10/26/2024 2000 by Yoko Trujillo RN  Medication Review/Management: medications reviewed  Goal: Blood Glucose Level Within Target Range  Outcome: Progressing  Intervention: Monitor and Manage Glycemia  Recent Flowsheet Documentation  Taken 10/27/2024 0400 by Yoko Trujillo RN  Medication Review/Management: medications reviewed  Taken 10/27/2024 0200 by Yoko Trujillo RN  Medication Review/Management: medications reviewed  Taken 10/27/2024 0000 by Yoko Trujillo RN  Medication Review/Management: medications reviewed  Taken 10/26/2024 2200 by Yoko Trujillo RN  Medication Review/Management: medications reviewed  Taken 10/26/2024 2000 by Yoko Trujillo RN  Medication Review/Management: medications reviewed  Goal: Maintenance of Heart Failure Symptom  Control  Outcome: Progressing  Intervention: Maintain Heart Failure Management  Recent Flowsheet Documentation  Taken 10/27/2024 0400 by Yoko Trujillo RN  Medication Review/Management: medications reviewed  Taken 10/27/2024 0200 by Yoko Trujillo RN  Medication Review/Management: medications reviewed  Taken 10/27/2024 0000 by Yoko Trujillo RN  Medication Review/Management: medications reviewed  Taken 10/26/2024 2200 by Yoko Trujillo RN  Medication Review/Management: medications reviewed  Taken 10/26/2024 2000 by Yoko Trujillo RN  Medication Review/Management: medications reviewed  Goal: Blood Pressure in Desired Range  Outcome: Progressing  Intervention: Maintain Blood Pressure Management  Recent Flowsheet Documentation  Taken 10/27/2024 0400 by Yoko Trujillo RN  Medication Review/Management: medications reviewed  Taken 10/27/2024 0200 by Yoko Trujillo RN  Medication Review/Management: medications reviewed  Taken 10/27/2024 0000 by Yoko Trujillo RN  Medication Review/Management: medications reviewed  Taken 10/26/2024 2200 by Yoko Trujillo RN  Medication Review/Management: medications reviewed  Taken 10/26/2024 2000 by Yoko Trujillo RN  Medication Review/Management: medications reviewed  Goal: Maintenance of Osteoarthritis Symptom Control  Outcome: Progressing  Intervention: Maintain Osteoarthritis Symptom Control  Recent Flowsheet Documentation  Taken 10/27/2024 0400 by Yoko Trujillo RN  Activity Management: activity encouraged  Assistive Device Utilized: walker  Medication Review/Management: medications reviewed  Taken 10/27/2024 0200 by Yoko Trujillo RN  Activity Management: activity encouraged  Medication Review/Management: medications reviewed  Taken 10/27/2024 0000 by Yoko Trujillo RN  Activity Management: activity encouraged  Medication Review/Management: medications reviewed  Taken 10/26/2024 2200 by Yoko Trujillo RN  Activity Management: activity encouraged  Medication  Review/Management: medications reviewed  Taken 10/26/2024 2000 by Yoko Trujillo RN  Activity Management: activity encouraged  Medication Review/Management: medications reviewed  Goal: Bariatric Home Regimen Maintained  Outcome: Progressing  Intervention: Maintain and Manage Postbariatric Surgery Care  Recent Flowsheet Documentation  Taken 10/27/2024 0400 by Yoko Trujillo RN  Medication Review/Management: medications reviewed  Taken 10/27/2024 0200 by Yoko Trujillo RN  Medication Review/Management: medications reviewed  Taken 10/27/2024 0000 by Yoko Trujillo RN  Medication Review/Management: medications reviewed  Taken 10/26/2024 2200 by Yoko Trujillo RN  Medication Review/Management: medications reviewed  Taken 10/26/2024 2000 by Yoko Trujillo RN  Medication Review/Management: medications reviewed  Goal: Maintenance of Seizure Control  Outcome: Progressing  Intervention: Maintain Seizure Symptom Control  Recent Flowsheet Documentation  Taken 10/27/2024 0400 by Yoko Trujillo RN  Sensory Stimulation Regulation: care clustered  Medication Review/Management: medications reviewed  Seizure Precautions: activity supervised  Taken 10/27/2024 0200 by Yoko Trujillo RN  Medication Review/Management: medications reviewed  Taken 10/27/2024 0000 by Yoko Trujillo RN  Sensory Stimulation Regulation: care clustered  Medication Review/Management: medications reviewed  Seizure Precautions: activity supervised  Taken 10/26/2024 2200 by Yoko Trujillo RN  Medication Review/Management: medications reviewed  Taken 10/26/2024 2000 by Yoko Trujillo RN  Sensory Stimulation Regulation: care clustered  Medication Review/Management: medications reviewed  Seizure Precautions:   activity supervised   clutter-free environment maintained     Problem: Fall Injury Risk  Goal: Absence of Fall and Fall-Related Injury  Outcome: Progressing  Intervention: Identify and Manage Contributors  Recent Flowsheet Documentation  Taken  10/27/2024 0400 by Yoko Trujillo RN  Medication Review/Management: medications reviewed  Self-Care Promotion: independence encouraged  Taken 10/27/2024 0200 by Yoko Trujillo RN  Medication Review/Management: medications reviewed  Self-Care Promotion: independence encouraged  Taken 10/27/2024 0000 by Yoko Trujillo RN  Medication Review/Management: medications reviewed  Self-Care Promotion: independence encouraged  Taken 10/26/2024 2200 by Yoko Trujillo RN  Medication Review/Management: medications reviewed  Self-Care Promotion: independence encouraged  Taken 10/26/2024 2000 by Yoko Trujillo RN  Medication Review/Management: medications reviewed  Self-Care Promotion: independence encouraged  Intervention: Promote Injury-Free Environment  Recent Flowsheet Documentation  Taken 10/27/2024 0400 by Yoko Trujillo RN  Safety Promotion/Fall Prevention: safety round/check completed  Taken 10/27/2024 0200 by Yoko Trujillo RN  Safety Promotion/Fall Prevention: safety round/check completed  Taken 10/27/2024 0000 by Yoko Trujillo RN  Safety Promotion/Fall Prevention:   activity supervised   assistive device/personal items within reach   clutter free environment maintained   safety round/check completed  Taken 10/26/2024 2200 by Yoko Trujillo RN  Safety Promotion/Fall Prevention: safety round/check completed  Taken 10/26/2024 2000 by Yoko Trujillo RN  Safety Promotion/Fall Prevention: safety round/check completed   Goal Outcome Evaluation:           Progress: no change

## 2024-10-27 NOTE — PROGRESS NOTES
PROGRESS NOTE      Patient Name: Leona Garcia  : 1945  MRN: [id number]  Primary Care Physician: Alfred Liriano MD  Date of admission: 10/21/2024    Patient Care Team:  Alfred Liriano MD as PCP - General (Family Medicine)    JOSELITO    Subjective   Subjective:     Patient seen and examined   Renal functions stable, urine output 2.6 L, creatinine 3.03 CO2 38.84.1 K is 3.6.  Developing alkalosis but is still has increased extracellular fluid volume.    Review of systems:  Negative      Allergies:    Allergies   Allergen Reactions    Codeine Nausea Only, Unknown - Low Severity and Other (See Comments)     Dizziness    Amlodipine Unknown - Low Severity       Objective   Exam:     Vital Signs  Temp:  [97.4 °F (36.3 °C)-98 °F (36.7 °C)] 97.4 °F (36.3 °C)  Heart Rate:  [61-77] 66  Resp:  [14-17] 17  BP: (121-197)/() 146/58  SpO2:  [88 %-100 %] 95 %  on  Flow (L/min) (Oxygen Therapy):  [2-3] 3;   Device (Oxygen Therapy): nasal cannula  Body mass index is 41.31 kg/m².    General: female in no acute distress.    Head:      Normocephalic and atraumatic.    Eyes:      PERRL/EOM intact, conjunctivae and sclerae clear without nystagmus.    Neck:      No masses, thyromegaly,  trachea central   Lungs:    Clear bilaterally to auscultation.    Heart:      Regular rate and rhythm, no murmur no gallop  Abd:        Soft, nontender, not distended, bowel sounds positive, no shifting dullness.  Msk:        No deformity or scoliosis noted of thoracic or lumbar spine.    Pulses:   Pulses normal in all 4 extremities.    Extremities:        No cyanosis or clubbing--++ edema.    Neuro:    No focal deficits.   alert oriented x3  Skin:       Intact without lesions or rashes.    Psych:    Alert and cooperative; normal mood and affect; normal attention span       Results Review:  I have personally reviewed most recent Data :  CBC    Results from last 7 days   Lab Units 10/27/24  0309 10/26/24  0616 10/25/24  1676  10/24/24  1423 10/24/24  0523 10/23/24  0419 10/22/24  0417 10/21/24  1400   WBC 10*3/mm3 8.59 7.83 7.23  --  7.01 6.63 5.46 6.02   HEMOGLOBIN g/dL 10.1* 9.3* 8.9* 9.0* 6.9* 8.1* 8.0* 8.3*   PLATELETS 10*3/mm3 205 198 171  --  173 160 154 166     CMP   Results from last 7 days   Lab Units 10/27/24  0309 10/26/24  1344 10/26/24  0616 10/25/24  1358 10/25/24  0355 10/24/24  1423 10/24/24  0523 10/23/24  0419 10/22/24  1313 10/22/24  0417 10/21/24  2040 10/21/24  1400   SODIUM mmol/L 141  --  141  --  139  --  143 144  --  147* 145 142   POTASSIUM mmol/L 4.0 3.8 3.6 3.7 3.6 3.7 3.6 3.9  --  4.7 4.8 4.5   CHLORIDE mmol/L 92*  --  93*  --  94*  --  100 104  --  108* 106 104   CO2 mmol/L 38.8*  --  36.4*  --  35.9*  --  33.3* 31.5*  --  29.0 26.3 27.8   BUN mg/dL 63*  --  67*  --  75*  --  77* 81*  --  84* 83* 82*   CREATININE mg/dL 3.03*  --  3.23*  --  3.47*  --  3.40* 3.33*  --  3.76* 3.62* 3.57*   GLUCOSE mg/dL 116*  --  215*  --  219*  --  160* 136*  --  275* 266* 267*   ALBUMIN g/dL  --   --   --   --   --   --   --  3.3* 3.2  --   --  3.5   BILIRUBIN mg/dL  --   --   --   --   --   --   --  0.3  --   --   --  0.4   ALK PHOS U/L  --   --   --   --   --   --   --  161*  --   --   --  192*   AST (SGOT) U/L  --   --   --   --   --   --   --  43*  --   --   --  63*   ALT (SGPT) U/L  --   --   --   --   --   --   --  82*  --   --   --  96*   LIPASE U/L  --   --   --   --   --   --   --   --   --   --   --  21     ABG      XR Chest 1 View    Result Date: 10/25/2024  Impression: Stable cardiomegaly with vascular congestive features. Left basilar airspace disease obscures the diaphragmatic margin, may represent atelectasis or pneumonia. Probable mild right basilar atelectasis. Suspected trace bibasilar pleural fluid. Electronically Signed: Arely Jaramillo MD  10/25/2024 2:10 PM EDT  Workstation ID: [id number]     Results for orders placed during the hospital encounter of 10/21/24    Adult Transthoracic Echo Complete W/ Cont if  Necessary Per Protocol    Interpretation Summary    Left ventricular ejection fraction appears to be 56 - 60%.    Left ventricular wall thickness is consistent with mild concentric hypertrophy.    Left ventricular diastolic function is consistent with (grade I) impaired relaxation.    The right ventricular cavity is mildly dilated.    The left atrial cavity is moderate to severely dilated.    The right atrial cavity is moderately  dilated.    Moderate to severe tricuspid valve regurgitation is present.    Estimated right ventricular systolic pressure from tricuspid regurgitation is moderately elevated (45-55 mmHg).    Moderate pulmonary hypertension is present.    Scheduled Meds:acetaZOLAMIDE, 250 mg, Oral, 4x Daily  aspirin, 81 mg, Oral, Daily  bumetanide, 2 mg, Intravenous, Q8H  carvedilol, 12.5 mg, Oral, BID With Meals  heparin (porcine), 5,000 Units, Subcutaneous, Q12H  hydrALAZINE, 100 mg, Oral, Q8H  insulin lispro, 2-7 Units, Subcutaneous, 4x Daily AC & at Bedtime  insulin lispro protamine-insulin lispro, 10 Units, Subcutaneous, Nightly  insulin lispro protamine-insulin lispro, 20 Units, Subcutaneous, Daily With Breakfast  levothyroxine, 50 mcg, Oral, Q AM  sevelamer, 800 mg, Oral, TID With Meals  sodium chloride, 10 mL, Intravenous, Q12H      Continuous Infusions:niCARdipine, 5-15 mg/hr, Last Rate: 5 mg/hr (10/27/24 0738)        PRN Meds:  acetaminophen    senna-docusate sodium **AND** polyethylene glycol **AND** bisacodyl **AND** bisacodyl    dextrose    dextrose    glucagon (human recombinant)    hydrALAZINE    influenza vaccine    Magnesium Standard Dose Replacement - Follow Nurse / BPA Driven Protocol    nitroglycerin    ondansetron    Phosphorus Replacement - Follow Nurse / BPA Driven Protocol    Potassium Replacement - Follow Nurse / BPA Driven Protocol    [COMPLETED] Insert peripheral IV **AND** sodium chloride    sodium chloride    Assessment & Plan   Assessment and Plan:         JOSELITO (acute kidney  injury)    ASSESSMENT:  JOSELITO now etiology  CKD stage III  Volume overload with increased edema  HTN stable   DM  Anemia  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 Etiology uncertain dont look like free water deficit     ECHO from 2023: EF 61- 65% with grade 1 diastolic dysfunction and mild TR.           PLAN :      Patient with JOSELITO, creatinine improved .Baseline around 1.7-1.9 I think. Etiology likely from volume overload, maybe some CRS.   Still has significantly increased volume but developing alkalosis has extracellular fluid.    Continue Bumex  2 mg IV every 8 hours.  Restart Diamox 250 mg 4 times a day x 4.    Supplement potassium chloride as needed.  Woodall catheter removed.   Urine studies reviewed, urine sodium 52, did show some hematuria as well as 1.1 grams proteinuria. Likely from diabetic nephropathy but need to monitor and will check serology   Renal ultrasound left kidney not visualized because of some body habitus right kidney within normal limit   continue metolazone because of the high sodium  Clinically patient is better much more alert oriented  ECHO : diastolic heart failre with some valvular issues  Other electrolytes and acid-base acceptable  Significant anemia improved s/p pRBCs  We will continue to follow       Kehinde Garcia MD.  Electronically signed by   [facility] kidney consultant  [phone]  10/27/2024  10:48 EDT

## 2024-10-27 NOTE — PROGRESS NOTES
Referring Provider: Sharron Grissom MD    Reason for follow-up:  Elevated troponin     Patient Care Team:  Alfred Liriano MD as PCP - General (Family Medicine)    Subjective .      ROS    Today, the patient has been without any chest discomfort ,shortness of breath, palpitations, dizziness or syncope.  Denies having any headache ,abdominal pain ,nausea, vomiting , diarrhea constipation, loss of weight or loss of appetite.  Denies having any excessive bruising ,hematuria or blood in the stool.    Review of all systems negative except as indicated    History  Past Medical History:   Diagnosis Date    Anemia     Chronic kidney disease (CKD)     Diabetes mellitus     Disease of thyroid gland     Hypertension        History reviewed. No pertinent surgical history.    History reviewed. No pertinent family history.    Social History     Tobacco Use    Smoking status: Never    Smokeless tobacco: Never   Vaping Use    Vaping status: Never Used   Substance Use Topics    Alcohol use: Never    Drug use: Never        Medications Prior to Admission   Medication Sig Dispense Refill Last Dose/Taking    aspirin 81 MG chewable tablet Chew 1 tablet Daily.   10/21/2024 at  9:00 AM    ezetimibe (ZETIA) 10 MG tablet Take 1 tablet by mouth Daily.   10/21/2024 Morning    furosemide (LASIX) 40 MG tablet Take 1 tablet by mouth Daily.   10/21/2024 at  9:00 AM    hydrALAZINE (APRESOLINE) 100 MG tablet Take 1 tablet by mouth Daily.   10/21/2024 at  9:00 AM    insulin NPH-insulin regular (humuLIN 70/30,novoLIN 70/30) (70-30) 100 UNIT/ML injection Inject 10 Units under the skin into the appropriate area as directed Every Night. 20 units QAM + 10 units QPM   10/21/2024    insulin NPH-insulin regular (humuLIN 70/30,novoLIN 70/30) (70-30) 100 UNIT/ML injection Inject 20 Units under the skin into the appropriate area as directed Every Morning. 20 units QAM + 10 units QPM   10/21/2024    levothyroxine (SYNTHROID, LEVOTHROID) 50 MCG tablet  Take 1 tablet by mouth Daily.   10/21/2024 at  9:00 AM    losartan (COZAAR) 100 MG tablet Take 1 tablet by mouth Daily.   10/21/2024 at  9:00 AM    potassium chloride 10 MEQ CR tablet Take 1 tablet by mouth Daily.   10/21/2024 at  9:00 AM    carvedilol (COREG) 6.25 MG tablet Take 1 tablet by mouth 2 (Two) Times a Day With Meals for 30 days. 60 tablet 0     fluticasone (FLONASE) 50 MCG/ACT nasal spray Administer 2 sprays into the nostril(s) as directed by provider Daily.   Unknown       Allergies  Codeine and Amlodipine    Scheduled Meds:acetaZOLAMIDE, 250 mg, Oral, 4x Daily  aspirin, 81 mg, Oral, Daily  bumetanide, 2 mg, Intravenous, Q8H  carvedilol, 12.5 mg, Oral, BID With Meals  heparin (porcine), 5,000 Units, Subcutaneous, Q12H  hydrALAZINE, 100 mg, Oral, Q8H  insulin lispro, 2-7 Units, Subcutaneous, 4x Daily AC & at Bedtime  insulin lispro protamine-insulin lispro, 10 Units, Subcutaneous, Nightly  insulin lispro protamine-insulin lispro, 20 Units, Subcutaneous, Daily With Breakfast  levothyroxine, 50 mcg, Oral, Q AM  sevelamer, 800 mg, Oral, TID With Meals  sodium chloride, 10 mL, Intravenous, Q12H      Continuous Infusions:niCARdipine, 5-15 mg/hr, Last Rate: 5 mg/hr (10/27/24 0738)      PRN Meds:.  acetaminophen    senna-docusate sodium **AND** polyethylene glycol **AND** bisacodyl **AND** bisacodyl    dextrose    dextrose    glucagon (human recombinant)    hydrALAZINE    influenza vaccine    Magnesium Standard Dose Replacement - Follow Nurse / BPA Driven Protocol    nitroglycerin    ondansetron    Phosphorus Replacement - Follow Nurse / BPA Driven Protocol    Potassium Replacement - Follow Nurse / BPA Driven Protocol    [COMPLETED] Insert peripheral IV **AND** sodium chloride    sodium chloride    Objective     VITAL SIGNS  Vitals:    10/27/24 0600 10/27/24 0644 10/27/24 0738 10/27/24 0741   BP: 140/57 125/49 146/58 146/58   BP Location:    Left arm   Patient Position:    Lying   Pulse: 63 63 66    Resp:    17  "  Temp:    97.4 °F (36.3 °C)   TempSrc:    Axillary   SpO2: 95%      Weight:       Height:           Flowsheet Rows      Flowsheet Row First Filed Value   Admission Height 167.6 cm (66\") Documented at 10/21/2024 1309   Admission Weight 130 kg (286 lb 9.6 oz) Documented at 10/21/2024 1309              Intake/Output Summary (Last 24 hours) at 10/27/2024 0831  Last data filed at 10/27/2024 0600  Gross per 24 hour   Intake 495 ml   Output 5201 ml   Net -4706 ml        TELEMETRY: Sinus rhythm    Physical Exam:  The patient is alert, oriented and in no distress.  Vital signs as noted above.  Head and neck revealed no carotid bruits or jugular venous distention.  No thyromegaly or lymphadenopathy is present  Lungs clear.  No wheezing.  Breath sounds are normal bilaterally.  Heart normal first and second heart sounds.  No murmur. No precordial rub is present.  No gallop is present.  Abdomen soft and nontender.  No organomegaly is present.  Extremities with good peripheral pulses without any pedal edema.  Skin warm and dry.  Musculoskeletal system is grossly normal  CNS grossly normal    Reviewed and updated.    Results Review:   I reviewed the patient's new clinical results.  Lab Results (last 24 hours)       Procedure Component Value Units Date/Time    POC Glucose 4x Daily Before Meals & at Bedtime [[id number]]  (Abnormal) Collected: 10/27/24 0736    Specimen: Blood Updated: 10/27/24 0746     Glucose 114 mg/dL      Comment: Serial Number: [id number]:  [id number]       Basic Metabolic Panel [[id number]]  (Abnormal) Collected: 10/27/24 0309    Specimen: Blood Updated: 10/27/24 0340     Glucose 116 mg/dL      BUN 63 mg/dL      Creatinine 3.03 mg/dL      Sodium 141 mmol/L      Potassium 4.0 mmol/L      Comment: Specimen hemolyzed.  Result may be falsely elevated.        Chloride 92 mmol/L      CO2 38.8 mmol/L      Calcium 9.9 mg/dL      BUN/Creatinine Ratio 20.8     Anion Gap 10.2 mmol/L      eGFR 15.3 mL/min/1.73     " Narrative:      GFR Normal >60  Chronic Kidney Disease <60  Kidney Failure <15    The GFR formula is only valid for adults with stable renal function between ages 18 and 70.    CBC (No Diff) [[id number]]  (Abnormal) Collected: 10/27/24 0309    Specimen: Blood Updated: 10/27/24 0319     WBC 8.59 10*3/mm3      RBC 3.65 10*6/mm3      Hemoglobin 10.1 g/dL      Hematocrit 32.7 %      MCV 89.6 fL      MCH 27.7 pg      MCHC 30.9 g/dL      RDW 15.6 %      RDW-SD 51.0 fl      MPV 11.4 fL      Platelets 205 10*3/mm3     POC Glucose Once [[id number]]  (Abnormal) Collected: 10/26/24 2030    Specimen: Blood Updated: 10/26/24 2031     Glucose 149 mg/dL      Comment: Serial Number: [id number]:  [id number]       POC Glucose 4x Daily Before Meals & at Bedtime [[id number]]  (Abnormal) Collected: 10/26/24 1743    Specimen: Blood Updated: 10/26/24 1745     Glucose 130 mg/dL      Comment: Serial Number: [id number]:  [id number]       Potassium [[id number]]  (Normal) Collected: 10/26/24 1344    Specimen: Blood from Cannula Updated: 10/26/24 1423     Potassium 3.8 mmol/L      Comment: Specimen hemolyzed.  Result may be falsely elevated.       POC Glucose 4x Daily Before Meals & at Bedtime [[id number]]  (Abnormal) Collected: 10/26/24 1216    Specimen: Blood Updated: 10/26/24 1220     Glucose 165 mg/dL      Comment: Serial Number: [id number]:  [id number]               Imaging Results (Last 24 Hours)       ** No results found for the last 24 hours. **        LAB RESULTS (LAST 7 DAYS)    CBC  Results from last 7 days   Lab Units 10/27/24  0309 10/26/24  0616 10/25/24  0355 10/24/24  1423 10/24/24  0523 10/23/24  0419 10/22/24  0417 10/21/24  1400   WBC 10*3/mm3 8.59 7.83 7.23  --  7.01 6.63 5.46 6.02   RBC 10*6/mm3 3.65* 3.38* 3.20*  --  2.46* 2.94* 2.87* 3.08*   HEMOGLOBIN g/dL 10.1* 9.3* 8.9* 9.0* 6.9* 8.1* 8.0* 8.3*   HEMATOCRIT % 32.7* 29.8* 28.3* 29.0* 22.2* 26.7* 26.2* 28.0*   MCV fL 89.6 88.2 88.4  --  90.2 90.8 91.3 90.9    PLATELETS 10*3/mm3 205 198 171  --  173 160 154 166       BMP  Results from last 7 days   Lab Units 10/27/24  0309 10/26/24  1344 10/26/24  0616 10/25/24  1358 10/25/24  0355 10/24/24  1423 10/24/24  0523 10/23/24  0419 10/22/24  0417 10/21/24  2040   SODIUM mmol/L 141  --  141  --  139  --  143 144 147* 145   POTASSIUM mmol/L 4.0 3.8 3.6 3.7 3.6 3.7 3.6 3.9 4.7 4.8   CHLORIDE mmol/L 92*  --  93*  --  94*  --  100 104 108* 106   CO2 mmol/L 38.8*  --  36.4*  --  35.9*  --  33.3* 31.5* 29.0 26.3   BUN mg/dL 63*  --  67*  --  75*  --  77* 81* 84* 83*   CREATININE mg/dL 3.03*  --  3.23*  --  3.47*  --  3.40* 3.33* 3.76* 3.62*   GLUCOSE mg/dL 116*  --  215*  --  219*  --  160* 136* 275* 266*   MAGNESIUM mg/dL  --   --   --   --   --   --  2.2 2.4 3.1* 2.6*   PHOSPHORUS mg/dL  --   --   --   --   --   --   --  4.9*  --  5.2*       CMP   Results from last 7 days   Lab Units 10/27/24  0309 10/26/24  1344 10/26/24  0616 10/25/24  1358 10/25/24  0355 10/24/24  1423 10/24/24  0523 10/23/24  0419 10/22/24  1313 10/22/24  0417 10/21/24  2040 10/21/24  1400   SODIUM mmol/L 141  --  141  --  139  --  143 144  --  147* 145 142   POTASSIUM mmol/L 4.0 3.8 3.6 3.7 3.6 3.7 3.6 3.9  --  4.7 4.8 4.5   CHLORIDE mmol/L 92*  --  93*  --  94*  --  100 104  --  108* 106 104   CO2 mmol/L 38.8*  --  36.4*  --  35.9*  --  33.3* 31.5*  --  29.0 26.3 27.8   BUN mg/dL 63*  --  67*  --  75*  --  77* 81*  --  84* 83* 82*   CREATININE mg/dL 3.03*  --  3.23*  --  3.47*  --  3.40* 3.33*  --  3.76* 3.62* 3.57*   GLUCOSE mg/dL 116*  --  215*  --  219*  --  160* 136*  --  275* 266* 267*   ALBUMIN g/dL  --   --   --   --   --   --   --  3.3* 3.2  --   --  3.5   BILIRUBIN mg/dL  --   --   --   --   --   --   --  0.3  --   --   --  0.4   ALK PHOS U/L  --   --   --   --   --   --   --  161*  --   --   --  192*   AST (SGOT) U/L  --   --   --   --   --   --   --  43*  --   --   --  63*   ALT (SGPT) U/L  --   --   --   --   --   --   --  82*  --   --   --  96*    LIPASE U/L  --   --   --   --   --   --   --   --   --   --   --  21         BNP        TROPONIN  Results from last 7 days   Lab Units 10/21/24  2338 10/21/24  2040   CK TOTAL U/L  --  263*   HSTROP T ng/L 227* 249*       CoAg        Creatinine Clearance  Estimated Creatinine Clearance: 19.8 mL/min (A) (by C-G formula based on SCr of 3.03 mg/dL (H)).    ABG        Radiology  XR Chest 1 View    Result Date: 10/25/2024  Impression: Stable cardiomegaly with vascular congestive features. Left basilar airspace disease obscures the diaphragmatic margin, may represent atelectasis or pneumonia. Probable mild right basilar atelectasis. Suspected trace bibasilar pleural fluid. Electronically Signed: Arely Jaramillo MD  10/25/2024 2:10 PM EDT  Workstation ID: [id number]         EKG                        I personally viewed and interpreted the patient's EKG/Telemetry data: Sinus bradycardia    ECHOCARDIOGRAM:    Results for orders placed during the hospital encounter of 10/21/24    Adult Transthoracic Echo Complete W/ Cont if Necessary Per Protocol    Interpretation Summary    Left ventricular ejection fraction appears to be 56 - 60%.    Left ventricular wall thickness is consistent with mild concentric hypertrophy.    Left ventricular diastolic function is consistent with (grade I) impaired relaxation.    The right ventricular cavity is mildly dilated.    The left atrial cavity is moderate to severely dilated.    The right atrial cavity is moderately  dilated.    Moderate to severe tricuspid valve regurgitation is present.    Estimated right ventricular systolic pressure from tricuspid regurgitation is moderately elevated (45-55 mmHg).    Moderate pulmonary hypertension is present.          STRESS TEST        Cardiolite (Tc-99m sestamibi) stress test    CARDIAC CATHETERIZATION  No results found for this or any previous visit.                OTHER:         Assessment & Plan     Principal Problem:    JOSELITO (acute kidney  "injury)      Primary cardiologist note was reviewed.    \"Cardiology assessment and plan        Abnormal elevated troponin in the setting of renal failure  Flat troponin with no significant trend  No chest discomfort  EKG with no acute ST changes  Acute on chronic diastolic dysfunction  Abnormal elevated proBNP secondary diastolic dysfunction  Chronic lower extremity venous edema  Chronic RV dysfunction  Diastolic dysfunction  Acute on chronic kidney injury  Hypertension  Hyperlipidemia  Diabetes mellitus  Hypothyroidism  Anemia  Chronic longstanding lower extremity edema  Invasive workup in the past deferred secondary to renal failure  Repeat echocardiogram to assess LV systolic function  Medical records from primary cardiologist reviewed  Patient never had a cardiac catheterization  Cardiac catheterization was withheld secondary to underlying renal failure and no significant ischemic burden on the stress test  Poor functional status  Tmax is 98 pulse is 69 respirations are 14 blood pressure is 148/52 sats are 98%  Sodium is 139 potassium is 3.6 creatinine is 3.4 hemoglobin is 8.9  Patient is currently -5L  Volume status is better  Echocardiogram with normal LV systolic function with RV dysfunction moderate to severe tricuspid regurgitation with a dilated IVC  Agree with diuresis and close monitoring of renal function  Diagnosis and treatment options reviewed and discussed with patient  Continue current medical therapy continue close monitoring  Further recommendation based on patient course\"    ]]]]]]]]]]]]]]]]]]]]]  10/27/2024  Elevated troponin level probably due to renal dysfunction.    BUN/creatinine 67/3.23: 10/26/2024  63/3.03: 10/27/2024    Echocardiogram 10/21/2024    Left ventricular ejection fraction appears to be 56 - 60%.    Left ventricular wall thickness is consistent with mild concentric hypertrophy.    Left ventricular diastolic function is consistent with (grade I) impaired relaxation.    The right " ventricular cavity is mildly dilated.    The left atrial cavity is moderate to severely dilated.    The right atrial cavity is moderately  dilated.    Moderate to severe tricuspid valve regurgitation is present.    Estimated right ventricular systolic pressure from tricuspid regurgitation is moderately elevated (45-55 mmHg).    Moderate pulmonary hypertension is present.    Hypertension dyslipidemia diabetes hypothyroidism.    Patient would benefit from further ischemic workup.  Primary cardiologist is planning on having cardiac catheterization as an outpatient depending on patient's renal function.    Primary cardiologist, Dr. Reyes/Dr. Braun will see the patient tomorrow.    Further plan will depend on patient's progress.    Reviewed and updated 10/27/2024.  ]]]]]]]]]]]]]]]]]]      Dianne Dickens MD  10/27/24  08:31 EDT

## 2024-10-28 ENCOUNTER — APPOINTMENT (OUTPATIENT)
Dept: CT IMAGING | Facility: HOSPITAL | Age: 79
End: 2024-10-28
Payer: MEDICARE

## 2024-10-28 ENCOUNTER — APPOINTMENT (OUTPATIENT)
Dept: GENERAL RADIOLOGY | Facility: HOSPITAL | Age: 79
End: 2024-10-28
Payer: MEDICARE

## 2024-10-28 LAB
ANION GAP SERPL CALCULATED.3IONS-SCNC: 10 MMOL/L (ref 5–15)
ANION GAP SERPL CALCULATED.3IONS-SCNC: 8.9 MMOL/L (ref 5–15)
BUN SERPL-MCNC: 59 MG/DL (ref 8–23)
BUN SERPL-MCNC: 61 MG/DL (ref 8–23)
BUN/CREAT SERPL: 19.6 (ref 7–25)
BUN/CREAT SERPL: 19.9 (ref 7–25)
CALCIUM SPEC-SCNC: 10.1 MG/DL (ref 8.6–10.5)
CALCIUM SPEC-SCNC: 9.8 MG/DL (ref 8.6–10.5)
CHLORIDE SERPL-SCNC: 88 MMOL/L (ref 98–107)
CHLORIDE SERPL-SCNC: 89 MMOL/L (ref 98–107)
CO2 SERPL-SCNC: 40.1 MMOL/L (ref 22–29)
CO2 SERPL-SCNC: 41 MMOL/L (ref 22–29)
CREAT SERPL-MCNC: 2.97 MG/DL (ref 0.57–1)
CREAT SERPL-MCNC: 3.12 MG/DL (ref 0.57–1)
DEPRECATED RDW RBC AUTO: 50.9 FL (ref 37–54)
EGFRCR SERPLBLD CKD-EPI 2021: 14.8 ML/MIN/1.73
EGFRCR SERPLBLD CKD-EPI 2021: 15.6 ML/MIN/1.73
ERYTHROCYTE [DISTWIDTH] IN BLOOD BY AUTOMATED COUNT: 15.3 % (ref 12.3–15.4)
GLUCOSE BLDC GLUCOMTR-MCNC: 123 MG/DL (ref 70–105)
GLUCOSE BLDC GLUCOMTR-MCNC: 168 MG/DL (ref 70–105)
GLUCOSE BLDC GLUCOMTR-MCNC: 172 MG/DL (ref 70–105)
GLUCOSE BLDC GLUCOMTR-MCNC: 175 MG/DL (ref 70–105)
GLUCOSE SERPL-MCNC: 104 MG/DL (ref 65–99)
GLUCOSE SERPL-MCNC: 163 MG/DL (ref 65–99)
HCT VFR BLD AUTO: 36.2 % (ref 34–46.6)
HGB BLD-MCNC: 10.8 G/DL (ref 12–15.9)
MAGNESIUM SERPL-MCNC: 1.7 MG/DL (ref 1.6–2.4)
MCH RBC QN AUTO: 27 PG (ref 26.6–33)
MCHC RBC AUTO-ENTMCNC: 29.8 G/DL (ref 31.5–35.7)
MCV RBC AUTO: 90.5 FL (ref 79–97)
PLATELET # BLD AUTO: 242 10*3/MM3 (ref 140–450)
PMV BLD AUTO: 11.2 FL (ref 6–12)
POTASSIUM SERPL-SCNC: 2.9 MMOL/L (ref 3.5–5.2)
POTASSIUM SERPL-SCNC: 3.2 MMOL/L (ref 3.5–5.2)
POTASSIUM SERPL-SCNC: 3.5 MMOL/L (ref 3.5–5.2)
POTASSIUM SERPL-SCNC: 3.5 MMOL/L (ref 3.5–5.2)
RBC # BLD AUTO: 4 10*6/MM3 (ref 3.77–5.28)
SODIUM SERPL-SCNC: 138 MMOL/L (ref 136–145)
SODIUM SERPL-SCNC: 139 MMOL/L (ref 136–145)
WBC NRBC COR # BLD AUTO: 9.82 10*3/MM3 (ref 3.4–10.8)

## 2024-10-28 PROCEDURE — 63710000001 INSULIN LISPRO (HUMAN) PER 5 UNITS: Performed by: NURSE PRACTITIONER

## 2024-10-28 PROCEDURE — 97535 SELF CARE MNGMENT TRAINING: CPT

## 2024-10-28 PROCEDURE — 94799 UNLISTED PULMONARY SVC/PX: CPT

## 2024-10-28 PROCEDURE — 99221 1ST HOSP IP/OBS SF/LOW 40: CPT

## 2024-10-28 PROCEDURE — 63710000001 INSULIN LISPRO PROTAMINE-INSULIN LISPRO (75-25) 100 UNIT/ML SUSPENSION: Performed by: INTERNAL MEDICINE

## 2024-10-28 PROCEDURE — 83735 ASSAY OF MAGNESIUM: CPT | Performed by: STUDENT IN AN ORGANIZED HEALTH CARE EDUCATION/TRAINING PROGRAM

## 2024-10-28 PROCEDURE — 25010000002 HEPARIN (PORCINE) PER 1000 UNITS: Performed by: NURSE PRACTITIONER

## 2024-10-28 PROCEDURE — 25010000002 NICARDIPINE 2.5 MG/ML SOLUTION 10 ML VIAL: Performed by: STUDENT IN AN ORGANIZED HEALTH CARE EDUCATION/TRAINING PROGRAM

## 2024-10-28 PROCEDURE — 82948 REAGENT STRIP/BLOOD GLUCOSE: CPT

## 2024-10-28 PROCEDURE — 99232 SBSQ HOSP IP/OBS MODERATE 35: CPT | Performed by: INTERNAL MEDICINE

## 2024-10-28 PROCEDURE — 94664 DEMO&/EVAL PT USE INHALER: CPT

## 2024-10-28 PROCEDURE — 97530 THERAPEUTIC ACTIVITIES: CPT

## 2024-10-28 PROCEDURE — 25010000002 HYDRALAZINE PER 20 MG: Performed by: NURSE PRACTITIONER

## 2024-10-28 PROCEDURE — 71250 CT THORAX DX C-: CPT

## 2024-10-28 PROCEDURE — 85027 COMPLETE CBC AUTOMATED: CPT | Performed by: INTERNAL MEDICINE

## 2024-10-28 PROCEDURE — 25010000002 BUMETANIDE PER 0.5 MG: Performed by: INTERNAL MEDICINE

## 2024-10-28 PROCEDURE — 97112 NEUROMUSCULAR REEDUCATION: CPT

## 2024-10-28 PROCEDURE — 84132 ASSAY OF SERUM POTASSIUM: CPT | Performed by: INTERNAL MEDICINE

## 2024-10-28 PROCEDURE — 25810000003 SODIUM CHLORIDE 0.9 % SOLUTION 250 ML FLEX CONT: Performed by: STUDENT IN AN ORGANIZED HEALTH CARE EDUCATION/TRAINING PROGRAM

## 2024-10-28 PROCEDURE — 97116 GAIT TRAINING THERAPY: CPT

## 2024-10-28 PROCEDURE — 80048 BASIC METABOLIC PNL TOTAL CA: CPT | Performed by: INTERNAL MEDICINE

## 2024-10-28 PROCEDURE — 94761 N-INVAS EAR/PLS OXIMETRY MLT: CPT

## 2024-10-28 PROCEDURE — 25010000002 LABETALOL 5 MG/ML SOLUTION: Performed by: STUDENT IN AN ORGANIZED HEALTH CARE EDUCATION/TRAINING PROGRAM

## 2024-10-28 PROCEDURE — 71045 X-RAY EXAM CHEST 1 VIEW: CPT

## 2024-10-28 RX ORDER — POTASSIUM CHLORIDE 1500 MG/1
40 TABLET, EXTENDED RELEASE ORAL ONCE
Status: COMPLETED | OUTPATIENT
Start: 2024-10-28 | End: 2024-10-28

## 2024-10-28 RX ORDER — MIRTAZAPINE 15 MG/1
7.5 TABLET, FILM COATED ORAL NIGHTLY
Status: DISCONTINUED | OUTPATIENT
Start: 2024-10-28 | End: 2024-10-28

## 2024-10-28 RX ORDER — POTASSIUM CHLORIDE 1500 MG/1
20 TABLET, EXTENDED RELEASE ORAL ONCE
Status: COMPLETED | OUTPATIENT
Start: 2024-10-28 | End: 2024-10-28

## 2024-10-28 RX ORDER — BUMETANIDE 1 MG/1
2 TABLET ORAL EVERY 8 HOURS
Status: DISCONTINUED | OUTPATIENT
Start: 2024-10-28 | End: 2024-10-29

## 2024-10-28 RX ORDER — LABETALOL HYDROCHLORIDE 5 MG/ML
10 INJECTION, SOLUTION INTRAVENOUS ONCE
Status: COMPLETED | OUTPATIENT
Start: 2024-10-28 | End: 2024-10-28

## 2024-10-28 RX ADMIN — HYDRALAZINE HYDROCHLORIDE 100 MG: 25 TABLET ORAL at 13:33

## 2024-10-28 RX ADMIN — NICARDIPINE HYDROCHLORIDE 5 MG/HR: 25 INJECTION, SOLUTION INTRAVENOUS at 02:41

## 2024-10-28 RX ADMIN — POTASSIUM CHLORIDE 40 MEQ: 1500 TABLET, EXTENDED RELEASE ORAL at 12:04

## 2024-10-28 RX ADMIN — INSULIN LISPRO 2 UNITS: 100 INJECTION, SOLUTION INTRAVENOUS; SUBCUTANEOUS at 20:44

## 2024-10-28 RX ADMIN — IPRATROPIUM BROMIDE AND ALBUTEROL SULFATE 3 ML: .5; 3 SOLUTION RESPIRATORY (INHALATION) at 11:10

## 2024-10-28 RX ADMIN — IPRATROPIUM BROMIDE AND ALBUTEROL SULFATE 3 ML: .5; 3 SOLUTION RESPIRATORY (INHALATION) at 19:04

## 2024-10-28 RX ADMIN — BUMETANIDE 2 MG: 0.25 INJECTION INTRAMUSCULAR; INTRAVENOUS at 04:23

## 2024-10-28 RX ADMIN — POTASSIUM CHLORIDE 20 MEQ: 1500 TABLET, EXTENDED RELEASE ORAL at 04:24

## 2024-10-28 RX ADMIN — ACETAZOLAMIDE 250 MG: 250 TABLET ORAL at 08:06

## 2024-10-28 RX ADMIN — INSULIN LISPRO 10 UNITS: 100 INJECTION, SUSPENSION SUBCUTANEOUS at 20:45

## 2024-10-28 RX ADMIN — POTASSIUM CHLORIDE 20 MEQ: 1500 TABLET, EXTENDED RELEASE ORAL at 20:44

## 2024-10-28 RX ADMIN — IPRATROPIUM BROMIDE AND ALBUTEROL SULFATE 3 ML: .5; 3 SOLUTION RESPIRATORY (INHALATION) at 07:40

## 2024-10-28 RX ADMIN — ACETAMINOPHEN 650 MG: 325 TABLET, FILM COATED ORAL at 21:33

## 2024-10-28 RX ADMIN — ACETAZOLAMIDE 250 MG: 250 TABLET ORAL at 11:38

## 2024-10-28 RX ADMIN — SEVELAMER CARBONATE 800 MG: 800 TABLET, FILM COATED ORAL at 11:38

## 2024-10-28 RX ADMIN — Medication 10 ML: at 08:05

## 2024-10-28 RX ADMIN — HEPARIN SODIUM 5000 UNITS: 5000 INJECTION INTRAVENOUS; SUBCUTANEOUS at 08:05

## 2024-10-28 RX ADMIN — SEVELAMER CARBONATE 800 MG: 800 TABLET, FILM COATED ORAL at 17:45

## 2024-10-28 RX ADMIN — Medication 10 ML: at 20:51

## 2024-10-28 RX ADMIN — CARVEDILOL 12.5 MG: 6.25 TABLET, FILM COATED ORAL at 17:45

## 2024-10-28 RX ADMIN — ASPIRIN 81 MG CHEWABLE TABLET 81 MG: 81 TABLET CHEWABLE at 08:06

## 2024-10-28 RX ADMIN — INSULIN LISPRO 2 UNITS: 100 INJECTION, SOLUTION INTRAVENOUS; SUBCUTANEOUS at 16:42

## 2024-10-28 RX ADMIN — CARVEDILOL 12.5 MG: 6.25 TABLET, FILM COATED ORAL at 08:06

## 2024-10-28 RX ADMIN — SEVELAMER CARBONATE 800 MG: 800 TABLET, FILM COATED ORAL at 08:05

## 2024-10-28 RX ADMIN — HYDRALAZINE HYDROCHLORIDE 100 MG: 25 TABLET ORAL at 04:23

## 2024-10-28 RX ADMIN — INSULIN LISPRO 20 UNITS: 100 INJECTION, SUSPENSION SUBCUTANEOUS at 08:06

## 2024-10-28 RX ADMIN — POTASSIUM CHLORIDE 40 MEQ: 1500 TABLET, EXTENDED RELEASE ORAL at 11:38

## 2024-10-28 RX ADMIN — BUMETANIDE 2 MG: 1 TABLET ORAL at 16:43

## 2024-10-28 RX ADMIN — HYDRALAZINE HYDROCHLORIDE 10 MG: 20 INJECTION INTRAMUSCULAR; INTRAVENOUS at 08:16

## 2024-10-28 RX ADMIN — LABETALOL HYDROCHLORIDE 10 MG: 5 INJECTION, SOLUTION INTRAVENOUS at 01:48

## 2024-10-28 RX ADMIN — HEPARIN SODIUM 5000 UNITS: 5000 INJECTION INTRAVENOUS; SUBCUTANEOUS at 20:44

## 2024-10-28 RX ADMIN — ACETAMINOPHEN 650 MG: 325 TABLET, FILM COATED ORAL at 04:24

## 2024-10-28 RX ADMIN — HYDRALAZINE HYDROCHLORIDE 100 MG: 25 TABLET ORAL at 21:21

## 2024-10-28 RX ADMIN — INSULIN LISPRO 2 UNITS: 100 INJECTION, SOLUTION INTRAVENOUS; SUBCUTANEOUS at 11:38

## 2024-10-28 RX ADMIN — BUMETANIDE 2 MG: 0.25 INJECTION INTRAMUSCULAR; INTRAVENOUS at 11:38

## 2024-10-28 RX ADMIN — LEVOTHYROXINE SODIUM 50 MCG: 0.05 TABLET ORAL at 04:24

## 2024-10-28 NOTE — PROGRESS NOTES
PROGRESS NOTE      Patient Name: Leona Garcia  : 1945  MRN: [id number]  Primary Care Physician: Alfred Liriano MD  Date of admission: 10/21/2024    Patient Care Team:  Alfred Liriano MD as PCP - General (Family Medicine)    JOSELITO    Subjective   Subjective:     Patient resting comfortably.  She is a little slow but able to communicate.  She had 7 L urine output last 24 hours.  Review of systems:  Negative      Allergies:    Allergies   Allergen Reactions    Codeine Nausea Only, Unknown - Low Severity and Other (See Comments)     Dizziness    Amlodipine Unknown - Low Severity       Objective   Exam:     Vital Signs  Temp:  [97.4 °F (36.3 °C)-97.7 °F (36.5 °C)] 97.4 °F (36.3 °C)  Heart Rate:  [56-68] 57  Resp:  [10-22] 17  BP: (116-188)/(47-80) 136/55  SpO2:  [91 %-100 %] 95 %  on  Flow (L/min) (Oxygen Therapy):  [2-3] 2;   Device (Oxygen Therapy): nasal cannula  Body mass index is 41.31 kg/m².    General: f chronically ill no acute distress  Heart:     RRR  Abd:        Soft, nontender  Extremities:   Chronic appearing lower extremity edema, venous stasis, compression stockings, trace dependent edema  Neuro: Slightly confused      Results Review:  I have personally reviewed most recent Data :  CBC    Results from last 7 days   Lab Units 10/28/24  0245 10/27/24  0309 10/26/24  0616 10/25/24  0355 10/24/24  1423 10/24/24  0523 10/23/24  0419 10/22/24  0417   WBC 10*3/mm3 9.82 8.59 7.83 7.23  --  7.01 6.63 5.46   HEMOGLOBIN g/dL 10.8* 10.1* 9.3* 8.9* 9.0* 6.9* 8.1* 8.0*   PLATELETS 10*3/mm3 242 205 198 171  --  173 160 154     CMP   Results from last 7 days   Lab Units 10/28/24  0930 10/28/24  0245 10/27/24  0309 10/26/24  1344 10/26/24  0616 10/25/24  1358 10/25/24  0355 10/24/24  1423 10/24/24  0523 10/23/24  0419 10/22/24  1313 10/22/24  0417 10/21/24  2040 10/21/24  1400   SODIUM mmol/L  --  139 141  --  141  --  139  --  143 144  --  147*   < > 142   POTASSIUM mmol/L 2.9* 3.2* 4.0 3.8 3.6  3.7 3.6   < > 3.6 3.9  --  4.7   < > 4.5   CHLORIDE mmol/L  --  88* 92*  --  93*  --  94*  --  100 104  --  108*   < > 104   CO2 mmol/L  --  41.0* 38.8*  --  36.4*  --  35.9*  --  33.3* 31.5*  --  29.0   < > 27.8   BUN mg/dL  --  61* 63*  --  67*  --  75*  --  77* 81*  --  84*   < > 82*   CREATININE mg/dL  --  3.12* 3.03*  --  3.23*  --  3.47*  --  3.40* 3.33*  --  3.76*   < > 3.57*   GLUCOSE mg/dL  --  104* 116*  --  215*  --  219*  --  160* 136*  --  275*   < > 267*   ALBUMIN g/dL  --   --   --   --   --   --   --   --   --  3.3* 3.2  --   --  3.5   BILIRUBIN mg/dL  --   --   --   --   --   --   --   --   --  0.3  --   --   --  0.4   ALK PHOS U/L  --   --   --   --   --   --   --   --   --  161*  --   --   --  192*   AST (SGOT) U/L  --   --   --   --   --   --   --   --   --  43*  --   --   --  63*   ALT (SGPT) U/L  --   --   --   --   --   --   --   --   --  82*  --   --   --  96*   LIPASE U/L  --   --   --   --   --   --   --   --   --   --   --   --   --  21    < > = values in this interval not displayed.     ABG    Results from last 7 days   Lab Units 10/27/24  1130   PH, ARTERIAL pH units 7.458*   PCO2, ARTERIAL mm Hg 61.1*   PO2 ART mm Hg 83.6   O2 SATURATION ART % 96.3   BASE EXCESS ART mmol/L 16.9*     XR Chest PA & Lateral    Result Date: 10/28/2024  Impression: 1. Enlargement of the cardiopericardial silhouette similar to the prior study. A component of a pericardial effusion in addition to cardiomegaly cannot be completely excluded. 2. Mild pulmonary vascular congestion. 3. Small bilateral pleural effusions Electronically Signed: Jere Cazares MD  10/28/2024 7:06 AM EDT  Workstation ID: [id number]     Results for orders placed during the hospital encounter of 10/21/24    Adult Transthoracic Echo Complete W/ Cont if Necessary Per Protocol    Interpretation Summary    Left ventricular ejection fraction appears to be 56 - 60%.    Left ventricular wall thickness is consistent with mild concentric  hypertrophy.    Left ventricular diastolic function is consistent with (grade I) impaired relaxation.    The right ventricular cavity is mildly dilated.    The left atrial cavity is moderate to severely dilated.    The right atrial cavity is moderately  dilated.    Moderate to severe tricuspid valve regurgitation is present.    Estimated right ventricular systolic pressure from tricuspid regurgitation is moderately elevated (45-55 mmHg).    Moderate pulmonary hypertension is present.    Scheduled Meds:acetaZOLAMIDE, 250 mg, Oral, 4x Daily  aspirin, 81 mg, Oral, Daily  bumetanide, 2 mg, Intravenous, Q8H  carvedilol, 12.5 mg, Oral, BID With Meals  heparin (porcine), 5,000 Units, Subcutaneous, Q12H  hydrALAZINE, 100 mg, Oral, Q8H  insulin lispro, 2-7 Units, Subcutaneous, 4x Daily AC & at Bedtime  insulin lispro protamine-insulin lispro, 10 Units, Subcutaneous, Nightly  insulin lispro protamine-insulin lispro, 20 Units, Subcutaneous, Daily With Breakfast  ipratropium-albuterol, 3 mL, Nebulization, 4x Daily - RT  levothyroxine, 50 mcg, Oral, Q AM  potassium chloride ER, 40 mEq, Oral, Once  sevelamer, 800 mg, Oral, TID With Meals  sodium chloride, 10 mL, Intravenous, Q12H      Continuous Infusions:niCARdipine, 5-15 mg/hr, Last Rate: Stopped (10/28/24 0530)        PRN Meds:  acetaminophen    senna-docusate sodium **AND** polyethylene glycol **AND** bisacodyl **AND** bisacodyl    dextrose    dextrose    glucagon (human recombinant)    hydrALAZINE    influenza vaccine    Magnesium Standard Dose Replacement - Follow Nurse / BPA Driven Protocol    nitroglycerin    ondansetron    Phosphorus Replacement - Follow Nurse / BPA Driven Protocol    Potassium Replacement - Follow Nurse / BPA Driven Protocol    [COMPLETED] Insert peripheral IV **AND** sodium chloride    sodium chloride    Assessment & Plan   Assessment and Plan:         JOSELITO (acute kidney injury)    ASSESSMENT:  JOSELITO now etiology  CKD stage III  Volume overload with  increased edema  HTN stable   DM  Anemia  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 Etiology uncertain dont look like free water deficit     ECHO from 2023: EF 61- 65% with grade 1 diastolic dysfunction and mild TR.           PLAN :      Patient with JOSELITO, creatinine improved .Baseline around 1.7-1.9.  Etiology multifactorial, overlapping ATN and cardiorenal syndrome.  Peak serum creatinine 3.76 mg/dL.  Overall with some mild improvement with volume control.  has diuresed significantly.  Her weight is down considerably and she is exhibiting signs of volume contraction.  I will repeat a chest x-ray today and if pulmonary edema better, I will plan to de-escalate to p.o. diuretics in the next 24 hours.  She will require aggressive potassium replacement: Needs at least 80 to 100 mEq p.o. potassium chloride today.  Please recheck potassium after replacement.  I will add on a magnesium level.  Azotemia stable.  Patient did have some mild proteinuria about 1 g.  Serological workup largely unremarkable.  Suspect diabetic nephropathy.  Metabolic alkalosis probably due to volume contraction: Replace potassium with potassium chloride aggressively and as above we will plan to de-escalate diuretics.  Continue Diamox.  Respiratory acidosis: Probably related to underlying airway disease but metabolic alkalosis can make hypercapnia worse.  We will need to watch and correct alkalosis.  BP stable: Continue current agents.    D/W RN.  MD [facility]    Electronically signed by   [facility]  [phone]  10/28/2024  11:34 EDT

## 2024-10-28 NOTE — THERAPY TREATMENT NOTE
"Subjective: Pt agreeable to therapeutic plan of care.    Objective:     Precautions - HFR, desats with activity   PT assisted RN with pt's dressing change of bilateral lower legs with PT encouraging pt to lift and hold legs against gravity with PT assistance as pt tolerated.     Bed mobility - Mod-A, Max-A, and Assist x 2  Transfers - Mod-A, Assist x 2, and with rolling walker  Ambulation - 5 feet Mod-A, Assist x 2, and with rolling walker    Vitals: WNL    Pain: pt tearful during dressing change of lower legs  Intervention for pain: Therapeutic Presence    Education: Provided education on the importance of mobility in the acute care setting, Verbal/Tactile Cues, Transfer Training, and Gait Training    Assessment: Leona Garcia presents with functional mobility impairments which indicate the need for skilled intervention. Pt with flat affect and pt was tearful. Pt requiring complete assist today for BLE management with supine>sit. Pt takes small, pivoting steps with rolling walker form bed over to recliner. Pt has very forward flexed posture and she requires 100% cueing for safe step sequencing and walker management. Pt fatigues easily with minimal exertion. Pt is very far below her baseline for mobility and SNF is the plan. Tolerating session today without incident. Will continue to follow and progress as tolerated.     Plan/Recommendations:   If medically appropriate, Moderate Intensity Therapy recommended post-acute care. This is recommended as therapy feels the patient would require 3-4 days per week and wouldn't tolerate \"3 hour daily\" rehab intensity. SNF would be the preferred choice. If the patient does not agree to SNF, arrange HH or OP depending on home bound status. If patient is medically complex, consider LTACH. Pt requires no DME at discharge.     Pt desires Skilled Rehab placement at discharge. Pt cooperative; agreeable to therapeutic recommendations and plan of care.         Basic Mobility " 6-click:  Rollin = Total, A lot = 2, A little = 3; 4 = None  Supine>Sit:   1 = Total, A lot = 2, A little = 3; 4 = None   Sit>Stand with arms:  1 = Total, A lot = 2, A little = 3; 4 = None  Bed>Chair:   1 = Total, A lot = 2, A little = 3; 4 = None  Ambulate in room:  1 = Total, A lot = 2, A little = 3; 4 = None  3-5 Steps with railin = Total, A lot = 2, A little = 3; 4 = None  Score: 11    Modified [place]: N/A = No pre-op stroke/TIA    Post-Tx Position: Up in Chair, Alarms activated, and Call light and personal items within reach  PPE: gloves    Therapy Charges for Today       Code Description Service Date Service Provider Modifiers Qty    [id number]  GAIT TRAINING EA 15 MIN 10/28/2024 Cori Velasquez, PT GP 1    [id number]  PT NEUROMUSC RE EDUCATION EA 15 MIN 10/28/2024 Cori Velasquez, PT GP 1

## 2024-10-28 NOTE — PLAN OF CARE
"Assessment: Leona Garcia presents with ADL impairments affecting function including balance, cognition, coordination, endurance / activity tolerance, pain, range of motion (ROM), and sensation / sensory awareness. Upon OT arrival to room pt tearful and reporting RUE numbness, OT assessed for sensation with RUE appearing to have sensation based on pt report. Pt upright in recliner with R sided lean, with MaxA pt repositioned in chair. Pt minimally verbally responsive this date with need for extended processing time as well as delayed recall response during orientation questions. Pt with noted difficulty swallowing this session, with c/o that her throat felt dry. Pt completed self care while seated in recliner with set up assist, pt required verbal and tactile cues x3 for initiation and termination of task, she appeared perseverative on wiping her forehead. OT attempted to assess FMC, opposition and finger to nose however pt reported \"I cannot do that\". OT attempted to assess BUE ROM Pt became tearful reporting she could not move LUE at all. Pt presented with a status change compared to previous sessions, notified nsg, OT will continue to monitor closely. Demonstrated functioning below baseline abilities indicate the need for continued skilled intervention while inpatient. Tolerating session today without incident. Will continue to follow and progress as tolerated.      Plan/Recommendations:   Moderate Intensity Therapy recommended post-acute care. This is recommended as therapy feels the patient would require 3-4 days per week and wouldn't tolerate \"3 hour daily\" rehab intensity. SNF would be the preferred choice. If the patient does not agree to SNF, arrange HH or OP depending on home bound status. If patient is medically complex, consider LTACH.. Pt requires no DME at discharge.           "

## 2024-10-28 NOTE — CONSULTS
Referring Provider: Sharron Grissom MD  Reason for Consultation: depression       Chief complaint depression     Subjective .     History of present illness:  The patient is a 79 y.o. female who was admitted secondary to lethargy and weakness.     PMH: DM, HTN, CKD stage 3, hypothyroidism, anemia.     Psychiatry was consulted due to concern for depression.     The patient was seen this afternoon. She was alert and oriented, but seemed like she was having difficulty with expressing herself. I asked her about the tearful episode she had had earlier in the day. The patient reported that she was upset because she thought she was wet from her external catheter. Her nurse checked it for her while I was there in the room, and reassured her she was dry underneath. The patient continued to ruminate on that topic. I asked her about whether she felt like she had been sad or depressed, and she declined several times, stating no she hadn't been depressed. She states she lives with her sister, who also just got out of the hospital, and she talked to her earlier on the phone.     The patient denies any past psychiatric hospitalizations. Denies any SI/HI/AVH.       Review of Systems   Pertinent items are noted in HPI, all other systems reviewed and negative    The following portions of the patient's history were reviewed and updated as appropriate: allergies, current medications, past family history, past medical history, past social history, past surgical history and problem list.    History    Past psychiatric history : denies     Past Medical History:   Diagnosis Date    Anemia     Chronic kidney disease (CKD)     Diabetes mellitus     Disease of thyroid gland     Hypertension         History reviewed. No pertinent family history.     Social History     Tobacco Use    Smoking status: Never    Smokeless tobacco: Never   Vaping Use    Vaping status: Never Used   Substance Use Topics    Alcohol use: Never    Drug use: Never           Medications Prior to Admission   Medication Sig Dispense Refill Last Dose/Taking    aspirin 81 MG chewable tablet Chew 1 tablet Daily.   10/21/2024 at  9:00 AM    ezetimibe (ZETIA) 10 MG tablet Take 1 tablet by mouth Daily.   10/21/2024 Morning    furosemide (LASIX) 40 MG tablet Take 1 tablet by mouth Daily.   10/21/2024 at  9:00 AM    hydrALAZINE (APRESOLINE) 100 MG tablet Take 1 tablet by mouth Daily.   10/21/2024 at  9:00 AM    insulin NPH-insulin regular (humuLIN 70/30,novoLIN 70/30) (70-30) 100 UNIT/ML injection Inject 10 Units under the skin into the appropriate area as directed Every Night. 20 units QAM + 10 units QPM   10/21/2024    insulin NPH-insulin regular (humuLIN 70/30,novoLIN 70/30) (70-30) 100 UNIT/ML injection Inject 20 Units under the skin into the appropriate area as directed Every Morning. 20 units QAM + 10 units QPM   10/21/2024    levothyroxine (SYNTHROID, LEVOTHROID) 50 MCG tablet Take 1 tablet by mouth Daily.   10/21/2024 at  9:00 AM    losartan (COZAAR) 100 MG tablet Take 1 tablet by mouth Daily.   10/21/2024 at  9:00 AM    potassium chloride 10 MEQ CR tablet Take 1 tablet by mouth Daily.   10/21/2024 at  9:00 AM    carvedilol (COREG) 6.25 MG tablet Take 1 tablet by mouth 2 (Two) Times a Day With Meals for 30 days. 60 tablet 0     fluticasone (FLONASE) 50 MCG/ACT nasal spray Administer 2 sprays into the nostril(s) as directed by provider Daily.   Unknown        Scheduled Meds:  aspirin, 81 mg, Oral, Daily  bumetanide, 2 mg, Oral, Q8H  carvedilol, 12.5 mg, Oral, BID With Meals  heparin (porcine), 5,000 Units, Subcutaneous, Q12H  hydrALAZINE, 100 mg, Oral, Q8H  insulin lispro, 2-7 Units, Subcutaneous, 4x Daily AC & at Bedtime  insulin lispro protamine-insulin lispro, 10 Units, Subcutaneous, Nightly  insulin lispro protamine-insulin lispro, 20 Units, Subcutaneous, Daily With Breakfast  ipratropium-albuterol, 3 mL, Nebulization, 4x Daily - RT  levothyroxine, 50 mcg, Oral, Q  "AM  sevelamer, 800 mg, Oral, TID With Meals  sodium chloride, 10 mL, Intravenous, Q12H         Continuous Infusions:  niCARdipine, 5-15 mg/hr, Last Rate: Stopped (10/28/24 0530)        PRN Meds:    acetaminophen    senna-docusate sodium **AND** polyethylene glycol **AND** bisacodyl **AND** bisacodyl    dextrose    dextrose    glucagon (human recombinant)    hydrALAZINE    influenza vaccine    Magnesium Standard Dose Replacement - Follow Nurse / BPA Driven Protocol    nitroglycerin    ondansetron    Phosphorus Replacement - Follow Nurse / BPA Driven Protocol    Potassium Replacement - Follow Nurse / BPA Driven Protocol    [COMPLETED] Insert peripheral IV **AND** sodium chloride    sodium chloride      Allergies:  Codeine and Amlodipine      Objective     Vital Signs   /56 (BP Location: Right arm, Patient Position: Sitting)   Pulse 59   Temp 97.5 °F (36.4 °C) (Oral)   Resp 19   Ht 167.6 cm (66\")   Wt 116 kg (255 lb 15.3 oz)   SpO2 97%   BMI 41.31 kg/m²     Physical Exam:    Musculoskeletal:   Muscle strength and tone: equal bilaterally   Abnormal Movements: None noted.   Gait: SHARAN      General Appearance:    In bed, in NAD.    MENTAL STATUS EXAM   General Appearance:  Cleanly groomed and dressed  Eye Contact:  Good eye contact  Attitude:  Cooperative  Motor Activity:  Normal gait, posture  Muscle Strength:  Normal  Speech:  Normal rate, tone, volume  Language:  Spontaneous  Mood and affect:  Flat  Hopelessness:  Denies  Loneliness: Denies  Thought Process:  Other  Other Comment:  Rumination  Associations/ Thought Content:  No delusions  Hallucinations:  None  Suicidal Ideations:  Not present  Homicidal Ideation:  Not present  Sensorium:  Alert  Orientation:  Person, place, time and other  Other Comment:  Appears somewhat disoriented to situation  Immediate Recall, Recent, and Remote Memory:  Intact  Attention Span/ Concentration:  Easily distracted  Fund of Knowledge:  Appropriate for age and educational " level  Intellectual Functioning:  Average range  Insight:  Fair  Judgement:  Fair  Reliability:  Fair  Impulse Control:  Fair           Result Review:  I have personally reviewed the results from the time of this admission to 10/28/2024 17:04 EDT and agree with these findings:  [x]  Laboratory  []  Microbiology  []  Radiology  [x]  EKG/Telemetry   []  Cardiology/Vascular   []  Pathology  []  Old records  []  Other:      Assessment & Plan       JOSELITO (acute kidney injury)     Assessment: assessment for depression   Treatment Plan: Patient was seen to assess for depression, however she denied any depression or SI. She was ruminating on her external catheter and feeling as though she was wet from that. I redirected her a couple times, and asked again about any sadness or depression, and she again declined.    Nothing further required from psychiatry standpoint at this time.     Continue supportive treatment.     Will follow PRN.   Treatment Plan discussed with: Patient and nursing     I discussed the patients findings and my recommendations with patient, nursing staff, and primary care team    I have reviewed and approved the behavioral health treatment plans and problem list. Yes  Thank you for the consult   Referring MD has access to consult report and progress notes in EMR     ORVILLE Hicks  10/28/24  17:04 EDT

## 2024-10-28 NOTE — PROGRESS NOTES
[facility] MEDICINE SERVICE  DAILY PROGRESS NOTE    NAME: Leona Garcia  : 1945  MRN: [id number]      LOS: 6 days     PROVIDER OF SERVICE: Sharron Grissom MD    Chief Complaint: JOSELITO (acute kidney injury)    Subjective:     Interval History:  History taken from: patient    Complaining of cold sensation in her right foot        Review of Systems:   Review of Systems   All other systems reviewed and are negative.      Objective:     Vital Signs  Temp:  [97.4 °F (36.3 °C)-97.8 °F (36.6 °C)] 97.5 °F (36.4 °C)  Heart Rate:  [56-77] 66  Resp:  [13-17] 16  BP: (121-197)/(49-82) 165/68  Flow (L/min) (Oxygen Therapy):  [2-3] 3   Body mass index is 41.31 kg/m².    Physical Exam  Physical Exam  Constitutional:       Appearance: Normal appearance.   HENT:      Head: Normocephalic and atraumatic.      Nose: Nose normal.      Mouth/Throat:      Mouth: Mucous membranes are moist.   Eyes:      Extraocular Movements: Extraocular movements intact.      Pupils: Pupils are equal, round, and reactive to light.   Cardiovascular:      Rate and Rhythm: Normal rate and regular rhythm.   Pulmonary:      Effort: Pulmonary effort is normal.      Breath sounds: Normal breath sounds.   Abdominal:      General: Abdomen is flat. Bowel sounds are normal.      Palpations: Abdomen is soft.   Musculoskeletal:      Cervical back: Normal range of motion and neck supple.      Comments: Bilateral feet are warm with palpable pulses.   Skin:     General: Skin is warm and dry.   Neurological:      General: No focal deficit present.      Mental Status: She is alert and oriented to person, place, and time.   Psychiatric:         Mood and Affect: Mood normal.         Behavior: Behavior normal.         Thought Content: Thought content normal.         Judgment: Judgment normal.         Current Medications:  Scheduled Meds:acetaZOLAMIDE, 250 mg, Oral, 4x Daily  aspirin, 81 mg, Oral, Daily  bumetanide, 2 mg, Intravenous, Q8H  carvedilol,  12.5 mg, Oral, BID With Meals  heparin (porcine), 5,000 Units, Subcutaneous, Q12H  hydrALAZINE, 100 mg, Oral, Q8H  insulin lispro, 2-7 Units, Subcutaneous, 4x Daily AC & at Bedtime  insulin lispro protamine-insulin lispro, 10 Units, Subcutaneous, Nightly  insulin lispro protamine-insulin lispro, 20 Units, Subcutaneous, Daily With Breakfast  levothyroxine, 50 mcg, Oral, Q AM  sevelamer, 800 mg, Oral, TID With Meals  sodium chloride, 10 mL, Intravenous, Q12H    Levothyroxine 50 mcg oral every morning  Continuous Infusions:niCARdipine, 5-15 mg/hr, Last Rate: 5 mg/hr (10/27/24 0738)        PRN Meds:.  acetaminophen    senna-docusate sodium **AND** polyethylene glycol **AND** bisacodyl **AND** bisacodyl    dextrose    dextrose    glucagon (human recombinant)    hydrALAZINE    influenza vaccine    Magnesium Standard Dose Replacement - Follow Nurse / BPA Driven Protocol    nitroglycerin    ondansetron    Phosphorus Replacement - Follow Nurse / BPA Driven Protocol    Potassium Replacement - Follow Nurse / BPA Driven Protocol    [COMPLETED] Insert peripheral IV **AND** sodium chloride    sodium chloride       Diagnostic Data    Results from last 7 days   Lab Units 10/27/24  0309 10/24/24  0523 10/23/24  0419   WBC 10*3/mm3 8.59   < > 6.63   HEMOGLOBIN g/dL 10.1*   < > 8.1*   HEMATOCRIT % 32.7*   < > 26.7*   PLATELETS 10*3/mm3 205   < > 160   GLUCOSE mg/dL 116*   < > 136*   CREATININE mg/dL 3.03*   < > 3.33*   BUN mg/dL 63*   < > 81*   SODIUM mmol/L 141   < > 144   POTASSIUM mmol/L 4.0   < > 3.9   AST (SGOT) U/L  --   --  43*   ALT (SGPT) U/L  --   --  82*   ALK PHOS U/L  --   --  161*   BILIRUBIN mg/dL  --   --  0.3   ANION GAP mmol/L 10.2   < > 8.5    < > = values in this interval not displayed.       No radiology results for the last day      I reviewed the patient's new clinical results.    Assessment/Plan:     Active and Resolved Problems  Active Hospital Problems    Diagnosis  POA    **JOSELITO (acute kidney injury) [N17.9]   Yes      Resolved Hospital Problems   No resolved problems to display.     Anemia  - H&H improved status post transfusion of 2 units of packed red blood cells.  Continue to monitor.    Acute hypercapnic respiratory failure  - ABG shows a pH of 7.4 pCO2 of 61 and a pO2 of 83.  Patient appears compensated however may benefit from BiPAP due to intermittent confusion per nursing.  During my examination at the bedside patient is completely alert and oriented to time, place and person..  I will however consult pulmonary to evaluate.    JOSELITO on CKD stage 3:  -Improving.  Avoid nephrotoxic drugs.  Continue to monitor.  Nephrology following.     Acute diastolic CHF exacerbation EF of 56 to 60%  -proBNP is elevated at 8,842  - Improving.  Now off of Bumex drip.  On IV Bumex every 8 hours.  Optimize medical management for CHF exacerbation, monitor strict I's and O's, daily weights, 2 g sodium diet.  I's and O's reflects fluid balance of -470.    Elevated Troponin  -Echo results reviewed.  EF of 56 to 60%.  No wall motion abnormalities noted.  Troponins elevated and trending slightly upwards.  EKG with slight T wave flattening in anterolateral leads.  Cardiology following.     Bradycardia:  -Resolved     Hypertension:  -Continue Coreg, continue hydralazine, continue Bumex, avoid ACE and ARB's for now due to JOSELITO.    Diabetes mellitus type 2:  -Blood glucose is better controlled.  Continue current insulin regimen.  Hemoglobin A1c is 9.65%     Hypothyroidism:  -Continue levothyroxine    Complaints of cold right foot  - On exam bilateral feet are warm with palpable pulses.  Keep feet covered to help with the sensation of coldness.  Warm pack applied around right foot.    VTE Prophylaxis:  Pharmacologic & mechanical VTE prophylaxis orders are present.             Disposition Planning:     Barriers to Discharge: Pending clinical improvement  Anticipated Date of Discharge: Pending clinical course and clinical improvement.  Also pending  SNF placement, with bed availability and insurance approval.  Place of Discharge: SNF        Code Status and Medical Interventions: CPR (Attempt to Resuscitate); Full Support   Ordered at: 10/21/24 2019     Code Status (Patient has no pulse and is not breathing):    CPR (Attempt to Resuscitate)     Medical Interventions (Patient has pulse or is breathing):    Full Support       Signature: Electronically signed by Sharron Grissom MD, 10/27/24, 20:59 EDT.  [facility], [place], operated by [facility] Hospitalist Team

## 2024-10-28 NOTE — PLAN OF CARE
"Goal Outcome Evaluation:  Plan of Care Reviewed With: patient    Assessment: Leona Garcia presents with functional mobility impairments which indicate the need for skilled intervention. Pt with flat affect and pt was tearful. Pt requiring complete assist today for BLE management with supine>sit. Pt takes small, pivoting steps with rolling walker form bed over to recliner. Pt has very forward flexed posture and she requires 100% cueing for safe step sequencing and walker management. Pt fatigues easily with minimal exertion. Pt is very far below her baseline for mobility and SNF is the plan. Tolerating session today without incident. Will continue to follow and progress as tolerated.     Plan/Recommendations:   If medically appropriate, Moderate Intensity Therapy recommended post-acute care. This is recommended as therapy feels the patient would require 3-4 days per week and wouldn't tolerate \"3 hour daily\" rehab intensity. SNF would be the preferred choice. If the patient does not agree to SNF, arrange HH or OP depending on home bound status. If patient is medically complex, consider LTACH. Pt requires no DME at discharge.     Pt desires Skilled Rehab placement at discharge. Pt cooperative; agreeable to therapeutic recommendations and plan of care.      Anticipated Discharge Disposition (PT): skilled nursing facility                        "

## 2024-10-28 NOTE — PLAN OF CARE
"Assessment: Leona Garcia presents with ADL impairments affecting function including balance, cognition, coordination, endurance / activity tolerance, pain, range of motion (ROM), and sensation / sensory awareness. Upon OT arrival to room pt tearful and reporting RUE numbness, OT assessed for sensation with RUE appearing to have sensation based on pt report. Pt upright in recliner with R sided lean, with MaxA pt repositioned in chair. Pt minimally verbally responsive this date with need for extended processing time as well as delayed recall response during orientation questions. Pt with noted difficulty swallowing this session, with c/o that her throat felt dry. Pt completed self care while seated in recliner with set up assist, pt required verbal and tactile cues x3 for initiation and termination of task, she appeared perseverative on wiping her forehead. OT attempted to assess FMC, opposition and finger to nose however pt reported \"I cannot do that\". OT attempted to assess BUE ROM Pt became tearful reporting she could not move LUE at all. Pt presented with a status change compared to previous sessions, OT will continue to monitor closely. Demonstrated functioning below baseline abilities indicate the need for continued skilled intervention while inpatient. Tolerating session today without incident. Will continue to follow and progress as tolerated.      Plan/Recommendations:   Moderate Intensity Therapy recommended post-acute care. This is recommended as therapy feels the patient would require 3-4 days per week and wouldn't tolerate \"3 hour daily\" rehab intensity. SNF would be the preferred choice. If the patient does not agree to SNF, arrange HH or OP depending on home bound status. If patient is medically complex, consider LTACH.. Pt requires no DME at discharge.           "

## 2024-10-28 NOTE — CONSULTS
Group: [facility]         CONSULT NOTE    Patient Identification:  Leona Garcia  79 y.o.  female  1945  [id number]            Requesting physician: Attending physician    Reason for Consultation: Hypoxia      History of Present Illness:  79-year-old female admitted on 10/21/2024 with weakness and fatigue and anemia and JOSELITO and elevated troponin  Due to abnormal ABG pulmonary consult was requested    Assessment:    Metabolic alkalosis with chronic/compensated hypercapnic respiratory insufficiency  ABG on 10/27/2024  pH 7.45/pCO2 61/pO2 83/bicarb 43    Moderate pulmonary hypertension    CKD with hypokalemia  Hypothyroidism  Diabetes mellitus      2D echo 10/22/2024    Left ventricular ejection fraction appears to be 56 - 60%.    Left ventricular wall thickness is consistent with mild concentric hypertrophy.    Left ventricular diastolic function is consistent with (grade I) impaired relaxation.    The right ventricular cavity is mildly dilated.    The left atrial cavity is moderate to severely dilated.    The right atrial cavity is moderately  dilated.    Moderate to severe tricuspid valve regurgitation is present.    Estimated right ventricular systolic pressure from tricuspid regurgitation is moderately elevated (45-55 mmHg).    Moderate pulmonary hypertension is present.      Recommendations:    Chest CT without contrast  Bronchodilators as needed  Oxygen titration    Currently on high-dose diuretics Bumex 2 mg every 8 hours  Followed by nephrology              Review of Sytems:  Review of Systems   Respiratory:  Positive for cough and shortness of breath. Negative for wheezing and stridor.    Cardiovascular:  Positive for palpitations and leg swelling. Negative for chest pain.       Past Medical History:  Past Medical History:   Diagnosis Date    Anemia     Chronic kidney disease (CKD)     Diabetes mellitus     Disease of thyroid gland     Hypertension        Past Surgical History:  History  reviewed. No pertinent surgical history.     Home Meds:  Medications Prior to Admission   Medication Sig Dispense Refill Last Dose/Taking    aspirin 81 MG chewable tablet Chew 1 tablet Daily.   10/21/2024 at  9:00 AM    ezetimibe (ZETIA) 10 MG tablet Take 1 tablet by mouth Daily.   10/21/2024 Morning    furosemide (LASIX) 40 MG tablet Take 1 tablet by mouth Daily.   10/21/2024 at  9:00 AM    hydrALAZINE (APRESOLINE) 100 MG tablet Take 1 tablet by mouth Daily.   10/21/2024 at  9:00 AM    insulin NPH-insulin regular (humuLIN 70/30,novoLIN 70/30) (70-30) 100 UNIT/ML injection Inject 10 Units under the skin into the appropriate area as directed Every Night. 20 units QAM + 10 units QPM   10/21/2024    insulin NPH-insulin regular (humuLIN 70/30,novoLIN 70/30) (70-30) 100 UNIT/ML injection Inject 20 Units under the skin into the appropriate area as directed Every Morning. 20 units QAM + 10 units QPM   10/21/2024    levothyroxine (SYNTHROID, LEVOTHROID) 50 MCG tablet Take 1 tablet by mouth Daily.   10/21/2024 at  9:00 AM    losartan (COZAAR) 100 MG tablet Take 1 tablet by mouth Daily.   10/21/2024 at  9:00 AM    potassium chloride 10 MEQ CR tablet Take 1 tablet by mouth Daily.   10/21/2024 at  9:00 AM    carvedilol (COREG) 6.25 MG tablet Take 1 tablet by mouth 2 (Two) Times a Day With Meals for 30 days. 60 tablet 0     fluticasone (FLONASE) 50 MCG/ACT nasal spray Administer 2 sprays into the nostril(s) as directed by provider Daily.   Unknown       Allergies:  Allergies   Allergen Reactions    Codeine Nausea Only, Unknown - Low Severity and Other (See Comments)     Dizziness    Amlodipine Unknown - Low Severity       Social History:   Social History     Socioeconomic History    Marital status:    Tobacco Use    Smoking status: Never    Smokeless tobacco: Never   Vaping Use    Vaping status: Never Used   Substance and Sexual Activity    Alcohol use: Never    Drug use: Never    Sexual activity: Not Currently  "      Family History:  History reviewed. No pertinent family history.    Physical Exam:  /62   Pulse 66   Temp 97.4 °F (36.3 °C) (Oral)   Resp 16   Ht 167.6 cm (66\")   Wt 116 kg (255 lb 15.3 oz)   SpO2 99%   BMI 41.31 kg/m²  Body mass index is 41.31 kg/m². 99% 116 kg (255 lb 15.3 oz)  Physical Exam  Cardiovascular:      Heart sounds: Murmur heard.      No gallop.   Pulmonary:      Effort: No respiratory distress.      Breath sounds: No stridor. Rhonchi and rales present. No wheezing.   Chest:      Chest wall: No tenderness.         LABS:  Lab Results   Component Value Date    CALCIUM 10.1 10/28/2024    PHOS 4.9 (H) 10/23/2024     Results from last 7 days   Lab Units 10/28/24  0245 10/27/24  0309 10/26/24  1344 10/26/24  0616 10/24/24  1423 10/24/24  0523 10/23/24  0419 10/22/24  1313 10/22/24  0417 10/21/24  2040 10/21/24  1400 10/21/24  1400   MAGNESIUM mg/dL  --   --   --   --   --  2.2 2.4  --  3.1* 2.6*   < >  --    SODIUM mmol/L 139 141  --  141   < > 143 144  --  147* 145  --  142   POTASSIUM mmol/L 3.2* 4.0 3.8 3.6   < > 3.6 3.9  --  4.7 4.8  --  4.5   CHLORIDE mmol/L 88* 92*  --  93*   < > 100 104  --  108* 106  --  104   CO2 mmol/L 41.0* 38.8*  --  36.4*   < > 33.3* 31.5*  --  29.0 26.3  --  27.8   BUN mg/dL 61* 63*  --  67*   < > 77* 81*  --  84* 83*  --  82*   CREATININE mg/dL 3.12* 3.03*  --  3.23*   < > 3.40* 3.33*  --  3.76* 3.62*  --  3.57*   GLUCOSE mg/dL 104* 116*  --  215*   < > 160* 136*  --  275* 266*  --  267*   CALCIUM mg/dL 10.1 9.9  --  8.9   < > 9.1 9.2  --  9.2 9.2  --  9.1   WBC 10*3/mm3 9.82 8.59  --  7.83   < > 7.01 6.63  --  5.46  --   --  6.02   HEMOGLOBIN g/dL 10.8* 10.1*  --  9.3*   < > 6.9* 8.1*  --  8.0*  --   --  8.3*   PLATELETS 10*3/mm3 242 205  --  198   < > 173 160  --  154  --   --  166   ALT (SGPT) U/L  --   --   --   --   --   --  82*  --   --   --   --  96*   AST (SGOT) U/L  --   --   --   --   --   --  43*  --   --   --   --  63*   PROBNP pg/mL  --   --   --  "  --   --   --   --   --   --  8,842.0*  --  9,565.0*   TOTAL PROTEIN g/dL  --   --   --   --   --   --   --  5.3*  --   --   --   --     < > = values in this interval not displayed.     Lab Results   Component Value Date    CKTOTAL 263 (H) 10/21/2024    TROPONINT 227 (C) 10/21/2024     Results from last 7 days   Lab Units 10/21/24  2338 10/21/24  2040 10/21/24  1636   CK TOTAL U/L  --  263*  --    HSTROP T ng/L 227* 249* 228*             Results from last 7 days   Lab Units 10/27/24  1130   PH, ARTERIAL pH units 7.458*   PCO2, ARTERIAL mm Hg 61.1*   PO2 ART mm Hg 83.6   O2 SATURATION ART % 96.3   MODALITY  Cannula     Results from last 7 days   Lab Units 10/21/24  1312   INFLUENZA B PCR  Not Detected   INFLUENZA A PCR  Not Detected             Lab Results   Component Value Date    TSH 2.440 10/21/2024     Estimated Creatinine Clearance: 19.2 mL/min (A) (by C-G formula based on SCr of 3.12 mg/dL (H)).  Results from last 7 days   Lab Units 10/22/24  1029   NITRITE UA  Negative   WBC UA /HPF 0-2   BACTERIA UA /HPF None Seen   SQUAM EPITHEL UA /HPF 3-6*        Imaging:  Imaging Results (Last 24 Hours)       Procedure Component Value Units Date/Time    XR Chest PA & Lateral [[id number]] Collected: 10/28/24 0657     Updated: 10/28/24 0708    Narrative:      XR CHEST PA AND LATERAL    Date of Exam: 10/27/2024 11:00 PM EDT    Indication: Acute hypoxic and hypercapnic respiratory failure    Comparison: 10/25/2024 and prior    Findings:  Persistent somewhat globular enlargement of the cardiac, pericardial silhouette is similar when accounting for differences in technique to most recent comparisons. Pulmonary vascularity is congested. No overt pulmonary edema is noted at this time. The   lungs appear to be grossly clear other than small bilateral pleural effusions noted posteriorly on the lateral view      Impression:      Impression:    1. Enlargement of the cardiopericardial silhouette similar to the prior study. A component  of a pericardial effusion in addition to cardiomegaly cannot be completely excluded.    2. Mild pulmonary vascular congestion.    3. Small bilateral pleural effusions      Electronically Signed: Jere Cazares MD    10/28/2024 7:06 AM EDT    Workstation ID: [id number]              Current Meds:   SCHEDULE  acetaZOLAMIDE, 250 mg, Oral, 4x Daily  aspirin, 81 mg, Oral, Daily  bumetanide, 2 mg, Intravenous, Q8H  carvedilol, 12.5 mg, Oral, BID With Meals  heparin (porcine), 5,000 Units, Subcutaneous, Q12H  hydrALAZINE, 100 mg, Oral, Q8H  insulin lispro, 2-7 Units, Subcutaneous, 4x Daily AC & at Bedtime  insulin lispro protamine-insulin lispro, 10 Units, Subcutaneous, Nightly  insulin lispro protamine-insulin lispro, 20 Units, Subcutaneous, Daily With Breakfast  ipratropium-albuterol, 3 mL, Nebulization, 4x Daily - RT  levothyroxine, 50 mcg, Oral, Q AM  sevelamer, 800 mg, Oral, TID With Meals  sodium chloride, 10 mL, Intravenous, Q12H      Infusions  niCARdipine, 5-15 mg/hr, Last Rate: Stopped (10/28/24 0530)      PRNs    acetaminophen    senna-docusate sodium **AND** polyethylene glycol **AND** bisacodyl **AND** bisacodyl    dextrose    dextrose    glucagon (human recombinant)    hydrALAZINE    influenza vaccine    Magnesium Standard Dose Replacement - Follow Nurse / BPA Driven Protocol    nitroglycerin    ondansetron    Phosphorus Replacement - Follow Nurse / BPA Driven Protocol    Potassium Replacement - Follow Nurse / BPA Driven Protocol    [COMPLETED] Insert peripheral IV **AND** sodium chloride    sodium chloride        Mary Adame MD  10/28/2024  08:44 EDT      Much of this encounter note is an electronic transcription/translation of spoken language to printed text using Dragon Software.

## 2024-10-28 NOTE — CASE MANAGEMENT/SOCIAL WORK
Continued Stay Note   [facility]     Patient Name: Leona Garcia  MRN: [id number]  Today's Date: 10/28/2024    Admit Date: 10/21/2024    Plan: DC Plan: [facility] accepted and following. Bed available. No precert required. PASRR approved.   Discharge Plan       Row Name 10/28/24 1317       Plan    Plan Comments D/C Barriers: Pulm/Neph/Cards following, Psych consult pending, IV Bumex, Cardene gtt, elevated Creatinine, Potassium replacement d/t low K @ 2.9               Expected Discharge Date and Time       Expected Discharge Date Expected Discharge Time    Oct 30, 2024        Justine Gaitan RN     [phone]  [email or web address]

## 2024-10-28 NOTE — PROGRESS NOTES
[facility] MEDICINE SERVICE  DAILY PROGRESS NOTE    NAME: Leona Garcia  : 1945  MRN: [id number]      LOS: 7 days     PROVIDER OF SERVICE: Sharron Grissom MD    Chief Complaint: JOSELITO (acute kidney injury)    Subjective:     Interval History:  History taken from: patient      Tearful and crying easily at the bedside.  She is alert and oriented x 3 but is not really expressing what is making her so tearful.      Review of Systems:   Review of Systems   All other systems reviewed and are negative.      Objective:     Vital Signs  Temp:  [97.4 °F (36.3 °C)-97.6 °F (36.4 °C)] 97.4 °F (36.3 °C)  Heart Rate:  [57-68] 59  Resp:  [10-22] 17  BP: (109-188)/(46-80) 151/62  Flow (L/min) (Oxygen Therapy):  [2-3] 2   Body mass index is 41.31 kg/m².    Physical Exam  Physical Exam  Constitutional:       Appearance: Normal appearance.   HENT:      Head: Normocephalic and atraumatic.      Nose: Nose normal.      Mouth/Throat:      Mouth: Mucous membranes are moist.   Eyes:      Extraocular Movements: Extraocular movements intact.      Pupils: Pupils are equal, round, and reactive to light.   Cardiovascular:      Rate and Rhythm: Normal rate and regular rhythm.   Pulmonary:      Effort: Pulmonary effort is normal.      Breath sounds: Normal breath sounds.   Abdominal:      General: Abdomen is flat. Bowel sounds are normal.      Palpations: Abdomen is soft.   Musculoskeletal:      Cervical back: Normal range of motion and neck supple.      Comments: Bilateral feet are warm with palpable pulses.   Skin:     General: Skin is warm and dry.   Neurological:      General: No focal deficit present.      Mental Status: She is alert and oriented to person, place, and time.   Psychiatric:      Comments: Depressed mood, tearful, congruent affect         Current Medications:  Scheduled Meds:aspirin, 81 mg, Oral, Daily  bumetanide, 2 mg, Intravenous, Q8H  carvedilol, 12.5 mg, Oral, BID With Meals  heparin (porcine), 5,000  Units, Subcutaneous, Q12H  hydrALAZINE, 100 mg, Oral, Q8H  insulin lispro, 2-7 Units, Subcutaneous, 4x Daily AC & at Bedtime  insulin lispro protamine-insulin lispro, 10 Units, Subcutaneous, Nightly  insulin lispro protamine-insulin lispro, 20 Units, Subcutaneous, Daily With Breakfast  ipratropium-albuterol, 3 mL, Nebulization, 4x Daily - RT  levothyroxine, 50 mcg, Oral, Q AM  sevelamer, 800 mg, Oral, TID With Meals  sodium chloride, 10 mL, Intravenous, Q12H    Levothyroxine 50 mcg oral every morning  Continuous Infusions:niCARdipine, 5-15 mg/hr, Last Rate: Stopped (10/28/24 0530)        PRN Meds:.  acetaminophen    senna-docusate sodium **AND** polyethylene glycol **AND** bisacodyl **AND** bisacodyl    dextrose    dextrose    glucagon (human recombinant)    hydrALAZINE    influenza vaccine    Magnesium Standard Dose Replacement - Follow Nurse / BPA Driven Protocol    nitroglycerin    ondansetron    Phosphorus Replacement - Follow Nurse / BPA Driven Protocol    Potassium Replacement - Follow Nurse / BPA Driven Protocol    [COMPLETED] Insert peripheral IV **AND** sodium chloride    sodium chloride       Diagnostic Data    Results from last 7 days   Lab Units 10/28/24  1339 10/28/24  0930 10/28/24  0245 10/24/24  0523 10/23/24  0419   WBC 10*3/mm3  --   --  9.82   < > 6.63   HEMOGLOBIN g/dL  --   --  10.8*   < > 8.1*   HEMATOCRIT %  --   --  36.2   < > 26.7*   PLATELETS 10*3/mm3  --   --  242   < > 160   GLUCOSE mg/dL 163*  --  104*   < > 136*   CREATININE mg/dL 2.97*  --  3.12*   < > 3.33*   BUN mg/dL 59*  --  61*   < > 81*   SODIUM mmol/L 138  --  139   < > 144   POTASSIUM mmol/L 3.5  3.5   < > 3.2*   < > 3.9   AST (SGOT) U/L  --   --   --   --  43*   ALT (SGPT) U/L  --   --   --   --  82*   ALK PHOS U/L  --   --   --   --  161*   BILIRUBIN mg/dL  --   --   --   --  0.3   ANION GAP mmol/L 8.9  --  10.0   < > 8.5    < > = values in this interval not displayed.       XR Chest 1 View    Result Date:  10/28/2024  Impression: No significant interval change from prior study of 10/27/2024. Electronically Signed: Álvaro Swanson MD  10/28/2024 1:14 PM EDT  Workstation ID: [id number]    XR Chest PA & Lateral    Result Date: 10/28/2024  Impression: 1. Enlargement of the cardiopericardial silhouette similar to the prior study. A component of a pericardial effusion in addition to cardiomegaly cannot be completely excluded. 2. Mild pulmonary vascular congestion. 3. Small bilateral pleural effusions Electronically Signed: Jere Cazares MD  10/28/2024 7:06 AM EDT  Workstation ID: [id number]       I reviewed the patient's new clinical results.    Assessment/Plan:     Active and Resolved Problems  Active Hospital Problems    Diagnosis  POA    **JOSELITO (acute kidney injury) [N17.9]  Yes      Resolved Hospital Problems   No resolved problems to display.     Anemia  - H&H improved status post transfusion of 2 units of packed red blood cells.  Continue to monitor.    Depression  - Patient very tearful.  Depressed mood with a congruent affect.  Consult psychiatry to evaluate.      JOSELITO on CKD stage 3:  -Improving.  Avoid nephrotoxic drugs.  Continue to monitor.  Nephrology following.     Acute diastolic CHF exacerbation EF of 56 to 60%  -proBNP is elevated at 8,842  - Improving.  Now off of Bumex drip.  On IV Bumex every 8 hours.  Converted to oral Bumex.  Optimize medical management for CHF exacerbation, monitor strict I's and O's, daily weights, 2 g sodium diet.  I's and O's a negative fluid balance.    Elevated Troponin  -Echo results reviewed.  EF of 56 to 60%.  No wall motion abnormalities noted.  Troponins elevated and trending slightly upwards.  EKG with slight T wave flattening in anterolateral leads.  Cardiology following.     Bradycardia:  -Resolved     Hypertension:  -Continue Coreg, continue hydralazine, continue Bumex, avoid ACE and ARB's for now due to JOSELITO.    Diabetes mellitus type 2:  -Blood glucose is better controlled.   Continue current insulin regimen.  Hemoglobin A1c is 9.65%     Hypothyroidism:  -Continue levothyroxine    Complaints of cold right foot  - Resolved    VTE Prophylaxis:  Pharmacologic & mechanical VTE prophylaxis orders are present.             Disposition Planning:     Barriers to Discharge: Pending clinical improvement  Anticipated Date of Discharge: Pending clinical course and clinical improvement.  Also pending SNF placement, with bed availability and insurance approval.  Place of Discharge: SNF        Code Status and Medical Interventions: CPR (Attempt to Resuscitate); Full Support   Ordered at: 10/21/24 2019     Code Status (Patient has no pulse and is not breathing):    CPR (Attempt to Resuscitate)     Medical Interventions (Patient has pulse or is breathing):    Full Support       Signature: Electronically signed by Sharron Grissom MD, 10/28/24, 14:56 EDT.  [facility]ist Team

## 2024-10-28 NOTE — PLAN OF CARE
"Goal Outcome Evaluation:         Patient was very restless and tearful throughout the day stating that she wanted her sister, \"Louann\". She calmed down shortly after talking to Louann on the phone and afterwards remained pleasant. Potassium was replaced and was taken from 2.9 to 3.5. Cardene was not needed at all throughout the day. The highest blood pressure noted was 157/56. Vitals at this time remain stable. Care is ongoing at this time.                                   "

## 2024-10-28 NOTE — PROGRESS NOTES
Cardiology Progress Note      PATIENT IDENTIFICATION    Name: Leona Garcia  Age: 79 y.o. Sex: female : 1945  MRN: [id number]    Requesting Provider    Sharron Grissom MD     LOS: 7 days       Reason For Followup:  Valvular heart disease  Elevated troponin      Subjective:    Interval History:  Seen examined.  Chart and labs reviewed.  Patient appears very frail.  Denies any chest pain.  Becomes emotional talking about her discharge plans.  She said her sister has been hospitalized recently as well.    Review of Systems:  A complete review of systems was undertaken with the pertinent cardiovascular findings listed in history of present illness and all other systems being negative.     Assessment & Plan    Impressions:  Elevated nontrending troponin in the setting of renal failure  Valvular heart disease with moderate to severe tricuspid insufficiency  Volume overload state secondary to diastolic dysfunction  Acute on chronic renal failure  Hypertension  Hyperlipidemia  Diabetes  Hypothyroidism  Longstanding lower extremity edema    Recommendations:  Patient has poor functional status.  Invasive workup has been deferred in the past secondary to renal insufficiency and lack of significant ischemic burden on stress testing.  Recommend conservative cardiovascular management and reserve invasive workup for recalcitrant symptoms.        Objective:    Medication Review:   Scheduled Meds:aspirin, 81 mg, Oral, Daily  bumetanide, 2 mg, Oral, Q8H  carvedilol, 12.5 mg, Oral, BID With Meals  heparin (porcine), 5,000 Units, Subcutaneous, Q12H  hydrALAZINE, 100 mg, Oral, Q8H  insulin lispro, 2-7 Units, Subcutaneous, 4x Daily AC & at Bedtime  insulin lispro protamine-insulin lispro, 10 Units, Subcutaneous, Nightly  insulin lispro protamine-insulin lispro, 20 Units, Subcutaneous, Daily With Breakfast  ipratropium-albuterol, 3 mL, Nebulization, 4x Daily - RT  levothyroxine, 50 mcg, Oral, Q AM  potassium chloride ER,  20 mEq, Oral, Once  sevelamer, 800 mg, Oral, TID With Meals  sodium chloride, 10 mL, Intravenous, Q12H      Continuous Infusions:niCARdipine, 5-15 mg/hr, Last Rate: Stopped (10/28/24 0530)      PRN Meds:.  acetaminophen    senna-docusate sodium **AND** polyethylene glycol **AND** bisacodyl **AND** bisacodyl    dextrose    dextrose    glucagon (human recombinant)    hydrALAZINE    influenza vaccine    Magnesium Standard Dose Replacement - Follow Nurse / BPA Driven Protocol    nitroglycerin    ondansetron    Phosphorus Replacement - Follow Nurse / BPA Driven Protocol    Potassium Replacement - Follow Nurse / BPA Driven Protocol    [COMPLETED] Insert peripheral IV **AND** sodium chloride    sodium chloride      JOSELITO (acute kidney injury)         Physical Exam:    General: Alert, cooperative, no distress, appears stated age  Head:  Normocephalic, atraumatic, mucous membranes moist  Eyes:  Conjunctivae/corneas clear, EOMs intact     Neck:  Supple,  no bruit  Lungs:  Clear to auscultation bilaterally, no wheezes, rhonchi or rales are noted  Chest wall: No tenderness  Heart::  Regular rate and rhythm, S1 and S2 normal, 1/6 holosystolic murmur.  No rub or gallop  Abdomen: Soft, nontender, nondistended, bowel sounds active.  Obese  Extremities: No cyanosis, clubbing.  Edema noted  Pulses: Diminished pedal pulses  Skin:  Stasis changes  Neuro/psych: A&O x3. CN II through XII are grossly intact with appropriate affect    Vital Signs:  Vitals:    10/28/24 1715 10/28/24 1730 10/28/24 1745 10/28/24 1800   BP:  156/62  105/66   BP Location:       Patient Position:       Pulse: 66 64 68 69   Resp:       Temp:       TempSrc:       SpO2: 97% 93% 94% 91%   Weight:       Height:         Wt Readings from Last 1 Encounters:   10/27/24 116 kg (255 lb 15.3 oz)       Intake/Output Summary (Last 24 hours) at 10/28/2024 1910  Last data filed at 10/28/2024 1718  Gross per 24 hour   Intake 600 ml   Output 6350 ml   Net -5750 ml         Results  Review:     CBC    Results from last 7 days   Lab Units 10/28/24  0245 10/27/24  0309 10/26/24  0616 10/25/24  0355 10/24/24  1423 10/24/24  0523 10/23/24  0419 10/22/24  0417   WBC 10*3/mm3 9.82 8.59 7.83 7.23  --  7.01 6.63 5.46   HEMOGLOBIN g/dL 10.8* 10.1* 9.3* 8.9* 9.0* 6.9* 8.1* 8.0*   PLATELETS 10*3/mm3 242 205 198 171  --  173 160 154     Cr Clearance Estimated Creatinine Clearance: 20.2 mL/min (A) (by C-G formula based on SCr of 2.97 mg/dL (H)).  Coag     HbA1C   Lab Results   Component Value Date    HGBA1C 9.65 (H) 10/21/2024    HGBA1C 9.30 (H) 10/18/2023     Blood Glucose   Glucose   Date/Time Value Ref Range Status   10/28/2024 1621 168 (H) 70 - 105 mg/dL Final     Comment:     Serial Number: [id number]:  [id number]   10/28/2024 1128 172 (H) 70 - 105 mg/dL Final     Comment:     Serial Number: [id number]:  [id number]   10/28/2024 0728 123 (H) 70 - 105 mg/dL Final     Comment:     Serial Number: [id number]:  [id number]   10/27/2024 2045 154 (H) 70 - 105 mg/dL Final     Comment:     Serial Number: [id number]:  [id number]   10/27/2024 1657 107 (H) 70 - 105 mg/dL Final     Comment:     Serial Number: [id number]:  [id number]   10/27/2024 1332 127 (H) 70 - 105 mg/dL Final     Comment:     Serial Number: [id number]:  [id number]   10/27/2024 1130 160 (H) 74 - 100 mg/dL Final     Comment:     Serial Number: [id number]:  [id number]   10/27/2024 0736 114 (H) 70 - 105 mg/dL Final     Comment:     Serial Number: [id number]:  [id number]     Infection     CMP   Results from last 7 days   Lab Units 10/28/24  1339 10/28/24  0930 10/28/24  0245 10/27/24  0309 10/26/24  1344 10/26/24  0616 10/25/24  1358 10/25/24  0355 10/24/24  1423 10/24/24  0523 10/23/24  0419 10/22/24  1313   SODIUM mmol/L 138  --  139 141  --  141  --  139  --  143 144  --    POTASSIUM mmol/L 3.5  3.5 2.9* 3.2* 4.0 3.8 3.6 3.7 3.6   < > 3.6 3.9  --    CHLORIDE mmol/L 89*  --  88* 92*  --  93*  --  94*  --  100  "104  --    CO2 mmol/L 40.1*  --  41.0* 38.8*  --  36.4*  --  35.9*  --  33.3* 31.5*  --    BUN mg/dL 59*  --  61* 63*  --  67*  --  75*  --  77* 81*  --    CREATININE mg/dL 2.97*  --  3.12* 3.03*  --  3.23*  --  3.47*  --  3.40* 3.33*  --    GLUCOSE mg/dL 163*  --  104* 116*  --  215*  --  219*  --  160* 136*  --    ALBUMIN g/dL  --   --   --   --   --   --   --   --   --   --  3.3* 3.2   BILIRUBIN mg/dL  --   --   --   --   --   --   --   --   --   --  0.3  --    ALK PHOS U/L  --   --   --   --   --   --   --   --   --   --  161*  --    AST (SGOT) U/L  --   --   --   --   --   --   --   --   --   --  43*  --    ALT (SGPT) U/L  --   --   --   --   --   --   --   --   --   --  82*  --     < > = values in this interval not displayed.     ABG    Results from last 7 days   Lab Units 10/27/24  1130   PH, ARTERIAL pH units 7.458*   PCO2, ARTERIAL mm Hg 61.1*   PO2 ART mm Hg 83.6   O2 SATURATION ART % 96.3   BASE EXCESS ART mmol/L 16.9*     UA    Results from last 7 days   Lab Units 10/22/24  1029   NITRITE UA  Negative   WBC UA /HPF 0-2   BACTERIA UA /HPF None Seen   SQUAM EPITHEL UA /HPF 3-6*     XIMENA  No results found for: \"POCMETH\", \"POCAMPHET\", \"POCBARBITUR\", \"POCBENZO\", \"POCCOCAINE\", \"POCOPIATES\", \"POCOXYCODO\", \"POCPHENCYC\", \"POCPROPOXY\", \"POCTHC\", \"POCTRICYC\"  Lysis Labs   Results from last 7 days   Lab Units 10/28/24  1339 10/28/24  0245 10/27/24  0309 10/26/24  0616 10/25/24  0355 10/24/24  1423 10/24/24  0523 10/23/24  0419 10/22/24  0417   HEMOGLOBIN g/dL  --  10.8* 10.1* 9.3* 8.9* 9.0* 6.9* 8.1* 8.0*   PLATELETS 10*3/mm3  --  242 205 198 171  --  173 160 154   CREATININE mg/dL 2.97* 3.12* 3.03* 3.23* 3.47*  --  3.40* 3.33* 3.76*     Radiology(recent) XR Chest 1 View    Result Date: 10/28/2024  Impression: No significant interval change from prior study of 10/27/2024. Electronically Signed: Álvaro Swanson MD  10/28/2024 1:14 PM EDT  Workstation ID: [id number]    XR Chest PA & Lateral    Result Date: " 10/28/2024  Impression: 1. Enlargement of the cardiopericardial silhouette similar to the prior study. A component of a pericardial effusion in addition to cardiomegaly cannot be completely excluded. 2. Mild pulmonary vascular congestion. 3. Small bilateral pleural effusions Electronically Signed: Jere Cazares MD  10/28/2024 7:06 AM EDT  Workstation ID: [id number]       Results from last 7 days   Lab Units 10/21/24  2338 10/21/24  2040   CK TOTAL U/L  --  263*   HSTROP T ng/L 227* 249*       Imaging Results (Last 24 Hours)       Procedure Component Value Units Date/Time    XR Chest 1 View [[id number]] Collected: 10/28/24 1312     Updated: 10/28/24 1316    Narrative:      XR CHEST 1 VW    Date of Exam: 10/28/2024 12:55 PM EDT    Indication: follow up pulmonary edema    Comparison: 10/27/2024    Findings:  There is marked enlargement of the cardiac silhouette again noted, similar to prior. This again may be due to pericardial effusion or cardiomegaly. Small bilateral effusions again suspected. No pneumothorax identified. There is prominence of the central   pulmonary vessels suggesting congestion. No pneumothorax identified. Aortic vascular calcification is noted.      Impression:      Impression:    No significant interval change from prior study of 10/27/2024.      Electronically Signed: Álvaro Swanson MD    10/28/2024 1:14 PM EDT    Workstation ID: [id number]    XR Chest PA & Lateral [[id number]] Collected: 10/28/24 0657     Updated: 10/28/24 0708    Narrative:      XR CHEST PA AND LATERAL    Date of Exam: 10/27/2024 11:00 PM EDT    Indication: Acute hypoxic and hypercapnic respiratory failure    Comparison: 10/25/2024 and prior    Findings:  Persistent somewhat globular enlargement of the cardiac, pericardial silhouette is similar when accounting for differences in technique to most recent comparisons. Pulmonary vascularity is congested. No overt pulmonary edema is noted at this time. The   lungs appear to be grossly  clear other than small bilateral pleural effusions noted posteriorly on the lateral view      Impression:      Impression:    1. Enlargement of the cardiopericardial silhouette similar to the prior study. A component of a pericardial effusion in addition to cardiomegaly cannot be completely excluded.    2. Mild pulmonary vascular congestion.    3. Small bilateral pleural effusions      Electronically Signed: Jere Cazares MD    10/28/2024 7:06 AM EDT    Workstation ID: [id number]            Cardiac Studies:  Echo- Results for orders placed during the hospital encounter of 10/21/24    Adult Transthoracic Echo Complete W/ Cont if Necessary Per Protocol    Interpretation Summary    Left ventricular ejection fraction appears to be 56 - 60%.    Left ventricular wall thickness is consistent with mild concentric hypertrophy.    Left ventricular diastolic function is consistent with (grade I) impaired relaxation.    The right ventricular cavity is mildly dilated.    The left atrial cavity is moderate to severely dilated.    The right atrial cavity is moderately  dilated.    Moderate to severe tricuspid valve regurgitation is present.    Estimated right ventricular systolic pressure from tricuspid regurgitation is moderately elevated (45-55 mmHg).    Moderate pulmonary hypertension is present.    Stress Myoview-  Cath-        Jenaro Braun DO  10/28/24  19:10 EDT    Copied information has been reviewed and is current as of 10/28/24

## 2024-10-28 NOTE — THERAPY TREATMENT NOTE
"Subjective: Pt agreeable to therapeutic plan of care.  Cognition: oriented to Person and Time    Objective:     Precautions - Falls    Bed Mobility: N/A or Not attempted.   Functional Transfers: N/A or Not attempted.     Balance: supported Supervision  Functional Ambulation: N/A or Not attempted.    Grooming: Set up assist and verbal cuing   ADL Position: supported sitting  ADL Comments: face and hand hygiene       Vitals: WNL    Pain: 4 VAS  Location: R Hand   Interventions for pain: Repositioned and Therapeutic Presence  Education: Provided education on the importance of mobility in the acute care setting and ADL training      Assessment: Leona Garcia presents with ADL impairments affecting function including balance, cognition, coordination, endurance / activity tolerance, pain, range of motion (ROM), and sensation / sensory awareness. Upon OT arrival to room pt tearful and reporting RUE numbness, OT assessed for sensation with RUE appearing to have sensation based on pt report. Pt upright in recliner with R sided lean, with MaxA pt repositioned in chair. Pt minimally verbally responsive this date with need for extended processing time as well as delayed recall response during orientation questions. Pt with noted difficulty swallowing this session, with c/o that her throat felt dry. Pt completed self care while seated in recliner with set up assist, pt required verbal and tactile cues x3 for initiation and termination of task, she appeared perseverative on wiping her forehead. OT attempted to assess FMC, opposition and finger to nose however pt reported \"I cannot do that\". OT attempted to assess BUE ROM Pt became tearful reporting she could not move LUE at all. Pt presented with a status change compared to previous sessions, OT will continue to monitor closely. Demonstrated functioning below baseline abilities indicate the need for continued skilled intervention while inpatient. Tolerating session today " "without incident. Will continue to follow and progress as tolerated.     Plan/Recommendations:   Moderate Intensity Therapy recommended post-acute care. This is recommended as therapy feels the patient would require 3-4 days per week and wouldn't tolerate \"3 hour daily\" rehab intensity. SNF would be the preferred choice. If the patient does not agree to SNF, arrange HH or OP depending on home bound status. If patient is medically complex, consider LTACH.. Pt requires no DME at discharge.     Pt desires Skilled Rehab placement at discharge. Pt cooperative; agreeable to therapeutic recommendations and plan of care.     Modified [place]: N/A = No pre-op stroke/TIA    Post-Tx Position: Up in Chair, Staff Present, Alarms activated, and Call light and personal items within reach  PPE: gloves    "

## 2024-10-29 VITALS
RESPIRATION RATE: 14 BRPM | SYSTOLIC BLOOD PRESSURE: 160 MMHG | HEART RATE: 66 BPM | TEMPERATURE: 97.6 F | OXYGEN SATURATION: 92 % | HEIGHT: 66 IN | DIASTOLIC BLOOD PRESSURE: 64 MMHG | WEIGHT: 230.6 LBS | BODY MASS INDEX: 37.06 KG/M2

## 2024-10-29 LAB
GLUCOSE BLDC GLUCOMTR-MCNC: 177 MG/DL (ref 70–105)
POTASSIUM SERPL-SCNC: 3.6 MMOL/L (ref 3.5–5.2)
POTASSIUM SERPL-SCNC: 3.6 MMOL/L (ref 3.5–5.2)

## 2024-10-29 PROCEDURE — 84132 ASSAY OF SERUM POTASSIUM: CPT | Performed by: INTERNAL MEDICINE

## 2024-10-29 PROCEDURE — 82948 REAGENT STRIP/BLOOD GLUCOSE: CPT

## 2024-10-29 PROCEDURE — 99232 SBSQ HOSP IP/OBS MODERATE 35: CPT | Performed by: NURSE PRACTITIONER

## 2024-10-29 PROCEDURE — 25010000002 HEPARIN (PORCINE) PER 1000 UNITS: Performed by: NURSE PRACTITIONER

## 2024-10-29 PROCEDURE — 25010000002 HYDRALAZINE PER 20 MG: Performed by: NURSE PRACTITIONER

## 2024-10-29 PROCEDURE — 63710000001 INSULIN LISPRO PROTAMINE-INSULIN LISPRO (75-25) 100 UNIT/ML SUSPENSION: Performed by: INTERNAL MEDICINE

## 2024-10-29 PROCEDURE — 63710000001 INSULIN LISPRO (HUMAN) PER 5 UNITS: Performed by: NURSE PRACTITIONER

## 2024-10-29 RX ORDER — IPRATROPIUM BROMIDE AND ALBUTEROL SULFATE 2.5; .5 MG/3ML; MG/3ML
3 SOLUTION RESPIRATORY (INHALATION) EVERY 4 HOURS PRN
Status: DISCONTINUED | OUTPATIENT
Start: 2024-10-29 | End: 2024-10-29 | Stop reason: HOSPADM

## 2024-10-29 RX ORDER — POTASSIUM CHLORIDE 1500 MG/1
20 TABLET, EXTENDED RELEASE ORAL ONCE
Status: COMPLETED | OUTPATIENT
Start: 2024-10-29 | End: 2024-10-29

## 2024-10-29 RX ORDER — SEVELAMER CARBONATE 800 MG/1
800 TABLET, FILM COATED ORAL
Qty: 90 TABLET | Refills: 0 | Status: SHIPPED | OUTPATIENT
Start: 2024-10-29 | End: 2024-11-28

## 2024-10-29 RX ORDER — ACETAZOLAMIDE SODIUM 500 MG/5ML
500 INJECTION, POWDER, LYOPHILIZED, FOR SOLUTION INTRAVENOUS ONCE
Status: DISCONTINUED | OUTPATIENT
Start: 2024-10-29 | End: 2024-10-29 | Stop reason: HOSPADM

## 2024-10-29 RX ORDER — BUMETANIDE 1 MG/1
2 TABLET ORAL
Status: DISCONTINUED | OUTPATIENT
Start: 2024-10-29 | End: 2024-10-29 | Stop reason: HOSPADM

## 2024-10-29 RX ORDER — BUMETANIDE 2 MG/1
2 TABLET ORAL 2 TIMES DAILY
Qty: 60 TABLET | Refills: 0 | Status: SHIPPED | OUTPATIENT
Start: 2024-10-29 | End: 2024-11-28

## 2024-10-29 RX ADMIN — BUMETANIDE 2 MG: 1 TABLET ORAL at 00:06

## 2024-10-29 RX ADMIN — SEVELAMER CARBONATE 800 MG: 800 TABLET, FILM COATED ORAL at 08:44

## 2024-10-29 RX ADMIN — INSULIN LISPRO 20 UNITS: 100 INJECTION, SUSPENSION SUBCUTANEOUS at 08:44

## 2024-10-29 RX ADMIN — LEVOTHYROXINE SODIUM 50 MCG: 0.05 TABLET ORAL at 05:18

## 2024-10-29 RX ADMIN — HYDRALAZINE HYDROCHLORIDE 100 MG: 25 TABLET ORAL at 06:31

## 2024-10-29 RX ADMIN — BUMETANIDE 2 MG: 1 TABLET ORAL at 08:45

## 2024-10-29 RX ADMIN — ASPIRIN 81 MG CHEWABLE TABLET 81 MG: 81 TABLET CHEWABLE at 08:44

## 2024-10-29 RX ADMIN — POTASSIUM CHLORIDE 20 MEQ: 1500 TABLET, EXTENDED RELEASE ORAL at 01:12

## 2024-10-29 RX ADMIN — ACETAMINOPHEN 650 MG: 325 TABLET, FILM COATED ORAL at 05:17

## 2024-10-29 RX ADMIN — HYDRALAZINE HYDROCHLORIDE 10 MG: 20 INJECTION INTRAMUSCULAR; INTRAVENOUS at 03:44

## 2024-10-29 RX ADMIN — CARVEDILOL 12.5 MG: 6.25 TABLET, FILM COATED ORAL at 08:45

## 2024-10-29 RX ADMIN — POTASSIUM CHLORIDE 20 MEQ: 1500 TABLET, EXTENDED RELEASE ORAL at 08:45

## 2024-10-29 RX ADMIN — Medication 10 ML: at 08:45

## 2024-10-29 RX ADMIN — INSULIN LISPRO 2 UNITS: 100 INJECTION, SOLUTION INTRAVENOUS; SUBCUTANEOUS at 08:44

## 2024-10-29 RX ADMIN — HEPARIN SODIUM 5000 UNITS: 5000 INJECTION INTRAVENOUS; SUBCUTANEOUS at 08:44

## 2024-10-29 NOTE — PROGRESS NOTES
PROGRESS NOTE      Patient Name: Leona Garcia  : 1945  MRN: [id number]  Primary Care Physician: Alfred Liriano MD  Date of admission: 10/21/2024    Patient Care Team:  Alfred Liriano MD as PCP - General (Family Medicine)    JOSELITO    Subjective   Subjective:     Feeling much better.  6 L urine output last 24 hours.  Breathing better.  Review of systems:  Negative      Allergies:    Allergies   Allergen Reactions   • Codeine Nausea Only, Unknown - Low Severity and Other (See Comments)     Dizziness   • Amlodipine Unknown - Low Severity       Objective   Exam:     Vital Signs  Temp:  [97.4 °F (36.3 °C)-98.1 °F (36.7 °C)] 97.6 °F (36.4 °C)  Heart Rate:  [57-76] 66  Resp:  [8-28] 14  BP: (105-200)/(51-95) 160/64  SpO2:  [89 %-100 %] 92 %  on  Flow (L/min) (Oxygen Therapy):  [2] 2;   Device (Oxygen Therapy): nasal cannula  Body mass index is 37.22 kg/m².    General: f chronically ill no acute distress  Heart:     RRR  Abd:        Soft, nontender  Extremities:   Chronic appearing lower extremity edema, improved venous stasis, compression stockings, trace dependent edema  Neuro: Awake, alert and oriented      Results Review:  I have personally reviewed most recent Data :  CBC    Results from last 7 days   Lab Units 10/28/24  0245 10/27/24  0309 10/26/24  0616 10/25/24  0355 10/24/24  1423 10/24/24  0523 10/23/24  0419   WBC 10*3/mm3 9.82 8.59 7.83 7.23  --  7.01 6.63   HEMOGLOBIN g/dL 10.8* 10.1* 9.3* 8.9* 9.0* 6.9* 8.1*   PLATELETS 10*3/mm3 242 205 198 171  --  173 160     CMP   Results from last 7 days   Lab Units 10/29/24  0647 10/29/24  0014 10/28/24  1339 10/28/24  0930 10/28/24  0245 10/27/24  0309 10/26/24  1344 10/26/24  0616 10/25/24  1358 10/25/24  0355 10/24/24  1423 10/24/24  0523 10/23/24  0419 10/23/24  0419 10/22/24  1313   SODIUM mmol/L  --   --  138  --  139 141  --  141  --  139  --  143  --  144  --    POTASSIUM mmol/L 3.6 3.6 3.5  3.5 2.9* 3.2* 4.0 3.8 3.6   < > 3.6   < > 3.6    < > 3.9  --    CHLORIDE mmol/L  --   --  89*  --  88* 92*  --  93*  --  94*  --  100  --  104  --    CO2 mmol/L  --   --  40.1*  --  41.0* 38.8*  --  36.4*  --  35.9*  --  33.3*  --  31.5*  --    BUN mg/dL  --   --  59*  --  61* 63*  --  67*  --  75*  --  77*  --  81*  --    CREATININE mg/dL  --   --  2.97*  --  3.12* 3.03*  --  3.23*  --  3.47*  --  3.40*  --  3.33*  --    GLUCOSE mg/dL  --   --  163*  --  104* 116*  --  215*  --  219*  --  160*  --  136*  --    ALBUMIN g/dL  --   --   --   --   --   --   --   --   --   --   --   --   --  3.3* 3.2   BILIRUBIN mg/dL  --   --   --   --   --   --   --   --   --   --   --   --   --  0.3  --    ALK PHOS U/L  --   --   --   --   --   --   --   --   --   --   --   --   --  161*  --    AST (SGOT) U/L  --   --   --   --   --   --   --   --   --   --   --   --   --  43*  --    ALT (SGPT) U/L  --   --   --   --   --   --   --   --   --   --   --   --   --  82*  --     < > = values in this interval not displayed.     ABG    Results from last 7 days   Lab Units 10/27/24  1130   PH, ARTERIAL pH units 7.458*   PCO2, ARTERIAL mm Hg 61.1*   PO2 ART mm Hg 83.6   O2 SATURATION ART % 96.3   BASE EXCESS ART mmol/L 16.9*     CT Chest Without Contrast Diagnostic    Result Date: 10/28/2024  Impression: No acute intrathoracic findings. Trace right pleural effusion. Mild subsegmental bibasilar/dependent atelectasis. Multichamber cardiac enlargement and coronary artery calcifications. No rosendo pulmonary edema. Electronically Signed: Jay Gonzalez MD  10/28/2024 9:50 PM EDT  Workstation ID: [id number]    XR Chest 1 View    Result Date: 10/28/2024  Impression: No significant interval change from prior study of 10/27/2024. Electronically Signed: Álvaro Swanson MD  10/28/2024 1:14 PM EDT  Workstation ID: [id number]    XR Chest PA & Lateral    Result Date: 10/28/2024  Impression: 1. Enlargement of the cardiopericardial silhouette similar to the prior study. A component of a pericardial effusion in  addition to cardiomegaly cannot be completely excluded. 2. Mild pulmonary vascular congestion. 3. Small bilateral pleural effusions Electronically Signed: Jere Cazares MD  10/28/2024 7:06 AM EDT  Workstation ID: [id number]     Results for orders placed during the hospital encounter of 10/21/24    Adult Transthoracic Echo Complete W/ Cont if Necessary Per Protocol    Interpretation Summary  •  Left ventricular ejection fraction appears to be 56 - 60%.  •  Left ventricular wall thickness is consistent with mild concentric hypertrophy.  •  Left ventricular diastolic function is consistent with (grade I) impaired relaxation.  •  The right ventricular cavity is mildly dilated.  •  The left atrial cavity is moderate to severely dilated.  •  The right atrial cavity is moderately  dilated.  •  Moderate to severe tricuspid valve regurgitation is present.  •  Estimated right ventricular systolic pressure from tricuspid regurgitation is moderately elevated (45-55 mmHg).  •  Moderate pulmonary hypertension is present.    Scheduled Meds:aspirin, 81 mg, Oral, Daily  bumetanide, 2 mg, Oral, Q8H  carvedilol, 12.5 mg, Oral, BID With Meals  heparin (porcine), 5,000 Units, Subcutaneous, Q12H  hydrALAZINE, 100 mg, Oral, Q8H  insulin lispro, 2-7 Units, Subcutaneous, 4x Daily AC & at Bedtime  insulin lispro protamine-insulin lispro, 10 Units, Subcutaneous, Nightly  insulin lispro protamine-insulin lispro, 20 Units, Subcutaneous, Daily With Breakfast  levothyroxine, 50 mcg, Oral, Q AM  sevelamer, 800 mg, Oral, TID With Meals  sodium chloride, 10 mL, Intravenous, Q12H      Continuous Infusions:niCARdipine, 5-15 mg/hr, Last Rate: Stopped (10/28/24 0530)        PRN Meds:•  acetaminophen  •  senna-docusate sodium **AND** polyethylene glycol **AND** bisacodyl **AND** bisacodyl  •  dextrose  •  dextrose  •  glucagon (human recombinant)  •  hydrALAZINE  •  influenza vaccine  •  ipratropium-albuterol  •  Magnesium Standard Dose Replacement -  Follow Nurse / BPA Driven Protocol  •  nitroglycerin  •  ondansetron  •  Phosphorus Replacement - Follow Nurse / BPA Driven Protocol  •  Potassium Replacement - Follow Nurse / BPA Driven Protocol  •  [COMPLETED] Insert peripheral IV **AND** sodium chloride  •  sodium chloride    Assessment & Plan   Assessment and Plan:         JOSELITO (acute kidney injury)    ASSESSMENT:  JOSELITO now etiology  CKD stage III  Volume overload with increased edema  HTN stable   DM  Anemia  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 Etiology uncertain dont look like free water deficit     ECHO from 2023: EF 61- 65% with grade 1 diastolic dysfunction and mild TR.           PLAN :      Patient with multifactorial JOSELITO with baseline creatinine about 1.7 to 1.9 mg/dL.  Etiology multifactorial, overlapping ATN and cardiorenal syndrome.  Peak serum creatinine 3.76 mg/dL.  Overall continuing to improve.  Will switch to p.o. bumetanide 2 mg twice daily.  Will order a dose of IV Diamox today for alkalosis.  Replace potassium per protocol.  Azotemia stable.  Patient did have some mild proteinuria about 1 g.  Serological workup largely unremarkable.  Suspect diabetic nephropathy.  Metabolic alkalosis: De-escalating diuretics.  Respiratory acidosis: Follow-up with pulmonary.  Correct alkalosis.  BP stable: Continue current agents.    D/W RN.  She will need outpatient follow-up.    Nataliia Coles MD  [facility]    Electronically signed by   [facility]  [phone]  10/29/2024  10:54 EDT

## 2024-10-29 NOTE — PLAN OF CARE
Goal Outcome Evaluation:      Pt unable to stay asleep throughout the night and remained somewhat restless though pleasant. Pt continued to be diuresed with bumex throughout the night. Blood pressure began to trend up around 0330 with the systolic into the 170s. Pt was given PRN dose of hydralazine. Pt currently lying in bed watching TV.

## 2024-10-29 NOTE — SIGNIFICANT NOTE
10/29/24 0929   OTHER   Discipline physical therapist   Rehab Time/Intention   Session Not Performed other (see comments)  (Pt with DC orders per Nsg.)   Recommendation   PT - Next Appointment 10/30/24

## 2024-10-29 NOTE — PROGRESS NOTES
Daily Progress Note        JOSELITO (acute kidney injury)    Assessment:     Metabolic alkalosis with chronic/compensated hypercapnic respiratory insufficiency  ABG on 10/27/2024  pH 7.45/pCO2 61/pO2 83/bicarb 43     Moderate pulmonary hypertension     CKD with hypokalemia  Hypothyroidism  Diabetes mellitus        2D echo 10/22/2024    Left ventricular ejection fraction appears to be 56 - 60%.    Left ventricular wall thickness is consistent with mild concentric hypertrophy.    Left ventricular diastolic function is consistent with (grade I) impaired relaxation.    The right ventricular cavity is mildly dilated.    The left atrial cavity is moderate to severely dilated.    The right atrial cavity is moderately  dilated.    Moderate to severe tricuspid valve regurgitation is present.    Estimated right ventricular systolic pressure from tricuspid regurgitation is moderately elevated (45-55 mmHg).    Moderate pulmonary hypertension is present.        Recommendations:       Bronchodilators as needed  Oxygen titration     Currently on high-dose diuretics Bumex 2 mg every 8 hours  Followed by nephrology                        LOS: 8 days     Subjective         Objective     Vital signs for last 24 hours:  Vitals:    10/29/24 0548 10/29/24 0600 10/29/24 0601 10/29/24 0633   BP: 161/68  172/71 168/72   BP Location:       Patient Position:       Pulse: 72  72 71   Resp:       Temp:       TempSrc:       SpO2: 90%  91% 96%   Weight:  105 kg (230 lb 9.6 oz)     Height:           Intake/Output last 3 shifts:  I/O last 3 completed shifts:  In: 1620 [P.O.:1620]  Out: 55884 [Urine:86424]  Intake/Output this shift:  No intake/output data recorded.      Radiology  Imaging Results (Last 24 Hours)       Procedure Component Value Units Date/Time    CT Chest Without Contrast Diagnostic [[id number]] Collected: 10/28/24 2145     Updated: 10/28/24 2152    Narrative:      CT CHEST WO CONTRAST DIAGNOSTIC    Date of Exam: 10/28/2024 8:30 PM  EDT    Indication: Dyspnea.    Comparison: Same-day chest x-ray    Technique: Axial CT images were obtained of the chest without contrast administration.  Sagittal and coronal reconstructions were performed.  Automated exposure control and iterative reconstruction methods were used.      Findings:  There is atherosclerosis of the thoracic aorta; otherwise unremarkable noncontrast appearance of the great vessels. There are coronary artery calcifications and/or stents. Mitral annular calcifications are noted. There is multichamber cardiac   enlargement. No pericardial effusion. There is a hypodense nodule of the right thyroid lobe. No thoracic adenopathy. Mildly patulous thoracic esophagus. Unremarkable appearance of the chest wall soft tissues. The trachea and mainstem bronchi are grossly   patent. The smaller peripheral airways are unremarkable. No pulmonary edema. There is a trace right pleural effusion. There is subsegmental dependent bibasilar atelectasis. No lung mass or nodule. No pneumothorax. There is mild scoliosis. No acute or   suspicious bony findings. Unremarkable appearance of the upper abdomen.      Impression:      Impression:  No acute intrathoracic findings.    Trace right pleural effusion. Mild subsegmental bibasilar/dependent atelectasis.    Multichamber cardiac enlargement and coronary artery calcifications. No rosendo pulmonary edema.      Electronically Signed: Jay Gonzalez MD    10/28/2024 9:50 PM EDT    Workstation ID: [id number]    XR Chest 1 View [[id number]] Collected: 10/28/24 1312     Updated: 10/28/24 1316    Narrative:      XR CHEST 1 VW    Date of Exam: 10/28/2024 12:55 PM EDT    Indication: follow up pulmonary edema    Comparison: 10/27/2024    Findings:  There is marked enlargement of the cardiac silhouette again noted, similar to prior. This again may be due to pericardial effusion or cardiomegaly. Small bilateral effusions again suspected. No pneumothorax identified. There is  prominence of the central   pulmonary vessels suggesting congestion. No pneumothorax identified. Aortic vascular calcification is noted.      Impression:      Impression:    No significant interval change from prior study of 10/27/2024.      Electronically Signed: Álvaro Swanson MD    10/28/2024 1:14 PM EDT    Workstation ID: [id number]            Labs:  Results from last 7 days   Lab Units 10/28/24  0245   WBC 10*3/mm3 9.82   HEMOGLOBIN g/dL 10.8*   HEMATOCRIT % 36.2   PLATELETS 10*3/mm3 242     Results from last 7 days   Lab Units 10/29/24  0647 10/29/24  0014 10/28/24  1339 10/24/24  0523 10/23/24  0419   SODIUM mmol/L  --   --  138   < > 144   POTASSIUM mmol/L 3.6   < > 3.5  3.5   < > 3.9   CHLORIDE mmol/L  --   --  89*   < > 104   CO2 mmol/L  --   --  40.1*   < > 31.5*   BUN mg/dL  --   --  59*   < > 81*   CREATININE mg/dL  --   --  2.97*   < > 3.33*   CALCIUM mg/dL  --   --  9.8   < > 9.2   BILIRUBIN mg/dL  --   --   --   --  0.3   ALK PHOS U/L  --   --   --   --  161*   ALT (SGPT) U/L  --   --   --   --  82*   AST (SGOT) U/L  --   --   --   --  43*   GLUCOSE mg/dL  --   --  163*   < > 136*    < > = values in this interval not displayed.     Results from last 7 days   Lab Units 10/27/24  1130   PH, ARTERIAL pH units 7.458*   PO2 ART mm Hg 83.6   PCO2, ARTERIAL mm Hg 61.1*   HCO3 ART mmol/L 43.2*     Results from last 7 days   Lab Units 10/23/24  0419 10/22/24  1313   ALBUMIN g/dL 3.3* 3.2             Results from last 7 days   Lab Units 10/28/24  0930   MAGNESIUM mg/dL 1.7                   Meds:   SCHEDULE  aspirin, 81 mg, Oral, Daily  bumetanide, 2 mg, Oral, Q8H  carvedilol, 12.5 mg, Oral, BID With Meals  heparin (porcine), 5,000 Units, Subcutaneous, Q12H  hydrALAZINE, 100 mg, Oral, Q8H  insulin lispro, 2-7 Units, Subcutaneous, 4x Daily AC & at Bedtime  insulin lispro protamine-insulin lispro, 10 Units, Subcutaneous, Nightly  insulin lispro protamine-insulin lispro, 20 Units, Subcutaneous, Daily With  Breakfast  levothyroxine, 50 mcg, Oral, Q AM  sevelamer, 800 mg, Oral, TID With Meals  sodium chloride, 10 mL, Intravenous, Q12H      Infusions  niCARdipine, 5-15 mg/hr, Last Rate: Stopped (10/28/24 0530)      PRNs    acetaminophen    senna-docusate sodium **AND** polyethylene glycol **AND** bisacodyl **AND** bisacodyl    dextrose    dextrose    glucagon (human recombinant)    hydrALAZINE    influenza vaccine    ipratropium-albuterol    Magnesium Standard Dose Replacement - Follow Nurse / BPA Driven Protocol    nitroglycerin    ondansetron    Phosphorus Replacement - Follow Nurse / BPA Driven Protocol    Potassium Replacement - Follow Nurse / BPA Driven Protocol    [COMPLETED] Insert peripheral IV **AND** sodium chloride    sodium chloride    Physical Exam:  Physical Exam    ROS  Review of Systems          Total time spent with patient greater than: 45 Minutes

## 2024-10-29 NOTE — CASE MANAGEMENT/SOCIAL WORK
Case Management Discharge Note      Final Note: CM spoke with patient’s nurse and also reviewed chart documentation to obtain clinical updates. DC orders in place. CM confirmed with liaison [facility] that bed remains available today, and she affirmed. CM reviewed therapy notes. Patient will require WC van transport due to Max assist x2 for transfers, pain in LE's, and O2@2l NC. CM placed WC van request through [employer] for will call . EMS confirmed transport and nursing added to confirmation chat. Follow up IMM delivered to bedside. Family notified by nursing of plan for DC today. No barriers.    Selected Continued Care - Admitted Since 10/21/2024       Destination Coordination complete.      Service Provider Services Address Phone Fax Patient Preferred    [facility] [address] [facility] [place] [address] [phone] --               Transportation Services  W/C Van: [employer]    Final Discharge Disposition Code: 03 - skilled nursing facility (SNF)    Met with patient in room   Maintain distance greater than six feet and spent less than fifteen minutes in the room.    Ale Pulido RN    Office Phone: [phone]  Office Cell:     [phone]

## 2024-10-29 NOTE — PROGRESS NOTES
Cardiology Progress Note      PATIENT IDENTIFICATION    Name: Leona Garcia  Age: 79 y.o. Sex: female : 1945  MRN: [id number]    Requesting Provider    Sharron Grissom MD     LOS: 8 days       Reason For Followup:  Valvular heart disease  Elevated troponin      Subjective:    Interval History:  Seen examined.  Chart and labs reviewed.  Patient sitting up in bed talking to her sister on the telephone.  She denies any chest pain or shortness of breath.  No GI complaints.  Rhythm sinus at 70, /64    Review of Systems:  A complete review of systems was undertaken with the pertinent cardiovascular findings listed in history of present illness and all other systems being negative.     Assessment & Plan    Impressions:  Elevated nontrending troponin in the setting of renal failure  Valvular heart disease with moderate to severe tricuspid insufficiency  Volume overload state secondary to diastolic dysfunction  Acute on chronic renal failure  Hypertension  Hyperlipidemia  Diabetes  Hypothyroidism  Longstanding lower extremity edema    Recommendations:  Patient has poor functional status.  Invasive workup has been deferred in the past secondary to renal insufficiency and lack of significant ischemic burden on stress testing.  Recommend conservative cardiovascular management and reserve invasive workup for recalcitrant symptoms.  Monitor and replete electrolytes-per nephrology recommendations.        Objective:    Medication Review:   Scheduled Meds:aspirin, 81 mg, Oral, Daily  bumetanide, 2 mg, Oral, Q8H  carvedilol, 12.5 mg, Oral, BID With Meals  heparin (porcine), 5,000 Units, Subcutaneous, Q12H  hydrALAZINE, 100 mg, Oral, Q8H  insulin lispro, 2-7 Units, Subcutaneous, 4x Daily AC & at Bedtime  insulin lispro protamine-insulin lispro, 10 Units, Subcutaneous, Nightly  insulin lispro protamine-insulin lispro, 20 Units, Subcutaneous, Daily With Breakfast  levothyroxine, 50 mcg, Oral, Q AM  sevelamer, 800  mg, Oral, TID With Meals  sodium chloride, 10 mL, Intravenous, Q12H      Continuous Infusions:niCARdipine, 5-15 mg/hr, Last Rate: Stopped (10/28/24 0530)      PRN Meds:.  acetaminophen    senna-docusate sodium **AND** polyethylene glycol **AND** bisacodyl **AND** bisacodyl    dextrose    dextrose    glucagon (human recombinant)    hydrALAZINE    influenza vaccine    ipratropium-albuterol    Magnesium Standard Dose Replacement - Follow Nurse / BPA Driven Protocol    nitroglycerin    ondansetron    Phosphorus Replacement - Follow Nurse / BPA Driven Protocol    Potassium Replacement - Follow Nurse / BPA Driven Protocol    [COMPLETED] Insert peripheral IV **AND** sodium chloride    sodium chloride      JOSELITO (acute kidney injury)         Physical Exam:    General: Alert, cooperative, no distress, appears stated age  Head:  Normocephalic, atraumatic, mucous membranes moist  Eyes:  Conjunctivae/corneas clear, EOMs intact     Neck:  Supple,  no bruit  Lungs:  Clear to auscultation bilaterally, no wheezes, rhonchi or rales are noted  Chest wall: No tenderness  Heart::  Regular rate and rhythm, S1 and S2 normal, 1/6 holosystolic murmur.  No rub or gallop  Abdomen: Soft, nontender, nondistended, bowel sounds active.  Obese  Extremities: No cyanosis, clubbing.  Edema noted.  Ace wrap's to both lower extremities with no obvious edema to her toes.  Pulses: Diminished pedal pulses  Skin:  Stasis changes  Neuro/psych: A&O x3. CN II through XII are grossly intact with appropriate affect    Vital Signs:  Vitals:    10/29/24 0600 10/29/24 0601 10/29/24 0633 10/29/24 0800   BP:  172/71 168/72 148/59   BP Location:    Right arm   Patient Position:    Lying   Pulse:  72 71 68   Resp:    14   Temp:    97.6 °F (36.4 °C)   TempSrc:    Oral   SpO2:  91% 96% 97%   Weight: 105 kg (230 lb 9.6 oz)      Height:         Wt Readings from Last 1 Encounters:   10/29/24 105 kg (230 lb 9.6 oz)       Intake/Output Summary (Last 24 hours) at 10/29/2024  1007  Last data filed at 10/29/2024 0800  Gross per 24 hour   Intake 1620 ml   Output 6150 ml   Net -4530 ml         Results Review:     CBC    Results from last 7 days   Lab Units 10/28/24  0245 10/27/24  0309 10/26/24  0616 10/25/24  0355 10/24/24  1423 10/24/24  0523 10/23/24  0419   WBC 10*3/mm3 9.82 8.59 7.83 7.23  --  7.01 6.63   HEMOGLOBIN g/dL 10.8* 10.1* 9.3* 8.9* 9.0* 6.9* 8.1*   PLATELETS 10*3/mm3 242 205 198 171  --  173 160     Cr Clearance Estimated Creatinine Clearance: 19.1 mL/min (A) (by C-G formula based on SCr of 2.97 mg/dL (H)).  Coag     HbA1C   Lab Results   Component Value Date    HGBA1C 9.65 (H) 10/21/2024    HGBA1C 9.30 (H) 10/18/2023     Blood Glucose   Glucose   Date/Time Value Ref Range Status   10/29/2024 0821 177 (H) 70 - 105 mg/dL Final     Comment:     Serial Number: [id number]:  [id number]   10/28/2024 1945 175 (H) 70 - 105 mg/dL Final     Comment:     Serial Number: [id number]:  [id number]   10/28/2024 1621 168 (H) 70 - 105 mg/dL Final     Comment:     Serial Number: [id number]:  [id number]   10/28/2024 1128 172 (H) 70 - 105 mg/dL Final     Comment:     Serial Number: [id number]:  [id number]   10/28/2024 0728 123 (H) 70 - 105 mg/dL Final     Comment:     Serial Number: [id number]:  [id number]   10/27/2024 2045 154 (H) 70 - 105 mg/dL Final     Comment:     Serial Number: [id number]:  [id number]   10/27/2024 1657 107 (H) 70 - 105 mg/dL Final     Comment:     Serial Number: [id number]:  [id number]   10/27/2024 1332 127 (H) 70 - 105 mg/dL Final     Comment:     Serial Number: [id number]:  [id number]     Infection     CMP   Results from last 7 days   Lab Units 10/29/24  0647 10/29/24  0014 10/28/24  1339 10/28/24  0930 10/28/24  0245 10/27/24  0309 10/26/24  1344 10/26/24  0616 10/25/24  1358 10/25/24  0355 10/24/24  1423 10/24/24  0523 10/23/24  0419 10/23/24  0419 10/22/24  1313   SODIUM mmol/L  --   --  138  --  139 141  --  141  --  139  " --  143  --  144  --    POTASSIUM mmol/L 3.6 3.6 3.5  3.5 2.9* 3.2* 4.0 3.8 3.6   < > 3.6   < > 3.6   < > 3.9  --    CHLORIDE mmol/L  --   --  89*  --  88* 92*  --  93*  --  94*  --  100  --  104  --    CO2 mmol/L  --   --  40.1*  --  41.0* 38.8*  --  36.4*  --  35.9*  --  33.3*  --  31.5*  --    BUN mg/dL  --   --  59*  --  61* 63*  --  67*  --  75*  --  77*  --  81*  --    CREATININE mg/dL  --   --  2.97*  --  3.12* 3.03*  --  3.23*  --  3.47*  --  3.40*  --  3.33*  --    GLUCOSE mg/dL  --   --  163*  --  104* 116*  --  215*  --  219*  --  160*  --  136*  --    ALBUMIN g/dL  --   --   --   --   --   --   --   --   --   --   --   --   --  3.3* 3.2   BILIRUBIN mg/dL  --   --   --   --   --   --   --   --   --   --   --   --   --  0.3  --    ALK PHOS U/L  --   --   --   --   --   --   --   --   --   --   --   --   --  161*  --    AST (SGOT) U/L  --   --   --   --   --   --   --   --   --   --   --   --   --  43*  --    ALT (SGPT) U/L  --   --   --   --   --   --   --   --   --   --   --   --   --  82*  --     < > = values in this interval not displayed.     ABG    Results from last 7 days   Lab Units 10/27/24  1130   PH, ARTERIAL pH units 7.458*   PCO2, ARTERIAL mm Hg 61.1*   PO2 ART mm Hg 83.6   O2 SATURATION ART % 96.3   BASE EXCESS ART mmol/L 16.9*     UA    Results from last 7 days   Lab Units 10/22/24  1029   NITRITE UA  Negative   WBC UA /HPF 0-2   BACTERIA UA /HPF None Seen   SQUAM EPITHEL UA /HPF 3-6*     XIMENA  No results found for: \"POCMETH\", \"POCAMPHET\", \"POCBARBITUR\", \"POCBENZO\", \"POCCOCAINE\", \"POCOPIATES\", \"POCOXYCODO\", \"POCPHENCYC\", \"POCPROPOXY\", \"POCTHC\", \"POCTRICYC\"  Lysis Labs   Results from last 7 days   Lab Units 10/28/24  1339 10/28/24  0245 10/27/24  0309 10/26/24  0616 10/25/24  0355 10/24/24  1423 10/24/24  0523 10/23/24  0419   HEMOGLOBIN g/dL  --  10.8* 10.1* 9.3* 8.9* 9.0* 6.9* 8.1*   PLATELETS 10*3/mm3  --  242 205 198 171  --  173 160   CREATININE mg/dL 2.97* 3.12* 3.03* 3.23* 3.47*  --  " 3.40* 3.33*     Radiology(recent) CT Chest Without Contrast Diagnostic    Result Date: 10/28/2024  Impression: No acute intrathoracic findings. Trace right pleural effusion. Mild subsegmental bibasilar/dependent atelectasis. Multichamber cardiac enlargement and coronary artery calcifications. No rosendo pulmonary edema. Electronically Signed: Jay Gonzalez MD  10/28/2024 9:50 PM EDT  Workstation ID: [id number]    XR Chest 1 View    Result Date: 10/28/2024  Impression: No significant interval change from prior study of 10/27/2024. Electronically Signed: Álvaro Swanson MD  10/28/2024 1:14 PM EDT  Workstation ID: [id number]    XR Chest PA & Lateral    Result Date: 10/28/2024  Impression: 1. Enlargement of the cardiopericardial silhouette similar to the prior study. A component of a pericardial effusion in addition to cardiomegaly cannot be completely excluded. 2. Mild pulmonary vascular congestion. 3. Small bilateral pleural effusions Electronically Signed: Jere Cazares MD  10/28/2024 7:06 AM EDT  Workstation ID: [id number]               Imaging Results (Last 24 Hours)       Procedure Component Value Units Date/Time    CT Chest Without Contrast Diagnostic [[id number]] Collected: 10/28/24 2145     Updated: 10/28/24 2152    Narrative:      CT CHEST WO CONTRAST DIAGNOSTIC    Date of Exam: 10/28/2024 8:30 PM EDT    Indication: Dyspnea.    Comparison: Same-day chest x-ray    Technique: Axial CT images were obtained of the chest without contrast administration.  Sagittal and coronal reconstructions were performed.  Automated exposure control and iterative reconstruction methods were used.      Findings:  There is atherosclerosis of the thoracic aorta; otherwise unremarkable noncontrast appearance of the great vessels. There are coronary artery calcifications and/or stents. Mitral annular calcifications are noted. There is multichamber cardiac   enlargement. No pericardial effusion. There is a hypodense nodule of the right thyroid  lobe. No thoracic adenopathy. Mildly patulous thoracic esophagus. Unremarkable appearance of the chest wall soft tissues. The trachea and mainstem bronchi are grossly   patent. The smaller peripheral airways are unremarkable. No pulmonary edema. There is a trace right pleural effusion. There is subsegmental dependent bibasilar atelectasis. No lung mass or nodule. No pneumothorax. There is mild scoliosis. No acute or   suspicious bony findings. Unremarkable appearance of the upper abdomen.      Impression:      Impression:  No acute intrathoracic findings.    Trace right pleural effusion. Mild subsegmental bibasilar/dependent atelectasis.    Multichamber cardiac enlargement and coronary artery calcifications. No rosendo pulmonary edema.      Electronically Signed: Jay Gonzalez MD    10/28/2024 9:50 PM EDT    Workstation ID: [id number]    XR Chest 1 View [[id number]] Collected: 10/28/24 1312     Updated: 10/28/24 1316    Narrative:      XR CHEST 1 VW    Date of Exam: 10/28/2024 12:55 PM EDT    Indication: follow up pulmonary edema    Comparison: 10/27/2024    Findings:  There is marked enlargement of the cardiac silhouette again noted, similar to prior. This again may be due to pericardial effusion or cardiomegaly. Small bilateral effusions again suspected. No pneumothorax identified. There is prominence of the central   pulmonary vessels suggesting congestion. No pneumothorax identified. Aortic vascular calcification is noted.      Impression:      Impression:    No significant interval change from prior study of 10/27/2024.      Electronically Signed: Álvaro Swanson MD    10/28/2024 1:14 PM EDT    Workstation ID: [id number]            Cardiac Studies:  Echo- Results for orders placed during the hospital encounter of 10/21/24    Adult Transthoracic Echo Complete W/ Cont if Necessary Per Protocol    Interpretation Summary    Left ventricular ejection fraction appears to be 56 - 60%.    Left ventricular wall thickness is  consistent with mild concentric hypertrophy.    Left ventricular diastolic function is consistent with (grade I) impaired relaxation.    The right ventricular cavity is mildly dilated.    The left atrial cavity is moderate to severely dilated.    The right atrial cavity is moderately  dilated.    Moderate to severe tricuspid valve regurgitation is present.    Estimated right ventricular systolic pressure from tricuspid regurgitation is moderately elevated (45-55 mmHg).    Moderate pulmonary hypertension is present.    Stress Myoview-  Cath-        ORVILLE Viramontes  10/29/24  10:07 EDT    Copied information has been reviewed and is current as of 10/29/24  Electronically signed by ORVILLE Viramontes, 10/29/24, 10:07 AM EDT.

## 2024-11-19 ENCOUNTER — TELEPHONE (OUTPATIENT)
Dept: DIABETES SERVICES | Facility: HOSPITAL | Age: 79
End: 2024-11-19
Payer: MEDICARE

## 2024-11-19 ENCOUNTER — TRANSCRIBE ORDERS (OUTPATIENT)
Dept: HOME HEALTH SERVICES | Facility: HOME HEALTHCARE | Age: 79
End: 2024-11-19
Payer: MEDICARE

## 2024-11-19 ENCOUNTER — DOCUMENTATION (OUTPATIENT)
Dept: HOME HEALTH SERVICES | Facility: HOME HEALTHCARE | Age: 79
End: 2024-11-19
Payer: MEDICARE

## 2024-11-19 ENCOUNTER — HOME HEALTH ADMISSION (OUTPATIENT)
Dept: HOME HEALTH SERVICES | Facility: HOME HEALTHCARE | Age: 79
End: 2024-11-19
Payer: MEDICARE

## 2024-11-19 DIAGNOSIS — N17.9 AKI (ACUTE KIDNEY INJURY): Primary | ICD-10-CM

## 2024-11-19 NOTE — PROGRESS NOTES
Facility Discharge  Leona Garcia - [birth year]  DC 11/18 from [facility]  PT SOC 11/21     Dr. Alfred Liriano is the PCP/POC/Attending provider and agrees to follow the HH/POC per Danielle on 11/19/24 at 09:26    Dr. Kecia Phillips is the ordering provider: PT to admit on 11/21/24    Dr. Kecia Phillips performed the F2F on 11/5/24    DX: altered mental status, JOSELITO, DM, pain, failed lower level of care, and imparied mobility; HTN, hypothyroidism, CHF, morbid obesity.     Address confirmed:  [address] [place] [address] [place] [address]    Contact:  #1 patient sister - Louann [phone]     I spoke with patient's sister, Louann and they are agreeable to home health and do not have any other skilled services in the home at this time.

## 2024-11-20 ENCOUNTER — TELEPHONE (OUTPATIENT)
Dept: DIABETES SERVICES | Facility: HOSPITAL | Age: 79
End: 2024-11-20
Payer: MEDICARE

## 2024-11-21 ENCOUNTER — HOME CARE VISIT (OUTPATIENT)
Dept: HOME HEALTH SERVICES | Facility: HOME HEALTHCARE | Age: 79
End: 2024-11-21
Payer: MEDICARE

## 2024-11-21 PROCEDURE — G0151 HHCP-SERV OF PT,EA 15 MIN: HCPCS

## 2024-11-22 ENCOUNTER — HOME CARE VISIT (OUTPATIENT)
Dept: HOME HEALTH SERVICES | Facility: HOME HEALTHCARE | Age: 79
End: 2024-11-22
Payer: MEDICARE

## 2024-11-22 VITALS
OXYGEN SATURATION: 95 % | HEART RATE: 68 BPM | HEIGHT: 66 IN | WEIGHT: 217 LBS | RESPIRATION RATE: 18 BRPM | TEMPERATURE: 97.8 F | DIASTOLIC BLOOD PRESSURE: 64 MMHG | BODY MASS INDEX: 34.87 KG/M2 | SYSTOLIC BLOOD PRESSURE: 146 MMHG

## 2024-11-22 VITALS
HEART RATE: 63 BPM | SYSTOLIC BLOOD PRESSURE: 190 MMHG | DIASTOLIC BLOOD PRESSURE: 82 MMHG | TEMPERATURE: 98.4 F | OXYGEN SATURATION: 98 % | RESPIRATION RATE: 14 BRPM

## 2024-11-22 PROCEDURE — G0299 HHS/HOSPICE OF RN EA 15 MIN: HCPCS

## 2024-11-22 NOTE — HOME HEALTH
Eval Note: see visit summary    Patient's goal(s): managment of lymphedema and understanding of medications    Services required to achieve goals: SN    Potential Issues for goal attainment: None    Problems identified: Knowlege deficit, mobility issues preventing pt from wrapping legs herself    Describe the Functional status and safety: ambnulates in the home with walker     Describe any environmental issues: cluttered walkways     Any equipment needs: none identified    POC confirmed with Cari Garcia on date 11/22/2024

## 2024-11-22 NOTE — Clinical Note
Eval Note: see visit summary    Patient's goal(s): managment of lymphedema and understanding of medications    Services required to achieve goals: SN    Potential Issues for goal attainment: None    Problems identified: Knowlege deficit, mobility issues preventing pt from wrapping legs herself    Describe the Functional status and safety: ambnulates in the home with walker     Describe any environmental issues: cluttered walkways     Any equipment needs: none identified    POC confirmed with Cari Garica on date 11/22/2024

## 2024-11-22 NOTE — HOME HEALTH
"SOC Note:     Home Health ordered for: PT, SN    Reason for Hosp/Primary Dx/Co-morbidities: Patient was discharged home on from [facility] on 11/18/24 and referred to home health with diagnossis of acute kidney failure. PMH:JOSELITO, DM, pain, and imparied mobility; HTN, hypothyroidism, CHF, morbid obesity.     Focus of Care: Acute kidney failure and related gait impairment    Patient's goal(s): \"To get up and down easier and walk good\"    Current Functional status/mobility/DME: Patient shows multiple attempts coming to stand from recliner chair needing min assist and ambulates about 60ft with rollator walker and min, showing short stance on lt le with discontinous steps and a limp    HB status/Living Arrangements: Patient and sister live together but she also has medical/physical challenges and their grandnephew is staying 3-4 weeks to assist as needed before returning home in [place]    Skin Integrity/wound status: No skin breakdown but patient has edema in rt leg graded 3+ with history of  weeping. SN eval entered    Code Status: Full code    Fall Risk/Safety concerns: High risk for fall related to functional impairment    Educated on Emergency Plan, steps to take prior to going to the ER and when to Call [facility]: Yes     Medication issues/Concerns: No    Additional Problems/Concerns: No    SDOH Barriers (i.e. caregiver concerns, social isolation, transportation, food insecurity, environment, income etc.)/Need for MSW: No    Patient will require additional PT 1w6 for therapeutic/home exercise program to ble, pain control in lt knee, tranfer teaching and gait training with rollator walker."

## 2024-11-26 ENCOUNTER — HOME CARE VISIT (OUTPATIENT)
Dept: HOME HEALTH SERVICES | Facility: HOME HEALTHCARE | Age: 79
End: 2024-11-26
Payer: MEDICARE

## 2024-11-26 VITALS
HEART RATE: 57 BPM | TEMPERATURE: 97.9 F | DIASTOLIC BLOOD PRESSURE: 70 MMHG | SYSTOLIC BLOOD PRESSURE: 148 MMHG | OXYGEN SATURATION: 98 %

## 2024-11-26 VITALS
TEMPERATURE: 61 F | HEART RATE: 98 BPM | DIASTOLIC BLOOD PRESSURE: 72 MMHG | SYSTOLIC BLOOD PRESSURE: 124 MMHG | RESPIRATION RATE: 18 BRPM | OXYGEN SATURATION: 96 %

## 2024-11-26 PROCEDURE — G0151 HHCP-SERV OF PT,EA 15 MIN: HCPCS

## 2024-11-26 PROCEDURE — G0162 HHC RN E&M PLAN SVS, 15 MIN: HCPCS

## 2024-11-29 ENCOUNTER — HOME CARE VISIT (OUTPATIENT)
Dept: HOME HEALTH SERVICES | Facility: HOME HEALTHCARE | Age: 79
End: 2024-11-29
Payer: MEDICARE

## 2024-12-03 ENCOUNTER — HOME CARE VISIT (OUTPATIENT)
Dept: HOME HEALTH SERVICES | Facility: HOME HEALTHCARE | Age: 79
End: 2024-12-03
Payer: MEDICARE

## 2024-12-03 VITALS
RESPIRATION RATE: 18 BRPM | TEMPERATURE: 97.9 F | SYSTOLIC BLOOD PRESSURE: 132 MMHG | HEART RATE: 63 BPM | OXYGEN SATURATION: 96 % | DIASTOLIC BLOOD PRESSURE: 86 MMHG

## 2024-12-03 PROCEDURE — G0151 HHCP-SERV OF PT,EA 15 MIN: HCPCS

## 2024-12-04 ENCOUNTER — HOME CARE VISIT (OUTPATIENT)
Dept: HOME HEALTH SERVICES | Facility: HOME HEALTHCARE | Age: 79
End: 2024-12-04
Payer: MEDICARE

## 2024-12-06 ENCOUNTER — HOME CARE VISIT (OUTPATIENT)
Dept: HOME HEALTH SERVICES | Facility: HOME HEALTHCARE | Age: 79
End: 2024-12-06
Payer: MEDICARE

## 2024-12-07 ENCOUNTER — HOME CARE VISIT (OUTPATIENT)
Dept: HOME HEALTH SERVICES | Facility: HOME HEALTHCARE | Age: 79
End: 2024-12-07
Payer: MEDICARE

## 2024-12-07 NOTE — Clinical Note
Transfer Summary:    - Patient transferred to [facility]  - Reason for transfer: CHF and leg swelling  - Report called to [facility]  on 12/6/24    - Summary of Care, treatment or services provided to the patient including disciplines seeing the patient:nursing and PT    - Patient progress toward goals: Ongoing    - Communicable Disease? No

## 2024-12-07 NOTE — HOME HEALTH
This patient was admitted to [facility] as an inpatient status via ER on 12/4/24 CHF with a diagnosis of CHF and bilateral leg swelling.

## 2024-12-26 ENCOUNTER — HOME CARE VISIT (OUTPATIENT)
Dept: HOME HEALTH SERVICES | Facility: HOME HEALTHCARE | Age: 79
End: 2024-12-26
Payer: MEDICARE

## 2024-12-26 VITALS
SYSTOLIC BLOOD PRESSURE: 136 MMHG | WEIGHT: 228 LBS | OXYGEN SATURATION: 98 % | RESPIRATION RATE: 16 BRPM | HEIGHT: 66 IN | DIASTOLIC BLOOD PRESSURE: 58 MMHG | HEART RATE: 63 BPM | BODY MASS INDEX: 36.64 KG/M2 | TEMPERATURE: 97.3 F

## 2024-12-26 PROCEDURE — G0299 HHS/HOSPICE OF RN EA 15 MIN: HCPCS

## 2024-12-26 NOTE — Clinical Note
"Resumption of Care Note: Pt discharged from [facility] on 12-9 and sent home w/ medication changes, pt followed up w/ Dr. Lee and he d/c lasix and potassium, started metolazone 2.5mg x 7 days. Pt stated she started metolazone late r/t picking it up late from pharmacy. This nurse called Dr. Lee office while in pts home, a f/u appt was made for blood work at Dr. lE office scheduled on 1-9-24 at 1445 and, a f/u appt with Dr. Lee again on 1-16-24 at 1545. This nurse verified appointments w/ pt and caregiver and wrote down appts for patient/caregiver, patient/caregiver return verbalized understanding. Pt noted to have 2 + non pitting edema in BLE, pt instructed to elevate BLE when not using, pt also assisted with applying compression stockings, pts weighed on her scale in living room and weighed 228.4lbs. Pt weak and uses a walker as assistive device, pts goal is to \" get strength back\", pt looking forward to PT services through us. SN will see pt 2wk2 then 1wk1 till end of cert period.     Reason for hospitalization/new problems: JOSELITO    GREGORIA Slaughter Findings:edema    New/changed medications: lasix and potassium d/c, metolazone started for 7 days.     New/changed orders: medications and follow up appts    Educated on Emergency Plan, steps to take prior to going to the ER and when to Call [facility]:  pt and caregiver instructed to call us first and have magnet    Plan/Focus of Care and Skilled need: FOC: Edema"

## 2024-12-27 NOTE — HOME HEALTH
"Resumption of Care Note: Pt discharged from [facility] on 12-9 and sent home w/ medication changes, pt followed up w/ Dr. Lee and he d/c lasix and potassium, started metolazone 2.5mg x 7 days. Pt stated she started metolazone late r/t picking it up late from pharmacy. This nurse called Dr. Lee office while in pts home, a f/u appt was made for blood work at Dr. El office scheduled on 1-9-24 at 1445 and, a f/u appt with Dr. Lee again on 1-16-24 at 1545. This nurse verified appointments w/ pt and caregiver and wrote down appts for patient/caregiver, patient/caregiver return verbalized understanding. Pt noted to have 2 + non pitting edema in BLE, pt instructed to elevate BLE when not using, pt also assisted with applying compression stockings, pts weighed on her scale in living room and weighed 228.4lbs. Pt weak and uses a walker as assistive device, pts goal is to \" get strength back\", pt looking forward to PT services through us. SN will see pt 2wk2 then 1wk1 till end of cert period.     Reason for hospitalization/new problems: JOSELITO    GREGORIA Merkel Findings:edema    New/changed medications: lasix and potassium d/c, metolazone started for 7 days.     New/changed orders: medications and follow up appts    Educated on Emergency Plan, steps to take prior to going to the ER and when to Call [facility]:  pt and caregiver instructed to call us first and have magnet    Plan/Focus of Care and Skilled need: FOC: Edema"

## 2024-12-30 ENCOUNTER — HOME CARE VISIT (OUTPATIENT)
Dept: HOME HEALTH SERVICES | Facility: HOME HEALTHCARE | Age: 79
End: 2024-12-30
Payer: MEDICARE

## 2024-12-30 PROCEDURE — G0299 HHS/HOSPICE OF RN EA 15 MIN: HCPCS

## 2024-12-31 ENCOUNTER — HOME CARE VISIT (OUTPATIENT)
Dept: HOME HEALTH SERVICES | Facility: HOME HEALTHCARE | Age: 79
End: 2024-12-31
Payer: MEDICARE

## 2024-12-31 NOTE — HOME HEALTH
This nurse called to check on patient r/t reports of low BS on 12-30, pts sister stated pt is doing well and feels much better this a.m, this nurse asked if pt needed additional visit today but sister states pt is doing good. Pt is scheduled to be seen by SN again this coming Thursday.

## 2024-12-31 NOTE — HOME HEALTH
Routine visit note: Medications reviewed.  No issues with elimination. CP assessed. Pt ate poorly yesterday on 12-29-24. Pt sitting in recliner chair with legs hanging down, pt noted to have 2+ edema in BLE, LLE noted to be weeping, this nurse ace wrapped BLE and instructed pt to elevate BLE while sitting. Pt and pts sister states shes been compliant with medications. Pts and pts sister stated pts bs was 39 this a.m, pts sister stated pt had food/juice and her bs increased to 198. Pts sister stated pt went to [employer] yesterday and had trouble transferring/ambulating w/ walker and only ate 2 chicken tenders all day, pts BS was in 300s last hs so pts sister administered insulin and pts BS noted to be 39 this a.m, pt and caregiver instructed if pt doesnt eat and BS is in 300s to wait and recheck bs instead of administering insulin, pt and caregiver instructed to call [facility] if they ever need help with BS or medications, pts sister return verbalized understanding. This nurse assisted pt with checking BS during this visit and BS noted to be 205.  Ace wraps ordered today for pts BLE and will be sent to office.     Skill/education provided: Vital signs stable. BLE ace wrapped. Cardiopulmonary assessment completed. Patient denies falls.     Patient/caregiver response: Patient/caregiver verbalized understanding.    Plam for next visit: Vital signs, review medications, cardiopulmonary assessment, assess falls and pain, assess cp, assess blood sugar.    Other pertinent info: na

## 2025-01-01 ENCOUNTER — HOSPITAL ENCOUNTER (EMERGENCY)
Facility: HOSPITAL | Age: 80
End: 2025-03-11
Payer: MEDICARE

## 2025-01-01 DIAGNOSIS — I46.9 CARDIAC ARREST: Primary | ICD-10-CM

## 2025-01-01 PROCEDURE — 99285 EMERGENCY DEPT VISIT HI MDM: CPT

## 2025-01-01 PROCEDURE — 25010000002 AMIODARONE PER 30 MG: Performed by: EMERGENCY MEDICINE

## 2025-01-01 PROCEDURE — 94799 UNLISTED PULMONARY SVC/PX: CPT

## 2025-01-01 PROCEDURE — 25010000002 EPINEPHRINE 1 MG/10ML SOLUTION PREFILLED SYRINGE: Performed by: EMERGENCY MEDICINE

## 2025-01-01 PROCEDURE — 92950 HEART/LUNG RESUSCITATION CPR: CPT

## 2025-01-01 RX ORDER — AMIODARONE HYDROCHLORIDE 150 MG/3ML
INJECTION, SOLUTION INTRAVENOUS
Status: COMPLETED | OUTPATIENT
Start: 2025-01-01 | End: 2025-01-01

## 2025-01-01 RX ADMIN — EPINEPHRINE 1 MG: 0.1 INJECTION, SOLUTION ENDOTRACHEAL; INTRACARDIAC; INTRAVENOUS at 03:37

## 2025-01-01 RX ADMIN — AMIODARONE HYDROCHLORIDE 300 MG: 50 INJECTION, SOLUTION INTRAVENOUS at 03:35

## 2025-01-01 RX ADMIN — EPINEPHRINE 1 MG: 0.1 INJECTION, SOLUTION ENDOTRACHEAL; INTRACARDIAC; INTRAVENOUS at 03:34

## 2025-01-02 ENCOUNTER — HOME CARE VISIT (OUTPATIENT)
Dept: HOME HEALTH SERVICES | Facility: HOME HEALTHCARE | Age: 80
End: 2025-01-02
Payer: MEDICARE

## 2025-01-02 NOTE — CASE COMMUNICATION
No SN visit today per family request has busy schedule with follow MD appointmens request visit resume next week.

## 2025-01-03 ENCOUNTER — HOME CARE VISIT (OUTPATIENT)
Dept: HOME HEALTH SERVICES | Facility: HOME HEALTHCARE | Age: 80
End: 2025-01-03
Payer: MEDICARE

## 2025-01-03 PROCEDURE — G0151 HHCP-SERV OF PT,EA 15 MIN: HCPCS

## 2025-01-06 VITALS
TEMPERATURE: 97.2 F | DIASTOLIC BLOOD PRESSURE: 58 MMHG | OXYGEN SATURATION: 97 % | SYSTOLIC BLOOD PRESSURE: 124 MMHG | HEART RATE: 63 BPM | RESPIRATION RATE: 18 BRPM

## 2025-01-08 ENCOUNTER — HOME CARE VISIT (OUTPATIENT)
Dept: HOME HEALTH SERVICES | Facility: HOME HEALTHCARE | Age: 80
End: 2025-01-08
Payer: MEDICARE

## 2025-01-09 ENCOUNTER — HOME CARE VISIT (OUTPATIENT)
Dept: HOME HEALTH SERVICES | Facility: HOME HEALTHCARE | Age: 80
End: 2025-01-09
Payer: MEDICARE

## 2025-01-09 NOTE — HOME HEALTH
This patient was admitted to [facility] as an inpatient status via ER on 1/7/25 (date) with a diagnosis of acute renal failure.

## 2025-01-09 NOTE — Clinical Note
Transfer Summary:    - Patient transferred to [facility]  - Reason for transfer: ARF  - Report called to  at [facility].  - Summary of Care, treatment or services provided to the patient including disciplines seeing the patient: SN and PT    - Patient progress toward goals: ongoing    - Communicable Disease? No

## 2025-01-17 ENCOUNTER — HOME CARE VISIT (OUTPATIENT)
Dept: HOME HEALTH SERVICES | Facility: HOME HEALTHCARE | Age: 80
End: 2025-01-17
Payer: MEDICARE

## 2025-03-07 ENCOUNTER — HOSPITAL ENCOUNTER (OUTPATIENT)
Dept: INTERVENTIONAL RADIOLOGY/VASCULAR | Facility: HOSPITAL | Age: 80
Discharge: HOME | End: 2025-03-07
Payer: MEDICARE

## 2025-03-07 VITALS
RESPIRATION RATE: 13 BRPM | BODY MASS INDEX: 34.72 KG/M2 | HEIGHT: 66 IN | OXYGEN SATURATION: 100 % | WEIGHT: 216.05 LBS | HEART RATE: 85 BPM

## 2025-03-07 DIAGNOSIS — N18.6 ESRD (END STAGE RENAL DISEASE): ICD-10-CM

## 2025-03-07 PROCEDURE — 25010000002 CEFAZOLIN PER 500 MG

## 2025-03-07 PROCEDURE — C1769 GUIDE WIRE: HCPCS

## 2025-03-07 PROCEDURE — 25010000002 FENTANYL CITRATE (PF) 50 MCG/ML SOLUTION

## 2025-03-07 PROCEDURE — 77001 FLUOROGUIDE FOR VEIN DEVICE: CPT

## 2025-03-07 PROCEDURE — 25010000002 LIDOCAINE 1 % SOLUTION

## 2025-03-07 PROCEDURE — C1750 CATH, HEMODIALYSIS,LONG-TERM: HCPCS

## 2025-03-07 PROCEDURE — 25010000002 HEPARIN (PORCINE) PER 1000 UNITS

## 2025-03-07 PROCEDURE — 36581 REPLACE TUNNELED CV CATH: CPT

## 2025-03-07 RX ORDER — HEPARIN SODIUM 1000 [USP'U]/ML
INJECTION, SOLUTION INTRAVENOUS; SUBCUTANEOUS AS NEEDED
Status: COMPLETED | OUTPATIENT
Start: 2025-03-07 | End: 2025-03-07

## 2025-03-07 RX ORDER — FENTANYL CITRATE 50 UG/ML
INJECTION, SOLUTION INTRAMUSCULAR; INTRAVENOUS AS NEEDED
Status: COMPLETED | OUTPATIENT
Start: 2025-03-07 | End: 2025-03-07

## 2025-03-07 RX ORDER — LIDOCAINE HYDROCHLORIDE 10 MG/ML
INJECTION, SOLUTION INFILTRATION; PERINEURAL AS NEEDED
Status: COMPLETED | OUTPATIENT
Start: 2025-03-07 | End: 2025-03-07

## 2025-03-07 RX ADMIN — LIDOCAINE HYDROCHLORIDE 6 ML: 10 INJECTION, SOLUTION INFILTRATION; PERINEURAL at 10:28

## 2025-03-07 RX ADMIN — HEPARIN SODIUM 4100 UNITS: 1000 INJECTION, SOLUTION INTRAVENOUS; SUBCUTANEOUS at 10:42

## 2025-03-07 RX ADMIN — CEFAZOLIN 1000 MG: 1 INJECTION, POWDER, FOR SOLUTION INTRAMUSCULAR; INTRAVENOUS at 10:19

## 2025-03-07 RX ADMIN — FENTANYL CITRATE 50 MCG: 50 INJECTION, SOLUTION INTRAMUSCULAR; INTRAVENOUS at 10:18

## 2025-03-11 NOTE — ED PROVIDER NOTES
Subjective   History of Present Illness  79-year-old female presents from the extended care facility with reports of cardiac arrest.  EMS reports that they were called to the scene and patient was pulseless and apneic with a PEA rhythm initially.  Multiple doses of epinephrine given prior to arrival and patient was defibrillated twice.  They states they had brief ROSC prior to arrival that deteriorated to pulselessness.  They state her pupils were fixed and dilated upon their arrival and she had an unwitnessed arrest at the facility.  They report at least 35-minute downtime prior to arrival here  Review of Systems    Past Medical History:   Diagnosis Date    Anemia     Chronic kidney disease (CKD)     Diabetes mellitus     Disease of thyroid gland     Hypertension    CVA    Allergies   Allergen Reactions    Codeine Nausea Only, Unknown - Low Severity and Other (See Comments)     Dizziness    Amlodipine Unknown - Low Severity       No past surgical history on file.    No family history on file.    Social History     Socioeconomic History    Marital status:    Tobacco Use    Smoking status: Never    Smokeless tobacco: Never   Vaping Use    Vaping status: Never Used   Substance and Sexual Activity    Alcohol use: Never    Drug use: Never    Sexual activity: Not Currently     Prior to Admission medications    Medication Sig Start Date End Date Taking? Authorizing Provider   Accu-Chek Softclix Lancets lancets 1 each 3 (Three) Times a Day Before Meals. Dx: E11.65 10/23/24   Sharron Grissom MD   acetaminophen (TYLENOL) 500 MG tablet Take 1,000 mg by mouth Every 6 (Six) Hours As Needed for Moderate Pain. Indications: Pain 1/1/22   Alfred Liriano MD   aspirin 81 MG chewable tablet Chew 1 tablet Daily. Indications: Venous Thromboembolism 10/22/24   Provider, MD Miriam   bumetanide (BUMEX) 2 MG tablet Take 1 tablet by mouth 2 (Two) Times a Day for 30 days.  Patient not taking: Reported on 11/21/2024  10/29/24 11/28/24  Sharron Grissom MD   carvedilol (COREG) 6.25 MG tablet Take 1 tablet by mouth 2 (Two) Times a Day With Meals for 30 days.  Patient not taking: Reported on 11/21/2024 1/4/24 2/3/24  Dennis Ribeiro MD   doxazosin (CARDURA) 2 MG tablet Take 2 mg by mouth twice a day. Indications: Dysfunctional Voiding 11/18/24   Alfred Liriano MD   ezetimibe (ZETIA) 10 MG tablet Take 1 tablet by mouth Daily. Indications: High Amount of Fats in the Blood 10/18/23   Miriam Baldwin MD   famotidine (Pepcid) 20 MG tablet Take 10 mg by mouth Daily. Indications: Heartburn 11/18/24   Alfred Liriano MD   fluticasone (FLONASE) 50 MCG/ACT nasal spray Administer 2 sprays into the nostril(s) as directed by provider Daily. Indications: Allergic Rhinitis, Stuffy Nose 10/18/23   Miriam Baldwin MD   furosemide (LASIX) 40 MG tablet Take 40 mg by mouth Daily. Indications: Edema, High Blood Pressure 7/26/24   Alfred Liriano MD   glucose blood (Accu-Chek Guide) test strip 1 each by Other route 3 (Three) Times a Day Before Meals. Dx: E11.65. Use as instructed 10/23/24   Sharron Grissom MD   hydrALAZINE (APRESOLINE) 100 MG tablet Take 1 tablet by mouth 2 (Two) Times a Day. Indications: High Blood Pressure 10/18/23   Miriam Baldwin MD   insulin NPH-insulin regular (humuLIN 70/30,novoLIN 70/30) (70-30) 100 UNIT/ML injection Inject 10 Units under the skin into the appropriate area as directed Every Night. 20 units QAM + 10 units QPM  Indications: Type 2 Diabetes 10/18/23   Miriam Baldwin MD   levothyroxine (SYNTHROID, LEVOTHROID) 50 MCG tablet Take 1 tablet by mouth Daily. Indications: Underactive Thyroid 10/18/23   Miriam Baldwin MD   potassium chloride 10 MEQ CR tablet Take 1 tablet by mouth 2 (Two) Times a Day. Indications: Low Amount of Potassium in the Blood 10/18/23   Provider, MD Miriam   Zinc 50 MG tablet Take 50 mg by mouth Daily. Indications: Supplements 1/1/20    Alfred Liriano MD     There were no vitals taken for this visit.        Objective   Physical Exam  Lifeless appearing female with fixed and dilated pupils normocephalic, atraumatic, supraglottic airway in place receiving bag ventilation, Benji device giving compressions, she had equal breath sounds with bagging, she has nontender nondistended abdominal exam, extremities appear atraumatic, she is pulseless and apneic.  She has peripheral IV access in place  Procedures           ED Course        Patient was ordered additional doses of epinephrine, chest compressions and bagging as well as amiodarone.  Patient has no cranial reflexes.  She had no response to the interventions.  A bedside ultrasound shows cardiac standstill and patient is in asystole on the monitor.  She was pronounced  at that time.                                               Medical Decision Making  Risk  Prescription drug management.        Final diagnoses:   Cardiac arrest       ED Disposition  ED Disposition       None            No follow-up provider specified.       Medication List      No changes were made to your prescriptions during this visit.            Gopal Pederson MD  [phone]       Gopal Pederson MD  [phone]